# Patient Record
Sex: FEMALE | Race: WHITE | Employment: FULL TIME | URBAN - METROPOLITAN AREA
[De-identification: names, ages, dates, MRNs, and addresses within clinical notes are randomized per-mention and may not be internally consistent; named-entity substitution may affect disease eponyms.]

---

## 2020-01-01 ENCOUNTER — RESEARCH ENCOUNTER (OUTPATIENT)
Dept: ONCOLOGY | Age: 55
End: 2020-01-01

## 2020-01-01 ENCOUNTER — HOSPITAL ENCOUNTER (OUTPATIENT)
Dept: INFUSION THERAPY | Age: 55
Discharge: HOME | End: 2020-12-22
Payer: COMMERCIAL

## 2020-01-01 ENCOUNTER — OFFICE VISIT (OUTPATIENT)
Dept: ONCOLOGY | Age: 55
End: 2020-01-01
Payer: COMMERCIAL

## 2020-01-01 VITALS
HEART RATE: 78 BPM | TEMPERATURE: 98.7 F | HEIGHT: 63 IN | BODY MASS INDEX: 35.08 KG/M2 | SYSTOLIC BLOOD PRESSURE: 125 MMHG | DIASTOLIC BLOOD PRESSURE: 84 MMHG | WEIGHT: 198 LBS | OXYGEN SATURATION: 99 % | RESPIRATION RATE: 16 BRPM

## 2020-01-01 VITALS
DIASTOLIC BLOOD PRESSURE: 90 MMHG | SYSTOLIC BLOOD PRESSURE: 166 MMHG | HEART RATE: 68 BPM | BODY MASS INDEX: 35.19 KG/M2 | TEMPERATURE: 98.7 F | HEIGHT: 63 IN | OXYGEN SATURATION: 99 % | RESPIRATION RATE: 16 BRPM | WEIGHT: 198.6 LBS

## 2020-01-01 DIAGNOSIS — M19.90 ARTHRITIS: ICD-10-CM

## 2020-01-01 DIAGNOSIS — Z51.11 CHEMOTHERAPY MANAGEMENT, ENCOUNTER FOR: ICD-10-CM

## 2020-01-01 DIAGNOSIS — G62.9 NEUROPATHY: ICD-10-CM

## 2020-01-01 DIAGNOSIS — Z17.1 MALIGNANT NEOPLASM OF UPPER-OUTER QUADRANT OF LEFT BREAST IN FEMALE, ESTROGEN RECEPTOR NEGATIVE (HCC): Primary | ICD-10-CM

## 2020-01-01 DIAGNOSIS — C50.412 MALIGNANT NEOPLASM OF UPPER-OUTER QUADRANT OF LEFT BREAST IN FEMALE, ESTROGEN RECEPTOR NEGATIVE (HCC): Primary | ICD-10-CM

## 2020-01-01 LAB
ALBUMIN SERPL-MCNC: 3.4 G/DL (ref 3.5–5)
ALBUMIN/GLOB SERPL: 0.9 {RATIO} (ref 1.1–2.2)
ALP SERPL-CCNC: 101 U/L (ref 45–117)
ALT SERPL-CCNC: 42 U/L (ref 12–78)
ANION GAP SERPL CALC-SCNC: 2 MMOL/L (ref 5–15)
AST SERPL-CCNC: 22 U/L (ref 15–37)
BASOPHILS # BLD: 0 K/UL (ref 0–0.1)
BASOPHILS NFR BLD: 1 % (ref 0–1)
BILIRUB SERPL-MCNC: 0.3 MG/DL (ref 0.2–1)
BUN SERPL-MCNC: 12 MG/DL (ref 6–20)
BUN/CREAT SERPL: 15 (ref 12–20)
CALCIUM SERPL-MCNC: 9.1 MG/DL (ref 8.5–10.1)
CHLORIDE SERPL-SCNC: 108 MMOL/L (ref 97–108)
CO2 SERPL-SCNC: 31 MMOL/L (ref 21–32)
CREAT SERPL-MCNC: 0.8 MG/DL (ref 0.55–1.02)
DIFFERENTIAL METHOD BLD: ABNORMAL
EOSINOPHIL # BLD: 0.1 K/UL (ref 0–0.4)
EOSINOPHIL NFR BLD: 2 % (ref 0–7)
ERYTHROCYTE [DISTWIDTH] IN BLOOD BY AUTOMATED COUNT: 13.5 % (ref 11.5–14.5)
GLOBULIN SER CALC-MCNC: 3.6 G/DL (ref 2–4)
GLUCOSE SERPL-MCNC: 84 MG/DL (ref 65–100)
HCT VFR BLD AUTO: 37.8 % (ref 35–47)
HGB BLD-MCNC: 11.9 G/DL (ref 11.5–16)
IMM GRANULOCYTES # BLD AUTO: 0 K/UL (ref 0–0.04)
IMM GRANULOCYTES NFR BLD AUTO: 0 % (ref 0–0.5)
LYMPHOCYTES # BLD: 0.7 K/UL (ref 0.8–3.5)
LYMPHOCYTES NFR BLD: 15 % (ref 12–49)
MCH RBC QN AUTO: 28.9 PG (ref 26–34)
MCHC RBC AUTO-ENTMCNC: 31.5 G/DL (ref 30–36.5)
MCV RBC AUTO: 91.7 FL (ref 80–99)
MONOCYTES # BLD: 0.5 K/UL (ref 0–1)
MONOCYTES NFR BLD: 10 % (ref 5–13)
NEUTS SEG # BLD: 3.3 K/UL (ref 1.8–8)
NEUTS SEG NFR BLD: 72 % (ref 32–75)
NRBC # BLD: 0 K/UL (ref 0–0.01)
NRBC BLD-RTO: 0 PER 100 WBC
PLATELET # BLD AUTO: 176 K/UL (ref 150–400)
PLATELET COMMENTS,PCOM: ABNORMAL
PMV BLD AUTO: 10 FL (ref 8.9–12.9)
POTASSIUM SERPL-SCNC: 3.5 MMOL/L (ref 3.5–5.1)
PROT SERPL-MCNC: 7 G/DL (ref 6.4–8.2)
RBC # BLD AUTO: 4.12 M/UL (ref 3.8–5.2)
RBC MORPH BLD: ABNORMAL
SODIUM SERPL-SCNC: 141 MMOL/L (ref 136–145)
WBC # BLD AUTO: 4.6 K/UL (ref 3.6–11)

## 2020-01-01 PROCEDURE — 36415 COLL VENOUS BLD VENIPUNCTURE: CPT

## 2020-01-01 PROCEDURE — 99215 OFFICE O/P EST HI 40 MIN: CPT | Performed by: INTERNAL MEDICINE

## 2020-01-01 PROCEDURE — 96413 CHEMO IV INFUSION 1 HR: CPT

## 2020-01-01 PROCEDURE — 77030012965 HC NDL HUBR BBMI -A

## 2020-01-01 PROCEDURE — 85025 COMPLETE CBC W/AUTO DIFF WBC: CPT

## 2020-01-01 PROCEDURE — 80053 COMPREHEN METABOLIC PANEL: CPT

## 2020-01-01 PROCEDURE — 74011000250 HC RX REV CODE- 250: Performed by: INTERNAL MEDICINE

## 2020-01-01 PROCEDURE — 74011250636 HC RX REV CODE- 250/636: Performed by: INTERNAL MEDICINE

## 2020-01-01 RX ADMIN — Medication 1200 MG: at 12:50

## 2020-04-09 ENCOUNTER — DOCUMENTATION ONLY (OUTPATIENT)
Dept: ONCOLOGY | Age: 55
End: 2020-04-09

## 2020-04-09 ENCOUNTER — RESEARCH ENCOUNTER (OUTPATIENT)
Dept: ONCOLOGY | Age: 55
End: 2020-04-09

## 2020-04-09 ENCOUNTER — OFFICE VISIT (OUTPATIENT)
Dept: ONCOLOGY | Age: 55
End: 2020-04-09

## 2020-04-09 VITALS
WEIGHT: 205.6 LBS | RESPIRATION RATE: 18 BRPM | HEART RATE: 75 BPM | BODY MASS INDEX: 36.43 KG/M2 | HEIGHT: 63 IN | OXYGEN SATURATION: 96 % | TEMPERATURE: 98.5 F | SYSTOLIC BLOOD PRESSURE: 155 MMHG | DIASTOLIC BLOOD PRESSURE: 92 MMHG

## 2020-04-09 DIAGNOSIS — Z17.1 MALIGNANT NEOPLASM OF UPPER-OUTER QUADRANT OF LEFT BREAST IN FEMALE, ESTROGEN RECEPTOR NEGATIVE (HCC): Primary | ICD-10-CM

## 2020-04-09 DIAGNOSIS — C50.412 MALIGNANT NEOPLASM OF UPPER-OUTER QUADRANT OF LEFT BREAST IN FEMALE, ESTROGEN RECEPTOR NEGATIVE (HCC): Primary | ICD-10-CM

## 2020-04-09 RX ORDER — PROCHLORPERAZINE MALEATE 10 MG
10 TABLET ORAL
Qty: 50 TAB | Refills: 5 | Status: SHIPPED | OUTPATIENT
Start: 2020-04-09 | End: 2020-09-14 | Stop reason: ALTCHOICE

## 2020-04-09 RX ORDER — ONDANSETRON HYDROCHLORIDE 8 MG/1
8 TABLET, FILM COATED ORAL
Qty: 60 TAB | Refills: 3 | Status: SHIPPED | OUTPATIENT
Start: 2020-04-09 | End: 2020-09-14 | Stop reason: ALTCHOICE

## 2020-04-09 RX ORDER — LIDOCAINE AND PRILOCAINE 25; 25 MG/G; MG/G
CREAM TOPICAL AS NEEDED
Qty: 30 G | Refills: 0 | Status: SHIPPED | OUTPATIENT
Start: 2020-04-09 | End: 2021-01-01

## 2020-04-09 RX ORDER — LANOLIN ALCOHOL/MO/W.PET/CERES
50 CREAM (GRAM) TOPICAL DAILY
COMMUNITY
Start: 2020-03-19 | End: 2021-01-01

## 2020-04-09 RX ORDER — LISDEXAMFETAMINE DIMESYLATE 30 MG/1
30 CAPSULE ORAL DAILY
COMMUNITY
Start: 2020-02-26 | End: 2020-04-28

## 2020-04-09 NOTE — H&P (VIEW-ONLY)
[facility] [place] [facility] [place] [address] [facility] [address] [facility] W: [phone]  F: [phone] Reason for Visit:  
Opal Craig is a 48 y.o. female who is seen in consultation at the request of Dr. Jacklyn Gardner for evaluation of therapy for breast cancer Treatment History: · 4/6/20 left breast 3:00, 13cmfn, core bx:  IMC, gr 2-3, 1.3 cm, ER negative, SD negative, HER 2 negative at IHC 0; ki67 68%; LN + by core, 6 mm, gr 2-3 History of Present Illness:  
12/28/19 mammogram was negative, she felt the L breast mass herself, earlier in March 2020. FH:  Mother with breast cancer at age 79; maternal aunt with breast cancer in her 45s; maternal aunt with breast cancer at age 61; no ovarian, prostate, or pancreas cancer No past medical history on file. No past surgical history on file. Social History Tobacco Use  Smoking status: Light Tobacco Smoker  Smokeless tobacco: Never Used  Tobacco comment: Occasional cigar per patient Substance Use Topics  Alcohol use: Yes Alcohol/week: 6.0 standard drinks Types: 6 Glasses of wine per week Family History Problem Relation Age of Onset  Cancer Mother Breast  
 Cancer Maternal Aunt Breast  
 Cancer Paternal Uncle  Cancer Maternal Aunt Breast  
 Cancer Paternal Uncle Current Outpatient Medications Medication Sig  
 vitamin E acetate (VITAMIN E PO) Take  by mouth.  cholecalciferol, vitamin D3, (VITAMIN D3 PO) Take  by mouth.  prochlorperazine (COMPAZINE) 10 mg tablet Take 1 Tab by mouth every six (6) hours as needed for Nausea.  lidocaine-prilocaine (EMLA) topical cream Apply  to affected area as needed for Pain.  ondansetron hcl (ZOFRAN) 8 mg tablet Take 1 Tab by mouth every eight (8) hours as needed for Nausea.  pyridoxine, vitamin B6, (VITAMIN B-6) 50 mg tablet Take 50 mg by mouth daily.  Vyvanse 30 mg capsule Take 30 mg by mouth daily. No current facility-administered medications for this visit. No Known Allergies Review of Systems: A complete review of systems was obtained, negative except as described above. Physical Exam:  
 
Visit Vitals BP (!) 155/92 (BP 1 Location: Right arm, BP Patient Position: Sitting) Pulse 75 Temp 98.5 °F (36.9 °C) (Temporal) Resp 18 Ht 5' 3\" (1.6 m) Wt 205 lb 9.6 oz (93.3 kg) LMP  (Approximate) Comment: LMP July 2019 per patient SpO2 96% BMI 36.42 kg/m² ECOG PS: 0 General: No distress Eyes: Anicteric sclerae HENT: Atraumatic Neck: Supple Respiratory: Normal respiratory effort CV: No peripheral edema Skin: No rashes, ecchymoses, or petechiae Psych: Alert, oriented, appropriate affect, normal judgment/insight Breasts:  L breast 3:00, 7-8 cmfn, 2.5 cm mass, left ax, 2 - 2cm ax LN, and 1 cm LN Results: No results found for: WBC, HGB, HCT, PLT, MCV, ANEU, HGBPOC, HCTPOC, HGBEXT, HCTEXT, PLTEXT, HGBEXT, HCTEXT, PLTEXT No results found for: NA, K, CL, CO2, GLU, BUN, CREA, GFRAA, GFRNA, CA, NAPOC, KPOCT, CLPOC, GLUCPOC, IBUN, CREAPOC, ICAI No results found for: TBILI, ALT, SGOT, AP, TP, ALB, GLOB 
 
3/31/20 
3D mammogram:  1.6 cm mass in L breast 
US L breast 
3:00 left breast mass 1.6 cm, multiple LN in L ax tail Records reviewed and summarized above. Pathology report(s) reviewed above. Radiology report(s) reviewed above. Assessment/plan: 1. Left IMC, gr 2-3, triple negative, LN + by core:  cT1c cN1a cM0, stage IIA both anatomic and prognostic With multiple LN felt on exam, ordered CT c/a/p and bone scan for staging We explained to the patient that the goal of systemic adjuvant therapy is to improve the chances for cure and decrease the risk of relapse.  We explained why a patient can have microscopic cancer spread now even though physical examination, laboratory studies and imaging studies are negative for cancer. We explained that the same treatments used now as adjuvant or preventive treatments rarely if ever are curative in women who develop metastases. We discussed that there is no difference in overall survival between neoadjuvant and adjuvant chemotherapy. We discussed the rationale for neoadjuvant chemotherapy, if chemotherapy is warranted, as it is in this case: to avoid any potential delays in giving chemotherapy following surgery, to be able to see the response of the tumor to chemotherapy, and to potentially downstage the tumor prior to surgery. I discussed the potential risks of dose-dense chemotherapy with the patient. (DD AC-T, adriamycin 60 mg/m2; cyclophosphamide 600 mg/m2 q 2 weeks x 4; paclitaxel 80 mg/m2 qweekly x 12). Major toxicities include nausea and vomiting, stomatitis, fatigue, and a small risk of heart damage. Anemia frequently results and occasionally requires growth factors and rarely transfusions. Neutropenic fever is uncommon, but can be a life-threatening problem. Also, there is a small but increased risk of myelodysplasia and acute leukemia. We provided the patient with detailed information concerning toxicity including frequent toxicities that only last a few days, such as nausea, vomiting, mouth sores, arthralgia, myalgia, and potentially allergic reactions to paclitaxel, as well as toxicities which can be longer lasting including total alopecia, fatigue, anemia and neuropathy. We provided the patient with detailed information concerning the toxicities of their regimen in addition to our verbal discussion. We discussed the potential addition of carboplatin auc 6 q 3 weeks during weekly paclitaxel as evaluated in CALGB 04773, showing a significant improvement in pCR for patients with stage II-III triple negative breast cancer. Additional side effects of added myelosuppression, fatigue, renal damage with dehydration, and nausea and vomiting were discussed.  Would use AUC 5 as that is the dose that is being used in all current investigations Also discussed the clinical trial NSABP B-59, which is carbo/taxol then DD [facility] +/- atezolizumab. Discussed the positive findings of MERCK 522 with pembrolizumab We discussed the risks and benefits of atezolizumab therapy today. Potential side effects include, but are not limited to fatigue, rash, auto-immune issues, infertility, allergic reactions, and rarely, death. Discussed cold caps (not working with [facility]). Discussed oral and peripheral cryotherapy Discussed COVID19 risks and LETI guidelines stating neoadj chemotherapy for [place] breast cancer is preferred vs upfront surgery during this pandemic. After this discussion, she is agreeable to chemotherapy and port placement. She will let us know by early next week if she would like to consent to B-59. Would aim to start chemo likely the week of the 20th The patient was given the following prescriptions with written and verbal instructions on how to use each:  Compazine, zofran, olanzapine (held until [facility]), a wig, and emla cream.  IV Graciela Sidle will be used with AC. Neulasta the following day with AC (bone pain side effect discussed). Dr. Shabnam May to place port, discussed with her and will let her know when pt decides on chemotherapy regimen (trial or not) TTE ordered Breast MRI ordered 2. Emotional well being:  She has excellent support and is coping well with her disease 3. Genetic testing:   Discussed potential ramifications of a positive test including the potential need for bilateral mastectomies and bilateral oophorectomies and the risk then for her family members to also have a mutation. VUS discussed also. Will do testing when draw blood next 
 
> 80 min were spent with this patient with > 50% of that time spent in face to face counseling. I appreciate the opportunity to participate in Ms. Amanda Kurtz's care.  
 
Signed By: Coy Floyd MD   
 
 No orders of the defined types were placed in this encounter.

## 2020-04-09 NOTE — PROGRESS NOTES
Met with patient to discuss NSABP B-59. We discussed the study in detail and I answered any questions she had at this time. I gave her a read-only consent to take home and review. She said that she would read the consent tonight and call us back tomorrow or we will call her back on Monday if we do not hear from her tomorrow.

## 2020-04-09 NOTE — PATIENT INSTRUCTIONS
Common Side Effects of Chemotherapy  Decreased Blood Counts Your blood counts can decrease temporarily due to chemotherapy, they will recover over time. This is an expected side effect that your Doctor will be monitoring.  - If you experience fevers (temperature >100.4°F), bleeding or unexplained bruising, please call the office right away   Risk of Infection Your white blood cells can decrease temporarily due to chemotherapy and can put you at higher risk of infection. Washing hands frequently with soap and avoiding sick contacts can reduce your risk of infection.  - If you experience fevers (temperature >100.4°F), shaking chills, or any signs of infection, please call the office immediately   Anemia Chemotherapy can cause your red blood cells to temporarily decrease; this is an expected side effect that your Doctor will be monitoring.  - You may experience fatigue if this occurs, please notify the office if you experience bleeding, shortness of breath with minimal exertion or at rest, rapid heartbeat, or feeling as though you may lose consciousness. Hair Loss Chemotherapy can affect your hair follicles and cause you to lose hair. This can occur on your scalp hair but also all over your body including eyebrows and eye lashes   Nausea  You have been prescribed nausea medication to take if needed. Please follow the directions given to you by your Doctor. - Please call the office if the medications you have been given are not relieving nausea. Vomiting Make sure you are taking anti-nausea medication as prescribed. Eating small amounts of bland foods frequently can help. - Please call the office right away if you are vomiting more than 4 times per day or are unable to keep down food or fluids   Diarrhea Eating small amounts of bland foods frequently can help, increase your fluid intake. It is usually ok to take Imodium for diarrhea.   - Please call the office right away if you experience more than 4 episodes of watery diarrhea or if you are feeling dehydrated. Female patients of childbearing age need to avoid pregnancy during chemotherapy. You can reach Medical Oncology at [address] [place] [address] with further questions or concerns at: [phone].  - Calls during normal business hours will reach our office.  - Calls after hours or on the weekend will reach an answering service and the on-call Oncologist will return your call. Prognosis: Good     This is our best current assessment. Cancers respond differently to treatment. Overall prognosis depends on many factors including other conditions, cancer stage, side effects, and other unforeseen events. Goal of therapy: Curative     Expected response to treatment:  Very good: Anticipate remission (no sign of cancer) and possible cancer cure    Treatment benefits and harms:  We discussed potential short term side effects to include:see handouts    Long term side effects of treatment:  see handouts    Quality of life: Quality of life concerns have been addressed. Treatment as outlined is expected to have minimal impact on patients quality of life.       prescriptions with written and verbal instructions on how to use each:  Compazine, zofran, a wig, and emla cream.    Port for chemo    Echo for heart

## 2020-04-09 NOTE — PROGRESS NOTES
Bjorn Owen is a 48 y.o. female here as a new patient for the evaluation of therapy for breast cancer.

## 2020-04-09 NOTE — PROGRESS NOTES
[facility] [place] [facility] [place] [address] [facility] [address] [facility] [facility] [phone]  F: [phone]      Reason for Visit:   Maryuri Webber is a 48 y.o. female who is seen in consultation at the request of Dr. Janis King for evaluation of therapy for breast cancer    Treatment History:   · 4/6/20 left breast 3:00, 13cmfn, core bx:  IMC, gr 2-3, 1.3 cm, ER negative, OR negative, HER 2 negative at IHC 0; ki67 68%; LN + by core, 6 mm, gr 2-3    History of Present Illness:   12/28/19 mammogram was negative, she felt the L breast mass herself, earlier in March 2020. FH:  Mother with breast cancer at age 79; maternal aunt with breast cancer in her 45s; maternal aunt with breast cancer at age 61; no ovarian, prostate, or pancreas cancer    No past medical history on file. No past surgical history on file. Social History     Tobacco Use    Smoking status: Light Tobacco Smoker    Smokeless tobacco: Never Used    Tobacco comment: Occasional cigar per patient   Substance Use Topics    Alcohol use: Yes     Alcohol/week: 6.0 standard drinks     Types: 6 Glasses of wine per week      Family History   Problem Relation Age of Onset    Cancer Mother         Breast    Cancer Maternal Aunt         Breast    Cancer Paternal Uncle     Cancer Maternal Aunt         Breast    Cancer Paternal Uncle      Current Outpatient Medications   Medication Sig    vitamin E acetate (VITAMIN E PO) Take  by mouth.  cholecalciferol, vitamin D3, (VITAMIN D3 PO) Take  by mouth.  prochlorperazine (COMPAZINE) 10 mg tablet Take 1 Tab by mouth every six (6) hours as needed for Nausea.  lidocaine-prilocaine (EMLA) topical cream Apply  to affected area as needed for Pain.  ondansetron hcl (ZOFRAN) 8 mg tablet Take 1 Tab by mouth every eight (8) hours as needed for Nausea.  pyridoxine, vitamin B6, (VITAMIN B-6) 50 mg tablet Take 50 mg by mouth daily.     Vyvanse 30 mg capsule Take 30 mg by mouth daily. No current facility-administered medications for this visit. No Known Allergies     Review of Systems: A complete review of systems was obtained, negative except as described above. Physical Exam:     Visit Vitals  BP (!) 155/92 (BP 1 Location: Right arm, BP Patient Position: Sitting)   Pulse 75   Temp 98.5 °F (36.9 °C) (Temporal)   Resp 18   Ht 5' 3\" (1.6 m)   Wt 205 lb 9.6 oz (93.3 kg)   LMP  (Approximate) Comment: LMP July 2019 per patient   SpO2 96%   BMI 36.42 kg/m²     ECOG PS: 0  General: No distress  Eyes: Anicteric sclerae  HENT: Atraumatic  Neck: Supple  Respiratory: Normal respiratory effort  CV: No peripheral edema  Skin: No rashes, ecchymoses, or petechiae  Psych: Alert, oriented, appropriate affect, normal judgment/insight  Breasts:  L breast 3:00, 7-8 cmfn, 2.5 cm mass, left ax, 2 - 2cm ax LN, and 1 cm LN    Results:   No results found for: WBC, HGB, HCT, PLT, MCV, ANEU, HGBPOC, HCTPOC, HGBEXT, HCTEXT, PLTEXT, HGBEXT, HCTEXT, PLTEXT  No results found for: NA, K, CL, CO2, GLU, BUN, CREA, GFRAA, GFRNA, CA, NAPOC, KPOCT, CLPOC, GLUCPOC, IBUN, CREAPOC, ICAI  No results found for: TBILI, ALT, SGOT, AP, TP, ALB, GLOB    3/31/20  3D mammogram:  1.6 cm mass in L breast  US L breast  3:00 left breast mass 1.6 cm, multiple LN in L ax tail    Records reviewed and summarized above. Pathology report(s) reviewed above. Radiology report(s) reviewed above. Assessment/plan:   1. Left IMC, gr 2-3, triple negative, LN + by core:  cT1c cN1a cM0, stage IIA both anatomic and prognostic  postmenopausal    With multiple LN felt on exam, ordered CT c/a/p and bone scan for staging    We explained to the patient that the goal of systemic adjuvant therapy is to improve the chances for cure and decrease the risk of relapse. We explained why a patient can have microscopic cancer spread now even though physical examination, laboratory studies and imaging studies are negative for cancer.  We explained that the same treatments used now as adjuvant or preventive treatments rarely if ever are curative in women who develop metastases. We discussed that there is no difference in overall survival between neoadjuvant and adjuvant chemotherapy. We discussed the rationale for neoadjuvant chemotherapy, if chemotherapy is warranted, as it is in this case: to avoid any potential delays in giving chemotherapy following surgery, to be able to see the response of the tumor to chemotherapy, and to potentially downstage the tumor prior to surgery. I discussed the potential risks of dose-dense chemotherapy with the patient. (DD AC-T, adriamycin 60 mg/m2; cyclophosphamide 600 mg/m2 q 2 weeks x 4; paclitaxel 80 mg/m2 qweekly x 12). Major toxicities include nausea and vomiting, stomatitis, fatigue, and a small risk of heart damage. Anemia frequently results and occasionally requires growth factors and rarely transfusions. Neutropenic fever is uncommon, but can be a life-threatening problem. Also, there is a small but increased risk of myelodysplasia and acute leukemia. We provided the patient with detailed information concerning toxicity including frequent toxicities that only last a few days, such as nausea, vomiting, mouth sores, arthralgia, myalgia, and potentially allergic reactions to paclitaxel, as well as toxicities which can be longer lasting including total alopecia, fatigue, anemia and neuropathy. We provided the patient with detailed information concerning the toxicities of their regimen in addition to our verbal discussion. We discussed the potential addition of carboplatin auc 6 q 3 weeks during weekly paclitaxel as evaluated in CALGB 56506, showing a significant improvement in pCR for patients with stage II-III triple negative breast cancer. Additional side effects of added myelosuppression, fatigue, renal damage with dehydration, and nausea and vomiting were discussed.  Would use AUC 5 as that is the dose that is being used in all current investigations    Also discussed the clinical trial NSABP B-59, which is carbo/taxol then DD [facility] +/- atezolizumab. Discussed the positive findings of FDOJO 159 with pembrolizumab    We discussed the risks and benefits of atezolizumab therapy today. Potential side effects include, but are not limited to fatigue, rash, auto-immune issues, infertility, allergic reactions, and rarely, death. Discussed cold caps (not working with [facility]). Discussed oral and peripheral cryotherapy    Discussed COVID19 risks and LETI guidelines stating neoadj chemotherapy for [place] breast cancer is preferred vs upfront surgery during this pandemic. After this discussion, she is agreeable to chemotherapy and port placement. She will let us know by early next week if she would like to consent to B-59. Would aim to start chemo likely the week of the 20th    The patient was given the following prescriptions with written and verbal instructions on how to use each:  Compazine, zofran, olanzapine (held until [facility]), a wig, and emla cream.  IV [place] will be used with AC. Neulasta the following day with AC (bone pain side effect discussed). Dr. Radha Torres to place port, discussed with her and will let her know when pt decides on chemotherapy regimen (trial or not)    TTE ordered    Breast MRI ordered      2. Emotional well being:  She has excellent support and is coping well with her disease    3. Genetic testing:   Discussed potential ramifications of a positive test including the potential need for bilateral mastectomies and bilateral oophorectomies and the risk then for her family members to also have a mutation. VUS discussed also. Will do testing when draw blood next    > 80 min were spent with this patient with > 50% of that time spent in face to face counseling. I appreciate the opportunity to participate in Ms. [place] Kurtz's care.     Signed By: Johan Tate MD      No orders of the defined types were placed in this encounter.

## 2020-04-09 NOTE — PROGRESS NOTES
[address] at [address], [address] [facility] [address] [facility] W: [phone]  F: [phone] Reason for Visit:  
Angelia Boas is a 48 y.o. female who is seen in consultation at the request of Dr. Annamarie Simmons for evaluation of therapy for breast cancer Treatment History: · 4/6/20 left breast 3:00, 13cmfn, core bx:  IMC, gr 2-3, 1.3 cm, ER negative, CO negative, HER 2 negative at IHC 0; ki67 68%; LN + by core, 6 mm, gr 2-3 History of Present Illness:  
*** 
***Include 4 elements, or status of 3 chronic or inactive problems*** FH:  Mother with breast cancer at age 79; maternal aunt with breast cancer at age 48; maternal aunt with breast cancer at age 61 No past medical history on file. No past surgical history on file. Social History Tobacco Use  Smoking status: Not on file Substance Use Topics  Alcohol use: Not on file No family history on file. No current outpatient medications on file. No current facility-administered medications for this visit. Allergies not on file Review of Systems: A complete review of systems was obtained, negative except as described above. Physical Exam:  
There were no vitals taken for this visit. ECOG PS: *** General: No distress Eyes: PERRLA, anicteric sclerae HENT: Atraumatic, OP clear Neck: Supple Lymphatic: No cervical, supraclavicular adenopathy Respiratory: CTAB, normal respiratory effort CV: Normal rate, regular rhythm, no murmurs, no peripheral edema GI: Soft, nontender, nondistended, no masses, no hepatomegaly, no splenomegaly; + BS 
MS:  Digits without clubbing or cyanosis. Skin: No rashes, ecchymoses, or petechiae. Normal temperature, turgor, and texture. Psych: Alert, oriented, appropriate affect, normal judgment/insight Results: No results found for: WBC, HGB, HCT, PLT, MCV, ANEU, HGBPOC, HCTPOC, HGBEXT, HCTEXT, PLTEXT 
 No results found for: NA, K, CL, CO2, GLU, BUN, CREA, GFRAA, GFRNA, CA, NAPOC, KPOCT, CLPOC, GLUCPOC, IBUN, CREAPOC, ICAI No results found for: TBILI, ALT, SGOT, AP, TP, ALB, GLOB 
 
3/31/20 
3D mammogram:  1.6 cm mass in L breast 
US L breast 
3:00 left breast mass 1.6 cm, multiple LN in L ax tail Records reviewed and summarized above. Pathology report(s) reviewed above. Radiology report(s) reviewed above. Assessment/plan: 1. Left IMC, gr 2-3, triple negative, LN + by core:  cT1c cN1a cM0 We explained to the patient that the goal of systemic adjuvant therapy is to improve the chances for cure and decrease the risk of relapse. We explained why a patient can have microscopic cancer spread now even though physical examination, laboratory studies and imaging studies are negative for cancer. We explained that the same treatments used now as adjuvant or preventive treatments rarely if ever are curative in women who develop metastases. We discussed that there is no difference in overall survival between neoadjuvant and adjuvant chemotherapy. We discussed the rationale for neoadjuvant chemotherapy, if chemotherapy is warranted, as it is in this case: to avoid any potential delays in giving chemotherapy following surgery, to be able to see the response of the tumor to chemotherapy, and to potentially downstage the tumor prior to surgery. I discussed the potential risks of dose-dense chemotherapy with the patient. (DD AC-T, adriamycin 60 mg/m2; cyclophosphamide 600 mg/m2 q 2 weeks x 4; paclitaxel 80 mg/m2 qweekly x 12). Major toxicities include nausea and vomiting, stomatitis, fatigue, and a small risk of heart damage. Anemia frequently results and occasionally requires growth factors and rarely transfusions. Neutropenic fever is uncommon, but can be a life-threatening problem. Also, there is a small but increased risk of myelodysplasia and acute leukemia.  We provided the patient with detailed information concerning toxicity including frequent toxicities that only last a few days, such as nausea, vomiting, mouth sores, arthralgia, myalgia, and potentially allergic reactions to paclitaxel, as well as toxicities which can be longer lasting including total alopecia, fatigue, anemia and neuropathy. We provided the patient with detailed information concerning the toxicities of their regimen in addition to our verbal discussion. We discussed the potential addition of carboplatin auc 6 q 3 weeks during weekly paclitaxel as evaluated in CALGB 97433, showing a significant improvement in pCR for patients with stage II-III triple negative breast cancer. Additional side effects of added myelosuppression, fatigue, renal damage with dehydration, and nausea and vomiting were discussed. Would use AUC 5 as that is the dose that is being used in all current investigations Also discussed the clinical trial NSABP B-59, which is carbo/taxol then DD [facility] +/- atezolizumab. Discussed the positive findings of MERCK 522 with pembrolizumab We discussed the risks and benefits of atezolizumab therapy today. Potential side effects include, but are not limited to fatigue, rash, auto-immune issues, infertility, allergic reactions, and rarely, death. Discussed cold caps (not working with [facility]). Discussed oral and peripheral cryotherapy Discussed COVID19 risks and LETI guidelines stating neoadj chemotherapy for [place] breast cancer is preferred vs upfront surgery during this pandemic. After this discussion, she is agreeable to The patient was given the following prescriptions with written and verbal instructions on how to use each:  Compazine, zofran, olanzapine (held until [facility]), a wig, and emla cream.  IV Hui Found will be used with AC. Neulasta the following day with AC (bone pain side effect discussed). Dr. Severa Marking to place port TTE ordered 2.  Emotional well being:  She has excellent support and is coping well with her disease I appreciate the opportunity to participate in MsNoemí Jhony Quintanilla Jerardo's care. Signed By: Aquilino Bertrand MD   
 
No orders of the defined types were placed in this encounter.

## 2020-04-10 ENCOUNTER — OFFICE VISIT (OUTPATIENT)
Dept: SURGERY | Age: 55
End: 2020-04-10

## 2020-04-10 DIAGNOSIS — Z17.1 MALIGNANT NEOPLASM OF UPPER-OUTER QUADRANT OF LEFT BREAST IN FEMALE, ESTROGEN RECEPTOR NEGATIVE (HCC): Primary | ICD-10-CM

## 2020-04-10 DIAGNOSIS — C50.912 MALIGNANT NEOPLASM OF LEFT FEMALE BREAST, UNSPECIFIED ESTROGEN RECEPTOR STATUS, UNSPECIFIED SITE OF BREAST (HCC): Primary | ICD-10-CM

## 2020-04-10 DIAGNOSIS — C50.912 BREAST CANCER METASTASIZED TO AXILLARY LYMPH NODE, LEFT (HCC): Primary | ICD-10-CM

## 2020-04-10 DIAGNOSIS — C77.3 BREAST CANCER METASTASIZED TO AXILLARY LYMPH NODE, LEFT (HCC): Primary | ICD-10-CM

## 2020-04-10 DIAGNOSIS — C50.412 MALIGNANT NEOPLASM OF UPPER-OUTER QUADRANT OF LEFT BREAST IN FEMALE, ESTROGEN RECEPTOR NEGATIVE (HCC): Primary | ICD-10-CM

## 2020-04-10 NOTE — PROGRESS NOTES
Oncology Navigator Psychosocial Assessment Reason for Assessment:   
[]Depression  []Anxiety  []Caregiver Kirby  []Maladaptive Coping with Serious Illness   [x] Social Work Referral [x] Initial Assessment  [] Other Sources of Information:   
[x]Patient  []Family  [x]Staff  [x]Medical Record Advance Care Planning: 
Advance Care Planning 4/10/2020 Confirm Advance Directive None Patient Would Like to Complete Advance Directive No  
 
 
Mental Status:   
[x]Alert  []Lethargic  []Unresponsive   [] Unable to assess Oriented to:  [x]Person  [x]Place  [x]Time  [x]Situation Barriers to Learning:   
[]Language  []Developmental  []Cognitive  []Altered Mental Status  []Visual/Hearing Impairment  []Unable to Read/Write  []Motivational   [x]No Barriers Identified  []Other: 
 
Relationship Status: 
[]Single  []  []Significant Other/Life Partner  [x]  []  [] Living Circumstances: 
[]Lives Alone  [x]Family/Significant Other in Household  []Roommates  []Children in the Home  []Paid Caregivers  []Assisted Living Facility/Group [address]  []Homeless  []Incarcerated  []Environmental/Care Concerns  []other: 
 
Employment Status: 
[x]Employed Full-time []Employed Part-time []Homemaker [] Disabled  [] Retired []Other: 
 
Support System:   
[x]Strong  []Fair  []Limited Financial/Legal Concerns:   
[]Uninsured  []Limited Income/Resources  []Non-Citizen  [x]No Concerns Identified  []Financial POA:   
[]Other: 
 
Protestant/Spiritual/Existential: 
[]Strong Sense of Spirituality  []Involved in [employer] []Request  Visit  []Expressing Spiritual/Existential Angst  [x]No Concerns Identified Coping with Illness:       
 Patient: Family/Caregiver:  
Understanding and Acceptance of Illness/Prognosis  [x] [] Strong Sense of Resilience [x] [] Self Reflection [x] [] Engaged Support System [x] [] Does not Readily Discuss Illness [] [] Denial of Terminal Status [] [] Anger [] [] Depression [] [] Anxiety/Fear [] []  
Bargaining [] [] Recent Diagnosis/Prognosis [x] [] Difficulties with Body Image [] [] Loss of Identity [] [] Excessive Substance Use [] [] Mental Health History [] []  
Enmeshed Relationships [] [] History of Loss [] [] Anticipatory Grief [] [] Concern for Complicated Grief [] [] Suicidal Ideation or Plan [] [] Unable to assess [] []  
        
Narrative: Patient here for initial consultation with Dr. Vikash Martinez. Met with patient to introduce social work role and supports. Patient is divorces and has two adult children. Her daughter lives with her in her private residence and her son is in college. Her family live about 2 hours away. Her coworkers provide most of her local support. She works full time  for [employer]. Provided supportive listening as she ventilates feelings regarding diagnosis and treatment. Patient denies depression or anxiety and tells me she is actively coping with diagnosis at this time. She feels at 'some point she will cry but is too busy planning right now'. She often assumes the caregiver role and tells me it will be hard to adjust to being the  of help but recognizes the importance of support during this time. Provided reassurance that we are here to support her during this process. Reviewed barriers to care and none identified a this time. Patient plans to work during treatment. She is able to work for home and has a flexible schedule. Her daughter is able to help with care needs at home and coworkers have offered to provide transportation. encouraged 
 her to contact me for psychosocial support. Assessment/Action: 1. Provided supportive listening and patient is actively coping with diagnosis. She demonstrates insight and offers self-reflection. She well supported with practical and emotional needs. 2. Wig information provided. 3. Ongoing psychosocial support at desired by patient. Plan/Referral:   
Insurance/Entitlements referral 
DME/WIG referral 
 
Thank you, Jon Gardner LCSW This note will not be viewable in [address]

## 2020-04-10 NOTE — PERIOP NOTES
PAT TELEPHONE INTERVIEW COMPLETED. PRE-OP INSTRUCTIONS REVIEWED WITH PATIENT WHICH INCLUDED INSTRUCTIONS ON MEDICATIONS AND NPO AFTER MIDNIGHT. INSTRUCTIONS GIVEN ON THE USE OF CHLORHEXIDINE SOLUTION THE EVENING BEFORE AND THE MORNING OF SURGERY PATIENT VOICED UNDERSTANDING OF SAME.

## 2020-04-13 ENCOUNTER — RESEARCH ENCOUNTER (OUTPATIENT)
Dept: ONCOLOGY | Age: 55
End: 2020-04-13

## 2020-04-13 NOTE — PROGRESS NOTES
Informed consent was reviewed by RN with patient prior to signing. Possible side effects were discussed in detail, with patient being advised to inform RN or Dr. Kodak Tapia immediately in the event of any side effects once drug is started. All questions were answered adequately by RN or Dr. Kodak Tapia. Patient signed consent today for study [address] [place] [address]. / A Randomized, Double-Blind, Phase III Clinical Trial of Neoadjuvant Chemotherapy with Atezolizumab or Placebo in Patients with Triple-Negative Breast Cancer Followed by Adjuvant Continuation of Atezolizumab or Placebo. A copy of ICF given to patient. No additional questions at this time.

## 2020-04-13 NOTE — PROGRESS NOTES
Peyman Acosta is a 49 yo female  who was phone evaluated on 4/10/2020. I spent 30 minutes on the phone. Consent:  
He and/or his healthcare decision maker is aware that this patient-initiated Telehealth encounter is a billable service, with coverage as determined by his insurance carrier. He is aware that he may receive a bill and has provided verbal consent to proceed: Yes I was at home while conducting this encounter. HPI:  
49 yo female with Left breast carcinoma · 20 left breast  3:00, 13cmfn, core bx:  IMC, gr 2-3, 1.3 cm, ER negative, NJ negative, HER 2 negative at IHC 0; ki67 68%; LN + by core, 6 mm, gr 2-3 
  
History of Present Illness:  
19 mammogram was negative, she felt the L breast mass herself, earlier in 2020. 
  
FH: Mother with breast cancer at age 79; maternal aunt with breast cancer in her 45s; maternal aunt with breast cancer at age 61; no ovarian, prostate, or pancreas cancer 
  
ROS: negative except for left breast mass Past Medical History:  
Diagnosis Date  ADHD  Cancer ([facility] [address] LEFT BREAST CA  
 Sleep apnea MILD -NO CPAP Past Surgical History:  
Procedure Laterality Date  HX  SECTION  98, 99  
 HX GI    
 COLONOSCOPY  
 HX ORTHOPAEDIC    
 RIGHT ELBOW-FATTY MASS Social History Socioeconomic History  Marital status: UNKNOWN Spouse name: Not on file  Number of children: Not on file  Years of education: Not on file  Highest education level: Not on file Occupational History  Not on file Social Needs  Financial resource strain: Not on file  Food insecurity Worry: Not on file Inability: Not on file  Transportation needs Medical: Not on file Non-medical: Not on file Tobacco Use  Smoking status: Light Tobacco Smoker  Smokeless tobacco: Never Used  Tobacco comment: RARE cigar per patient Substance and Sexual Activity  Alcohol use:  Yes  
 Alcohol/week: 6.0 standard drinks Types: 6 Glasses of wine per week  Drug use: Never  Sexual activity: Not on file Lifestyle  Physical activity Days per week: Not on file Minutes per session: Not on file  Stress: Not on file Relationships  Social connections Talks on phone: Not on file Gets together: Not on file Attends Mosque service: Not on file Active member of club or organization: Not on file Attends meetings of clubs or organizations: Not on file Relationship status: Not on file  Intimate partner violence Fear of current or ex partner: Not on file Emotionally abused: Not on file Physically abused: Not on file Forced sexual activity: Not on file Other Topics Concern  Not on file Social History Narrative  Not on file OB History No obstetric history on file. Current Outpatient Medications:  
  pyridoxine, vitamin B6, (VITAMIN B-6) 50 mg tablet, Take 50 mg by mouth daily. , Disp: , Rfl:  
  Vyvanse 30 mg capsule, Take 30 mg by mouth daily. , Disp: , Rfl:  
  vitamin E acetate (VITAMIN E PO), Take  by mouth daily. , Disp: , Rfl:  
  cholecalciferol, vitamin D3, (VITAMIN D3 PO), Take 5,000 Units by mouth daily. , Disp: , Rfl:  
  prochlorperazine (COMPAZINE) 10 mg tablet, Take 1 Tab by mouth every six (6) hours as needed for Nausea., Disp: 50 Tab, Rfl: 5 
  lidocaine-prilocaine (EMLA) topical cream, Apply  to affected area as needed for Pain., Disp: 30 g, Rfl: 0 
  ondansetron hcl (ZOFRAN) 8 mg tablet, Take 1 Tab by mouth every eight (8) hours as needed for Nausea., Disp: 60 Tab, Rfl: 3 No Known Allergies Physical: this is a phone note. Patient was examined by Dr. Shanti Kumar medical oncology on 4/9/2020. A/P  
1. Staging scans and breast mri per Dr. Shanti Kumar. Plan is staging, port placement by myself, then neoadjuvant chemo. I spoke with the patient and discussed port placement.  The procedure and risks were discussed. Risks include bleeding, bruising, scar, infection, need for more surgery and pneumothorax. She understood and wished to proceed. She was happy with the treatment plan. We will discuss future breast surgery in office at later time. Pursuant to the emergency declaration under the [facility], [facility] waiver authority and the [employer] Act, this Virtual Visit was conducted, with patient's consent, to reduce the patient's risk of exposure to COVID-19 and provide continuity of care for an established patient. Services were provided through a phone discussion to substitute for in-person clinic visit. Dr. Misha West

## 2020-04-14 ENCOUNTER — HOSPITAL ENCOUNTER (OUTPATIENT)
Dept: CT IMAGING | Age: 55
Discharge: HOME | End: 2020-04-14
Attending: INTERNAL MEDICINE
Payer: COMMERCIAL

## 2020-04-14 ENCOUNTER — HOSPITAL ENCOUNTER (OUTPATIENT)
Dept: NUCLEAR MEDICINE | Age: 55
Discharge: HOME | End: 2020-04-14
Attending: INTERNAL MEDICINE
Payer: COMMERCIAL

## 2020-04-14 ENCOUNTER — ANESTHESIA EVENT (OUTPATIENT)
Dept: SURGERY | Age: 55
End: 2020-04-14
Payer: COMMERCIAL

## 2020-04-14 DIAGNOSIS — Z17.1 MALIGNANT NEOPLASM OF UPPER-OUTER QUADRANT OF LEFT BREAST IN FEMALE, ESTROGEN RECEPTOR NEGATIVE (HCC): ICD-10-CM

## 2020-04-14 DIAGNOSIS — C50.412 MALIGNANT NEOPLASM OF UPPER-OUTER QUADRANT OF LEFT BREAST IN FEMALE, ESTROGEN RECEPTOR NEGATIVE (HCC): ICD-10-CM

## 2020-04-14 DIAGNOSIS — C50.912 MALIGNANT NEOPLASM OF LEFT FEMALE BREAST, UNSPECIFIED ESTROGEN RECEPTOR STATUS, UNSPECIFIED SITE OF BREAST (HCC): Primary | ICD-10-CM

## 2020-04-14 PROCEDURE — 74177 CT ABD & PELVIS W/CONTRAST: CPT

## 2020-04-14 PROCEDURE — 74011636320 HC RX REV CODE- 636/320: Performed by: RADIOLOGY

## 2020-04-14 PROCEDURE — 78306 BONE IMAGING WHOLE BODY: CPT

## 2020-04-14 RX ADMIN — IOPAMIDOL 100 ML: 755 INJECTION, SOLUTION INTRAVENOUS at 12:04

## 2020-04-15 ENCOUNTER — APPOINTMENT (OUTPATIENT)
Dept: GENERAL RADIOLOGY | Age: 55
End: 2020-04-15
Attending: SURGERY
Payer: COMMERCIAL

## 2020-04-15 ENCOUNTER — HOSPITAL ENCOUNTER (OUTPATIENT)
Age: 55
Setting detail: OUTPATIENT SURGERY
Discharge: HOME | End: 2020-04-15
Attending: SURGERY | Admitting: SURGERY
Payer: COMMERCIAL

## 2020-04-15 ENCOUNTER — ANESTHESIA (OUTPATIENT)
Dept: SURGERY | Age: 55
End: 2020-04-15
Payer: COMMERCIAL

## 2020-04-15 VITALS
BODY MASS INDEX: 36.14 KG/M2 | OXYGEN SATURATION: 96 % | SYSTOLIC BLOOD PRESSURE: 130 MMHG | TEMPERATURE: 97.9 F | RESPIRATION RATE: 12 BRPM | HEART RATE: 63 BPM | HEIGHT: 63 IN | DIASTOLIC BLOOD PRESSURE: 77 MMHG | WEIGHT: 204 LBS

## 2020-04-15 DIAGNOSIS — C50.912 MALIGNANT NEOPLASM OF LEFT FEMALE BREAST, UNSPECIFIED ESTROGEN RECEPTOR STATUS, UNSPECIFIED SITE OF BREAST (HCC): ICD-10-CM

## 2020-04-15 LAB — HCG UR QL: NEGATIVE

## 2020-04-15 PROCEDURE — 74011250636 HC RX REV CODE- 250/636: Performed by: SURGERY

## 2020-04-15 PROCEDURE — 77030040922 HC BLNKT HYPOTHRM STRY -A

## 2020-04-15 PROCEDURE — 74011250636 HC RX REV CODE- 250/636: Performed by: NURSE ANESTHETIST, CERTIFIED REGISTERED

## 2020-04-15 PROCEDURE — 71045 X-RAY EXAM CHEST 1 VIEW: CPT

## 2020-04-15 PROCEDURE — 81025 URINE PREGNANCY TEST: CPT

## 2020-04-15 PROCEDURE — 77030018836 HC SOL IRR NACL ICUM -A: Performed by: SURGERY

## 2020-04-15 PROCEDURE — 74011000250 HC RX REV CODE- 250: Performed by: SURGERY

## 2020-04-15 PROCEDURE — 74011250636 HC RX REV CODE- 250/636: Performed by: ANESTHESIOLOGY

## 2020-04-15 PROCEDURE — 74011250637 HC RX REV CODE- 250/637: Performed by: ANESTHESIOLOGY

## 2020-04-15 PROCEDURE — 77030040361 HC SLV COMPR DVT MDII -B: Performed by: SURGERY

## 2020-04-15 PROCEDURE — 77030008684 HC TU ET CUF COVD -B: Performed by: ANESTHESIOLOGY

## 2020-04-15 PROCEDURE — 76060000033 HC ANESTHESIA 1 TO 1.5 HR: Performed by: SURGERY

## 2020-04-15 PROCEDURE — 77030002996 HC SUT SLK J&J -A: Performed by: SURGERY

## 2020-04-15 PROCEDURE — 76010000149 HC OR TIME 1 TO 1.5 HR: Performed by: SURGERY

## 2020-04-15 PROCEDURE — C1788 PORT, INDWELLING, IMP: HCPCS | Performed by: SURGERY

## 2020-04-15 PROCEDURE — 77030002933 HC SUT MCRYL J&J -A: Performed by: SURGERY

## 2020-04-15 PROCEDURE — 76210000006 HC OR PH I REC 0.5 TO 1 HR: Performed by: SURGERY

## 2020-04-15 PROCEDURE — 77030011640 HC PAD GRND REM COVD -A: Performed by: SURGERY

## 2020-04-15 PROCEDURE — 77030026438 HC STYL ET INTUB CARD -A: Performed by: ANESTHESIOLOGY

## 2020-04-15 PROCEDURE — 77030031139 HC SUT VCRL2 J&J -A: Performed by: SURGERY

## 2020-04-15 PROCEDURE — 74011000250 HC RX REV CODE- 250: Performed by: NURSE ANESTHETIST, CERTIFIED REGISTERED

## 2020-04-15 PROCEDURE — 77030020256 HC SOL INJ NACL 0.9%  500ML: Performed by: SURGERY

## 2020-04-15 PROCEDURE — 77030010507 HC ADH SKN DERMBND J&J -B: Performed by: SURGERY

## 2020-04-15 PROCEDURE — 77030011267 HC ELECTRD BLD COVD -A: Performed by: SURGERY

## 2020-04-15 DEVICE — POWERPORT IMPLANTABLE PORT WITH ATTACHABLE 8F CHRONOFLEX OPEN-ENDED SINGLE-LUMEN VENOUS CATHETER INTERMEDIATE KIT (WITH SUTURE PLUGS)
Type: IMPLANTABLE DEVICE | Site: SUBCLAVIAN | Status: FUNCTIONAL
Brand: POWERPORT, CHRONOFLEX

## 2020-04-15 RX ORDER — SUCCINYLCHOLINE CHLORIDE 20 MG/ML
INJECTION INTRAMUSCULAR; INTRAVENOUS AS NEEDED
Status: DISCONTINUED | OUTPATIENT
Start: 2020-04-15 | End: 2020-04-15 | Stop reason: HOSPADM

## 2020-04-15 RX ORDER — ACETAMINOPHEN 325 MG/1
650 TABLET ORAL ONCE
Status: COMPLETED | OUTPATIENT
Start: 2020-04-15 | End: 2020-04-15

## 2020-04-15 RX ORDER — SODIUM CHLORIDE 0.9 % (FLUSH) 0.9 %
5-40 SYRINGE (ML) INJECTION EVERY 8 HOURS
Status: DISCONTINUED | OUTPATIENT
Start: 2020-04-15 | End: 2020-04-15 | Stop reason: HOSPADM

## 2020-04-15 RX ORDER — SODIUM CHLORIDE, SODIUM LACTATE, POTASSIUM CHLORIDE, CALCIUM CHLORIDE 600; 310; 30; 20 MG/100ML; MG/100ML; MG/100ML; MG/100ML
100 INJECTION, SOLUTION INTRAVENOUS CONTINUOUS
Status: DISCONTINUED | OUTPATIENT
Start: 2020-04-15 | End: 2020-04-15 | Stop reason: HOSPADM

## 2020-04-15 RX ORDER — MORPHINE SULFATE 10 MG/ML
2 INJECTION, SOLUTION INTRAMUSCULAR; INTRAVENOUS
Status: DISCONTINUED | OUTPATIENT
Start: 2020-04-15 | End: 2020-04-15 | Stop reason: HOSPADM

## 2020-04-15 RX ORDER — MIDAZOLAM HYDROCHLORIDE 1 MG/ML
1 INJECTION, SOLUTION INTRAMUSCULAR; INTRAVENOUS AS NEEDED
Status: DISCONTINUED | OUTPATIENT
Start: 2020-04-15 | End: 2020-04-15 | Stop reason: HOSPADM

## 2020-04-15 RX ORDER — LIDOCAINE HYDROCHLORIDE 10 MG/ML
0.1 INJECTION, SOLUTION EPIDURAL; INFILTRATION; INTRACAUDAL; PERINEURAL AS NEEDED
Status: DISCONTINUED | OUTPATIENT
Start: 2020-04-15 | End: 2020-04-15 | Stop reason: HOSPADM

## 2020-04-15 RX ORDER — DEXAMETHASONE SODIUM PHOSPHATE 4 MG/ML
INJECTION, SOLUTION INTRA-ARTICULAR; INTRALESIONAL; INTRAMUSCULAR; INTRAVENOUS; SOFT TISSUE AS NEEDED
Status: DISCONTINUED | OUTPATIENT
Start: 2020-04-15 | End: 2020-04-15 | Stop reason: HOSPADM

## 2020-04-15 RX ORDER — ONDANSETRON 2 MG/ML
INJECTION INTRAMUSCULAR; INTRAVENOUS AS NEEDED
Status: DISCONTINUED | OUTPATIENT
Start: 2020-04-15 | End: 2020-04-15 | Stop reason: HOSPADM

## 2020-04-15 RX ORDER — MIDAZOLAM HYDROCHLORIDE 1 MG/ML
INJECTION, SOLUTION INTRAMUSCULAR; INTRAVENOUS AS NEEDED
Status: DISCONTINUED | OUTPATIENT
Start: 2020-04-15 | End: 2020-04-15 | Stop reason: HOSPADM

## 2020-04-15 RX ORDER — SODIUM CHLORIDE 0.9 % (FLUSH) 0.9 %
5-40 SYRINGE (ML) INJECTION AS NEEDED
Status: DISCONTINUED | OUTPATIENT
Start: 2020-04-15 | End: 2020-04-15 | Stop reason: HOSPADM

## 2020-04-15 RX ORDER — FENTANYL CITRATE 50 UG/ML
50 INJECTION, SOLUTION INTRAMUSCULAR; INTRAVENOUS AS NEEDED
Status: DISCONTINUED | OUTPATIENT
Start: 2020-04-15 | End: 2020-04-15 | Stop reason: HOSPADM

## 2020-04-15 RX ORDER — FENTANYL CITRATE 50 UG/ML
INJECTION, SOLUTION INTRAMUSCULAR; INTRAVENOUS AS NEEDED
Status: DISCONTINUED | OUTPATIENT
Start: 2020-04-15 | End: 2020-04-15 | Stop reason: HOSPADM

## 2020-04-15 RX ORDER — FENTANYL CITRATE 50 UG/ML
25 INJECTION, SOLUTION INTRAMUSCULAR; INTRAVENOUS
Status: DISCONTINUED | OUTPATIENT
Start: 2020-04-15 | End: 2020-04-15 | Stop reason: HOSPADM

## 2020-04-15 RX ORDER — ROPIVACAINE HYDROCHLORIDE 5 MG/ML
30 INJECTION, SOLUTION EPIDURAL; INFILTRATION; PERINEURAL AS NEEDED
Status: DISCONTINUED | OUTPATIENT
Start: 2020-04-15 | End: 2020-04-15 | Stop reason: HOSPADM

## 2020-04-15 RX ORDER — CEFAZOLIN SODIUM/WATER 2 G/20 ML
2 SYRINGE (ML) INTRAVENOUS ONCE
Status: COMPLETED | OUTPATIENT
Start: 2020-04-15 | End: 2020-04-15

## 2020-04-15 RX ORDER — SODIUM CHLORIDE 9 MG/ML
25 INJECTION, SOLUTION INTRAVENOUS CONTINUOUS
Status: DISCONTINUED | OUTPATIENT
Start: 2020-04-15 | End: 2020-04-15 | Stop reason: HOSPADM

## 2020-04-15 RX ORDER — HYDROMORPHONE HYDROCHLORIDE 1 MG/ML
0.5 INJECTION, SOLUTION INTRAMUSCULAR; INTRAVENOUS; SUBCUTANEOUS
Status: DISCONTINUED | OUTPATIENT
Start: 2020-04-15 | End: 2020-04-15 | Stop reason: HOSPADM

## 2020-04-15 RX ORDER — PHENYLEPHRINE HCL IN 0.9% NACL 0.4MG/10ML
SYRINGE (ML) INTRAVENOUS AS NEEDED
Status: DISCONTINUED | OUTPATIENT
Start: 2020-04-15 | End: 2020-04-15 | Stop reason: HOSPADM

## 2020-04-15 RX ORDER — HYDROCODONE BITARTRATE AND ACETAMINOPHEN 5; 325 MG/1; MG/1
1 TABLET ORAL
Qty: 20 TAB | Refills: 0 | Status: SHIPPED | OUTPATIENT
Start: 2020-04-15 | End: 2020-04-20

## 2020-04-15 RX ORDER — HYDROMORPHONE HYDROCHLORIDE 2 MG/ML
INJECTION, SOLUTION INTRAMUSCULAR; INTRAVENOUS; SUBCUTANEOUS AS NEEDED
Status: DISCONTINUED | OUTPATIENT
Start: 2020-04-15 | End: 2020-04-15 | Stop reason: HOSPADM

## 2020-04-15 RX ORDER — MIDAZOLAM HYDROCHLORIDE 1 MG/ML
0.5 INJECTION, SOLUTION INTRAMUSCULAR; INTRAVENOUS
Status: DISCONTINUED | OUTPATIENT
Start: 2020-04-15 | End: 2020-04-15 | Stop reason: HOSPADM

## 2020-04-15 RX ORDER — PROPOFOL 10 MG/ML
INJECTION, EMULSION INTRAVENOUS AS NEEDED
Status: DISCONTINUED | OUTPATIENT
Start: 2020-04-15 | End: 2020-04-15 | Stop reason: HOSPADM

## 2020-04-15 RX ORDER — ROCURONIUM BROMIDE 10 MG/ML
INJECTION, SOLUTION INTRAVENOUS AS NEEDED
Status: DISCONTINUED | OUTPATIENT
Start: 2020-04-15 | End: 2020-04-15 | Stop reason: HOSPADM

## 2020-04-15 RX ORDER — DIPHENHYDRAMINE HYDROCHLORIDE 50 MG/ML
12.5 INJECTION, SOLUTION INTRAMUSCULAR; INTRAVENOUS AS NEEDED
Status: DISCONTINUED | OUTPATIENT
Start: 2020-04-15 | End: 2020-04-15 | Stop reason: HOSPADM

## 2020-04-15 RX ORDER — HEPARIN 100 UNIT/ML
300 SYRINGE INTRAVENOUS ONCE
Status: COMPLETED | OUTPATIENT
Start: 2020-04-15 | End: 2020-04-15

## 2020-04-15 RX ORDER — SODIUM CHLORIDE, SODIUM LACTATE, POTASSIUM CHLORIDE, CALCIUM CHLORIDE 600; 310; 30; 20 MG/100ML; MG/100ML; MG/100ML; MG/100ML
25 INJECTION, SOLUTION INTRAVENOUS CONTINUOUS
Status: DISCONTINUED | OUTPATIENT
Start: 2020-04-15 | End: 2020-04-15 | Stop reason: HOSPADM

## 2020-04-15 RX ORDER — LIDOCAINE HYDROCHLORIDE 20 MG/ML
INJECTION, SOLUTION EPIDURAL; INFILTRATION; INTRACAUDAL; PERINEURAL AS NEEDED
Status: DISCONTINUED | OUTPATIENT
Start: 2020-04-15 | End: 2020-04-15 | Stop reason: HOSPADM

## 2020-04-15 RX ORDER — ONDANSETRON 2 MG/ML
4 INJECTION INTRAMUSCULAR; INTRAVENOUS AS NEEDED
Status: DISCONTINUED | OUTPATIENT
Start: 2020-04-15 | End: 2020-04-15 | Stop reason: HOSPADM

## 2020-04-15 RX ADMIN — ACETAMINOPHEN 650 MG: 325 TABLET ORAL at 07:23

## 2020-04-15 RX ADMIN — HYDROMORPHONE HYDROCHLORIDE 0.4 MG: 2 INJECTION, SOLUTION INTRAMUSCULAR; INTRAVENOUS; SUBCUTANEOUS at 08:32

## 2020-04-15 RX ADMIN — FENTANYL CITRATE 50 MCG: 50 INJECTION, SOLUTION INTRAMUSCULAR; INTRAVENOUS at 07:39

## 2020-04-15 RX ADMIN — PROPOFOL 200 MG: 10 INJECTION, EMULSION INTRAVENOUS at 07:39

## 2020-04-15 RX ADMIN — LIDOCAINE HYDROCHLORIDE 60 MG: 20 INJECTION, SOLUTION EPIDURAL; INFILTRATION; INTRACAUDAL; PERINEURAL at 07:39

## 2020-04-15 RX ADMIN — Medication 80 MCG: at 08:14

## 2020-04-15 RX ADMIN — ROCURONIUM BROMIDE 5 MG: 10 SOLUTION INTRAVENOUS at 07:39

## 2020-04-15 RX ADMIN — FENTANYL CITRATE 50 MCG: 50 INJECTION, SOLUTION INTRAMUSCULAR; INTRAVENOUS at 07:55

## 2020-04-15 RX ADMIN — DEXAMETHASONE SODIUM PHOSPHATE 4 MG: 4 INJECTION, SOLUTION INTRAMUSCULAR; INTRAVENOUS at 07:52

## 2020-04-15 RX ADMIN — SUCCINYLCHOLINE CHLORIDE 160 MG: 20 INJECTION, SOLUTION INTRAMUSCULAR; INTRAVENOUS at 07:39

## 2020-04-15 RX ADMIN — ONDANSETRON HYDROCHLORIDE 4 MG: 2 INJECTION, SOLUTION INTRAMUSCULAR; INTRAVENOUS at 08:15

## 2020-04-15 RX ADMIN — MIDAZOLAM 2 MG: 1 INJECTION INTRAMUSCULAR; INTRAVENOUS at 07:25

## 2020-04-15 RX ADMIN — HYDROMORPHONE HYDROCHLORIDE 0.4 MG: 2 INJECTION, SOLUTION INTRAMUSCULAR; INTRAVENOUS; SUBCUTANEOUS at 08:37

## 2020-04-15 RX ADMIN — Medication 2 G: at 07:47

## 2020-04-15 RX ADMIN — SODIUM CHLORIDE, SODIUM LACTATE, POTASSIUM CHLORIDE, AND CALCIUM CHLORIDE 25 ML/HR: 600; 310; 30; 20 INJECTION, SOLUTION INTRAVENOUS at 07:05

## 2020-04-15 NOTE — PERIOP NOTES
PATIENT INTERVIEWED IN PREOP. NAME BAND VISIBLE AND CORRECT PER PATIENT. PATIENT HAS UNDERSTANDING OF PROCEDURE AND SURGICAL SITE. EDUCATIONAL NEEDS MET. PATIENT STATES LEFT BREAST PAIN AT 7-8.

## 2020-04-15 NOTE — ANESTHESIA PREPROCEDURE EVALUATION
Relevant Problems No relevant active problems Anesthetic History No history of anesthetic complications Review of Systems / Medical History Patient summary reviewed, nursing notes reviewed and pertinent labs reviewed Pulmonary Within defined limits Sleep apnea Neuro/Psych Within defined limits Cardiovascular Within defined limits Exercise tolerance: >4 METS 
  
GI/Hepatic/Renal 
Within defined limits Endo/Other Within defined limits Cancer Other Findings Physical Exam 
 
Airway Mallampati: II 
TM Distance: > 6 cm Neck ROM: normal range of motion Mouth opening: Normal 
 
 Cardiovascular Regular rate and rhythm,  S1 and S2 normal,  no murmur, click, rub, or gallop Dental 
No notable dental hx Pulmonary Breath sounds clear to auscultation Abdominal 
GI exam deferred Other Findings Anesthetic Plan ASA: 2 Anesthesia type: general 
 
 
 
 
Induction: Intravenous Anesthetic plan and risks discussed with: Patient

## 2020-04-15 NOTE — ROUTINE PROCESS
Patient: Opal Craig MRN: [id number]  SSN: [id number] YOB: 1965  Age: 48 y.o. Sex: female Patient is status post Procedure(s): PORTACATH INSERTION, LEFT BREAST EXCISONAL BIOPSY WITH ULTRASOUND (ESSENTIAL) BREAST BIOPSY. Surgeon(s) and Role: Baudilio Javier MD - Primary Local/Dose/Irrigation:  See [facility] Peripheral IV 04/15/20 Right Hand (Active) Site Assessment Clean, dry, & intact 4/15/2020  8:46 AM  
Phlebitis Assessment 0 4/15/2020  8:46 AM  
Infiltration Assessment 0 4/15/2020  8:46 AM  
Dressing Status Clean, dry, & intact 4/15/2020  8:46 AM  
Dressing Type Tape;Transparent 4/15/2020  8:46 AM  
Hub Color/Line Status Pink; Infusing 4/15/2020  8:46 AM  
                 
 
 
 
Dressing/Packing:  Wound Breast Left-Dressing Type: Topical skin adhesive/glue (04/15/20 0836) Wound Chest Right-Dressing Type: Topical skin adhesive/glue (04/15/20 0840) Splint/Cast:  ] Other:

## 2020-04-15 NOTE — OP NOTES
[address]  
OPERATIVE REPORT Name:  Tiffanie Hardy 
MR#:  [id number] :  1965 ACCOUNT #:  [de-identified] DATE OF SERVICE:  04/15/2020 PREOPERATIVE DIAGNOSIS:  Left breast cancer, need IV access for chemotherapy. POSTOPERATIVE DIAGNOSIS:  Left breast cancer, need IV access for chemotherapy. PROCEDURE PERFORMED: 
1. Right subclavian port insertion. 2.  Left ultrasound-guided core needle breast biopsy. SURGEON:  Annelise Wheeler MD 
 
ASSISTANT:  None. ANESTHESIA:  general 
 
COMPLICATIONS:  None. SPECIMENS REMOVED:  None. IMPLANTS:  An 8-[place] PowerPort. ESTIMATED BLOOD LOSS:  Minimal. 
 
DRAINS:  None. FINDINGS:  Tip of port at junction of SVC and the right atrium on fluoroscopy. INDICATIONS FOR PROCEDURE:  This is a 72-year-old female with triple negative stage III left breast cancer. She agreed to be in the B59 study which required 2 additional core biopsies of the left breast cancer. She also needed a port placement for central IV access for neoadjuvant systemic therapy. PROCEDURE IN DETAIL:  The patient was seen in the preop holding area where surgical site was marked by surgeon. Informed consent was obtained. She was taken to the operating room and laid in supine position where general endotracheal anesthesia was induced. Bilateral breasts were prepped and draped in the usual fashion. Shoulder roll was placed underneath the patient. Time-out was performed. Attention was turned to the left breast at the 3 to 4 o'clock where the mass and clip was identified. Ten mL of local anesthetic was injected in the breast under direct ultrasound guidance. For core needle biopsy, 2 cores were taken, and these were sent for B-59 study. Pressure was held on the site and a Dermabond was applied to the incision. The patient was placed in Trendelenburg position and an 18-gauge introducer needle was used to access the right subclavian vein. This went very easily. The syringe was removed. The wire was threaded through the needle, and the needle was removed and the wire was going toward the SVC and right atrium on fluoro. Next, an incision was made at the wire with a 15 blade. Bovie cautery was used to create a port pocket. The dilator sheath was threaded over the wire. The inner cannula and wire were removed. The catheter was clamped and threaded through the dilator sheath and this was torn away at 15 cm at the chest wall. Fluro confirmed tip of the port catheter at the junction of the SVC and right atrium. Next,  the catheter was clamped, cut, and threaded onto the port. The port was secured to the chest wall with 3-0 Prolene on either side. The port aspirated and flushed well with injectable saline and final heparin solution. The patient did not have ectopy and had good placement on fluoroscopy. Next, an additional 20 mL of local anesthetic was injected in the port site surrounding skin and tissue. The incision was closed with interrupted 3-0 Vicryl and 4-0 subcuticular Monocryl. Skin glue was placed on the incision. All sponge, needle, and instrument counts were correct. A chest x-ray was ordered for recovery. The patient was stable at this time. MD TIMMY Duvall/[id number]_I/[email or web address] 
D:  04/15/2020 8:51 T:  04/15/2020 10:23 
JOB #:  [id number]

## 2020-04-15 NOTE — DISCHARGE INSTRUCTIONS
Discharge Instructions from Dr. Givens Saliva    · I will call you with the pathology results, typically within 1 week from today. · you may shower, but no hot tubs, swimming pools, or baths until your incision is healed. · No heavy lifting with the affected extremity (nothing greater than 5 pounds), and limit its use for the next 4-5 days. · You may use an ice pack for comfort for the next couple of days, but do not place ice directly on the skin. Rather, use a towel or clothing to serve as a barrier between skin and ice to prevent injury. · If I placed a drain, follow the drain instructions provided, especially as you keep a record of the drain output. · Follow medication instructions carefully. ·   · You will have bruising and swelling  · Watch for signs of infection as listed below. · Redness  · Swelling  · Drainage from the incision or from your nipple that appears infected  · Fever over 101.5 degrees for consecutive readings, or over 99.5 if you are currently undergoing chemotherapy. · Call our office (number is below) for a follow-up appointment. · If you have any problems, our phone number is [phone]  ______________________________________________________________________    Anesthesia Discharge Instructions    After general anesthesia or intervenous sedation, for 24 hours or while taking prescription Narcotics:  · Limit your activities  · Do not drive or operate hazardous machinery  · If you have not urinated within 8 hours after discharge, please contact your surgeon on call.   · Do not make important personal or business decisions  · Do not drink alcoholic beverages    Report the following to your surgeon:  · Excessive pain, swelling, redness or odor of or around the surgical area  · Temperature over 100.5 degrees  · Nausea and vomiting lasting longer than 4 hours or if unable to take medication  · Any signs of decreased circulation or nerve impairment to extremity:  Change in color, persistent numbness, tingling, coldness or increased pain.   · Any questions

## 2020-04-15 NOTE — INTERVAL H&P NOTE
Update History & Physical 
 
The Patient's History and Physical of April 4/9/2020,  
H and P  was reviewed with the patient and I examined the patient. There was no change. The surgical site was confirmed by the patient and me. Plan is port placement and us guided left breast core needle biopsy. Plan:  The risk, benefits, expected outcome, and alternative to the recommended procedure have been discussed with the patient. Patient understands and wants to proceed with the procedure.  
 
Electronically signed by Marysol Ball MD on 4/15/2020 at 6:56 AM

## 2020-04-15 NOTE — BRIEF OP NOTE
Brief Postoperative Note Patient: Akshat Rivas YOB: 1965 MRN: [id number] Date of Procedure: 4/15/2020 Pre-Op Diagnosis: LEFT BREAST CANCER, need iv access for chemo Post-Op Diagnosis: Same as preoperative diagnosis. Procedure(s): PORTACATH INSERTION, LEFT BREAST EXCISONAL BIOPSY WITH ULTRASOUND (ESSENTIAL) BREAST BIOPSY Surgeon(s): Eve Cabello MD 
 
Surgical Assistant: None Anesthesia: MAC Estimated Blood Loss (mL): Minimal 
 
Complications: None Specimens: * No specimens in log * Implants:  
Implant Name Type Inv. Item Serial No.  Lot No. LRB No. Used Action PORT ATTACH CATH POWERPORT 8FR --  - SN/A  PORT ATTACH CATH POWERPORT 8FR --  N/A [employer] [id number] 1 Implanted Drains: * No LDAs found * Findings: tip of port junction svc and right atrium Electronically Signed by Annelise Wheeler MD on 4/15/2020 at 8:43 AM 
 
Dictated stat

## 2020-04-16 NOTE — ANESTHESIA POSTPROCEDURE EVALUATION
Procedure(s): PORTACATH INSERTION, LEFT BREAST EXCISONAL BIOPSY WITH ULTRASOUND (ESSENTIAL) BREAST BIOPSY. general 
 
Anesthesia Post Evaluation Patient location during evaluation: PACU Note status: Adequate. Level of consciousness: responsive to verbal stimuli and sleepy but conscious Pain management: satisfactory to patient Airway patency: patent Anesthetic complications: no 
Cardiovascular status: acceptable Respiratory status: acceptable Hydration status: acceptable Comments: +Post-Anesthesia Evaluation and Assessment Patient: Naveen Grimaldo MRN: [id number]  SSN: [id number] YOB: 1965  Age: 48 y.o. Sex: female Cardiovascular Function/Vital Signs /77   Pulse 63   Temp 36.6 °C (97.9 °F)   Resp 12   Ht 5' 3\" (1.6 m)   Wt 92.5 kg (204 lb)   SpO2 96%   BMI 36.14 kg/m² Patient is status post Procedure(s): PORTACATH INSERTION, LEFT BREAST EXCISONAL BIOPSY WITH ULTRASOUND (ESSENTIAL) BREAST BIOPSY. Nausea/Vomiting: Controlled. Postoperative hydration reviewed and adequate. Pain: 
Pain Scale 1: Numeric (0 - 10) (04/15/20 0910) Pain Intensity 1: 0 (04/15/20 0910) Managed. Neurological Status:  
Neuro (WDL): Within Defined Limits (04/15/20 0910) At baseline. Mental Status and Level of Consciousness: Arousable. Pulmonary Status:  
O2 Device: Room air (04/15/20 0910) Adequate oxygenation and airway patent. Complications related to anesthesia: None Post-anesthesia assessment completed. No concerns. I have evaluated the patient and the patient is stable and ready to be discharged from PACU . Signed By: Farshad Copeland MD  
 4/16/2020 Vitals Value Taken Time /77 4/15/2020  9:00 AM  
Temp 36.6 °C (97.9 °F) 4/15/2020  9:10 AM  
Pulse 66 4/15/2020  9:14 AM  
Resp 15 4/15/2020  9:14 AM  
SpO2 95 % 4/15/2020  9:14 AM  
Vitals shown include unvalidated device data.

## 2020-04-17 DIAGNOSIS — Z17.1 MALIGNANT NEOPLASM OF UPPER-OUTER QUADRANT OF LEFT BREAST IN FEMALE, ESTROGEN RECEPTOR NEGATIVE (HCC): Primary | ICD-10-CM

## 2020-04-17 DIAGNOSIS — C50.412 MALIGNANT NEOPLASM OF UPPER-OUTER QUADRANT OF LEFT BREAST IN FEMALE, ESTROGEN RECEPTOR NEGATIVE (HCC): Primary | ICD-10-CM

## 2020-04-21 ENCOUNTER — TELEPHONE (OUTPATIENT)
Dept: SURGERY | Age: 55
End: 2020-04-21

## 2020-04-21 NOTE — TELEPHONE ENCOUNTER
Called and spoke with patient as she sent a [place] message regarding port site concerns. Port site mildly sore - has improved since last night. No erythema, swelling or drainage. Musculoskeletal soreness has developed. Likely due to positioning at desk and some guarding of the area. Will treat symptoms with heat, stretching and OTC pain medication. Has follow-up with Dr. Wicho Peck next week as she had not met her for a breast talk. Also has follow-up with the infusion center.

## 2020-04-23 ENCOUNTER — HOSPITAL ENCOUNTER (OUTPATIENT)
Dept: NON INVASIVE DIAGNOSTICS | Age: 55
Discharge: HOME | End: 2020-04-23
Payer: COMMERCIAL

## 2020-04-23 ENCOUNTER — HOSPITAL ENCOUNTER (OUTPATIENT)
Dept: LAB | Age: 55
Discharge: HOME | End: 2020-04-23

## 2020-04-23 DIAGNOSIS — Z17.1 MALIGNANT NEOPLASM OF UPPER-OUTER QUADRANT OF LEFT BREAST IN FEMALE, ESTROGEN RECEPTOR NEGATIVE (HCC): ICD-10-CM

## 2020-04-23 DIAGNOSIS — C50.412 MALIGNANT NEOPLASM OF UPPER-OUTER QUADRANT OF LEFT BREAST IN FEMALE, ESTROGEN RECEPTOR NEGATIVE (HCC): ICD-10-CM

## 2020-04-23 LAB
ATRIAL RATE: 63 BPM
CALCULATED P AXIS, ECG09: 61 DEGREES
CALCULATED R AXIS, ECG10: 3 DEGREES
CALCULATED T AXIS, ECG11: 10 DEGREES
DIAGNOSIS, 93000: NORMAL
P-R INTERVAL, ECG05: 146 MS
Q-T INTERVAL, ECG07: 426 MS
QRS DURATION, ECG06: 88 MS
QTC CALCULATION (BEZET), ECG08: 435 MS
VENTRICULAR RATE, ECG03: 63 BPM

## 2020-04-23 PROCEDURE — 93005 ELECTROCARDIOGRAM TRACING: CPT

## 2020-04-23 RX ORDER — DIPHENHYDRAMINE HCL 25 MG
50 CAPSULE ORAL ONCE
Status: COMPLETED | OUTPATIENT
Start: 2020-04-28 | End: 2020-04-28

## 2020-04-23 RX ORDER — PALONOSETRON 0.05 MG/ML
0.25 INJECTION, SOLUTION INTRAVENOUS ONCE
Status: COMPLETED | OUTPATIENT
Start: 2020-04-28 | End: 2020-04-28

## 2020-04-23 RX ORDER — SODIUM CHLORIDE 9 MG/ML
25 INJECTION, SOLUTION INTRAVENOUS CONTINUOUS
Status: DISCONTINUED | OUTPATIENT
Start: 2020-04-28 | End: 2020-04-29 | Stop reason: HOSPADM

## 2020-04-24 LAB
ALBUMIN SERPL-MCNC: 4.4 G/DL (ref 3.8–4.9)
ALBUMIN/GLOB SERPL: 1.8 {RATIO} (ref 1.2–2.2)
ALP SERPL-CCNC: 108 IU/L (ref 39–117)
ALT SERPL-CCNC: 113 IU/L (ref 0–32)
AST SERPL-CCNC: 65 IU/L (ref 0–40)
BASOPHILS # BLD AUTO: 0 X10E3/UL (ref 0–0.2)
BASOPHILS NFR BLD AUTO: 0 %
BILIRUB SERPL-MCNC: 0.3 MG/DL (ref 0–1.2)
BUN SERPL-MCNC: 9 MG/DL (ref 6–24)
BUN/CREAT SERPL: 10 (ref 9–23)
CALCIUM SERPL-MCNC: 9.5 MG/DL (ref 8.7–10.2)
CHLORIDE SERPL-SCNC: 100 MMOL/L (ref 96–106)
CO2 SERPL-SCNC: 26 MMOL/L (ref 20–29)
CORTIS SERPL-MCNC: 13 UG/DL
CREAT SERPL-MCNC: 0.88 MG/DL (ref 0.57–1)
EOSINOPHIL # BLD AUTO: 0.1 X10E3/UL (ref 0–0.4)
EOSINOPHIL NFR BLD AUTO: 2 %
ERYTHROCYTE [DISTWIDTH] IN BLOOD BY AUTOMATED COUNT: 13 % (ref 11.7–15.4)
GLOBULIN SER CALC-MCNC: 2.4 G/DL (ref 1.5–4.5)
GLUCOSE SERPL-MCNC: 88 MG/DL (ref 65–99)
HBV CORE AB SERPL QL IA: NEGATIVE
HBV SURFACE AB SER QL: NON REACTIVE
HBV SURFACE AG SERPL QL IA: NEGATIVE
HCT VFR BLD AUTO: 39.2 % (ref 34–46.6)
HCV AB S/CO SERPL IA: <0.1 S/CO RATIO (ref 0–0.9)
HGB BLD-MCNC: 13.2 G/DL (ref 11.1–15.9)
INR PPP: 1 (ref 0.8–1.2)
LYMPHOCYTES # BLD AUTO: 1.4 X10E3/UL (ref 0.7–3.1)
LYMPHOCYTES NFR BLD AUTO: 24 %
MCH RBC QN AUTO: 29.4 PG (ref 26.6–33)
MCHC RBC AUTO-ENTMCNC: 33.7 G/DL (ref 31.5–35.7)
MCV RBC AUTO: 87 FL (ref 79–97)
MONOCYTES # BLD AUTO: 0.6 X10E3/UL (ref 0.1–0.9)
MONOCYTES NFR BLD AUTO: 11 %
NEUTROPHILS # BLD AUTO: 3.7 X10E3/UL (ref 1.4–7)
NEUTROPHILS NFR BLD AUTO: 63 %
PLATELET # BLD AUTO: 240 X10E3/UL (ref 150–450)
POTASSIUM SERPL-SCNC: 3.8 MMOL/L (ref 3.5–5.2)
PROT SERPL-MCNC: 6.8 G/DL (ref 6–8.5)
PROTHROMBIN TIME: 9.6 SEC (ref 9.1–12)
RBC # BLD AUTO: 4.49 X10E6/UL (ref 3.77–5.28)
SODIUM SERPL-SCNC: 141 MMOL/L (ref 134–144)
TSH SERPL DL<=0.005 MIU/L-ACNC: 2.51 UIU/ML (ref 0.45–4.5)
WBC # BLD AUTO: 5.8 X10E3/UL (ref 3.4–10.8)

## 2020-04-24 NOTE — PROGRESS NOTES
Discussed with pt, LFTs high. Pt has been taking a significant amount of tylenol due to breast pain.   Will have her stop tylenol and repeat LFTs on Monday

## 2020-04-27 ENCOUNTER — HOSPITAL ENCOUNTER (OUTPATIENT)
Dept: INFUSION THERAPY | Age: 55
Discharge: HOME | End: 2020-04-27
Payer: COMMERCIAL

## 2020-04-27 VITALS
HEART RATE: 65 BPM | TEMPERATURE: 97.5 F | RESPIRATION RATE: 18 BRPM | SYSTOLIC BLOOD PRESSURE: 149 MMHG | OXYGEN SATURATION: 97 % | DIASTOLIC BLOOD PRESSURE: 77 MMHG

## 2020-04-27 LAB
ALBUMIN SERPL-MCNC: 3.6 G/DL (ref 3.5–5)
ALBUMIN/GLOB SERPL: 1 {RATIO} (ref 1.1–2.2)
ALP SERPL-CCNC: 110 U/L (ref 45–117)
ALT SERPL-CCNC: 91 U/L (ref 12–78)
AST SERPL-CCNC: 35 U/L (ref 15–37)
BILIRUB DIRECT SERPL-MCNC: 0.1 MG/DL (ref 0–0.2)
BILIRUB SERPL-MCNC: 0.4 MG/DL (ref 0.2–1)
GLOBULIN SER CALC-MCNC: 3.5 G/DL (ref 2–4)
PROT SERPL-MCNC: 7.1 G/DL (ref 6.4–8.2)

## 2020-04-27 PROCEDURE — 77030016057 HC NDL HUBR APOL -B

## 2020-04-27 PROCEDURE — 74011250636 HC RX REV CODE- 250/636: Performed by: INTERNAL MEDICINE

## 2020-04-27 PROCEDURE — 36591 DRAW BLOOD OFF VENOUS DEVICE: CPT

## 2020-04-27 PROCEDURE — 80076 HEPATIC FUNCTION PANEL: CPT

## 2020-04-27 RX ORDER — HEPARIN 100 UNIT/ML
500 SYRINGE INTRAVENOUS AS NEEDED
Status: DISCONTINUED | OUTPATIENT
Start: 2020-04-27 | End: 2020-04-28 | Stop reason: HOSPADM

## 2020-04-27 RX ORDER — SODIUM CHLORIDE 0.9 % (FLUSH) 0.9 %
5-10 SYRINGE (ML) INJECTION AS NEEDED
Status: DISCONTINUED | OUTPATIENT
Start: 2020-04-27 | End: 2020-04-28 | Stop reason: HOSPADM

## 2020-04-27 RX ADMIN — HEPARIN 500 UNITS: 100 SYRINGE at 08:51

## 2020-04-27 RX ADMIN — Medication 10 ML: at 08:52

## 2020-04-27 NOTE — PROGRESS NOTES
Outpatient Infusion Center Short Visit Progress Note Patient admitted to [facility] for lab draw from port ambulatory in stable condition. Verified labs to be drawn by research RN Linwood zacarias. Vital Signs: 
Visit Vitals /77 Pulse 65 Temp 97.5 °F (36.4 °C) Resp 18 SpO2 97% R chest port with positive blood return secured for use tomorrow per patiens request.  
 
Lab Results: 
Pending in CC Patient tolerated treatment well. Patient discharged from [place] [address] ambulatory in no distress. Patient aware of next appointment. Farrukh Bailon RN Future Appointments Date Time Provider Dorota Ethel 4/28/2020  9:00 AM SS INF2 CH3 <4H [facility]  
4/28/2020  9:30 AM Rani Toure MD [address] [place] [address],  [place] [address]  
4/29/2020  3:30 PM Gorge Farah MD [facility] SING[facility] SCHED  
5/4/2020 10:00 AM [facility] MRI 1 [facility] [place]  
5/5/2020  9:00 AM SS INF3 [facility]H [facility]  
5/11/2020  9:30 AM Gorge Farah MD [facility] SINGH SCHED  
5/12/2020  9:00 AM SS INF2 CH3 <4H [facility]  
5/19/2020  9:00 AM SS INF2 CH3 <4H [facility]  
5/19/2020  9:30 AM Rani Toure MD [facility] SING[facility] SCHED  
6/16/2020  9:00 AM SS INF4 CH3 <4H [facility]  
6/23/2020  9:00 AM SS INF2 CH3 <4H [facility]  
6/30/2020  9:00 AM SS INF2 CH3 <4H [facility]  
6/30/2020  9:15 AM MD LORE Lobo SINGH SCHED  
7/7/2020  9:00 AM SS INF1 CH3 <4H [facility]  
7/14/2020  9:00 AM SS INF1 CH3 <4H [facility] [address] [place] [address]

## 2020-04-28 ENCOUNTER — HOSPITAL ENCOUNTER (OUTPATIENT)
Dept: INFUSION THERAPY | Age: 55
Discharge: HOME | End: 2020-04-28
Payer: COMMERCIAL

## 2020-04-28 ENCOUNTER — OFFICE VISIT (OUTPATIENT)
Dept: ONCOLOGY | Age: 55
End: 2020-04-28

## 2020-04-28 ENCOUNTER — RESEARCH ENCOUNTER (OUTPATIENT)
Dept: ONCOLOGY | Age: 55
End: 2020-04-28

## 2020-04-28 VITALS
HEIGHT: 63 IN | DIASTOLIC BLOOD PRESSURE: 92 MMHG | HEART RATE: 73 BPM | TEMPERATURE: 98.7 F | WEIGHT: 206 LBS | SYSTOLIC BLOOD PRESSURE: 163 MMHG | BODY MASS INDEX: 36.5 KG/M2 | RESPIRATION RATE: 18 BRPM | OXYGEN SATURATION: 98 %

## 2020-04-28 VITALS
WEIGHT: 206.1 LBS | DIASTOLIC BLOOD PRESSURE: 81 MMHG | HEIGHT: 63 IN | RESPIRATION RATE: 18 BRPM | HEART RATE: 75 BPM | BODY MASS INDEX: 36.52 KG/M2 | OXYGEN SATURATION: 96 % | SYSTOLIC BLOOD PRESSURE: 154 MMHG | TEMPERATURE: 97.9 F

## 2020-04-28 DIAGNOSIS — E66.01 SEVERE OBESITY (HCC): ICD-10-CM

## 2020-04-28 DIAGNOSIS — C50.412 MALIGNANT NEOPLASM OF UPPER-OUTER QUADRANT OF LEFT BREAST IN FEMALE, ESTROGEN RECEPTOR NEGATIVE (HCC): Primary | ICD-10-CM

## 2020-04-28 DIAGNOSIS — Z51.11 CHEMOTHERAPY MANAGEMENT, ENCOUNTER FOR: ICD-10-CM

## 2020-04-28 DIAGNOSIS — Z17.1 MALIGNANT NEOPLASM OF UPPER-OUTER QUADRANT OF LEFT BREAST IN FEMALE, ESTROGEN RECEPTOR NEGATIVE (HCC): Primary | ICD-10-CM

## 2020-04-28 LAB
ALBUMIN SERPL-MCNC: 3.6 G/DL (ref 3.5–5)
ALBUMIN/GLOB SERPL: 1 {RATIO} (ref 1.1–2.2)
ALP SERPL-CCNC: 110 U/L (ref 45–117)
ALT SERPL-CCNC: 85 U/L (ref 12–78)
ANION GAP SERPL CALC-SCNC: 6 MMOL/L (ref 5–15)
AST SERPL-CCNC: 35 U/L (ref 15–37)
BASOPHILS # BLD: 0 K/UL (ref 0–0.1)
BASOPHILS NFR BLD: 0 % (ref 0–1)
BILIRUB SERPL-MCNC: 0.4 MG/DL (ref 0.2–1)
BUN SERPL-MCNC: 17 MG/DL (ref 6–20)
BUN/CREAT SERPL: 20 (ref 12–20)
CALCIUM SERPL-MCNC: 8.8 MG/DL (ref 8.5–10.1)
CHLORIDE SERPL-SCNC: 107 MMOL/L (ref 97–108)
CO2 SERPL-SCNC: 27 MMOL/L (ref 21–32)
CREAT SERPL-MCNC: 0.84 MG/DL (ref 0.55–1.02)
DIFFERENTIAL METHOD BLD: NORMAL
EOSINOPHIL # BLD: 0.1 K/UL (ref 0–0.4)
EOSINOPHIL NFR BLD: 1 % (ref 0–7)
ERYTHROCYTE [DISTWIDTH] IN BLOOD BY AUTOMATED COUNT: 13 % (ref 11.5–14.5)
GLOBULIN SER CALC-MCNC: 3.5 G/DL (ref 2–4)
GLUCOSE SERPL-MCNC: 99 MG/DL (ref 65–100)
HCG SERPL QL: NEGATIVE
HCT VFR BLD AUTO: 39.6 % (ref 35–47)
HGB BLD-MCNC: 12.3 G/DL (ref 11.5–16)
IMM GRANULOCYTES # BLD AUTO: 0 K/UL (ref 0–0.04)
IMM GRANULOCYTES NFR BLD AUTO: 0 % (ref 0–0.5)
LYMPHOCYTES # BLD: 1.5 K/UL (ref 0.8–3.5)
LYMPHOCYTES NFR BLD: 21 % (ref 12–49)
MCH RBC QN AUTO: 28.9 PG (ref 26–34)
MCHC RBC AUTO-ENTMCNC: 31.1 G/DL (ref 30–36.5)
MCV RBC AUTO: 93 FL (ref 80–99)
MONOCYTES # BLD: 0.6 K/UL (ref 0–1)
MONOCYTES NFR BLD: 9 % (ref 5–13)
NEUTS SEG # BLD: 4.9 K/UL (ref 1.8–8)
NEUTS SEG NFR BLD: 69 % (ref 32–75)
NRBC # BLD: 0 K/UL (ref 0–0.01)
NRBC BLD-RTO: 0 PER 100 WBC
PLATELET # BLD AUTO: 237 K/UL (ref 150–400)
PMV BLD AUTO: 10.6 FL (ref 8.9–12.9)
POTASSIUM SERPL-SCNC: 3.5 MMOL/L (ref 3.5–5.1)
PROT SERPL-MCNC: 7.1 G/DL (ref 6.4–8.2)
RBC # BLD AUTO: 4.26 M/UL (ref 3.8–5.2)
SODIUM SERPL-SCNC: 140 MMOL/L (ref 136–145)
WBC # BLD AUTO: 7.1 K/UL (ref 3.6–11)

## 2020-04-28 PROCEDURE — 74011250636 HC RX REV CODE- 250/636: Performed by: INTERNAL MEDICINE

## 2020-04-28 PROCEDURE — 74011000250 HC RX REV CODE- 250: Performed by: INTERNAL MEDICINE

## 2020-04-28 PROCEDURE — 96417 CHEMO IV INFUS EACH ADDL SEQ: CPT

## 2020-04-28 PROCEDURE — 84703 CHORIONIC GONADOTROPIN ASSAY: CPT

## 2020-04-28 PROCEDURE — 85025 COMPLETE CBC W/AUTO DIFF WBC: CPT

## 2020-04-28 PROCEDURE — 74011000258 HC RX REV CODE- 258: Performed by: INTERNAL MEDICINE

## 2020-04-28 PROCEDURE — 96413 CHEMO IV INFUSION 1 HR: CPT

## 2020-04-28 PROCEDURE — 74011250637 HC RX REV CODE- 250/637: Performed by: INTERNAL MEDICINE

## 2020-04-28 PROCEDURE — 96367 TX/PROPH/DG ADDL SEQ IV INF: CPT

## 2020-04-28 PROCEDURE — 96375 TX/PRO/DX INJ NEW DRUG ADDON: CPT

## 2020-04-28 PROCEDURE — 80053 COMPREHEN METABOLIC PANEL: CPT

## 2020-04-28 PROCEDURE — 36415 COLL VENOUS BLD VENIPUNCTURE: CPT

## 2020-04-28 RX ORDER — SODIUM CHLORIDE 9 MG/ML
25 INJECTION, SOLUTION INTRAVENOUS CONTINUOUS
Status: DISCONTINUED | OUTPATIENT
Start: 2020-05-05 | End: 2020-05-06 | Stop reason: HOSPADM

## 2020-04-28 RX ORDER — DIPHENHYDRAMINE HCL 25 MG
50 CAPSULE ORAL ONCE
Status: COMPLETED | OUTPATIENT
Start: 2020-05-05 | End: 2020-05-05

## 2020-04-28 RX ADMIN — SODIUM CHLORIDE 25 ML/HR: 900 INJECTION, SOLUTION INTRAVENOUS at 11:09

## 2020-04-28 RX ADMIN — Medication 1200 MG: at 15:04

## 2020-04-28 RX ADMIN — FAMOTIDINE 20 MG: 10 INJECTION INTRAVENOUS at 11:09

## 2020-04-28 RX ADMIN — PALONOSETRON HYDROCHLORIDE 0.25 MG: 0.25 INJECTION INTRAVENOUS at 13:27

## 2020-04-28 RX ADMIN — DEXAMETHASONE SODIUM PHOSPHATE 12 MG: 4 INJECTION INTRA-ARTICULAR; INTRALESIONAL; INTRAMUSCULAR; INTRAVENOUS; SOFT TISSUE at 11:14

## 2020-04-28 RX ADMIN — DIPHENHYDRAMINE HYDROCHLORIDE 50 MG: 25 CAPSULE ORAL at 11:08

## 2020-04-28 RX ADMIN — CARBOPLATIN 750 MG: 10 INJECTION INTRAVENOUS at 14:24

## 2020-04-28 RX ADMIN — FOSAPREPITANT 150 MG: 150 INJECTION, POWDER, LYOPHILIZED, FOR SOLUTION INTRAVENOUS at 13:28

## 2020-04-28 RX ADMIN — PACLITAXEL 162 MG: 6 INJECTION, SOLUTION INTRAVENOUS at 12:14

## 2020-04-28 NOTE — PROGRESS NOTES
[facility] Progress Note Date: 2020 Name: Dameon Mcnulty MRN: [id number] : 1965 Ms. Alvino Ruiz Arrived ambulatory and in no distress for C1D1 of  Clinical Trial NSABP B-59 Regimen. Assessment was completed, no acute issues at this time, no new complaints voiced. R chest wall port accessed without difficulty, labs drawn & sent for processing. Chemotherapy Flowsheet 2020 Cycle C1D1 Date 2020 Drug / Regimen Clinical Trial NSABP B-59 Pre Meds given Notes given Patient proceed to appointment with Dr. Aakash Bear. Ms. Shira Myles vitals were reviewed. Visit Vitals /81 Pulse 75 Temp 97.9 °F (36.6 °C) Resp 18 Ht 5' 3\" (1.6 m) Wt 93.5 kg (206 lb 1.6 oz) SpO2 96% Breastfeeding No  
BMI 36.51 kg/m² Lab results were obtained and reviewed. Recent Results (from the past 12 hour(s)) CBC WITH AUTOMATED DIFF Collection Time: 20  8:48 AM  
Result Value Ref Range WBC 7.1 3.6 - 11.0 K/uL  
 RBC 4.26 3.80 - 5.20 M/uL  
 HGB 12.3 11.5 - 16.0 g/dL HCT 39.6 35.0 - 47.0 % MCV 93.0 80.0 - 99.0 FL  
 MCH 28.9 26.0 - 34.0 PG  
 MCHC 31.1 30.0 - 36.5 g/dL  
 RDW 13.0 11.5 - 14.5 % PLATELET 390 577 - 290 K/uL MPV 10.6 8.9 - 12.9 FL  
 NRBC 0.0 0  WBC ABSOLUTE NRBC 0.00 0.00 - 0.01 K/uL NEUTROPHILS 69 32 - 75 % LYMPHOCYTES 21 12 - 49 % MONOCYTES 9 5 - 13 % EOSINOPHILS 1 0 - 7 % BASOPHILS 0 0 - 1 % IMMATURE GRANULOCYTES 0 0.0 - 0.5 % ABS. NEUTROPHILS 4.9 1.8 - 8.0 K/UL  
 ABS. LYMPHOCYTES 1.5 0.8 - 3.5 K/UL  
 ABS. MONOCYTES 0.6 0.0 - 1.0 K/UL  
 ABS. EOSINOPHILS 0.1 0.0 - 0.4 K/UL  
 ABS. BASOPHILS 0.0 0.0 - 0.1 K/UL  
 ABS. IMM. GRANS. 0.0 0.00 - 0.04 K/UL  
 DF AUTOMATED METABOLIC PANEL, COMPREHENSIVE Collection Time: 20  8:48 AM  
Result Value Ref Range Sodium 140 136 - 145 mmol/L Potassium 3.5 3.5 - 5.1 mmol/L  Chloride 107 97 - 108 mmol/L  
 CO2 27 21 - 32 mmol/L  
 Anion gap 6 5 - 15 mmol/L Glucose 99 65 - 100 mg/dL BUN 17 6 - 20 MG/DL Creatinine 0.84 0.55 - 1.02 MG/DL  
 BUN/Creatinine ratio 20 12 - 20 GFR est AA >60 >60 ml/min/1.73m2 GFR est non-AA >60 >60 ml/min/1.73m2 Calcium 8.8 8.5 - 10.1 MG/DL Bilirubin, total 0.4 0.2 - 1.0 MG/DL  
 ALT (SGPT) 85 (H) 12 - 78 U/L  
 AST (SGOT) 35 15 - 37 U/L Alk. phosphatase 110 45 - 117 U/L Protein, total 7.1 6.4 - 8.2 g/dL Albumin 3.6 3.5 - 5.0 g/dL Globulin 3.5 2.0 - 4.0 g/dL A-G Ratio 1.0 (L) 1.1 - 2.2 HCG QL SERUM Collection Time: 04/28/20  8:48 AM  
Result Value Ref Range HCG, Ql. Negative NEG Medications: 
Medications Administered 0.9% sodium chloride infusion Admin Date 
04/28/2020 Action New Bag Dose 25 mL/hr Rate 25 mL/hr Route IntraVENous Administered By 
Saul Garcia RN  
  
  
 CARBOplatin (PARAPLATIN) 750 mg in 0.9% sodium chloride 250 mL, overfill volume 25 mL chemo infusion Admin Date 
04/28/2020 Action New Bag Dose 
750 mg Rate 
700 mL/hr Route IntraVENous Administered By 
Saul Garcia RN  
  
  
 dexamethasone (DECADRON) 12 mg in 0.9% sodium chloride 50 mL IVPB Admin Date 
04/28/2020 Action Given Dose 
12 mg Route IntraVENous Administered By 
Saul Garcia RN  
  
  
 diphenhydrAMINE (BENADRYL) capsule 50 mg   
 Admin Date 
04/28/2020 Action Given Dose 50 mg Route Oral Administered By 
Saul Garcia RN  
  
  
 famotidine (PF) (PEPCID) 20 mg in 0.9% sodium chloride 10 mL injection Admin Date 
04/28/2020 Action Given Dose 
20 mg Route IntraVENous Administered By 
Saul Garcia RN  
  
  
 fosaprepitant (EMEND) 150 mg in 0.9% sodium chloride 150 mL IVPB Admin Date 
04/28/2020 Action Given Dose 
150 mg Rate 450 mL/hr Route IntraVENous Administered By 
Saul Garcia RN  
  
  
 INV atezolizumab / placebo 1,200 mg in 0.9% sodium chloride 250 mL, overfill volume 25 mL INVESTIGATIONAL IVPB Admin Date 
04/28/2020 Action Given Dose 
1200 mg Rate 
295 mL/hr Route IntraVENous Administered By 
Adonay Kumar RN  
  
  
 PACLitaxeL (TAXOL) 162 mg in 0.9% sodium chloride 250 mL, overfill volume 25 mL chemo infusion Admin Date 
04/28/2020 Action New Bag Dose 162 mg Rate 302 mL/hr Route IntraVENous Administered By 
Adonay Kumar RN  
  
  
 palonosetron HCl (ALOXI) injection 0.25 mg   
 Admin Date 
04/28/2020 Action Given Dose 0.25 mg Route IntraVENous Administered By 
Adonay Kumar RN  
  
  
  
 
 
 
Ms. Savanah Adams tolerated treatment well and was discharged from [place] [address] in stable condition. Port de-accessed, flushed & heparinized per protocol. She is to return on May 5 at 0900 for her next appointment. Huber Goldman RN April 28, 2020

## 2020-04-28 NOTE — PROGRESS NOTES
[facility] at [address], [address] [facility]  [place]: [phone]  F: [phone]      Reason for Visit:   Brook Leach is a 48 y.o. female who is seen in consultation at the request of Dr. Rory Haynes for evaluation of therapy for breast cancer    Treatment History:   · 20 left breast 3:00, 13cmfn, core bx:  IMC, gr 2-3, 1.3 cm, ER negative, VA negative, HER 2 negative at IHC 0; ki67 68%; LN + by core, 6 mm, gr 2-3  · B-59 Carbo/Taxol +/- atezolizumab 2020-    History of Present Illness:   19 mammogram was negative, she felt the L breast mass herself, earlier in 2020. Interval history: In today for follow up and treatment. Complains of gr 1 hot flashes, gr 2 insomnia. FH:  Mother with breast cancer at age 79; maternal aunt with breast cancer in her 45s; maternal aunt with breast cancer at age 61; no ovarian, prostate, or pancreas cancer    Past Medical History:   Diagnosis Date    ADHD     Cancer ([address]     LEFT BREAST CA    Sleep apnea     MILD -NO CPAP      Past Surgical History:   Procedure Laterality Date    HX BREAST BIOPSY Left 4/15/2020    BREAST BIOPSY performed by Shari Jesus MD at [address] HX  SECTION  98, 80    HX GI      COLONOSCOPY    HX ORTHOPAEDIC      RIGHT ELBOW-FATTY MASS      Social History     Tobacco Use    Smoking status: Light Tobacco Smoker    Smokeless tobacco: Never Used    Tobacco comment: RARE cigar per patient   Substance Use Topics    Alcohol use: Yes     Alcohol/week: 6.0 standard drinks     Types: 6 Glasses of wine per week      Family History   Problem Relation Age of Onset    Cancer Mother         Breast    Cancer Maternal Aunt         Breast    Cancer Paternal Uncle     Cancer Maternal Aunt         Breast    Cancer Paternal Uncle      Current Outpatient Medications   Medication Sig    pyridoxine, vitamin B6, (VITAMIN B-6) 50 mg tablet Take 50 mg by mouth daily.  cholecalciferol, vitamin D3, (VITAMIN D3 PO) Take 5,000 Units by mouth daily.  prochlorperazine (COMPAZINE) 10 mg tablet Take 1 Tab by mouth every six (6) hours as needed for Nausea.  lidocaine-prilocaine (EMLA) topical cream Apply  to affected area as needed for Pain.  ondansetron hcl (ZOFRAN) 8 mg tablet Take 1 Tab by mouth every eight (8) hours as needed for Nausea. No current facility-administered medications for this visit. Facility-Administered Medications Ordered in Other Visits   Medication Dose Route Frequency    PACLitaxeL (TAXOL) 162 mg in 0.9% sodium chloride 250 mL, overfill volume 25 mL chemo infusion  162 mg IntraVENous ONCE    CARBOplatin (PARAPLATIN) 750 mg in 0.9% sodium chloride 250 mL, overfill volume 25 mL chemo infusion  750 mg IntraVENous ONCE    INV atezolizumab / placebo 1,200 mg in 0.9% sodium chloride 250 mL, overfill volume 25 mL INVESTIGATIONAL IVPB  1,200 mg IntraVENous ONCE    fosaprepitant (EMEND) 150 mg in 0.9% sodium chloride 150 mL IVPB  150 mg IntraVENous ONCE    palonosetron HCl (ALOXI) injection 0.25 mg  0.25 mg IntraVENous ONCE    diphenhydrAMINE (BENADRYL) capsule 50 mg  50 mg Oral ONCE    famotidine (PF) (PEPCID) 20 mg in 0.9% sodium chloride 10 mL injection  20 mg IntraVENous ONCE    dexamethasone (DECADRON) 12 mg in 0.9% sodium chloride 50 mL IVPB  12 mg IntraVENous ONCE    0.9% sodium chloride infusion  25 mL/hr IntraVENous CONTINUOUS      No Known Allergies     Review of Systems: A complete review of systems was obtained, negative except as described above. Physical Exam:     Visit Vitals  BP (!) 163/92 (BP 1 Location: Left arm, BP Patient Position: Sitting) Comment: I rechecked the BP and it was[de-identified] 163/ 98 Pulse:70:Aysymptom.    Pulse 73   Temp 98.7 °F (37.1 °C) (Temporal)   Resp 18   Ht 5' 3\" (1.6 m)   Wt 206 lb (93.4 kg)   SpO2 98%   BMI 36.49 kg/m²     ECOG PS: 0  General: No distress  Eyes: Anicteric sclerae  HENT: Atraumatic  Neck: Supple  Respiratory: Normal respiratory effort  CV: No peripheral edema  GI: nondistended  Skin: No rashes, ecchymoses, or petechiae  Psych: Alert, oriented, appropriate affect, normal judgment/insight    Breasts:  L breast 3:00, 7-8 cmfn, 2.5 cm mass, left ax, 2 - 2cm ax LN, and 1 cm LN    Results:     Lab Results   Component Value Date/Time    WBC 5.8 04/23/2020 10:21 AM    HGB 13.2 04/23/2020 10:21 AM    HCT 39.2 04/23/2020 10:21 AM    PLATELET 234 81/45/8787 10:21 AM    MCV 87 04/23/2020 10:21 AM    ABS. NEUTROPHILS 3.7 04/23/2020 10:21 AM     Lab Results   Component Value Date/Time    Sodium 141 04/23/2020 10:21 AM    Potassium 3.8 04/23/2020 10:21 AM    Chloride 100 04/23/2020 10:21 AM    CO2 26 04/23/2020 10:21 AM    Glucose 88 04/23/2020 10:21 AM    BUN 9 04/23/2020 10:21 AM    Creatinine 0.88 04/23/2020 10:21 AM    GFR est AA 86 04/23/2020 10:21 AM    GFR est non-AA 75 04/23/2020 10:21 AM    Calcium 9.5 04/23/2020 10:21 AM     Lab Results   Component Value Date/Time    Bilirubin, total 0.4 04/27/2020 08:09 AM    ALT (SGPT) 91 (H) 04/27/2020 08:09 AM    AST (SGOT) 35 04/27/2020 08:09 AM    Alk. phosphatase 110 04/27/2020 08:09 AM    Protein, total 7.1 04/27/2020 08:09 AM    Albumin 3.6 04/27/2020 08:09 AM    Globulin 3.5 04/27/2020 08:09 AM       3/31/20  3D mammogram:  1.6 cm mass in L breast  US L breast  3:00 left breast mass 1.6 cm, multiple LN in L ax tail    4/14/2020 CT c/a/p:  IMPRESSION: Left breast mass. No evidence for metastatic disease in the chest  abdomen or pelvis. There are no target lesions for RECIST classification. 4/14/2020 Bone Scan:  IMPRESSION: No evidence of bony metastatic disease. Records reviewed and summarized above. Pathology report(s) reviewed above. Radiology report(s) reviewed above. Assessment/plan:   1.  Left IMC, gr 2-3, triple negative, LN + by core:  cT1c cN1a cM0, unifocal, stage IIA both anatomic and prognostic  postmenopausal    With multiple LN felt on exam, ordered CT c/a/p and bone scan for staging. CT and Bone scan on 4/14/2020 negative for mets. We explained to the patient that the goal of systemic adjuvant therapy is to improve the chances for cure and decrease the risk of relapse. We explained why a patient can have microscopic cancer spread now even though physical examination, laboratory studies and imaging studies are negative for cancer. We explained that the same treatments used now as adjuvant or preventive treatments rarely if ever are curative in women who develop metastases. We discussed that there is no difference in overall survival between neoadjuvant and adjuvant chemotherapy. We discussed the rationale for neoadjuvant chemotherapy, if chemotherapy is warranted, as it is in this case: to avoid any potential delays in giving chemotherapy following surgery, to be able to see the response of the tumor to chemotherapy, and to potentially downstage the tumor prior to surgery. I discussed the potential risks of dose-dense chemotherapy with the patient. (DD AC-T, adriamycin 60 mg/m2; cyclophosphamide 600 mg/m2 q 2 weeks x 4; paclitaxel 80 mg/m2 qweekly x 12). Major toxicities include nausea and vomiting, stomatitis, fatigue, and a small risk of heart damage. Anemia frequently results and occasionally requires growth factors and rarely transfusions. Neutropenic fever is uncommon, but can be a life-threatening problem. Also, there is a small but increased risk of myelodysplasia and acute leukemia. We provided the patient with detailed information concerning toxicity including frequent toxicities that only last a few days, such as nausea, vomiting, mouth sores, arthralgia, myalgia, and potentially allergic reactions to paclitaxel, as well as toxicities which can be longer lasting including total alopecia, fatigue, anemia and neuropathy.  We provided the patient with detailed information concerning the toxicities of their regimen in addition to our verbal discussion. We discussed the potential addition of carboplatin auc 6 q 3 weeks during weekly paclitaxel as evaluated in CAL[facility] 41189, showing a significant improvement in pCR for patients with stage II-III triple negative breast cancer. Additional side effects of added myelosuppression, fatigue, renal damage with dehydration, and nausea and vomiting were discussed. Would use AUC 5 as that is the dose that is being used in all current investigations    Also discussed the clinical trial NSABP B-59, which is carbo/taxol then DD [facility] +/- atezolizumab. Discussed the positive findings of YOYOE 202 with pembrolizumab    We discussed the risks and benefits of atezolizumab therapy today. Potential side effects include, but are not limited to fatigue, rash, auto-immune issues, infertility, allergic reactions, and rarely, death. Discussed cold caps (not working with [facility]). Discussed oral and peripheral cryotherapy    Discussed COVID19 risks and LETI guidelines stating neoadj chemotherapy for [place] breast cancer is preferred vs upfront surgery during this pandemic. After this discussion, she is agreeable to chemotherapy and port placement. She has signed informed consent for B-59. The patient was given the following prescriptions with written and verbal instructions on how to use each:  Compazine, zofran, olanzapine (held until [facility]), a wig, and emla cream.  IV Lemmie Gentle will be used with AC. Neulasta the following day with AC (bone pain side effect discussed). Port placed by Dr. Bernice Sever. TTE on 4/13/2020, EF 61%. Breast MRI scheduled for 5/4/2020. B-59 Carbo/Taxol C1D1 today. 2. Emotional well being:  She has excellent support and is coping well with her disease    3. Genetic testing:   Discussed potential ramifications of a positive test including the potential need for bilateral mastectomies and bilateral oophorectomies and the risk then for her family members to also have a mutation.  VUS discussed also. Testing performed today by blood. 4. Increased LFTs:  Due to tylenol and alcohol, resolved with stopping        I appreciate the opportunity to participate in Ms. Vikram Wilson Jerardo's care. Signed By: Thaddeus Gil MD      No orders of the defined types were placed in this encounter.

## 2020-04-28 NOTE — PROGRESS NOTES
Akshat Rivas is a 48 y.o. female Follow up for the Evaluation of Breast Cancer. 1. Have you been to the ER, urgent care clinic since your last visit? Hospitalized since your last visit? No    2. Have you seen or consulted any other health care providers outside of the [address] [place] [address] since your last visit? Include any pap smears or colon screening.  No

## 2020-04-29 ENCOUNTER — OFFICE VISIT (OUTPATIENT)
Dept: SURGERY | Age: 55
End: 2020-04-29

## 2020-04-29 ENCOUNTER — DOCUMENTATION ONLY (OUTPATIENT)
Dept: SURGERY | Age: 55
End: 2020-04-29

## 2020-04-29 VITALS — HEIGHT: 63 IN | BODY MASS INDEX: 36.5 KG/M2 | WEIGHT: 206 LBS | TEMPERATURE: 98.4 F

## 2020-04-29 DIAGNOSIS — C77.3 BREAST CANCER METASTASIZED TO AXILLARY LYMPH NODE, LEFT (HCC): Primary | ICD-10-CM

## 2020-04-29 DIAGNOSIS — C50.912 BREAST CANCER METASTASIZED TO AXILLARY LYMPH NODE, LEFT (HCC): Primary | ICD-10-CM

## 2020-04-29 NOTE — PATIENT INSTRUCTIONS

## 2020-04-29 NOTE — PROGRESS NOTES
Type of Film: [x] CD [] FILMS  Type of Test: [] MRI [x] MAMMO  From: [facility]  Given to: Will take to [facility] MRI 5/4/2020  To be Downloaded into PACS:  YES      Patient is having a breast MRI next week.

## 2020-04-29 NOTE — PROGRESS NOTES
HISTORY OF PRESENT ILLNESS  Gisela Rendon is a 48 y.o. female. HPI ESTABLISHED patient here to discuss newly diagnosed LEFT breast cancer. She started neoadjuvant chemotherapy yesterday. She is feeling pretty well so far. Her LEFT breast feels a little swollen. Port site has healed well. Breast cancer-  2020 - left breast 3:00, 13cmfn, core bx:  IMC, gr 2-3, 1.3 cm, ER negative, CT negative, HER 2 negative at [facility] 0; ki67 68%; LN + by core, 6 mm, gr 2-3  4/15/2020 - port insertion   2020 - started neoadjuvant chemotherapy (B-59 Carbo/Taxol +/- atezolizumab) - Dr. Brenden Lopes testing performed yesterday 2020 at Dr. Bang Mabry office. Breast imaging-  Breast MRI is next week 2020  3/2020 - mammogram at [facility]  Staging scans negative  Past Medical History:   Diagnosis Date    ADHD     Cancer ([address]     LEFT BREAST CA    Sleep apnea     MILD -NO CPAP     Past Surgical History:   Procedure Laterality Date    HX BREAST BIOPSY Left 4/15/2020    BREAST BIOPSY performed by Miryam Whittington [facility] at [facility] MAIN OR    HX  SECTION  98, 80    HX GI      COLONOSCOPY    HX ORTHOPAEDIC      RIGHT ELBOW-FATTY MASS     Social History     Socioeconomic History    Marital status:      Spouse name: Not on file    Number of children: Not on file    Years of education: Not on file    Highest education level: Not on file   Occupational History    Not on file   Social Needs    Financial resource strain: Not on file    Food insecurity     Worry: Not on file     Inability: Not on file    Transportation needs     Medical: Not on file     Non-medical: Not on file   Tobacco Use    Smoking status: Light Tobacco Smoker    Smokeless tobacco: Never Used    Tobacco comment: RARE cigar per patient   Substance and Sexual Activity    Alcohol use:  Yes     Alcohol/week: 6.0 standard drinks     Types: 6 Glasses of wine per week    Drug use: Never    Sexual activity: Not on file   Lifestyle    Physical activity     Days per week: Not on file     Minutes per session: Not on file    Stress: Not on file   Relationships    Social connections     Talks on phone: Not on file     Gets together: Not on file     Attends [employer] service: Not on file     Active member of club or organization: Not on file     Attends meetings of clubs or organizations: Not on file     Relationship status: Not on file    Intimate partner violence     Fear of current or ex partner: Not on file     Emotionally abused: Not on file     Physically abused: Not on file     Forced sexual activity: Not on file   Other Topics Concern    Not on file   Social History Narrative    Not on file     OB History    No obstetric history on file. Obstetric Comments   Menarche 16, LMP 7/2019, # of children 2, age of 1st delivery 29, Hysterectomy/oophorectomy No/No, Breast bx Yes, history of breast feeding Yes, BCP No, Hormone therapy No               Current Outpatient Medications:     lisdexamfetamine dimesylate (VYVANSE PO), Take  by mouth., Disp: , Rfl:     CLONAZEPAM PO, Take  by mouth., Disp: , Rfl:     prochlorperazine (COMPAZINE) 10 mg tablet, Take 1 Tab by mouth every six (6) hours as needed for Nausea., Disp: 50 Tab, Rfl: 5    lidocaine-prilocaine (EMLA) topical cream, Apply  to affected area as needed for Pain., Disp: 30 g, Rfl: 0    ondansetron hcl (ZOFRAN) 8 mg tablet, Take 1 Tab by mouth every eight (8) hours as needed for Nausea., Disp: 60 Tab, Rfl: 3    pyridoxine, vitamin B6, (VITAMIN B-6) 50 mg tablet, Take 50 mg by mouth daily. , Disp: , Rfl:     cholecalciferol, vitamin D3, (VITAMIN D3 PO), Take 5,000 Units by mouth daily. , Disp: , Rfl:   No current facility-administered medications for this visit.      Facility-Administered Medications Ordered in Other Visits:     [START ON 5/5/2020] diphenhydrAMINE (BENADRYL) capsule 50 mg, 50 mg, Oral, ONCE, Karin Coelho MD    [START ON 5/5/2020] famotidine (PF) (PEPCID) 20 mg in 0.9% sodium chloride 10 mL injection, 20 mg, IntraVENous, ONCE, Summer Nuno MD  [facility] ON 5/5/2020] dexamethasone (DECADRON) 12 mg in 0.9% sodium chloride 50 mL IVPB, 12 mg, IntraVENous, ONCE, Summer Nuno MD  [facility] ON 5/5/2020] PACLitaxeL (TAXOL) 162 mg in 0.9% sodium chloride 250 mL, overfill volume 25 mL chemo infusion, 162 mg, IntraVENous, ONCE, Summer Nuno MD    [START ON 5/5/2020] 0.9% sodium chloride infusion, 25 mL/hr, IntraVENous, CONTINUOUS, Summer Nuno MD  No Known Allergies    Review of Systems   Constitutional: Negative. HENT: Negative. Eyes: Negative. Respiratory: Negative. Cardiovascular: Negative. Gastrointestinal: Negative. Genitourinary: Negative. Musculoskeletal: Negative. Skin: Negative. Neurological: Negative. Endo/Heme/Allergies: Negative. Psychiatric/Behavioral: Negative. All other systems reviewed and are negative. Physical Exam  Vitals signs and nursing note reviewed. Constitutional:       Appearance: She is well-developed. HENT:      Head: Normocephalic. Neck:      Musculoskeletal: Neck supple. Thyroid: No thyromegaly. Pulmonary:      Effort: Pulmonary effort is normal.   Chest:      Breasts:         Right: No swelling, bleeding, inverted nipple, mass, nipple discharge, skin change or tenderness. Left: Mass (2.5 cm mass 4:00 ttp ) present. No swelling, bleeding, inverted nipple, nipple discharge, skin change or tenderness. Abdominal:      Palpations: Abdomen is soft. Musculoskeletal: Normal range of motion. Lymphadenopathy:      Cervical: No cervical adenopathy. Upper Body:      Left upper body: Axillary adenopathy present. No supraclavicular or pectoral adenopathy. Skin:     General: Skin is warm and dry. Neurological:      Mental Status: She is alert and oriented to person, place, and time. ASSESSMENT and PLAN    ICD-10-CM ICD-9-CM    1. Breast cancer metastasized to axillary lymph node, left (HCC) C50.912 174.9     C77.3 196.3    Breast cancer-  4/6/2020 - left breast 3:00, 13cmfn, core bx:  IMC, gr 2-3, 1.3 cm, ER negative, [place] negative, HER 2 negative at [facility] 0; ki67 68%; LN + by core, 6 mm, gr 2-3  4/15/2020 - port insertion   4/28/2020 - started neoadjuvant chemotherapy (B-59 Carbo/Taxol +/- atezolizumab) - Dr. Ivy Salgado site healing well  Will call her after mri  Genetic testing pending. Surgical plan will depend on mri and genetic test.   F/u in 6 weeks for us.

## 2020-04-29 NOTE — PROGRESS NOTES
Pt has been screened for all eligibility/ineligibility criteria and has been determined eligible for this protocol. We discussed contraception methods and patient stated she will remain abstinent. Pt in for labs and follow up visit today per protocol with Dr. Carlyon Mohs and RN. This is C1D1 of treatment. Patient reports the following adverse events at this time: gr 1 hot flashes, gr 2 insomnia. Vital signs are stable. ctDNA labs drawn per protocol. Patient to go to infusion center after appointment to receive carbo/taxol/atezo/placebo. Next appt is scheduled for 5/5/20.

## 2020-04-30 ENCOUNTER — DOCUMENTATION ONLY (OUTPATIENT)
Dept: ONCOLOGY | Age: 55
End: 2020-04-30

## 2020-04-30 NOTE — PROGRESS NOTES
[facility] Genetic Testing Kit Prepared and placed up front for [email or web address] to  via [employer]. Tracking Number[de-identified] [id number].

## 2020-05-04 ENCOUNTER — HOSPITAL ENCOUNTER (OUTPATIENT)
Dept: MRI IMAGING | Age: 55
Discharge: HOME | End: 2020-05-04
Attending: INTERNAL MEDICINE
Payer: COMMERCIAL

## 2020-05-04 ENCOUNTER — DOCUMENTATION ONLY (OUTPATIENT)
Dept: SURGERY | Age: 55
End: 2020-05-04

## 2020-05-04 DIAGNOSIS — C50.412 MALIGNANT NEOPLASM OF UPPER-OUTER QUADRANT OF LEFT BREAST IN FEMALE, ESTROGEN RECEPTOR NEGATIVE (HCC): ICD-10-CM

## 2020-05-04 DIAGNOSIS — Z17.1 MALIGNANT NEOPLASM OF UPPER-OUTER QUADRANT OF LEFT BREAST IN FEMALE, ESTROGEN RECEPTOR NEGATIVE (HCC): ICD-10-CM

## 2020-05-04 PROCEDURE — 77049 MRI BREAST C-+ W/CAD BI: CPT

## 2020-05-04 PROCEDURE — 74011250636 HC RX REV CODE- 250/636: Performed by: INTERNAL MEDICINE

## 2020-05-04 PROCEDURE — A9585 GADOBUTROL INJECTION: HCPCS | Performed by: INTERNAL MEDICINE

## 2020-05-04 RX ADMIN — GADOBUTROL 10 ML: 604.72 INJECTION INTRAVENOUS at 11:00

## 2020-05-04 NOTE — PROGRESS NOTES
Type of Film: [x] CD [] FILMS  Type of Test: [] MRI [x] MAMMO  From: [facility]   Given to: [facility] MRI department    To be Downloaded into PACS:  YES    Patient is having an  MRI today.

## 2020-05-05 ENCOUNTER — OFFICE VISIT (OUTPATIENT)
Dept: ONCOLOGY | Age: 55
End: 2020-05-05

## 2020-05-05 ENCOUNTER — RESEARCH ENCOUNTER (OUTPATIENT)
Dept: ONCOLOGY | Age: 55
End: 2020-05-05

## 2020-05-05 ENCOUNTER — HOSPITAL ENCOUNTER (OUTPATIENT)
Dept: INFUSION THERAPY | Age: 55
Discharge: HOME | End: 2020-05-05
Payer: COMMERCIAL

## 2020-05-05 VITALS
RESPIRATION RATE: 16 BRPM | WEIGHT: 205.4 LBS | SYSTOLIC BLOOD PRESSURE: 153 MMHG | HEIGHT: 63 IN | TEMPERATURE: 98.5 F | OXYGEN SATURATION: 95 % | HEART RATE: 64 BPM | BODY MASS INDEX: 36.39 KG/M2 | DIASTOLIC BLOOD PRESSURE: 84 MMHG

## 2020-05-05 VITALS
SYSTOLIC BLOOD PRESSURE: 133 MMHG | RESPIRATION RATE: 18 BRPM | HEART RATE: 74 BPM | DIASTOLIC BLOOD PRESSURE: 69 MMHG | HEIGHT: 63 IN | BODY MASS INDEX: 36.32 KG/M2 | TEMPERATURE: 98.9 F | WEIGHT: 205 LBS | OXYGEN SATURATION: 95 %

## 2020-05-05 DIAGNOSIS — Z17.1 MALIGNANT NEOPLASM OF UPPER-OUTER QUADRANT OF LEFT BREAST IN FEMALE, ESTROGEN RECEPTOR NEGATIVE (HCC): Primary | ICD-10-CM

## 2020-05-05 DIAGNOSIS — Z51.11 CHEMOTHERAPY MANAGEMENT, ENCOUNTER FOR: ICD-10-CM

## 2020-05-05 DIAGNOSIS — C50.412 MALIGNANT NEOPLASM OF UPPER-OUTER QUADRANT OF LEFT BREAST IN FEMALE, ESTROGEN RECEPTOR NEGATIVE (HCC): Primary | ICD-10-CM

## 2020-05-05 LAB
BASOPHILS # BLD: 0 K/UL (ref 0–0.1)
BASOPHILS NFR BLD: 1 % (ref 0–1)
DIFFERENTIAL METHOD BLD: ABNORMAL
EOSINOPHIL # BLD: 0.1 K/UL (ref 0–0.4)
EOSINOPHIL NFR BLD: 2 % (ref 0–7)
ERYTHROCYTE [DISTWIDTH] IN BLOOD BY AUTOMATED COUNT: 12.8 % (ref 11.5–14.5)
HCT VFR BLD AUTO: 37.7 % (ref 35–47)
HGB BLD-MCNC: 12.1 G/DL (ref 11.5–16)
IMM GRANULOCYTES # BLD AUTO: 0 K/UL (ref 0–0.04)
IMM GRANULOCYTES NFR BLD AUTO: 1 % (ref 0–0.5)
LYMPHOCYTES # BLD: 1.3 K/UL (ref 0.8–3.5)
LYMPHOCYTES NFR BLD: 23 % (ref 12–49)
MCH RBC QN AUTO: 29.4 PG (ref 26–34)
MCHC RBC AUTO-ENTMCNC: 32.1 G/DL (ref 30–36.5)
MCV RBC AUTO: 91.7 FL (ref 80–99)
MONOCYTES # BLD: 0.4 K/UL (ref 0–1)
MONOCYTES NFR BLD: 7 % (ref 5–13)
NEUTS SEG # BLD: 3.7 K/UL (ref 1.8–8)
NEUTS SEG NFR BLD: 66 % (ref 32–75)
NRBC # BLD: 0 K/UL (ref 0–0.01)
NRBC BLD-RTO: 0 PER 100 WBC
PLATELET # BLD AUTO: 243 K/UL (ref 150–400)
PMV BLD AUTO: 10.8 FL (ref 8.9–12.9)
RBC # BLD AUTO: 4.11 M/UL (ref 3.8–5.2)
WBC # BLD AUTO: 5.6 K/UL (ref 3.6–11)

## 2020-05-05 PROCEDURE — 74011250637 HC RX REV CODE- 250/637: Performed by: INTERNAL MEDICINE

## 2020-05-05 PROCEDURE — 77030016057 HC NDL HUBR APOL -B

## 2020-05-05 PROCEDURE — 74011000258 HC RX REV CODE- 258: Performed by: INTERNAL MEDICINE

## 2020-05-05 PROCEDURE — 36415 COLL VENOUS BLD VENIPUNCTURE: CPT

## 2020-05-05 PROCEDURE — 85025 COMPLETE CBC W/AUTO DIFF WBC: CPT

## 2020-05-05 PROCEDURE — 74011250636 HC RX REV CODE- 250/636: Performed by: INTERNAL MEDICINE

## 2020-05-05 PROCEDURE — 96413 CHEMO IV INFUSION 1 HR: CPT

## 2020-05-05 PROCEDURE — 74011000250 HC RX REV CODE- 250: Performed by: INTERNAL MEDICINE

## 2020-05-05 PROCEDURE — 96375 TX/PRO/DX INJ NEW DRUG ADDON: CPT

## 2020-05-05 RX ORDER — DIPHENHYDRAMINE HCL 25 MG
50 CAPSULE ORAL ONCE
Status: COMPLETED | OUTPATIENT
Start: 2020-05-12 | End: 2020-05-12

## 2020-05-05 RX ORDER — SODIUM CHLORIDE 9 MG/ML
25 INJECTION, SOLUTION INTRAVENOUS CONTINUOUS
Status: DISCONTINUED | OUTPATIENT
Start: 2020-05-12 | End: 2020-05-13 | Stop reason: HOSPADM

## 2020-05-05 RX ADMIN — DEXAMETHASONE SODIUM PHOSPHATE 12 MG: 4 INJECTION INTRA-ARTICULAR; INTRALESIONAL; INTRAMUSCULAR; INTRAVENOUS; SOFT TISSUE at 10:30

## 2020-05-05 RX ADMIN — PACLITAXEL 162 MG: 6 INJECTION, SOLUTION INTRAVENOUS at 11:22

## 2020-05-05 RX ADMIN — DIPHENHYDRAMINE HYDROCHLORIDE 50 MG: 25 CAPSULE ORAL at 10:28

## 2020-05-05 RX ADMIN — SODIUM CHLORIDE 25 ML/HR: 900 INJECTION, SOLUTION INTRAVENOUS at 10:28

## 2020-05-05 RX ADMIN — FAMOTIDINE 20 MG: 10 INJECTION INTRAVENOUS at 10:29

## 2020-05-05 NOTE — PROGRESS NOTES
Pt in for labs and follow up visit today per protocol with Dr. Lozada and RN. Héctor Croft is C1D8 of treatment. Benigno Jose reports the following adverse events at this time:gr 1 hot flashes, gr 2 sob. Vital signs are stable. Patient to go to infusion center after appointment to receive taxol.  Next appt is scheduled for 5/12/20.

## 2020-05-05 NOTE — PROGRESS NOTES
[facility] [place] at [facility]  [address] [facility], [address]  Lucia Mix: [phone]  F: [phone]      Reason for Visit:   Opal Craig is a 48 y.o. female who is seen in consultation at the request of Dr. Jacklyn Gardner for evaluation of therapy for breast cancer    Treatment History:   · 20 left breast 3:00, 13cmfn, core bx:  IMC, gr 2-3, 1.3 cm, ER negative, [place] negative, HER 2 negative at [facility] 0; ki67 68%; LN + by core, 6 mm, gr 2-3  · B-59 Carbo/Taxol +/- atezolizumab 2020-    History of Present Illness:   19 mammogram was negative, she felt the L breast mass herself, earlier in 2020. Interval history: In today for follow up and treatment. Complains of gr 1 hot flashes, gr 2 sob. FH:  Mother with breast cancer at age 79; maternal aunt with breast cancer in her 45s; maternal aunt with breast cancer at age 61; no ovarian, prostate, or pancreas cancer    Past Medical History:   Diagnosis Date    ADHD     Cancer ([address]     LEFT BREAST CA    Sleep apnea     MILD -NO CPAP      Past Surgical History:   Procedure Laterality Date    HX BREAST BIOPSY Left 4/15/2020    BREAST BIOPSY performed by Sendy Garcia MD at [facility]  HX  SECTION  98, 80    HX GI      COLONOSCOPY    HX ORTHOPAEDIC      RIGHT ELBOW-FATTY MASS      Social History     Tobacco Use    Smoking status: Light Tobacco Smoker    Smokeless tobacco: Never Used    Tobacco comment: RARE cigar per patient   Substance Use Topics    Alcohol use: Yes     Alcohol/week: 6.0 standard drinks     Types: 6 Glasses of wine per week      Family History   Problem Relation Age of Onset    Cancer Mother         Breast    Cancer Maternal Aunt         Breast    Cancer Paternal Uncle     Cancer Maternal Aunt         Breast    Cancer Paternal Uncle      Current Outpatient Medications   Medication Sig    lisdexamfetamine dimesylate (VYVANSE PO) Take  by mouth.     CLONAZEPAM PO Take by mouth.  pyridoxine, vitamin B6, (VITAMIN B-6) 50 mg tablet Take 50 mg by mouth daily.  cholecalciferol, vitamin D3, (VITAMIN D3 PO) Take 5,000 Units by mouth daily.  prochlorperazine (COMPAZINE) 10 mg tablet Take 1 Tab by mouth every six (6) hours as needed for Nausea.  lidocaine-prilocaine (EMLA) topical cream Apply  to affected area as needed for Pain.  ondansetron hcl (ZOFRAN) 8 mg tablet Take 1 Tab by mouth every eight (8) hours as needed for Nausea. No current facility-administered medications for this visit. Facility-Administered Medications Ordered in Other Visits   Medication Dose Route Frequency    diphenhydrAMINE (BENADRYL) capsule 50 mg  50 mg Oral ONCE    famotidine (PF) (PEPCID) 20 mg in 0.9% sodium chloride 10 mL injection  20 mg IntraVENous ONCE    dexamethasone (DECADRON) 12 mg in 0.9% sodium chloride 50 mL IVPB  12 mg IntraVENous ONCE    PACLitaxeL (TAXOL) 162 mg in 0.9% sodium chloride 250 mL, overfill volume 25 mL chemo infusion  162 mg IntraVENous ONCE    0.9% sodium chloride infusion  25 mL/hr IntraVENous CONTINUOUS      No Known Allergies     Review of Systems: A complete review of systems was obtained, negative except as described above.     Physical Exam:     Visit Vitals  /69 (BP 1 Location: Left arm, BP Patient Position: Sitting)   Pulse 74   Temp 98.9 °F (37.2 °C) (Temporal)   Resp 18   Ht 5' 3\" (1.6 m)   Wt 205 lb (93 kg)   SpO2 95%   BMI 36.31 kg/m²     ECOG PS: 0  General: No distress  Eyes: Anicteric sclerae  HENT: Atraumatic  Neck: Supple  Respiratory: Normal respiratory effort  CV: No peripheral edema  GI: nondistended  Skin: No rashes, ecchymoses, or petechiae  Psych: Alert, oriented, appropriate affect, normal judgment/insight    Breasts:  L breast 3:00, 7-8 cmfn, 2.5 cm mass, left ax, 2 - 2cm ax LN, and 1 cm LN (last exam, deferred today)    Results:     Lab Results   Component Value Date/Time    WBC 7.1 04/28/2020 08:48 AM    HGB 12.3 04/28/2020 08:48 AM    HCT 39.6 04/28/2020 08:48 AM    PLATELET 362 72/81/6160 08:48 AM    MCV 93.0 04/28/2020 08:48 AM    ABS. NEUTROPHILS 4.9 04/28/2020 08:48 AM     Lab Results   Component Value Date/Time    Sodium 140 04/28/2020 08:48 AM    Potassium 3.5 04/28/2020 08:48 AM    Chloride 107 04/28/2020 08:48 AM    CO2 27 04/28/2020 08:48 AM    Glucose 99 04/28/2020 08:48 AM    BUN 17 04/28/2020 08:48 AM    Creatinine 0.84 04/28/2020 08:48 AM    GFR est AA >60 04/28/2020 08:48 AM    GFR est non-AA >60 04/28/2020 08:48 AM    Calcium 8.8 04/28/2020 08:48 AM     Lab Results   Component Value Date/Time    Bilirubin, total 0.4 04/28/2020 08:48 AM    ALT (SGPT) 85 (H) 04/28/2020 08:48 AM    AST (SGOT) 35 04/28/2020 08:48 AM    Alk. phosphatase 110 04/28/2020 08:48 AM    Protein, total 7.1 04/28/2020 08:48 AM    Albumin 3.6 04/28/2020 08:48 AM    Globulin 3.5 04/28/2020 08:48 AM       3/31/20  3D mammogram:  1.6 cm mass in L breast  US L breast  3:00 left breast mass 1.6 cm, multiple LN in L ax tail    4/14/2020 CT c/a/p:  IMPRESSION: Left breast mass. No evidence for metastatic disease in the chest  abdomen or pelvis. There are no target lesions for RECIST classification. 4/14/2020 Bone Scan:  IMPRESSION: No evidence of bony metastatic disease. 5/4/2020 MRI breast:  IMPRESSION:     Right Breast:  1. BI-RADS Assessment Category 1: Negative. No evidence of breast carcinoma  within the right breast.     Left Breast:  1. BI-RADS Assessment Category 6: Known biopsy proven malignancy- Appropriate  action should be taken. Large area of malignant enhancement, occupying most of  the middle and posterior third of the left breast upper outer and lower-outer  quadrants, from 2:00 to 4:00. Malignancy is much larger than it had appeared  mammographically. Greatest measurement is 7 x 4.2 x 3 cm.  Contained within this  large area of enhancement is the dominant 2.5 cm mass, which represents the  mammographic and sonographic correlate. 2. BI-RADS Assessment Category 6: Known biopsy proven malignancy- Appropriate  action should be taken. Pathologically proven level 1 left axillary  lymphadenopathy, with numerous enlarged, malignant lymph nodes. There is also  level 2 left axillary lymphadenopathy. 3. Retraction of the skin of the lateral left breast, which is thickened. However, no evidence of malignant skin invasion/involvement. 4. The malignant mass closely approximates the pectoral muscle, but there is no  evidence of direct muscle invasion/involvement.     RECOMMENDATIONS:  Appropriate action is recommended. The patient is undergoing neoadjuvant  chemotherapy. There is no evidence of contralateral disease. Records reviewed and summarized above. Pathology report(s) reviewed above. Radiology report(s) reviewed above. Assessment/plan:   1. Left IMC, gr 2-3, triple negative, LN + by core:  cT1c cN1a cM0, unifocal, stage IIA both anatomic and prognostic  postmenopausal    With multiple LN felt on exam, ordered CT c/a/p and bone scan for staging. CT and Bone scan on 4/14/2020 negative for mets. We explained to the patient that the goal of systemic adjuvant therapy is to improve the chances for cure and decrease the risk of relapse. We explained why a patient can have microscopic cancer spread now even though physical examination, laboratory studies and imaging studies are negative for cancer. We explained that the same treatments used now as adjuvant or preventive treatments rarely if ever are curative in women who develop metastases. We discussed that there is no difference in overall survival between neoadjuvant and adjuvant chemotherapy.   We discussed the rationale for neoadjuvant chemotherapy, if chemotherapy is warranted, as it is in this case: to avoid any potential delays in giving chemotherapy following surgery, to be able to see the response of the tumor to chemotherapy, and to potentially downstage the tumor prior to surgery. I discussed the potential risks of dose-dense chemotherapy with the patient. (DD AC-T, adriamycin 60 mg/m2; cyclophosphamide 600 mg/m2 q 2 weeks x 4; paclitaxel 80 mg/m2 qweekly x 12). Major toxicities include nausea and vomiting, stomatitis, fatigue, and a small risk of heart damage. Anemia frequently results and occasionally requires growth factors and rarely transfusions. Neutropenic fever is uncommon, but can be a life-threatening problem. Also, there is a small but increased risk of myelodysplasia and acute leukemia. We provided the patient with detailed information concerning toxicity including frequent toxicities that only last a few days, such as nausea, vomiting, mouth sores, arthralgia, myalgia, and potentially allergic reactions to paclitaxel, as well as toxicities which can be longer lasting including total alopecia, fatigue, anemia and neuropathy. We provided the patient with detailed information concerning the toxicities of their regimen in addition to our verbal discussion. We discussed the potential addition of carboplatin auc 6 q 3 weeks during weekly paclitaxel as evaluated in CALGB 73366, showing a significant improvement in pCR for patients with stage II-III triple negative breast cancer. Additional side effects of added myelosuppression, fatigue, renal damage with dehydration, and nausea and vomiting were discussed. Would use AUC 5 as that is the dose that is being used in all current investigations    Also discussed the clinical trial NSABP B-59, which is carbo/taxol then [facility] +/- atezolizumab. Discussed the positive findings of IBCXA 260 with pembrolizumab    We discussed the risks and benefits of atezolizumab therapy today. Potential side effects include, but are not limited to fatigue, rash, auto-immune issues, infertility, allergic reactions, and rarely, death. Discussed cold caps (not working with [facility]).   Discussed oral and peripheral cryotherapy    Discussed COVID19 risks and LETI guidelines stating neoadj chemotherapy for [place] breast cancer is preferred vs upfront surgery during this pandemic. After this discussion, she is agreeable to chemotherapy and port placement. She has signed informed consent for B-59. The patient was given the following prescriptions with written and verbal instructions on how to use each:  Compazine, zofran, olanzapine (held until [facility]), a wig, and emla cream.  IV German Winter will be used with AC. Neulasta the following day with AC (bone pain side effect discussed). Port placed by Dr. Hernesto Dobbs. TTE on 4/13/2020, EF 61%. Breast MRI on 5/4/2020. B-59 Carbo/Taxol C1D8 today. 2. Emotional well being:  She has excellent support and is coping well with her disease    3. Genetic testing:   Discussed potential ramifications of a positive test including the potential need for bilateral mastectomies and bilateral oophorectomies and the risk then for her family members to also have a mutation. VUS discussed also. Testing performed 4/28/20 by blood    4. Increased LFTs:  Due to tylenol and alcohol, resolved with stopping    5. Headaches:  Dull for few days after treatment. Recommend ibuprofen or tylenol PRN. Encouraged increase PO fluids. I appreciate the opportunity to participate in Ms. Noy Kurtz's care. Signed By: Chucky Guallpa MD      No orders of the defined types were placed in this encounter.

## 2020-05-05 NOTE — PROGRESS NOTES
[facility] Progress Note Date: May 5, 2020 Name: Isabella De Leon MRN: [id number] : 1965 Ms. Brook Bautista Arrived ambulatory and in no distress for C1D8 of Clinical Trial NSABP B-59  Regimen. Assessment was completed, no acute issues at this time, no new complaints voiced. Right chest wall port accessed without difficulty, labs drawn & sent for processing. Patient proceed to appointment with Dr. Allison Charles. Ms. Garcia Talamantes vitals were reviewed. Visit Vitals /69 Pulse 74 Temp 98.9 °F (37.2 °C) Resp 18 Ht 5' 3\" (1.6 m) Wt 93.2 kg (205 lb 6.4 oz) SpO2 95% Breastfeeding No  
BMI 36.38 kg/m² Lab results were obtained and reviewed. Recent Results (from the past 12 hour(s)) CBC WITH AUTOMATED DIFF Collection Time: 20  8:55 AM  
Result Value Ref Range WBC 5.6 3.6 - 11.0 K/uL  
 RBC 4.11 3.80 - 5.20 M/uL  
 HGB 12.1 11.5 - 16.0 g/dL HCT 37.7 35.0 - 47.0 % MCV 91.7 80.0 - 99.0 FL  
 MCH 29.4 26.0 - 34.0 PG  
 MCHC 32.1 30.0 - 36.5 g/dL  
 RDW 12.8 11.5 - 14.5 % PLATELET 454 171 - 131 K/uL MPV 10.8 8.9 - 12.9 FL  
 NRBC 0.0 0  WBC ABSOLUTE NRBC 0.00 0.00 - 0.01 K/uL NEUTROPHILS 66 32 - 75 % LYMPHOCYTES 23 12 - 49 % MONOCYTES 7 5 - 13 % EOSINOPHILS 2 0 - 7 % BASOPHILS 1 0 - 1 % IMMATURE GRANULOCYTES 1 (H) 0.0 - 0.5 % ABS. NEUTROPHILS 3.7 1.8 - 8.0 K/UL  
 ABS. LYMPHOCYTES 1.3 0.8 - 3.5 K/UL  
 ABS. MONOCYTES 0.4 0.0 - 1.0 K/UL  
 ABS. EOSINOPHILS 0.1 0.0 - 0.4 K/UL  
 ABS. BASOPHILS 0.0 0.0 - 0.1 K/UL  
 ABS. IMM. GRANS. 0.0 0.00 - 0.04 K/UL  
 DF AUTOMATED OK to treat from research. Medications: 
Pepcid IVP Benadryl PO Decadron IVPB Taxol IV 
 
 
Ms. Brook Bautista tolerated treatment well and was discharged from [place] [address] in stable condition   Port de-accessed, flushed & heparinized per protocol. She is to return on 20 at 9:00 for her next appointment. Purnima Levi RN May 5, 2020

## 2020-05-05 NOTE — PROGRESS NOTES
Gene Camarillo is a 48 y.o. female Follow up for the Evaluation of Breast Cancer. 1. Have you been to the ER, urgent care clinic since your last visit? Hospitalized since your last visit? No    2. Have you seen or consulted any other health care providers outside of the [address] [place] [address] since your last visit? Include any pap smears or colon screening.  No

## 2020-05-06 ENCOUNTER — TELEPHONE (OUTPATIENT)
Dept: ONCOLOGY | Age: 55
End: 2020-05-06

## 2020-05-06 NOTE — TELEPHONE ENCOUNTER
[facility]  Social Work Navigator Encounter     Patient Name:  Ariadne Owen    Medical History: breast cancer    Advance Directives: none on file; conversation deferred    Narrative: Social work services requested by Sterling Sears Np. Called patient and left voicemail message requesting a return call.       Thank you,  Evangelista Ward LCSW

## 2020-05-12 ENCOUNTER — RESEARCH ENCOUNTER (OUTPATIENT)
Dept: ONCOLOGY | Age: 55
End: 2020-05-12

## 2020-05-12 ENCOUNTER — HOSPITAL ENCOUNTER (OUTPATIENT)
Dept: INFUSION THERAPY | Age: 55
Discharge: HOME | End: 2020-05-12
Payer: COMMERCIAL

## 2020-05-12 VITALS
DIASTOLIC BLOOD PRESSURE: 79 MMHG | BODY MASS INDEX: 36.61 KG/M2 | WEIGHT: 206.6 LBS | HEIGHT: 63 IN | TEMPERATURE: 99.1 F | RESPIRATION RATE: 18 BRPM | OXYGEN SATURATION: 97 % | HEART RATE: 71 BPM | SYSTOLIC BLOOD PRESSURE: 153 MMHG

## 2020-05-12 DIAGNOSIS — Z17.1 MALIGNANT NEOPLASM OF UPPER-OUTER QUADRANT OF LEFT BREAST IN FEMALE, ESTROGEN RECEPTOR NEGATIVE (HCC): Primary | ICD-10-CM

## 2020-05-12 DIAGNOSIS — C50.412 MALIGNANT NEOPLASM OF UPPER-OUTER QUADRANT OF LEFT BREAST IN FEMALE, ESTROGEN RECEPTOR NEGATIVE (HCC): Primary | ICD-10-CM

## 2020-05-12 LAB
BASOPHILS # BLD: 0 K/UL (ref 0–0.1)
BASOPHILS NFR BLD: 0 % (ref 0–1)
DIFFERENTIAL METHOD BLD: ABNORMAL
EOSINOPHIL # BLD: 0.1 K/UL (ref 0–0.4)
EOSINOPHIL NFR BLD: 1 % (ref 0–7)
ERYTHROCYTE [DISTWIDTH] IN BLOOD BY AUTOMATED COUNT: 13.2 % (ref 11.5–14.5)
HCT VFR BLD AUTO: 36.6 % (ref 35–47)
HGB BLD-MCNC: 11.7 G/DL (ref 11.5–16)
IMM GRANULOCYTES # BLD AUTO: 0 K/UL (ref 0–0.04)
IMM GRANULOCYTES NFR BLD AUTO: 1 % (ref 0–0.5)
LYMPHOCYTES # BLD: 1.2 K/UL (ref 0.8–3.5)
LYMPHOCYTES NFR BLD: 23 % (ref 12–49)
MCH RBC QN AUTO: 29.5 PG (ref 26–34)
MCHC RBC AUTO-ENTMCNC: 32 G/DL (ref 30–36.5)
MCV RBC AUTO: 92.4 FL (ref 80–99)
MONOCYTES # BLD: 0.5 K/UL (ref 0–1)
MONOCYTES NFR BLD: 10 % (ref 5–13)
NEUTS SEG # BLD: 3.4 K/UL (ref 1.8–8)
NEUTS SEG NFR BLD: 65 % (ref 32–75)
NRBC # BLD: 0 K/UL (ref 0–0.01)
NRBC BLD-RTO: 0 PER 100 WBC
PLATELET # BLD AUTO: 238 K/UL (ref 150–400)
PMV BLD AUTO: 10.3 FL (ref 8.9–12.9)
RBC # BLD AUTO: 3.96 M/UL (ref 3.8–5.2)
WBC # BLD AUTO: 5.2 K/UL (ref 3.6–11)

## 2020-05-12 PROCEDURE — 74011000258 HC RX REV CODE- 258: Performed by: INTERNAL MEDICINE

## 2020-05-12 PROCEDURE — 77030016057 HC NDL HUBR APOL -B

## 2020-05-12 PROCEDURE — 96413 CHEMO IV INFUSION 1 HR: CPT

## 2020-05-12 PROCEDURE — 85025 COMPLETE CBC W/AUTO DIFF WBC: CPT

## 2020-05-12 PROCEDURE — 74011250637 HC RX REV CODE- 250/637: Performed by: INTERNAL MEDICINE

## 2020-05-12 PROCEDURE — 74011250636 HC RX REV CODE- 250/636: Performed by: INTERNAL MEDICINE

## 2020-05-12 PROCEDURE — 74011000250 HC RX REV CODE- 250: Performed by: INTERNAL MEDICINE

## 2020-05-12 PROCEDURE — 36415 COLL VENOUS BLD VENIPUNCTURE: CPT

## 2020-05-12 PROCEDURE — 96375 TX/PRO/DX INJ NEW DRUG ADDON: CPT

## 2020-05-12 RX ORDER — FLUCONAZOLE 150 MG/1
150 TABLET ORAL DAILY
Qty: 1 TAB | Refills: 1 | Status: SHIPPED | OUTPATIENT
Start: 2020-05-12 | End: 2020-05-13

## 2020-05-12 RX ADMIN — SODIUM CHLORIDE 25 ML/HR: 900 INJECTION, SOLUTION INTRAVENOUS at 10:13

## 2020-05-12 RX ADMIN — DIPHENHYDRAMINE HYDROCHLORIDE 50 MG: 25 CAPSULE ORAL at 10:13

## 2020-05-12 RX ADMIN — DEXAMETHASONE SODIUM PHOSPHATE 12 MG: 4 INJECTION INTRA-ARTICULAR; INTRALESIONAL; INTRAMUSCULAR; INTRAVENOUS; SOFT TISSUE at 10:21

## 2020-05-12 RX ADMIN — PACLITAXEL 162 MG: 6 INJECTION, SOLUTION INTRAVENOUS at 11:23

## 2020-05-12 RX ADMIN — FAMOTIDINE 20 MG: 10 INJECTION INTRAVENOUS at 10:17

## 2020-05-12 NOTE — PROGRESS NOTES
[facility] Progress Note Date: May 12, 2020 Name: Betzaida Woodson MRN: [id number] : 1965 Ms. Guerrero Johnson Arrived ambulatory and in no distress for C1D15 of Taxol Regimen. Assessment was completed, no acute issues at this time, no new complaints voiced. Right chest wall port accessed with 1\" needle without difficulty, labs drawn & sent for processing. Chemotherapy Flowsheet 2020 Cycle C1D15 Date 2020 Drug / Regimen Taxol Pre Meds -  
Notes -  
 
  
 
Ms. Kurtz's vitals were reviewed. Visit Vitals /79 Pulse 74 Temp 98.8 °F (37.1 °C) Resp 18 Ht 5' 3\" (1.6 m) Wt 93.7 kg (206 lb 9.6 oz) SpO2 97% BMI 36.60 kg/m² Lab results were obtained and reviewed. Recent Results (from the past 12 hour(s)) CBC WITH AUTOMATED DIFF Collection Time: 20  8:53 AM  
Result Value Ref Range WBC 5.2 3.6 - 11.0 K/uL  
 RBC 3.96 3.80 - 5.20 M/uL  
 HGB 11.7 11.5 - 16.0 g/dL HCT 36.6 35.0 - 47.0 % MCV 92.4 80.0 - 99.0 FL  
 MCH 29.5 26.0 - 34.0 PG  
 MCHC 32.0 30.0 - 36.5 g/dL  
 RDW 13.2 11.5 - 14.5 % PLATELET 207 310 - 735 K/uL MPV 10.3 8.9 - 12.9 FL  
 NRBC 0.0 0  WBC ABSOLUTE NRBC 0.00 0.00 - 0.01 K/uL NEUTROPHILS 65 32 - 75 % LYMPHOCYTES 23 12 - 49 % MONOCYTES 10 5 - 13 % EOSINOPHILS 1 0 - 7 % BASOPHILS 0 0 - 1 % IMMATURE GRANULOCYTES 1 (H) 0.0 - 0.5 % ABS. NEUTROPHILS 3.4 1.8 - 8.0 K/UL  
 ABS. LYMPHOCYTES 1.2 0.8 - 3.5 K/UL  
 ABS. MONOCYTES 0.5 0.0 - 1.0 K/UL  
 ABS. EOSINOPHILS 0.1 0.0 - 0.4 K/UL  
 ABS. BASOPHILS 0.0 0.0 - 0.1 K/UL  
 ABS. IMM. GRANS. 0.0 0.00 - 0.04 K/UL  
 DF AUTOMATED    
0111- Called to let MD office know that patient has been having an issue with a rash in genital area. Dr. Anam Hernadez to prescribe Diflucan. Informed patient that script was sent to her pharmacy. Medications: 
Medications Administered 0.9% sodium chloride infusion Admin Date 
2020 Action New Bag Dose 25 mL/hr Rate 25 mL/hr Route IntraVENous Administered By 
Abram Gaffney RN  
  
  
 dexamethasone (DECADRON) 12 mg in 0.9% sodium chloride 50 mL IVPB Admin Date 
05/12/2020 Action Given Dose 
12 mg Route IntraVENous Administered By 
Abram Gaffney RN  
  
  
 diphenhydrAMINE (BENADRYL) capsule 50 mg   
 Admin Date 
05/12/2020 Action Given Dose 50 mg Route Oral Administered By 
Abram Gaffney RN  
  
  
 famotidine (PF) (PEPCID) 20 mg in 0.9% sodium chloride 10 mL injection Admin Date 
05/12/2020 Action Given Dose 
20 mg Route IntraVENous Administered By 
Abram Gaffney RN  
  
  
 PACLitaxeL (TAXOL) 162 mg in 0.9% sodium chloride 250 mL, overfill volume 25 mL chemo infusion Admin Date 
05/12/2020 Action New Bag Dose 162 mg Rate 302 mL/hr Route IntraVENous Administered By 
Abram Gaffney RN  
  
  
  
 
 
 
Ms. Malathi Flores tolerated treatment well and was discharged from [place] [address] in stable condition. Port de-accessed, flushed & heparinized per protocol. She is to return on 5/19/20 for her next appointment. Yasmine Lombardo RN May 12, 2020

## 2020-05-13 RX ORDER — SODIUM CHLORIDE 9 MG/ML
25 INJECTION, SOLUTION INTRAVENOUS CONTINUOUS
Status: DISCONTINUED | OUTPATIENT
Start: 2020-05-19 | End: 2020-05-20 | Stop reason: HOSPADM

## 2020-05-13 RX ORDER — PALONOSETRON 0.05 MG/ML
0.25 INJECTION, SOLUTION INTRAVENOUS ONCE
Status: COMPLETED | OUTPATIENT
Start: 2020-05-19 | End: 2020-05-19

## 2020-05-13 RX ORDER — DIPHENHYDRAMINE HCL 25 MG
50 CAPSULE ORAL ONCE
Status: COMPLETED | OUTPATIENT
Start: 2020-05-19 | End: 2020-05-19

## 2020-05-18 RX ORDER — DIPHENHYDRAMINE HCL 25 MG
50 CAPSULE ORAL ONCE
Status: COMPLETED | OUTPATIENT
Start: 2020-05-26 | End: 2020-05-26

## 2020-05-18 RX ORDER — SODIUM CHLORIDE 9 MG/ML
25 INJECTION, SOLUTION INTRAVENOUS CONTINUOUS
Status: DISCONTINUED | OUTPATIENT
Start: 2020-05-26 | End: 2020-05-27 | Stop reason: HOSPADM

## 2020-05-19 ENCOUNTER — RESEARCH ENCOUNTER (OUTPATIENT)
Dept: ONCOLOGY | Age: 55
End: 2020-05-19

## 2020-05-19 ENCOUNTER — OFFICE VISIT (OUTPATIENT)
Dept: ONCOLOGY | Age: 55
End: 2020-05-19

## 2020-05-19 ENCOUNTER — HOSPITAL ENCOUNTER (OUTPATIENT)
Dept: INFUSION THERAPY | Age: 55
Discharge: HOME | End: 2020-05-19
Payer: COMMERCIAL

## 2020-05-19 VITALS
WEIGHT: 207.9 LBS | HEART RATE: 72 BPM | SYSTOLIC BLOOD PRESSURE: 164 MMHG | RESPIRATION RATE: 18 BRPM | BODY MASS INDEX: 36.84 KG/M2 | HEIGHT: 63 IN | OXYGEN SATURATION: 99 % | DIASTOLIC BLOOD PRESSURE: 84 MMHG

## 2020-05-19 VITALS
TEMPERATURE: 97.8 F | WEIGHT: 207 LBS | BODY MASS INDEX: 36.68 KG/M2 | HEART RATE: 74 BPM | HEIGHT: 63 IN | OXYGEN SATURATION: 97 % | RESPIRATION RATE: 18 BRPM | DIASTOLIC BLOOD PRESSURE: 77 MMHG | SYSTOLIC BLOOD PRESSURE: 125 MMHG

## 2020-05-19 DIAGNOSIS — Z51.11 CHEMOTHERAPY MANAGEMENT, ENCOUNTER FOR: ICD-10-CM

## 2020-05-19 DIAGNOSIS — Z17.1 MALIGNANT NEOPLASM OF UPPER-OUTER QUADRANT OF LEFT BREAST IN FEMALE, ESTROGEN RECEPTOR NEGATIVE (HCC): Primary | ICD-10-CM

## 2020-05-19 DIAGNOSIS — C50.412 MALIGNANT NEOPLASM OF UPPER-OUTER QUADRANT OF LEFT BREAST IN FEMALE, ESTROGEN RECEPTOR NEGATIVE (HCC): Primary | ICD-10-CM

## 2020-05-19 LAB
ALBUMIN SERPL-MCNC: 3.7 G/DL (ref 3.5–5)
ALBUMIN/GLOB SERPL: 1 {RATIO} (ref 1.1–2.2)
ALP SERPL-CCNC: 112 U/L (ref 45–117)
ALT SERPL-CCNC: 94 U/L (ref 12–78)
ANION GAP SERPL CALC-SCNC: 4 MMOL/L (ref 5–15)
AST SERPL-CCNC: 34 U/L (ref 15–37)
BASOPHILS # BLD: 0 K/UL (ref 0–0.1)
BASOPHILS NFR BLD: 0 % (ref 0–1)
BILIRUB SERPL-MCNC: 0.2 MG/DL (ref 0.2–1)
BUN SERPL-MCNC: 15 MG/DL (ref 6–20)
BUN/CREAT SERPL: 18 (ref 12–20)
CALCIUM SERPL-MCNC: 9.1 MG/DL (ref 8.5–10.1)
CHLORIDE SERPL-SCNC: 108 MMOL/L (ref 97–108)
CO2 SERPL-SCNC: 27 MMOL/L (ref 21–32)
CREAT SERPL-MCNC: 0.84 MG/DL (ref 0.55–1.02)
DIFFERENTIAL METHOD BLD: ABNORMAL
EOSINOPHIL # BLD: 0 K/UL (ref 0–0.4)
EOSINOPHIL NFR BLD: 1 % (ref 0–7)
ERYTHROCYTE [DISTWIDTH] IN BLOOD BY AUTOMATED COUNT: 13.4 % (ref 11.5–14.5)
GLOBULIN SER CALC-MCNC: 3.7 G/DL (ref 2–4)
GLUCOSE SERPL-MCNC: 103 MG/DL (ref 65–100)
HCT VFR BLD AUTO: 36.8 % (ref 35–47)
HGB BLD-MCNC: 11.8 G/DL (ref 11.5–16)
IMM GRANULOCYTES # BLD AUTO: 0 K/UL (ref 0–0.04)
IMM GRANULOCYTES NFR BLD AUTO: 1 % (ref 0–0.5)
LYMPHOCYTES # BLD: 1.1 K/UL (ref 0.8–3.5)
LYMPHOCYTES NFR BLD: 24 % (ref 12–49)
MCH RBC QN AUTO: 29.7 PG (ref 26–34)
MCHC RBC AUTO-ENTMCNC: 32.1 G/DL (ref 30–36.5)
MCV RBC AUTO: 92.7 FL (ref 80–99)
MONOCYTES # BLD: 0.4 K/UL (ref 0–1)
MONOCYTES NFR BLD: 8 % (ref 5–13)
NEUTS SEG # BLD: 3.2 K/UL (ref 1.8–8)
NEUTS SEG NFR BLD: 66 % (ref 32–75)
NRBC # BLD: 0 K/UL (ref 0–0.01)
NRBC BLD-RTO: 0 PER 100 WBC
PLATELET # BLD AUTO: 180 K/UL (ref 150–400)
PMV BLD AUTO: 10.6 FL (ref 8.9–12.9)
POTASSIUM SERPL-SCNC: 3.8 MMOL/L (ref 3.5–5.1)
PROT SERPL-MCNC: 7.4 G/DL (ref 6.4–8.2)
RBC # BLD AUTO: 3.97 M/UL (ref 3.8–5.2)
SODIUM SERPL-SCNC: 139 MMOL/L (ref 136–145)
WBC # BLD AUTO: 4.8 K/UL (ref 3.6–11)

## 2020-05-19 PROCEDURE — 74011000250 HC RX REV CODE- 250: Performed by: INTERNAL MEDICINE

## 2020-05-19 PROCEDURE — 96417 CHEMO IV INFUS EACH ADDL SEQ: CPT

## 2020-05-19 PROCEDURE — 96375 TX/PRO/DX INJ NEW DRUG ADDON: CPT

## 2020-05-19 PROCEDURE — 80053 COMPREHEN METABOLIC PANEL: CPT

## 2020-05-19 PROCEDURE — 85025 COMPLETE CBC W/AUTO DIFF WBC: CPT

## 2020-05-19 PROCEDURE — 36415 COLL VENOUS BLD VENIPUNCTURE: CPT

## 2020-05-19 PROCEDURE — 74011000258 HC RX REV CODE- 258: Performed by: INTERNAL MEDICINE

## 2020-05-19 PROCEDURE — 96413 CHEMO IV INFUSION 1 HR: CPT

## 2020-05-19 PROCEDURE — 74011250636 HC RX REV CODE- 250/636: Performed by: INTERNAL MEDICINE

## 2020-05-19 PROCEDURE — 74011250637 HC RX REV CODE- 250/637: Performed by: INTERNAL MEDICINE

## 2020-05-19 PROCEDURE — 77030016057 HC NDL HUBR APOL -B

## 2020-05-19 PROCEDURE — 96367 TX/PROPH/DG ADDL SEQ IV INF: CPT

## 2020-05-19 RX ADMIN — PALONOSETRON HYDROCHLORIDE 0.25 MG: 0.25 INJECTION INTRAVENOUS at 10:35

## 2020-05-19 RX ADMIN — Medication 1200 MG: at 13:23

## 2020-05-19 RX ADMIN — DIPHENHYDRAMINE HYDROCHLORIDE 50 MG: 25 CAPSULE ORAL at 10:34

## 2020-05-19 RX ADMIN — PACLITAXEL 162 MG: 6 INJECTION, SOLUTION INTRAVENOUS at 11:36

## 2020-05-19 RX ADMIN — DEXAMETHASONE SODIUM PHOSPHATE 12 MG: 4 INJECTION INTRA-ARTICULAR; INTRALESIONAL; INTRAMUSCULAR; INTRAVENOUS; SOFT TISSUE at 10:42

## 2020-05-19 RX ADMIN — CARBOPLATIN 750 MG: 10 INJECTION, SOLUTION INTRAVENOUS at 12:40

## 2020-05-19 RX ADMIN — FAMOTIDINE 20 MG: 10 INJECTION INTRAVENOUS at 10:38

## 2020-05-19 RX ADMIN — FOSAPREPITANT 150 MG: 150 INJECTION, POWDER, LYOPHILIZED, FOR SOLUTION INTRAVENOUS at 10:41

## 2020-05-19 RX ADMIN — SODIUM CHLORIDE 25 ML/HR: 900 INJECTION, SOLUTION INTRAVENOUS at 11:00

## 2020-05-19 NOTE — PROGRESS NOTES
OK TO TREAT    Pt in for labs and follow up visit today per protocol with Dr. Lozada and RN. Geovanna Lima is C2D1 of treatment.  Patient reports the following adverse events at this time: gr 2 hot flashes, gr 1 insomnia. Vital signs are stable.  Patient to go to infusion center after appointment to receive carbo/taxol/atezo/placebo.  Next appt is scheduled for 5/26/20.

## 2020-05-19 NOTE — PROGRESS NOTES
[facility] at [address], [address] [place] [address]  Navid Marva: [phone]  F: [phone]      Reason for Visit:   Jaynie Spurling is a 48 y.o. female who is seen in consultation at the request of Dr. Bjorn Tyson for evaluation of therapy for breast cancer    Treatment History:   · 20 left breast 3:00, 13cmfn, core bx:  IMC, gr 2-3, 1.3 cm, ER negative, OH negative, HER 2 negative at IHC 0; ki67 68%; LN + by core, 6 mm, gr 2-3  · B-59 Carbo/Taxol +/- atezolizumab 2020-  · [facility] 2020: negative    History of Present Illness:   19 mammogram was negative, she felt the L breast mass herself, earlier in 2020. Interval history: In today for follow up and treatment. Complains of gr 2 hot flashes, gr 1 insomnia. FH:  Mother with breast cancer at age 79; maternal aunt with breast cancer in her 45s; maternal aunt with breast cancer at age 61; no ovarian, prostate, or pancreas cancer    Past Medical History:   Diagnosis Date    ADHD     Cancer ([address]     LEFT BREAST CA    Sleep apnea     MILD -NO CPAP      Past Surgical History:   Procedure Laterality Date    HX BREAST BIOPSY Left 4/15/2020    BREAST BIOPSY performed by Ludwin Cerrato MD at [facility]  HX  SECTION  98, 80    HX GI      COLONOSCOPY    HX ORTHOPAEDIC      RIGHT ELBOW-FATTY MASS      Social History     Tobacco Use    Smoking status: Light Tobacco Smoker    Smokeless tobacco: Never Used    Tobacco comment: RARE cigar per patient   Substance Use Topics    Alcohol use:  Yes     Alcohol/week: 6.0 standard drinks     Types: 6 Glasses of wine per week      Family History   Problem Relation Age of Onset    Cancer Mother         Breast    Cancer Maternal Aunt         Breast    Cancer Paternal Uncle     Cancer Maternal Aunt         Breast    Cancer Paternal Uncle      Current Outpatient Medications   Medication Sig    lisdexamfetamine dimesylate (VYVANSE PO) Take by mouth.  CLONAZEPAM PO Take  by mouth.  pyridoxine, vitamin B6, (VITAMIN B-6) 50 mg tablet Take 50 mg by mouth daily.  cholecalciferol, vitamin D3, (VITAMIN D3 PO) Take 5,000 Units by mouth daily.  prochlorperazine (COMPAZINE) 10 mg tablet Take 1 Tab by mouth every six (6) hours as needed for Nausea.  lidocaine-prilocaine (EMLA) topical cream Apply  to affected area as needed for Pain.  ondansetron hcl (ZOFRAN) 8 mg tablet Take 1 Tab by mouth every eight (8) hours as needed for Nausea. No current facility-administered medications for this visit.       Facility-Administered Medications Ordered in Other Visits   Medication Dose Route Frequency    [START ON 5/26/2020] PACLitaxeL (TAXOL) 162 mg in 0.9% sodium chloride 250 mL, overfill volume 25 mL chemo infusion  162 mg IntraVENous ONCE    [START ON 5/26/2020] famotidine (PF) (PEPCID) 20 mg in 0.9% sodium chloride 10 mL injection  20 mg IntraVENous ONCE    [START ON 5/26/2020] diphenhydrAMINE (BENADRYL) capsule 50 mg  50 mg Oral ONCE    [START ON 5/26/2020] dexamethasone (DECADRON) 12 mg in 0.9% sodium chloride 50 mL IVPB  12 mg IntraVENous ONCE    [START ON 5/26/2020] 0.9% sodium chloride infusion  25 mL/hr IntraVENous CONTINUOUS    PACLitaxeL (TAXOL) 162 mg in 0.9% sodium chloride 250 mL, overfill volume 25 mL chemo infusion  162 mg IntraVENous ONCE    CARBOplatin (PARAPLATIN) 750 mg in 0.9% sodium chloride 250 mL, overfill volume 25 mL chemo infusion  750 mg IntraVENous ONCE    INV atezolizumab / placebo 1,200 mg in 0.9% sodium chloride 250 mL, overfill volume 25 mL INVESTIGATIONAL IVPB  1,200 mg IntraVENous ONCE    fosaprepitant (EMEND) 150 mg in 0.9% sodium chloride 150 mL IVPB  150 mg IntraVENous ONCE    palonosetron HCl (ALOXI) injection 0.25 mg  0.25 mg IntraVENous ONCE    diphenhydrAMINE (BENADRYL) capsule 50 mg  50 mg Oral ONCE    famotidine (PF) (PEPCID) 20 mg in 0.9% sodium chloride 10 mL injection  20 mg IntraVENous ONCE  dexamethasone (DECADRON) 12 mg in 0.9% sodium chloride 50 mL IVPB  12 mg IntraVENous ONCE    0.9% sodium chloride infusion  25 mL/hr IntraVENous CONTINUOUS      No Known Allergies     Review of Systems: A complete review of systems was obtained, negative except as described above. Physical Exam:     Visit Vitals  /77 (BP 1 Location: Left arm, BP Patient Position: Sitting)   Pulse 74   Temp 97.8 °F (36.6 °C) (Temporal)   Resp 18   Ht 5' 3\" (1.6 m)   Wt 207 lb (93.9 kg)   SpO2 97%   BMI 36.67 kg/m²     ECOG PS: 0  General: No distress  Eyes: Anicteric sclerae  HENT: Atraumatic  Neck: Supple  Respiratory: Normal respiratory effort  CV: No peripheral edema  GI: nondistended  Skin: No rashes, ecchymoses, or petechiae  Psych: Alert, oriented, appropriate affect, normal judgment/insight    Breasts:  L breast 3:00, 7-8 cmfn, 2 cm mass, left ax, 2 - 2cm ax LN, and 1 cm LN     Results:     Lab Results   Component Value Date/Time    WBC 5.2 05/12/2020 08:53 AM    HGB 11.7 05/12/2020 08:53 AM    HCT 36.6 05/12/2020 08:53 AM    PLATELET 890 44/76/5768 08:53 AM    MCV 92.4 05/12/2020 08:53 AM    ABS. NEUTROPHILS 3.4 05/12/2020 08:53 AM     Lab Results   Component Value Date/Time    Sodium 140 04/28/2020 08:48 AM    Potassium 3.5 04/28/2020 08:48 AM    Chloride 107 04/28/2020 08:48 AM    CO2 27 04/28/2020 08:48 AM    Glucose 99 04/28/2020 08:48 AM    BUN 17 04/28/2020 08:48 AM    Creatinine 0.84 04/28/2020 08:48 AM    GFR est AA >60 04/28/2020 08:48 AM    GFR est non-AA >60 04/28/2020 08:48 AM    Calcium 8.8 04/28/2020 08:48 AM     Lab Results   Component Value Date/Time    Bilirubin, total 0.4 04/28/2020 08:48 AM    ALT (SGPT) 85 (H) 04/28/2020 08:48 AM    AST (SGOT) 35 04/28/2020 08:48 AM    Alk.  phosphatase 110 04/28/2020 08:48 AM    Protein, total 7.1 04/28/2020 08:48 AM    Albumin 3.6 04/28/2020 08:48 AM    Globulin 3.5 04/28/2020 08:48 AM       3/31/20  3D mammogram:  1.6 cm mass in L breast  US L breast  3:00 left breast mass 1.6 cm, multiple LN in L ax tail    4/14/2020 CT c/a/p:  IMPRESSION: Left breast mass. No evidence for metastatic disease in the chest  abdomen or pelvis. There are no target lesions for RECIST classification. 4/14/2020 Bone Scan:  IMPRESSION: No evidence of bony metastatic disease. 5/4/2020 MRI breast:  IMPRESSION:     Right Breast:  1. BI-RADS Assessment Category 1: Negative. No evidence of breast carcinoma  within the right breast.     Left Breast:  1. BI-RADS Assessment Category 6: Known biopsy proven malignancy- Appropriate  action should be taken. Large area of malignant enhancement, occupying most of  the middle and posterior third of the left breast upper outer and lower-outer  quadrants, from 2:00 to 4:00. Malignancy is much larger than it had appeared  mammographically. Greatest measurement is 7 x 4.2 x 3 cm. Contained within this  large area of enhancement is the dominant 2.5 cm mass, which represents the  mammographic and sonographic correlate. 2. BI-RADS Assessment Category 6: Known biopsy proven malignancy- Appropriate  action should be taken. Pathologically proven level 1 left axillary  lymphadenopathy, with numerous enlarged, malignant lymph nodes. There is also  level 2 left axillary lymphadenopathy. 3. Retraction of the skin of the lateral left breast, which is thickened. However, no evidence of malignant skin invasion/involvement. 4. The malignant mass closely approximates the pectoral muscle, but there is no  evidence of direct muscle invasion/involvement.     RECOMMENDATIONS:  Appropriate action is recommended. The patient is undergoing neoadjuvant  chemotherapy. There is no evidence of contralateral disease. Records reviewed and summarized above. Pathology report(s) reviewed above. Radiology report(s) reviewed above. Assessment/plan:   1.  Left IMC, gr 2-3, triple negative, LN + by core:  cT1c cN1a cM0, unifocal, stage IIA both anatomic and prognostic  postmenopausal    With multiple LN felt on exam, ordered CT c/a/p and bone scan for staging. CT and Bone scan on 4/14/2020 negative for mets. We explained to the patient that the goal of systemic adjuvant therapy is to improve the chances for cure and decrease the risk of relapse. We explained why a patient can have microscopic cancer spread now even though physical examination, laboratory studies and imaging studies are negative for cancer. We explained that the same treatments used now as adjuvant or preventive treatments rarely if ever are curative in women who develop metastases. We discussed that there is no difference in overall survival between neoadjuvant and adjuvant chemotherapy. We discussed the rationale for neoadjuvant chemotherapy, if chemotherapy is warranted, as it is in this case: to avoid any potential delays in giving chemotherapy following surgery, to be able to see the response of the tumor to chemotherapy, and to potentially downstage the tumor prior to surgery. I discussed the potential risks of dose-dense chemotherapy with the patient. (DD AC-T, adriamycin 60 mg/m2; cyclophosphamide 600 mg/m2 q 2 weeks x 4; paclitaxel 80 mg/m2 qweekly x 12). Major toxicities include nausea and vomiting, stomatitis, fatigue, and a small risk of heart damage. Anemia frequently results and occasionally requires growth factors and rarely transfusions. Neutropenic fever is uncommon, but can be a life-threatening problem. Also, there is a small but increased risk of myelodysplasia and acute leukemia. We provided the patient with detailed information concerning toxicity including frequent toxicities that only last a few days, such as nausea, vomiting, mouth sores, arthralgia, myalgia, and potentially allergic reactions to paclitaxel, as well as toxicities which can be longer lasting including total alopecia, fatigue, anemia and neuropathy.  We provided the patient with detailed information concerning the toxicities of their regimen in addition to our verbal discussion. We discussed the potential addition of carboplatin auc 6 q 3 weeks during weekly paclitaxel as evaluated in CALGB 85205, showing a significant improvement in pCR for patients with stage II-III triple negative breast cancer. Additional side effects of added myelosuppression, fatigue, renal damage with dehydration, and nausea and vomiting were discussed. Would use AUC 5 as that is the dose that is being used in all current investigations    Also discussed the clinical trial NSABP B-59, which is carbo/taxol then DD [facility] +/- atezolizumab. Discussed the positive findings of PKMLQ 420 with pembrolizumab    We discussed the risks and benefits of atezolizumab therapy today. Potential side effects include, but are not limited to fatigue, rash, auto-immune issues, infertility, allergic reactions, and rarely, death. Discussed cold caps (not working with [facility]). Discussed oral and peripheral cryotherapy    Discussed COVID19 risks and LETI guidelines stating neoadj chemotherapy for [place] breast cancer is preferred vs upfront surgery during this pandemic. After this discussion, she is agreeable to chemotherapy and port placement. She has signed informed consent for B-59. The patient was given the following prescriptions with written and verbal instructions on how to use each:  Compazine, zofran, olanzapine (held until [facility]), a wig, and emla cream.  IV Brissa Roulette will be used with AC. Neulasta the following day with AC (bone pain side effect discussed). Port placed by Dr. Herminio Polanco. TTE on 4/13/2020, EF 61%. Breast MRI on 5/4/2020. Discussed that with her extent of disease, a mastectomy is warranted. B-59 Carbo/Taxol C2D1 today. 2. Emotional well being:  She has excellent support and is coping well with her disease    3.  Genetic testing:   Discussed potential ramifications of a positive test including the potential need for bilateral mastectomies and bilateral oophorectomies and the risk then for her family members to also have a mutation. VUS discussed also. Testing performed 4/28/20 by blood, negative. 4. Increased LFTs:  Due to tylenol and alcohol, resolved with stopping    5. Headaches:  Dull for few days after treatment. Recommend ibuprofen or tylenol PRN. Encouraged increase PO fluids. Reports improvement. I appreciate the opportunity to participate in Ms. Vikki Kurtz's care. Signed By: Devi Cross MD      No orders of the defined types were placed in this encounter.

## 2020-05-19 NOTE — PROGRESS NOTES
[facility] Progress Note Date: May 19, 2020 Name: Stalin Garcia MRN: [id number] : 1965 Ms. Maribell Dwyer Arrived ambulatory and in no distress for C2D1 of Taxol/Carboplatin/Tecentriq vs. Placebo Regimen. Assessment was completed, patient complaining of mild intermittent nausea and fatigue. Right chest wall port accessed without difficulty, labs drawn & sent for processing. Chemotherapy Flowsheet 2020 Cycle C1D15 Date 2020 Drug / Regimen Taxol Pre Meds given Notes given Patient proceed to appointment with Dr. Alf Salinas. Ms. Sharon Alcala vitals were reviewed. Visit Vitals /77 Pulse 74 Resp 18 Ht 5' 3\" (1.6 m) Wt 94.3 kg (207 lb 14.4 oz) SpO2 97% BMI 36.83 kg/m² Lab results were obtained and reviewed. Recent Results (from the past 8 hour(s)) CBC WITH AUTOMATED DIFF Collection Time: 20  8:46 AM  
Result Value Ref Range WBC 4.8 3.6 - 11.0 K/uL  
 RBC 3.97 3.80 - 5.20 M/uL  
 HGB 11.8 11.5 - 16.0 g/dL HCT 36.8 35.0 - 47.0 % MCV 92.7 80.0 - 99.0 FL  
 MCH 29.7 26.0 - 34.0 PG  
 MCHC 32.1 30.0 - 36.5 g/dL  
 RDW 13.4 11.5 - 14.5 % PLATELET 876 943 - 856 K/uL MPV 10.6 8.9 - 12.9 FL  
 NRBC 0.0 0  WBC ABSOLUTE NRBC 0.00 0.00 - 0.01 K/uL NEUTROPHILS 66 32 - 75 % LYMPHOCYTES 24 12 - 49 % MONOCYTES 8 5 - 13 % EOSINOPHILS 1 0 - 7 % BASOPHILS 0 0 - 1 % IMMATURE GRANULOCYTES 1 (H) 0.0 - 0.5 % ABS. NEUTROPHILS 3.2 1.8 - 8.0 K/UL  
 ABS. LYMPHOCYTES 1.1 0.8 - 3.5 K/UL  
 ABS. MONOCYTES 0.4 0.0 - 1.0 K/UL  
 ABS. EOSINOPHILS 0.0 0.0 - 0.4 K/UL  
 ABS. BASOPHILS 0.0 0.0 - 0.1 K/UL  
 ABS. IMM. GRANS. 0.0 0.00 - 0.04 K/UL  
 DF AUTOMATED METABOLIC PANEL, COMPREHENSIVE Collection Time: 20  8:46 AM  
Result Value Ref Range Sodium 139 136 - 145 mmol/L Potassium 3.8 3.5 - 5.1 mmol/L Chloride 108 97 - 108 mmol/L  
 CO2 27 21 - 32 mmol/L  Anion gap 4 (L) 5 - 15 mmol/L  
 Glucose 103 (H) 65 - 100 mg/dL BUN 15 6 - 20 MG/DL Creatinine 0.84 0.55 - 1.02 MG/DL  
 BUN/Creatinine ratio 18 12 - 20 GFR est AA >60 >60 ml/min/1.73m2 GFR est non-AA >60 >60 ml/min/1.73m2 Calcium 9.1 8.5 - 10.1 MG/DL Bilirubin, total 0.2 0.2 - 1.0 MG/DL  
 ALT (SGPT) 94 (H) 12 - 78 U/L  
 AST (SGOT) 34 15 - 37 U/L Alk. phosphatase 112 45 - 117 U/L Protein, total 7.4 6.4 - 8.2 g/dL Albumin 3.7 3.5 - 5.0 g/dL Globulin 3.7 2.0 - 4.0 g/dL A-G Ratio 1.0 (L) 1.1 - 2.2 Ok to treat received from research nurse. Medications: 
Medications Administered 0.9% sodium chloride infusion Admin Date 
05/19/2020 Action New Bag Dose 25 mL/hr Rate 25 mL/hr Route IntraVENous Administered By Nini Merlos RN  
  
  
 CARBOplatin (PARAPLATIN) 750 mg in 0.9% sodium chloride 250 mL, overfill volume 25 mL chemo infusion Admin Date 
05/19/2020 Action New Bag Dose 
750 mg Rate 
700 mL/hr Route IntraVENous Administered By Nini Merlos RN  
  
  
 dexamethasone (DECADRON) 12 mg in 0.9% sodium chloride 50 mL IVPB Admin Date 
05/19/2020 Action Given Dose 
12 mg Route IntraVENous Administered By Nini Leone RN  
  
  
 diphenhydrAMINE (BENADRYL) capsule 50 mg   
 Admin Date 
05/19/2020 Action Given Dose 50 mg Route Oral Administered By Nini Merlos RN  
  
  
 famotidine (PF) (PEPCID) 20 mg in 0.9% sodium chloride 10 mL injection Admin Date 
05/19/2020 Action Given Dose 
20 mg Route IntraVENous Administered By Nini Leone RN  
  
  
 fosaprepitant (EMEND) 150 mg in 0.9% sodium chloride 150 mL IVPB Admin Date 
05/19/2020 Action Given Dose 
150 mg Rate 450 mL/hr Route IntraVENous Administered By Yanni Valente RN  
  
  
 INV atezolizumab / placebo 1,200 mg in 0.9% sodium chloride 250 mL, overfill volume 25 mL INVESTIGATIONAL IVPB Admin Date 
05/19/2020 Action Given Dose 
1200 mg Rate 
590 mL/hr Route IntraVENous Administered By Walker Mercedes RN  
  
  
 PACLitaxeL (TAXOL) 162 mg in 0.9% sodium chloride 250 mL, overfill volume 25 mL chemo infusion Admin Date 
05/19/2020 Action New Bag Dose 162 mg Rate 302 mL/hr Route IntraVENous Administered By Monserrat JOHNSON RN  
  
  
 palonosetron HCl (ALOXI) injection 0.25 mg   
 Admin Date 
05/19/2020 Action Given Dose 0.25 mg Route IntraVENous Administered By Yelena Finley RN  
  
  
  
 
 
Ms. Brook Bautista tolerated treatment well and was discharged from [place] [address] in stable condition. Port de-accessed, flushed & heparinized per protocol. She is to return on May 26 for her next appointment. Ophelia Linton RN May 19, 2020

## 2020-05-19 NOTE — PROGRESS NOTES
Cindy Courser is a 48 y.o. female Follow up for the Evaluation of Breast Cancer. 1. Have you been to the ER, urgent care clinic since your last visit? Hospitalized since your last visit? No    2. Have you seen or consulted any other health care providers outside of the [address] [place] [address] since your last visit? Include any pap smears or colon screening.  No no

## 2020-05-26 ENCOUNTER — RESEARCH ENCOUNTER (OUTPATIENT)
Dept: ONCOLOGY | Age: 55
End: 2020-05-26

## 2020-05-26 ENCOUNTER — HOSPITAL ENCOUNTER (OUTPATIENT)
Dept: INFUSION THERAPY | Age: 55
Discharge: HOME | End: 2020-05-26
Payer: COMMERCIAL

## 2020-05-26 VITALS
HEART RATE: 79 BPM | RESPIRATION RATE: 18 BRPM | TEMPERATURE: 98.7 F | SYSTOLIC BLOOD PRESSURE: 153 MMHG | HEIGHT: 63 IN | WEIGHT: 206 LBS | BODY MASS INDEX: 36.5 KG/M2 | DIASTOLIC BLOOD PRESSURE: 91 MMHG | OXYGEN SATURATION: 99 %

## 2020-05-26 LAB
BASOPHILS # BLD: 0 K/UL (ref 0–0.1)
BASOPHILS NFR BLD: 1 % (ref 0–1)
DIFFERENTIAL METHOD BLD: ABNORMAL
EOSINOPHIL # BLD: 0.1 K/UL (ref 0–0.4)
EOSINOPHIL NFR BLD: 1 % (ref 0–7)
ERYTHROCYTE [DISTWIDTH] IN BLOOD BY AUTOMATED COUNT: 13.3 % (ref 11.5–14.5)
HCT VFR BLD AUTO: 35.5 % (ref 35–47)
HGB BLD-MCNC: 11.2 G/DL (ref 11.5–16)
IMM GRANULOCYTES # BLD AUTO: 0 K/UL (ref 0–0.04)
IMM GRANULOCYTES NFR BLD AUTO: 1 % (ref 0–0.5)
LYMPHOCYTES # BLD: 1.1 K/UL (ref 0.8–3.5)
LYMPHOCYTES NFR BLD: 26 % (ref 12–49)
MCH RBC QN AUTO: 29.7 PG (ref 26–34)
MCHC RBC AUTO-ENTMCNC: 31.5 G/DL (ref 30–36.5)
MCV RBC AUTO: 94.2 FL (ref 80–99)
MONOCYTES # BLD: 0.3 K/UL (ref 0–1)
MONOCYTES NFR BLD: 6 % (ref 5–13)
NEUTS SEG # BLD: 2.7 K/UL (ref 1.8–8)
NEUTS SEG NFR BLD: 65 % (ref 32–75)
NRBC # BLD: 0 K/UL (ref 0–0.01)
NRBC BLD-RTO: 0 PER 100 WBC
PLATELET # BLD AUTO: 155 K/UL (ref 150–400)
PMV BLD AUTO: 10.3 FL (ref 8.9–12.9)
RBC # BLD AUTO: 3.77 M/UL (ref 3.8–5.2)
WBC # BLD AUTO: 4.1 K/UL (ref 3.6–11)

## 2020-05-26 PROCEDURE — 74011250636 HC RX REV CODE- 250/636: Performed by: INTERNAL MEDICINE

## 2020-05-26 PROCEDURE — 74011250637 HC RX REV CODE- 250/637: Performed by: INTERNAL MEDICINE

## 2020-05-26 PROCEDURE — 96375 TX/PRO/DX INJ NEW DRUG ADDON: CPT

## 2020-05-26 PROCEDURE — 77030012965 HC NDL HUBR BBMI -A

## 2020-05-26 PROCEDURE — 74011000258 HC RX REV CODE- 258: Performed by: INTERNAL MEDICINE

## 2020-05-26 PROCEDURE — 96413 CHEMO IV INFUSION 1 HR: CPT

## 2020-05-26 PROCEDURE — 36415 COLL VENOUS BLD VENIPUNCTURE: CPT

## 2020-05-26 PROCEDURE — 85025 COMPLETE CBC W/AUTO DIFF WBC: CPT

## 2020-05-26 RX ORDER — DIPHENHYDRAMINE HCL 25 MG
50 CAPSULE ORAL ONCE
Status: COMPLETED | OUTPATIENT
Start: 2020-06-02 | End: 2020-06-02

## 2020-05-26 RX ORDER — SODIUM CHLORIDE 9 MG/ML
25 INJECTION, SOLUTION INTRAVENOUS CONTINUOUS
Status: DISCONTINUED | OUTPATIENT
Start: 2020-06-02 | End: 2020-06-03 | Stop reason: HOSPADM

## 2020-05-26 RX ADMIN — FAMOTIDINE 20 MG: 10 INJECTION INTRAVENOUS at 10:31

## 2020-05-26 RX ADMIN — PACLITAXEL 162 MG: 6 INJECTION, SOLUTION INTRAVENOUS at 11:19

## 2020-05-26 RX ADMIN — SODIUM CHLORIDE 25 ML/HR: 900 INJECTION, SOLUTION INTRAVENOUS at 10:17

## 2020-05-26 RX ADMIN — DEXAMETHASONE SODIUM PHOSPHATE 12 MG: 4 INJECTION INTRA-ARTICULAR; INTRALESIONAL; INTRAMUSCULAR; INTRAVENOUS; SOFT TISSUE at 10:33

## 2020-05-26 RX ADMIN — DIPHENHYDRAMINE HYDROCHLORIDE 50 MG: 25 CAPSULE ORAL at 10:29

## 2020-06-02 ENCOUNTER — RESEARCH ENCOUNTER (OUTPATIENT)
Dept: ONCOLOGY | Age: 55
End: 2020-06-02

## 2020-06-02 ENCOUNTER — HOSPITAL ENCOUNTER (OUTPATIENT)
Dept: INFUSION THERAPY | Age: 55
Discharge: HOME | End: 2020-06-02
Payer: COMMERCIAL

## 2020-06-02 VITALS
OXYGEN SATURATION: 96 % | WEIGHT: 206.1 LBS | BODY MASS INDEX: 36.52 KG/M2 | RESPIRATION RATE: 16 BRPM | TEMPERATURE: 98.3 F | HEART RATE: 70 BPM | SYSTOLIC BLOOD PRESSURE: 156 MMHG | DIASTOLIC BLOOD PRESSURE: 88 MMHG | HEIGHT: 63 IN

## 2020-06-02 LAB
BASOPHILS # BLD: 0 K/UL (ref 0–0.1)
BASOPHILS NFR BLD: 0 % (ref 0–1)
DIFFERENTIAL METHOD BLD: ABNORMAL
EOSINOPHIL # BLD: 0 K/UL (ref 0–0.4)
EOSINOPHIL NFR BLD: 1 % (ref 0–7)
ERYTHROCYTE [DISTWIDTH] IN BLOOD BY AUTOMATED COUNT: 13.9 % (ref 11.5–14.5)
HCT VFR BLD AUTO: 34.1 % (ref 35–47)
HGB BLD-MCNC: 11 G/DL (ref 11.5–16)
IMM GRANULOCYTES # BLD AUTO: 0 K/UL (ref 0–0.04)
IMM GRANULOCYTES NFR BLD AUTO: 1 % (ref 0–0.5)
LYMPHOCYTES # BLD: 1.1 K/UL (ref 0.8–3.5)
LYMPHOCYTES NFR BLD: 26 % (ref 12–49)
MCH RBC QN AUTO: 30 PG (ref 26–34)
MCHC RBC AUTO-ENTMCNC: 32.3 G/DL (ref 30–36.5)
MCV RBC AUTO: 92.9 FL (ref 80–99)
MONOCYTES # BLD: 0.5 K/UL (ref 0–1)
MONOCYTES NFR BLD: 12 % (ref 5–13)
NEUTS SEG # BLD: 2.5 K/UL (ref 1.8–8)
NEUTS SEG NFR BLD: 60 % (ref 32–75)
NRBC # BLD: 0 K/UL (ref 0–0.01)
NRBC BLD-RTO: 0 PER 100 WBC
PLATELET # BLD AUTO: 218 K/UL (ref 150–400)
PMV BLD AUTO: 9.8 FL (ref 8.9–12.9)
RBC # BLD AUTO: 3.67 M/UL (ref 3.8–5.2)
WBC # BLD AUTO: 4.1 K/UL (ref 3.6–11)

## 2020-06-02 PROCEDURE — 77030016057 HC NDL HUBR APOL -B

## 2020-06-02 PROCEDURE — 74011250637 HC RX REV CODE- 250/637: Performed by: INTERNAL MEDICINE

## 2020-06-02 PROCEDURE — 96413 CHEMO IV INFUSION 1 HR: CPT

## 2020-06-02 PROCEDURE — 36415 COLL VENOUS BLD VENIPUNCTURE: CPT

## 2020-06-02 PROCEDURE — 85025 COMPLETE CBC W/AUTO DIFF WBC: CPT

## 2020-06-02 PROCEDURE — 74011250636 HC RX REV CODE- 250/636: Performed by: INTERNAL MEDICINE

## 2020-06-02 PROCEDURE — 74011000258 HC RX REV CODE- 258: Performed by: INTERNAL MEDICINE

## 2020-06-02 PROCEDURE — 74011000250 HC RX REV CODE- 250: Performed by: INTERNAL MEDICINE

## 2020-06-02 PROCEDURE — 96375 TX/PRO/DX INJ NEW DRUG ADDON: CPT

## 2020-06-02 RX ORDER — SODIUM CHLORIDE 0.9 % (FLUSH) 0.9 %
5-10 SYRINGE (ML) INJECTION AS NEEDED
Status: DISCONTINUED | OUTPATIENT
Start: 2020-06-02 | End: 2020-06-03 | Stop reason: HOSPADM

## 2020-06-02 RX ORDER — HEPARIN 100 UNIT/ML
500 SYRINGE INTRAVENOUS AS NEEDED
Status: DISCONTINUED | OUTPATIENT
Start: 2020-06-02 | End: 2020-06-03 | Stop reason: HOSPADM

## 2020-06-02 RX ORDER — DIPHENHYDRAMINE HCL 25 MG
50 CAPSULE ORAL ONCE
Status: COMPLETED | OUTPATIENT
Start: 2020-06-09 | End: 2020-06-09

## 2020-06-02 RX ORDER — SODIUM CHLORIDE 9 MG/ML
25 INJECTION, SOLUTION INTRAVENOUS CONTINUOUS
Status: DISPENSED | OUTPATIENT
Start: 2020-06-09 | End: 2020-06-09

## 2020-06-02 RX ORDER — PALONOSETRON 0.05 MG/ML
0.25 INJECTION, SOLUTION INTRAVENOUS ONCE
Status: COMPLETED | OUTPATIENT
Start: 2020-06-09 | End: 2020-06-09

## 2020-06-02 RX ADMIN — SODIUM CHLORIDE 25 ML/HR: 900 INJECTION, SOLUTION INTRAVENOUS at 09:30

## 2020-06-02 RX ADMIN — DEXAMETHASONE SODIUM PHOSPHATE 12 MG: 4 INJECTION INTRA-ARTICULAR; INTRALESIONAL; INTRAMUSCULAR; INTRAVENOUS; SOFT TISSUE at 09:36

## 2020-06-02 RX ADMIN — HEPARIN 500 UNITS: 100 SYRINGE at 11:35

## 2020-06-02 RX ADMIN — FAMOTIDINE 20 MG: 10 INJECTION INTRAVENOUS at 09:31

## 2020-06-02 RX ADMIN — DIPHENHYDRAMINE HYDROCHLORIDE 50 MG: 25 CAPSULE ORAL at 09:30

## 2020-06-02 RX ADMIN — PACLITAXEL 162 MG: 6 INJECTION, SOLUTION INTRAVENOUS at 10:30

## 2020-06-02 RX ADMIN — Medication 10 ML: at 11:35

## 2020-06-02 NOTE — PROGRESS NOTES
[facility] Progress Note Date: 2020 Name: Stalin Garcia MRN: [id number] : 1965 Ms. Maribell Dwyer Arrived ambulatory and in no distress for cycle 2 day 15 of Taxol/Clinical Trial regimen. Assessment was completed, no acute issues at this time, no new complaints voiced. Right chest port accessed without difficulty, labs drawn and in process. 1 inch mancera needle. Chemotherapy Flowsheet 2020 Cycle C2D15 Date 2020 Drug / Regimen Taxol/Clinical Trial  
Pre Meds -  
Notes -  
 
 
 
Ms. Kurtz's vitals were reviewed. Visit Vitals /60 (BP 1 Location: Right arm, BP Patient Position: Sitting) Pulse 77 Temp 98 °F (36.7 °C) Resp 18 Ht 5' 2.99\" (1.6 m) Wt 93.5 kg (206 lb 1.6 oz) SpO2 96% BMI 36.52 kg/m² Lab results were obtained and reviewed. Recent Results (from the past 12 hour(s)) CBC WITH AUTOMATED DIFF Collection Time: 20  8:40 AM  
Result Value Ref Range WBC 4.1 3.6 - 11.0 K/uL  
 RBC 3.67 (L) 3.80 - 5.20 M/uL  
 HGB 11.0 (L) 11.5 - 16.0 g/dL HCT 34.1 (L) 35.0 - 47.0 % MCV 92.9 80.0 - 99.0 FL  
 MCH 30.0 26.0 - 34.0 PG  
 MCHC 32.3 30.0 - 36.5 g/dL  
 RDW 13.9 11.5 - 14.5 % PLATELET 428 680 - 514 K/uL MPV 9.8 8.9 - 12.9 FL  
 NRBC 0.0 0  WBC ABSOLUTE NRBC 0.00 0.00 - 0.01 K/uL NEUTROPHILS 60 32 - 75 % LYMPHOCYTES 26 12 - 49 % MONOCYTES 12 5 - 13 % EOSINOPHILS 1 0 - 7 % BASOPHILS 0 0 - 1 % IMMATURE GRANULOCYTES 1 (H) 0.0 - 0.5 % ABS. NEUTROPHILS 2.5 1.8 - 8.0 K/UL  
 ABS. LYMPHOCYTES 1.1 0.8 - 3.5 K/UL  
 ABS. MONOCYTES 0.5 0.0 - 1.0 K/UL  
 ABS. EOSINOPHILS 0.0 0.0 - 0.4 K/UL  
 ABS. BASOPHILS 0.0 0.0 - 0.1 K/UL  
 ABS. IMM. GRANS. 0.0 0.00 - 0.04 K/UL  
 DF AUTOMATED Pre-medications  were administered as ordered and chemotherapy was initiated. Medications Administered 0.9% sodium chloride infusion Admin Date 
2020 Action New Bag Dose 25 mL/hr Rate 25 mL/hr Route IntraVENous Administered By Lor Landaverde  
  
  
 dexamethasone (DECADRON) 12 mg in 0.9% sodium chloride 50 mL IVPB Admin Date 
06/02/2020 Action Given Dose 
12 mg Route IntraVENous Administered By Kassandra Arellano diphenhydrAMINE (BENADRYL) capsule 50 mg   
 Admin Date 
06/02/2020 Action Given Dose 50 mg Route Oral Administered By Lor Landaverde  
  
  
 famotidine (PF) (PEPCID) 20 mg in 0.9% sodium chloride 10 mL injection Admin Date 
06/02/2020 Action Given Dose 
20 mg Route IntraVENous Administered By Lor Landaverde  
  
  
 heparin (porcine) pf 500 Units Admin Date 
06/02/2020 Action Given Dose 
500 Units Route IntraVENous Administered By Kassandra Arellano PACLitaxeL (TAXOL) 162 mg in 0.9% sodium chloride 250 mL, overfill volume 25 mL chemo infusion Admin Date 
06/02/2020 Action New Bag Dose 162 mg Rate 302 mL/hr Route IntraVENous Administered By Lor Landaverde  
  
  
 sodium chloride (NS) flush 5-10 mL Admin Date 
06/02/2020 Action Given Dose 
10 mL Route IntraVENous Administered By Kassandra Arellano Ms. Elmer Morrison tolerated treatment well and was discharged from [place] [address] in stable condition. She is to return on 6/9/20 for her next appointment. [facility] June 2, 2020

## 2020-06-08 ENCOUNTER — DOCUMENTATION ONLY (OUTPATIENT)
Dept: SURGERY | Age: 55
End: 2020-06-08

## 2020-06-08 ENCOUNTER — OFFICE VISIT (OUTPATIENT)
Dept: SURGERY | Age: 55
End: 2020-06-08

## 2020-06-08 VITALS
SYSTOLIC BLOOD PRESSURE: 130 MMHG | DIASTOLIC BLOOD PRESSURE: 86 MMHG | HEIGHT: 63 IN | BODY MASS INDEX: 36.5 KG/M2 | WEIGHT: 206 LBS | HEART RATE: 85 BPM | TEMPERATURE: 98.6 F

## 2020-06-08 DIAGNOSIS — C77.3 SECONDARY MALIGNANT NEOPLASM OF BRACHIAL LYMPH NODE (HCC): ICD-10-CM

## 2020-06-08 DIAGNOSIS — C50.912 MALIGNANT NEOPLASM OF LEFT FEMALE BREAST, UNSPECIFIED ESTROGEN RECEPTOR STATUS, UNSPECIFIED SITE OF BREAST (HCC): Primary | ICD-10-CM

## 2020-06-08 DIAGNOSIS — C50.912 BREAST CANCER METASTASIZED TO AXILLARY LYMPH NODE, LEFT (HCC): Primary | ICD-10-CM

## 2020-06-08 DIAGNOSIS — C77.3 BREAST CANCER METASTASIZED TO AXILLARY LYMPH NODE, LEFT (HCC): Primary | ICD-10-CM

## 2020-06-08 NOTE — PROGRESS NOTES
HISTORY OF PRESENT ILLNESS Kezia Cox is a 48 y.o. female. HPI ESTABLISHED patient here today for follow up LEFT breast cancer. She is doing well. Receiving neoadjuvant chemotherapy. The patient reports the tumor to have gotten smaller from when she found it. She is on B59 study. Breast cancer- 
4/6/2020 - left breast 3:00, 13cmfn, core bx: [place], gr 2-3, 1.3 cm, ER negative, MI negative, HER 2 negative at IHC 0; ki67 68%; LN + by core, 6 mm, gr 2-3 4/15/2020 - port insertion 4/28/2020 - started neoadjuvant chemotherapy (B-59 Carbo/Taxol +/- atezolizumab) - Dr. Sky Matamoros 5/4/2020- breast mri 7 cm enhancement left breast, multiple suspicious nodes 4/14/2020- bone scan, ct scan no mets Review of Systems All other systems reviewed and are negative. Physical Exam 
Vitals signs and nursing note reviewed. Chest:  
   Breasts: Breasts are symmetrical.  
      Right: No inverted nipple, mass, nipple discharge, skin change or tenderness. Left: No inverted nipple, mass, nipple discharge, skin change or tenderness. Comments: Today no palpable mass or adenopathy on left. Skin is smooth. Overall breast much softer. Lymphadenopathy:  
   Cervical: No cervical adenopathy. BREAST ULTRASOUND, Preop planning Indication:preop planning  left Breast 3:00 and left axilla Technique: The area was scanned using a high-frequency linear-array near-field transducer Findings: 1 cm  irregular mass with dropout Impression: Biopsy site visible with ultrasound, nodes much smaller mass smaller (1.0 cm from 1.6 cm on us) Disposition:  Responding to chemo ASSESSMENT and PLAN 
  ICD-10-CM ICD-9-CM 1. Breast cancer metastasized to axillary lymph node, left ([facility]) C50.912 174.9   
 C77.3 196.3   
 
47 yo female with left breast 3:00, 13cmfn, core bx:  IMC, gr 2-3, 1.3 cm, ER negative, MI negative, HER 2 negative at IHC 0; ki67 68%; LN + by core, 6 mm, gr 2-3 On mri 7 cm with multiple nodes Stage 3. She is responding well to treatment. Will see her back in 6 weeks. Will plan for post treatment mri I will go ahead and refer to rad onc, plastics. She was happy with plan.

## 2020-06-09 ENCOUNTER — RESEARCH ENCOUNTER (OUTPATIENT)
Dept: ONCOLOGY | Age: 55
End: 2020-06-09

## 2020-06-09 ENCOUNTER — OFFICE VISIT (OUTPATIENT)
Dept: ONCOLOGY | Age: 55
End: 2020-06-09

## 2020-06-09 ENCOUNTER — HOSPITAL ENCOUNTER (OUTPATIENT)
Dept: INFUSION THERAPY | Age: 55
Discharge: HOME | End: 2020-06-09
Payer: COMMERCIAL

## 2020-06-09 VITALS
DIASTOLIC BLOOD PRESSURE: 80 MMHG | OXYGEN SATURATION: 97 % | HEART RATE: 78 BPM | BODY MASS INDEX: 36.5 KG/M2 | RESPIRATION RATE: 18 BRPM | WEIGHT: 206 LBS | SYSTOLIC BLOOD PRESSURE: 133 MMHG | TEMPERATURE: 97.7 F | HEIGHT: 63 IN

## 2020-06-09 VITALS
TEMPERATURE: 98.7 F | SYSTOLIC BLOOD PRESSURE: 142 MMHG | OXYGEN SATURATION: 97 % | WEIGHT: 206 LBS | RESPIRATION RATE: 18 BRPM | DIASTOLIC BLOOD PRESSURE: 76 MMHG | HEART RATE: 76 BPM | BODY MASS INDEX: 36.5 KG/M2 | HEIGHT: 63 IN

## 2020-06-09 DIAGNOSIS — C50.412 MALIGNANT NEOPLASM OF UPPER-OUTER QUADRANT OF LEFT BREAST IN FEMALE, ESTROGEN RECEPTOR NEGATIVE (HCC): Primary | ICD-10-CM

## 2020-06-09 DIAGNOSIS — Z17.1 MALIGNANT NEOPLASM OF UPPER-OUTER QUADRANT OF LEFT BREAST IN FEMALE, ESTROGEN RECEPTOR NEGATIVE (HCC): Primary | ICD-10-CM

## 2020-06-09 DIAGNOSIS — Z51.11 CHEMOTHERAPY MANAGEMENT, ENCOUNTER FOR: ICD-10-CM

## 2020-06-09 LAB
ALBUMIN SERPL-MCNC: 3.6 G/DL (ref 3.5–5)
ALBUMIN/GLOB SERPL: 1.1 {RATIO} (ref 1.1–2.2)
ALP SERPL-CCNC: 96 U/L (ref 45–117)
ALT SERPL-CCNC: 93 U/L (ref 12–78)
ANION GAP SERPL CALC-SCNC: 2 MMOL/L (ref 5–15)
AST SERPL-CCNC: 32 U/L (ref 15–37)
BASOPHILS # BLD: 0 K/UL (ref 0–0.1)
BASOPHILS NFR BLD: 0 % (ref 0–1)
BILIRUB SERPL-MCNC: 0.2 MG/DL (ref 0.2–1)
BUN SERPL-MCNC: 12 MG/DL (ref 6–20)
BUN/CREAT SERPL: 13 (ref 12–20)
CALCIUM SERPL-MCNC: 8.8 MG/DL (ref 8.5–10.1)
CHLORIDE SERPL-SCNC: 107 MMOL/L (ref 97–108)
CO2 SERPL-SCNC: 30 MMOL/L (ref 21–32)
CORTIS AM PEAK SERPL-MCNC: 9.3 UG/DL (ref 4.3–22.45)
CREAT SERPL-MCNC: 0.9 MG/DL (ref 0.55–1.02)
DIFFERENTIAL METHOD BLD: ABNORMAL
EOSINOPHIL # BLD: 0 K/UL (ref 0–0.4)
EOSINOPHIL NFR BLD: 1 % (ref 0–7)
ERYTHROCYTE [DISTWIDTH] IN BLOOD BY AUTOMATED COUNT: 14.5 % (ref 11.5–14.5)
GLOBULIN SER CALC-MCNC: 3.3 G/DL (ref 2–4)
GLUCOSE SERPL-MCNC: 93 MG/DL (ref 65–100)
HCT VFR BLD AUTO: 32.4 % (ref 35–47)
HGB BLD-MCNC: 10.6 G/DL (ref 11.5–16)
IMM GRANULOCYTES # BLD AUTO: 0 K/UL (ref 0–0.04)
IMM GRANULOCYTES NFR BLD AUTO: 1 % (ref 0–0.5)
LYMPHOCYTES # BLD: 1.2 K/UL (ref 0.8–3.5)
LYMPHOCYTES NFR BLD: 29 % (ref 12–49)
MCH RBC QN AUTO: 30.4 PG (ref 26–34)
MCHC RBC AUTO-ENTMCNC: 32.7 G/DL (ref 30–36.5)
MCV RBC AUTO: 92.8 FL (ref 80–99)
MONOCYTES # BLD: 0.4 K/UL (ref 0–1)
MONOCYTES NFR BLD: 10 % (ref 5–13)
NEUTS SEG # BLD: 2.3 K/UL (ref 1.8–8)
NEUTS SEG NFR BLD: 59 % (ref 32–75)
NRBC # BLD: 0 K/UL (ref 0–0.01)
NRBC BLD-RTO: 0 PER 100 WBC
PLATELET # BLD AUTO: 161 K/UL (ref 150–400)
PMV BLD AUTO: 10.3 FL (ref 8.9–12.9)
POTASSIUM SERPL-SCNC: 3.5 MMOL/L (ref 3.5–5.1)
PROT SERPL-MCNC: 6.9 G/DL (ref 6.4–8.2)
RBC # BLD AUTO: 3.49 M/UL (ref 3.8–5.2)
SODIUM SERPL-SCNC: 139 MMOL/L (ref 136–145)
TSH SERPL DL<=0.05 MIU/L-ACNC: 1.64 UIU/ML (ref 0.36–3.74)
WBC # BLD AUTO: 3.9 K/UL (ref 3.6–11)

## 2020-06-09 PROCEDURE — 74011000258 HC RX REV CODE- 258: Performed by: INTERNAL MEDICINE

## 2020-06-09 PROCEDURE — 74011000250 HC RX REV CODE- 250: Performed by: INTERNAL MEDICINE

## 2020-06-09 PROCEDURE — 84443 ASSAY THYROID STIM HORMONE: CPT

## 2020-06-09 PROCEDURE — 80053 COMPREHEN METABOLIC PANEL: CPT

## 2020-06-09 PROCEDURE — 96375 TX/PRO/DX INJ NEW DRUG ADDON: CPT

## 2020-06-09 PROCEDURE — 85025 COMPLETE CBC W/AUTO DIFF WBC: CPT

## 2020-06-09 PROCEDURE — 96413 CHEMO IV INFUSION 1 HR: CPT

## 2020-06-09 PROCEDURE — 82533 TOTAL CORTISOL: CPT

## 2020-06-09 PROCEDURE — 74011250636 HC RX REV CODE- 250/636: Performed by: INTERNAL MEDICINE

## 2020-06-09 PROCEDURE — 74011250637 HC RX REV CODE- 250/637: Performed by: INTERNAL MEDICINE

## 2020-06-09 PROCEDURE — 96417 CHEMO IV INFUS EACH ADDL SEQ: CPT

## 2020-06-09 PROCEDURE — 77030016057 HC NDL HUBR APOL -B

## 2020-06-09 PROCEDURE — 96366 THER/PROPH/DIAG IV INF ADDON: CPT

## 2020-06-09 PROCEDURE — 36415 COLL VENOUS BLD VENIPUNCTURE: CPT

## 2020-06-09 RX ORDER — SODIUM CHLORIDE 9 MG/ML
25 INJECTION, SOLUTION INTRAVENOUS CONTINUOUS
Status: DISCONTINUED | OUTPATIENT
Start: 2020-06-16 | End: 2020-06-17 | Stop reason: HOSPADM

## 2020-06-09 RX ORDER — DIPHENHYDRAMINE HCL 25 MG
50 CAPSULE ORAL ONCE
Status: COMPLETED | OUTPATIENT
Start: 2020-06-16 | End: 2020-06-16

## 2020-06-09 RX ORDER — CLINDAMYCIN PHOSPHATE 10 MG/G
GEL TOPICAL 2 TIMES DAILY
Qty: 1 ML | Refills: 0 | Status: SHIPPED | OUTPATIENT
Start: 2020-06-09 | End: 2020-07-06

## 2020-06-09 RX ADMIN — PACLITAXEL 163 MG: 6 INJECTION, SOLUTION INTRAVENOUS at 13:15

## 2020-06-09 RX ADMIN — DEXAMETHASONE SODIUM PHOSPHATE 12 MG: 4 INJECTION INTRA-ARTICULAR; INTRALESIONAL; INTRAMUSCULAR; INTRAVENOUS; SOFT TISSUE at 12:24

## 2020-06-09 RX ADMIN — Medication 1200 MG: at 15:10

## 2020-06-09 RX ADMIN — FAMOTIDINE 20 MG: 10 INJECTION INTRAVENOUS at 12:18

## 2020-06-09 RX ADMIN — FOSAPREPITANT 150 MG: 150 INJECTION, POWDER, LYOPHILIZED, FOR SOLUTION INTRAVENOUS at 12:21

## 2020-06-09 RX ADMIN — DIPHENHYDRAMINE HYDROCHLORIDE 50 MG: 25 CAPSULE ORAL at 12:12

## 2020-06-09 RX ADMIN — CARBOPLATIN 650 MG: 10 INJECTION, SOLUTION INTRAVENOUS at 14:31

## 2020-06-09 RX ADMIN — PALONOSETRON HYDROCHLORIDE 0.25 MG: 0.25 INJECTION INTRAVENOUS at 12:16

## 2020-06-09 RX ADMIN — SODIUM CHLORIDE 25 ML/HR: 900 INJECTION, SOLUTION INTRAVENOUS at 12:13

## 2020-06-09 NOTE — PROGRESS NOTES
[facility] [place] at [address], [address] [facility]  [place]: [phone]  F: [phone]      Reason for Visit:   Koko Moss is a 48 y.o. female who is seen in consultation at the request of Dr. Junior Manzano for evaluation of therapy for breast cancer    Treatment History:   · 20 left breast 3:00, 13cmfn, core bx:  IMC, gr 2-3, 1.3 cm, ER negative, IN negative, HER 2 negative at IHC 0; ki67 68%; LN + by core, 6 mm, gr 2-3  · B-59 Carbo/Taxol +/- atezolizumab 2020-  · [employer] 2020: negative    History of Present Illness:   19 mammogram was negative, she felt the L breast mass herself, earlier in 2020. Interval history: In today for follow up and treatment. Complains of gr 2 hot flashes, gr 1 insomnia, gr 1 skin rash. FH:  Mother with breast cancer at age 79; maternal aunt with breast cancer in her 45s; maternal aunt with breast cancer at age 61; no ovarian, prostate, or pancreas cancer    Past Medical History:   Diagnosis Date    ADHD     Cancer ([address]     LEFT BREAST CA    Sleep apnea     MILD -NO CPAP      Past Surgical History:   Procedure Laterality Date    HX BREAST BIOPSY Left 4/15/2020    BREAST BIOPSY performed by Lieutenant Javan MD at [address] [facility] [address] HX  SECTION  98, 80    HX GI      COLONOSCOPY    HX ORTHOPAEDIC      RIGHT ELBOW-FATTY MASS      Social History     Tobacco Use    Smoking status: Light Tobacco Smoker    Smokeless tobacco: Never Used    Tobacco comment: RARE cigar per patient   Substance Use Topics    Alcohol use:  Yes     Alcohol/week: 6.0 standard drinks     Types: 6 Glasses of wine per week      Family History   Problem Relation Age of Onset    Cancer Mother         Breast    Cancer Maternal Aunt         Breast    Cancer Paternal Uncle     Cancer Maternal Aunt         Breast    Cancer Paternal Uncle      Current Outpatient Medications   Medication Sig    clindamycin (CLINDAGEL) 1 % topical gel Apply  to affected area two (2) times a day. use thin film on affected area    lisdexamfetamine dimesylate (VYVANSE PO) Take  by mouth.  CLONAZEPAM PO Take  by mouth.  pyridoxine, vitamin B6, (VITAMIN B-6) 50 mg tablet Take 50 mg by mouth daily.  cholecalciferol, vitamin D3, (VITAMIN D3 PO) Take 5,000 Units by mouth daily.  prochlorperazine (COMPAZINE) 10 mg tablet Take 1 Tab by mouth every six (6) hours as needed for Nausea.  lidocaine-prilocaine (EMLA) topical cream Apply  to affected area as needed for Pain.  ondansetron hcl (ZOFRAN) 8 mg tablet Take 1 Tab by mouth every eight (8) hours as needed for Nausea. No current facility-administered medications for this visit. Facility-Administered Medications Ordered in Other Visits   Medication Dose Route Frequency    INV atezolizumab / placebo 1,200 mg in 0.9% sodium chloride 250 mL, overfill volume 25 mL INVESTIGATIONAL IVPB  1,200 mg IntraVENous ONCE    CARBOplatin (PARAPLATIN) 750 mg in 0.9% sodium chloride 250 mL, overfill volume 25 mL chemo infusion  750 mg IntraVENous ONCE    PACLitaxeL (TAXOL) 162 mg in 0.9% sodium chloride 250 mL, overfill volume 25 mL chemo infusion  162 mg IntraVENous ONCE    fosaprepitant (EMEND) 150 mg in 0.9% sodium chloride 150 mL IVPB  150 mg IntraVENous ONCE    palonosetron HCl (ALOXI) injection 0.25 mg  0.25 mg IntraVENous ONCE    diphenhydrAMINE (BENADRYL) capsule 50 mg  50 mg Oral ONCE    famotidine (PF) (PEPCID) 20 mg in 0.9% sodium chloride 10 mL injection  20 mg IntraVENous ONCE    dexamethasone (DECADRON) 12 mg in 0.9% sodium chloride 50 mL IVPB  12 mg IntraVENous ONCE    0.9% sodium chloride infusion  25 mL/hr IntraVENous CONTINUOUS      No Known Allergies     Review of Systems: A complete review of systems was obtained, negative except as described above.     Physical Exam:     Visit Vitals  /76 (BP 1 Location: Left arm, BP Patient Position: Sitting)   Pulse 76   Temp 98.7 °F (37.1 °C) (Temporal)   Resp 18   Ht 5' 3\" (1.6 m)   Wt 206 lb (93.4 kg)   SpO2 97%   BMI 36.49 kg/m²     ECOG PS: 0  General: No distress  Eyes: Anicteric sclerae  HENT: Atraumatic  Neck: Supple  Respiratory: Normal respiratory effort  CV: No peripheral edema  GI: nondistended  Skin: No rashes, ecchymoses, or petechiae  Psych: Alert, oriented, appropriate affect, normal judgment/insight    Breasts:  L breast 3:00, 7-8 cmfn, 1.8 cm mass, left ax, 2 - 2cm ax LN, and 1 cm LN     Results:     Lab Results   Component Value Date/Time    WBC 3.9 06/09/2020 08:51 AM    HGB 10.6 (L) 06/09/2020 08:51 AM    HCT 32.4 (L) 06/09/2020 08:51 AM    PLATELET 480 47/64/8168 08:51 AM    MCV 92.8 06/09/2020 08:51 AM    ABS. NEUTROPHILS 2.3 06/09/2020 08:51 AM     Lab Results   Component Value Date/Time    Sodium 139 06/09/2020 08:51 AM    Potassium 3.5 06/09/2020 08:51 AM    Chloride 107 06/09/2020 08:51 AM    CO2 30 06/09/2020 08:51 AM    Glucose 93 06/09/2020 08:51 AM    BUN 12 06/09/2020 08:51 AM    Creatinine 0.90 06/09/2020 08:51 AM    GFR est AA >60 06/09/2020 08:51 AM    GFR est non-AA >60 06/09/2020 08:51 AM    Calcium 8.8 06/09/2020 08:51 AM     Lab Results   Component Value Date/Time    Bilirubin, total 0.2 06/09/2020 08:51 AM    ALT (SGPT) 93 (H) 06/09/2020 08:51 AM    Alk. phosphatase 96 06/09/2020 08:51 AM    Protein, total 6.9 06/09/2020 08:51 AM    Albumin 3.6 06/09/2020 08:51 AM    Globulin 3.3 06/09/2020 08:51 AM       3/31/20  3D mammogram:  1.6 cm mass in L breast  US L breast  3:00 left breast mass 1.6 cm, multiple LN in L ax tail    4/14/2020 CT c/a/p:  IMPRESSION: Left breast mass. No evidence for metastatic disease in the chest  abdomen or pelvis. There are no target lesions for RECIST classification. 4/14/2020 Bone Scan:  IMPRESSION: No evidence of bony metastatic disease. 5/4/2020 MRI breast:  IMPRESSION:     Right Breast:  1. BI-RADS Assessment Category 1: Negative.  No evidence of breast carcinoma  within the right breast.     Left Breast:  1. BI-RADS Assessment Category 6: Known biopsy proven malignancy- Appropriate  action should be taken. Large area of malignant enhancement, occupying most of  the middle and posterior third of the left breast upper outer and lower-outer  quadrants, from 2:00 to 4:00. Malignancy is much larger than it had appeared  mammographically. Greatest measurement is 7 x 4.2 x 3 cm. Contained within this  large area of enhancement is the dominant 2.5 cm mass, which represents the  mammographic and sonographic correlate. 2. BI-RADS Assessment Category 6: Known biopsy proven malignancy- Appropriate  action should be taken. Pathologically proven level 1 left axillary  lymphadenopathy, with numerous enlarged, malignant lymph nodes. There is also  level 2 left axillary lymphadenopathy. 3. Retraction of the skin of the lateral left breast, which is thickened. However, no evidence of malignant skin invasion/involvement. 4. The malignant mass closely approximates the pectoral muscle, but there is no  evidence of direct muscle invasion/involvement.     RECOMMENDATIONS:  Appropriate action is recommended. The patient is undergoing neoadjuvant  chemotherapy. There is no evidence of contralateral disease. Records reviewed and summarized above. Pathology report(s) reviewed above. Radiology report(s) reviewed above. Assessment/plan:   1. Left IMC, gr 2-3, triple negative, LN + by core:  cT1c cN1a cM0, unifocal, stage IIA both anatomic and prognostic  postmenopausal    With multiple LN felt on exam, ordered CT c/a/p and bone scan for staging. CT and Bone scan on 4/14/2020 negative for mets. We explained to the patient that the goal of systemic adjuvant therapy is to improve the chances for cure and decrease the risk of relapse.  We explained why a patient can have microscopic cancer spread now even though physical examination, laboratory studies and imaging studies are negative for cancer. We explained that the same treatments used now as adjuvant or preventive treatments rarely if ever are curative in women who develop metastases. We discussed that there is no difference in overall survival between neoadjuvant and adjuvant chemotherapy. We discussed the rationale for neoadjuvant chemotherapy, if chemotherapy is warranted, as it is in this case: to avoid any potential delays in giving chemotherapy following surgery, to be able to see the response of the tumor to chemotherapy, and to potentially downstage the tumor prior to surgery. I discussed the potential risks of dose-dense chemotherapy with the patient. (DD AC-T, adriamycin 60 mg/m2; cyclophosphamide 600 mg/m2 q 2 weeks x 4; paclitaxel 80 mg/m2 qweekly x 12). Major toxicities include nausea and vomiting, stomatitis, fatigue, and a small risk of heart damage. Anemia frequently results and occasionally requires growth factors and rarely transfusions. Neutropenic fever is uncommon, but can be a life-threatening problem. Also, there is a small but increased risk of myelodysplasia and acute leukemia. We provided the patient with detailed information concerning toxicity including frequent toxicities that only last a few days, such as nausea, vomiting, mouth sores, arthralgia, myalgia, and potentially allergic reactions to paclitaxel, as well as toxicities which can be longer lasting including total alopecia, fatigue, anemia and neuropathy. We provided the patient with detailed information concerning the toxicities of their regimen in addition to our verbal discussion. We discussed the potential addition of carboplatin auc 6 q 3 weeks during weekly paclitaxel as evaluated in CALGB 04157, showing a significant improvement in pCR for patients with stage II-III triple negative breast cancer. Additional side effects of added myelosuppression, fatigue, renal damage with dehydration, and nausea and vomiting were discussed. Would use AUC 5 as that is the dose that is being used in all current investigations    Also discussed the clinical trial NSABP B-59, which is carbo/taxol then DD [facility] +/- atezolizumab. Discussed the positive findings of SPLNV 544 with pembrolizumab    We discussed the risks and benefits of atezolizumab therapy today. Potential side effects include, but are not limited to fatigue, rash, auto-immune issues, infertility, allergic reactions, and rarely, death. Discussed cold caps (not working with [facility]). Discussed oral and peripheral cryotherapy    Discussed COVID19 risks and LETI guidelines stating neoadj chemotherapy for [place] breast cancer is preferred vs upfront surgery during this pandemic. After this discussion, she is agreeable to chemotherapy and port placement. She has signed informed consent for B-59. The patient was given the following prescriptions with written and verbal instructions on how to use each:  Compazine, zofran, olanzapine (held until [facility]), a wig, and emla cream.  IV Robert Chong will be used with AC. Neulasta the following day with AC (bone pain side effect discussed). Port placed by Dr. Jacque Thompson. TTE on 4/13/2020, EF 61%. Breast MRI on 5/4/2020. Discussed that with her extent of disease, a mastectomy is warranted. B-59 Carbo/Taxol C3D1 today. 2. Emotional well being:  She has excellent support and is coping well with her disease    3. Genetic testing:   Discussed potential ramifications of a positive test including the potential need for bilateral mastectomies and bilateral oophorectomies and the risk then for her family members to also have a mutation. VUS discussed also. Testing performed 4/28/20 by blood, negative. 4. Alt increased:  Mild, due to taxol    5. Headaches:  Dull for few days after treatment. Recommend ibuprofen or tylenol PRN. Encouraged increase PO fluids. Significantly improved. 6. Heartburn:  Recommend PRN Pepcid.      7. Folliculitis: Clindamycin gel BID PRN, rx in.      8. Anemia:  Due to chemo, mild        I appreciate the opportunity to participate in Ms. Shari Kurtz's care. Signed By: Carson Lesches, MD      No orders of the defined types were placed in this encounter.

## 2020-06-09 NOTE — PROGRESS NOTES
Jaynie Spurling is a 48 y.o. female Follow up for the Evaluation of Breast Cancer. 1. Have you been to the ER, urgent care clinic since your last visit? Hospitalized since your last visit? No    2. Have you seen or consulted any other health care providers outside of the [address] [place] [address] since your last visit? Include any pap smears or colon screening.  No

## 2020-06-09 NOTE — PROGRESS NOTES
[facility] Progress Note Date: 2020 Name: Migue Cornell MRN: [id number] : 1965 Ms. Michelle Jerome Arrived ambulatory and in no distress for C3D1 of Clinical Trial: NSABP B-59 Atezolizumab/Placebo + Carboplatin + Paclitaxel Regimen. Assessment was completed, no acute issues at this time, no new complaints voiced. Right chest wall port accessed with 1 inch needle without difficulty, labs drawn & sent for processing. Chemotherapy Flowsheet 2020 Cycle C3D1 Date 2020 Drug / Regimen Taxol/Carbo/Tecentriq Pre Meds -  
Notes -  
 
 
0940 Patient proceed to appointment with Dr. Jeanie Daily. Ms. Tr Casas vitals were reviewed. Visit Vitals /76 Resp 18 Ht 5' 3\" (1.6 m) Wt 93.4 kg (206 lb) SpO2 97% BMI 36.49 kg/m² Lab results were obtained and reviewed. Recent Results (from the past 12 hour(s)) CBC WITH AUTOMATED DIFF Collection Time: 20  8:51 AM  
Result Value Ref Range WBC 3.9 3.6 - 11.0 K/uL  
 RBC 3.49 (L) 3.80 - 5.20 M/uL  
 HGB 10.6 (L) 11.5 - 16.0 g/dL HCT 32.4 (L) 35.0 - 47.0 % MCV 92.8 80.0 - 99.0 FL  
 MCH 30.4 26.0 - 34.0 PG  
 MCHC 32.7 30.0 - 36.5 g/dL  
 RDW 14.5 11.5 - 14.5 % PLATELET 279 599 - 257 K/uL MPV 10.3 8.9 - 12.9 FL  
 NRBC 0.0 0  WBC ABSOLUTE NRBC 0.00 0.00 - 0.01 K/uL NEUTROPHILS 59 32 - 75 % LYMPHOCYTES 29 12 - 49 % MONOCYTES 10 5 - 13 % EOSINOPHILS 1 0 - 7 % BASOPHILS 0 0 - 1 % IMMATURE GRANULOCYTES 1 (H) 0.0 - 0.5 % ABS. NEUTROPHILS 2.3 1.8 - 8.0 K/UL  
 ABS. LYMPHOCYTES 1.2 0.8 - 3.5 K/UL  
 ABS. MONOCYTES 0.4 0.0 - 1.0 K/UL  
 ABS. EOSINOPHILS 0.0 0.0 - 0.4 K/UL  
 ABS. BASOPHILS 0.0 0.0 - 0.1 K/UL  
 ABS. IMM. GRANS. 0.0 0.00 - 0.04 K/UL  
 DF AUTOMATED Medications: 
Medications Administered 0.9% sodium chloride infusion Admin Date 
2020 Action New Bag Dose 25 mL/hr Rate 25 mL/hr Route IntraVENous Administered By 
Carmella Alvarez RN  
  
  
 CARBOplatin (PARAPLATIN) 650 mg in 0.9% sodium chloride 250 mL, overfill volume 25 mL chemo infusion Admin Date 
06/09/2020 Action New Bag Dose 650 mg Rate 680 mL/hr Route IntraVENous Administered By 
Carmella Alvarez RN  
  
  
 dexamethasone (DECADRON) 12 mg in 0.9% sodium chloride 50 mL IVPB Admin Date 
06/09/2020 Action Given Dose 
12 mg Route IntraVENous Administered By 
Carmella Alvarez RN  
  
  
 diphenhydrAMINE (BENADRYL) capsule 50 mg   
 Admin Date 
06/09/2020 Action Given Dose 50 mg Route Oral Administered By 
Carmella Alvarez RN  
  
  
 famotidine (PF) (PEPCID) 20 mg in 0.9% sodium chloride 10 mL injection Admin Date 
06/09/2020 Action Given Dose 
20 mg Route IntraVENous Administered By 
Carmella Alvarez RN  
  
  
 fosaprepitant (EMEND) 150 mg in 0.9% sodium chloride 150 mL IVPB Admin Date 
06/09/2020 Action Given Dose 
150 mg Rate 450 mL/hr Route IntraVENous Administered By 
Carmella Alvarez RN  
  
  
 INV atezolizumab / placebo 1,200 mg in 0.9% sodium chloride 250 mL, overfill volume 25 mL INVESTIGATIONAL IVPB Admin Date 
06/09/2020 Action Given Dose 
1200 mg Rate 
590 mL/hr Route IntraVENous Administered By 
Carmella Alvarez RN  
  
  
 PACLitaxeL (TAXOL) 163 mg in 0.9% sodium chloride 250 mL, overfill volume 25 mL chemo infusion Admin Date 
06/09/2020 Action New Bag Dose 163 mg Rate 
302.2 mL/hr Route IntraVENous Administered By 
Carmella Alvarez RN  
  
  
 palonosetron HCl (ALOXI) injection 0.25 mg   
 Admin Date 
06/09/2020 Action Given Dose 0.25 mg Route IntraVENous Administered By 
Carmella Alvarez RN  
  
  
  
 
 
 
Ms. Michelle Jerome tolerated treatment well and was discharged from [place] [address] in stable condition. Port de-accessed, flushed & heparinized per protocol. She is to return on 6/16/20 for her next appointment. Fransico Horton RN 
June 9, 2020

## 2020-06-16 ENCOUNTER — RESEARCH ENCOUNTER (OUTPATIENT)
Dept: ONCOLOGY | Age: 55
End: 2020-06-16

## 2020-06-16 ENCOUNTER — HOSPITAL ENCOUNTER (OUTPATIENT)
Dept: INFUSION THERAPY | Age: 55
Discharge: HOME | End: 2020-06-16
Payer: COMMERCIAL

## 2020-06-16 VITALS
OXYGEN SATURATION: 100 % | DIASTOLIC BLOOD PRESSURE: 73 MMHG | BODY MASS INDEX: 35.58 KG/M2 | WEIGHT: 200.8 LBS | TEMPERATURE: 98.5 F | RESPIRATION RATE: 18 BRPM | HEART RATE: 80 BPM | SYSTOLIC BLOOD PRESSURE: 139 MMHG | HEIGHT: 63 IN

## 2020-06-16 LAB
BASOPHILS # BLD: 0 K/UL (ref 0–0.1)
BASOPHILS NFR BLD: 0 % (ref 0–1)
DIFFERENTIAL METHOD BLD: ABNORMAL
EOSINOPHIL # BLD: 0 K/UL (ref 0–0.4)
EOSINOPHIL NFR BLD: 1 % (ref 0–7)
ERYTHROCYTE [DISTWIDTH] IN BLOOD BY AUTOMATED COUNT: 14.6 % (ref 11.5–14.5)
HCT VFR BLD AUTO: 31.8 % (ref 35–47)
HGB BLD-MCNC: 10.4 G/DL (ref 11.5–16)
IMM GRANULOCYTES # BLD AUTO: 0 K/UL (ref 0–0.04)
IMM GRANULOCYTES NFR BLD AUTO: 1 % (ref 0–0.5)
LYMPHOCYTES # BLD: 1.1 K/UL (ref 0.8–3.5)
LYMPHOCYTES NFR BLD: 24 % (ref 12–49)
MCH RBC QN AUTO: 30.4 PG (ref 26–34)
MCHC RBC AUTO-ENTMCNC: 32.7 G/DL (ref 30–36.5)
MCV RBC AUTO: 93 FL (ref 80–99)
MONOCYTES # BLD: 0.4 K/UL (ref 0–1)
MONOCYTES NFR BLD: 9 % (ref 5–13)
NEUTS SEG # BLD: 3.1 K/UL (ref 1.8–8)
NEUTS SEG NFR BLD: 65 % (ref 32–75)
NRBC # BLD: 0 K/UL (ref 0–0.01)
NRBC BLD-RTO: 0 PER 100 WBC
PLATELET # BLD AUTO: 197 K/UL (ref 150–400)
PMV BLD AUTO: 10.1 FL (ref 8.9–12.9)
RBC # BLD AUTO: 3.42 M/UL (ref 3.8–5.2)
WBC # BLD AUTO: 4.8 K/UL (ref 3.6–11)

## 2020-06-16 PROCEDURE — 85025 COMPLETE CBC W/AUTO DIFF WBC: CPT

## 2020-06-16 PROCEDURE — 74011250636 HC RX REV CODE- 250/636: Performed by: INTERNAL MEDICINE

## 2020-06-16 PROCEDURE — 74011000258 HC RX REV CODE- 258: Performed by: INTERNAL MEDICINE

## 2020-06-16 PROCEDURE — 77030012965 HC NDL HUBR BBMI -A

## 2020-06-16 PROCEDURE — 96413 CHEMO IV INFUSION 1 HR: CPT

## 2020-06-16 PROCEDURE — 36415 COLL VENOUS BLD VENIPUNCTURE: CPT

## 2020-06-16 PROCEDURE — 96375 TX/PRO/DX INJ NEW DRUG ADDON: CPT

## 2020-06-16 PROCEDURE — 74011250637 HC RX REV CODE- 250/637: Performed by: INTERNAL MEDICINE

## 2020-06-16 RX ORDER — DIPHENHYDRAMINE HCL 25 MG
50 CAPSULE ORAL ONCE
Status: DISCONTINUED | OUTPATIENT
Start: 2020-06-23 | End: 2020-06-23

## 2020-06-16 RX ORDER — SODIUM CHLORIDE 9 MG/ML
25 INJECTION, SOLUTION INTRAVENOUS CONTINUOUS
Status: DISCONTINUED | OUTPATIENT
Start: 2020-06-23 | End: 2020-06-24

## 2020-06-16 RX ADMIN — FAMOTIDINE 20 MG: 10 INJECTION, SOLUTION INTRAVENOUS at 10:15

## 2020-06-16 RX ADMIN — PACLITAXEL 163 MG: 6 INJECTION, SOLUTION INTRAVENOUS at 11:19

## 2020-06-16 RX ADMIN — DIPHENHYDRAMINE HYDROCHLORIDE 50 MG: 25 CAPSULE ORAL at 10:10

## 2020-06-16 RX ADMIN — SODIUM CHLORIDE 25 ML/HR: 900 INJECTION, SOLUTION INTRAVENOUS at 10:09

## 2020-06-16 RX ADMIN — DEXAMETHASONE SODIUM PHOSPHATE 12 MG: 4 INJECTION INTRA-ARTICULAR; INTRALESIONAL; INTRAMUSCULAR; INTRAVENOUS; SOFT TISSUE at 10:13

## 2020-06-16 NOTE — PROGRESS NOTES
[facility] VISIT NOTE Date: 2020 Name: Caesar Faulkner MRN: [id number] : 1965 
 
9180 Ms. Clarence Sahu Arrived ambulatory and in no distress for C3D of CLINICAL TRAIL: NSABP B-59 Treatment 1: Atezlizumab/Placebo + CARBOplatin + PACItaxel Regimen. Assessment was completed, no acute issues at this time, no new complaints voiced. Right chest wall port accessed with 1 inch mancera needle without difficulty, labs drawn & sent for processing. Chemotherapy Flowsheet 2020 Cycle C3D8 Date 2020 Drug / Regimen CLINCAL TRAIL: NSABP B-59 Treatment 1: Atezlizumab/Placebo + CARBOplatin + PACLItaxel Pre Meds given Notes Paclitaxel only Vitals: 
Patient Vitals for the past 12 hrs: 
 Temp Pulse Resp BP SpO2  
20 1226 98.5 °F (36.9 °C) 80  139/73   
20 0853 98.3 °F (36.8 °C) 79 18 135/78 100 % Lab results were obtained and reviewed. Recent Results (from the past 12 hour(s)) CBC WITH AUTOMATED DIFF Collection Time: 20  9:13 AM  
Result Value Ref Range WBC 4.8 3.6 - 11.0 K/uL  
 RBC 3.42 (L) 3.80 - 5.20 M/uL  
 HGB 10.4 (L) 11.5 - 16.0 g/dL HCT 31.8 (L) 35.0 - 47.0 % MCV 93.0 80.0 - 99.0 FL  
 MCH 30.4 26.0 - 34.0 PG  
 MCHC 32.7 30.0 - 36.5 g/dL  
 RDW 14.6 (H) 11.5 - 14.5 % PLATELET 436 749 - 999 K/uL MPV 10.1 8.9 - 12.9 FL  
 NRBC 0.0 0  WBC ABSOLUTE NRBC 0.00 0.00 - 0.01 K/uL NEUTROPHILS 65 32 - 75 % LYMPHOCYTES 24 12 - 49 % MONOCYTES 9 5 - 13 % EOSINOPHILS 1 0 - 7 % BASOPHILS 0 0 - 1 % IMMATURE GRANULOCYTES 1 (H) 0.0 - 0.5 % ABS. NEUTROPHILS 3.1 1.8 - 8.0 K/UL  
 ABS. LYMPHOCYTES 1.1 0.8 - 3.5 K/UL  
 ABS. MONOCYTES 0.4 0.0 - 1.0 K/UL  
 ABS. EOSINOPHILS 0.0 0.0 - 0.4 K/UL  
 ABS. BASOPHILS 0.0 0.0 - 0.1 K/UL  
 ABS. IMM. GRANS. 0.0 0.00 - 0.04 K/UL  
 DF AUTOMATED Medications received: 
Medications Administered 0.9% sodium chloride infusion Admin Date 
2020 Action New Bag Dose 25 mL/hr Rate 25 mL/hr Route IntraVENous Administered By Pamela Reis RN  
  
  
 dexamethasone (DECADRON) 12 mg in 0.9% sodium chloride 50 mL IVPB Admin Date 
06/16/2020 Action Given Dose 
12 mg Route IntraVENous Administered By Pamela Reis RN  
  
  
 diphenhydrAMINE (BENADRYL) capsule 50 mg   
 Admin Date 
06/16/2020 Action Given Dose 50 mg Route Oral Administered By Pamela Reis RN  
  
  
 famotidine (PF) (PEPCID) 20 mg in 0.9% sodium chloride 10 mL injection Admin Date 
06/16/2020 Action Given Dose 
20 mg Route IntraVENous Administered By Pamela Reis RN  
  
  
 PACLitaxeL (TAXOL) 163 mg in 0.9% sodium chloride 250 mL, overfill volume 25 mL chemo infusion Admin Date 
06/16/2020 Action New Bag Dose 163 mg Rate 
302.2 mL/hr Route IntraVENous Administered By Pamela Reis RN  
  
  
  
 
 
Ms. Tea Littlejohn tolerated treatment well and was discharged from [place] [address] in stable condition at 1230. Port de-accessed, flushed & heparinized per protocol. She is to return on 6/23/2020 at 0900 for her next appointment. Tramaine Yen RN 
June 16, 2020 Future Appointments: 
Future Appointments Date Time Provider Dorota Davenport 6/22/2020  2:30 PM RAD ONC THERAPY [facility] [address]  
6/23/2020  9:00 AM [facility]  
6/30/2020  9:00 AM SS [facility]  
6/30/2020  9:15 AM Marnie Robb MD [address] [place] [address], [facility]  
7/7/2020  9:00 AM [facility]  
7/14/2020  9:00 AM SS INF1 CH3 <4H [facility]  
7/20/2020  9:00 AM Ivan Ramirez MD [facility] SCHED  
7/27/2020  2:30 PM RAD ONC THERAPY [facility] [address]

## 2020-06-17 ENCOUNTER — DOCUMENTATION ONLY (OUTPATIENT)
Dept: SURGERY | Age: 55
End: 2020-06-17

## 2020-06-18 ENCOUNTER — TELEPHONE (OUTPATIENT)
Dept: ONCOLOGY | Age: 55
End: 2020-06-18

## 2020-06-18 NOTE — TELEPHONE ENCOUNTER
I attempted to call the Patient to get some more information, to see if it was red, swollen, hot to the touch anything like that and how long it has been going on for, but she did not answer the phone. I left her a message to call me back. ----- Message from Baltazar Tian LPN sent at 0/30/0239 12:55 PM EDT -----  Regarding: FW: Non-Urgent Medical Question  Contact: [phone]    ----- Message -----  From: Selene Gottron  Sent: 6/18/2020  12:38 PM EDT  To: [place] [address] [facility] [address]  Subject: Non-Urgent Medical Question                      I have been experiencing pain in my right  knee (inner part/left of my knee cap) for several days. I have not been treating it with OTC however, it is becoming increasingly more painful especially when I am not moving for awhile. Should I contact my PCP or could chemo be causing inflammation? What if any could be recommended as Tx?

## 2020-06-18 NOTE — TELEPHONE ENCOUNTER
I called and spoke to the Patient and let her know all the Recommendation's and she verbalized understanding and had no further question's at that time.

## 2020-06-22 ENCOUNTER — HOSPITAL ENCOUNTER (OUTPATIENT)
Dept: RADIATION THERAPY | Age: 55
Discharge: HOME | End: 2020-06-22

## 2020-06-23 RX ORDER — SODIUM CHLORIDE 9 MG/ML
25 INJECTION, SOLUTION INTRAVENOUS CONTINUOUS
Status: DISCONTINUED | OUTPATIENT
Start: 2020-06-30 | End: 2020-07-01 | Stop reason: HOSPADM

## 2020-06-23 RX ORDER — DIPHENHYDRAMINE HCL 25 MG
50 CAPSULE ORAL ONCE
Status: COMPLETED | OUTPATIENT
Start: 2020-06-30 | End: 2020-06-30

## 2020-06-23 RX ORDER — PALONOSETRON 0.05 MG/ML
0.25 INJECTION, SOLUTION INTRAVENOUS ONCE
Status: COMPLETED | OUTPATIENT
Start: 2020-06-30 | End: 2020-06-30

## 2020-06-24 ENCOUNTER — HOSPITAL ENCOUNTER (OUTPATIENT)
Dept: INFUSION THERAPY | Age: 55
Discharge: HOME | End: 2020-06-24
Payer: COMMERCIAL

## 2020-06-24 ENCOUNTER — RESEARCH ENCOUNTER (OUTPATIENT)
Dept: ONCOLOGY | Age: 55
End: 2020-06-24

## 2020-06-24 VITALS
WEIGHT: 200.2 LBS | BODY MASS INDEX: 35.47 KG/M2 | HEART RATE: 76 BPM | SYSTOLIC BLOOD PRESSURE: 141 MMHG | TEMPERATURE: 98.3 F | DIASTOLIC BLOOD PRESSURE: 78 MMHG | RESPIRATION RATE: 18 BRPM | HEIGHT: 63 IN

## 2020-06-24 LAB
BASOPHILS # BLD: 0 K/UL (ref 0–0.1)
BASOPHILS NFR BLD: 0 % (ref 0–1)
DIFFERENTIAL METHOD BLD: ABNORMAL
EOSINOPHIL # BLD: 0 K/UL (ref 0–0.4)
EOSINOPHIL NFR BLD: 0 % (ref 0–7)
ERYTHROCYTE [DISTWIDTH] IN BLOOD BY AUTOMATED COUNT: 15.6 % (ref 11.5–14.5)
HCT VFR BLD AUTO: 32.4 % (ref 35–47)
HGB BLD-MCNC: 10.4 G/DL (ref 11.5–16)
IMM GRANULOCYTES # BLD AUTO: 0 K/UL (ref 0–0.04)
IMM GRANULOCYTES NFR BLD AUTO: 1 % (ref 0–0.5)
LYMPHOCYTES # BLD: 1.2 K/UL (ref 0.8–3.5)
LYMPHOCYTES NFR BLD: 25 % (ref 12–49)
MCH RBC QN AUTO: 30.5 PG (ref 26–34)
MCHC RBC AUTO-ENTMCNC: 32.1 G/DL (ref 30–36.5)
MCV RBC AUTO: 95 FL (ref 80–99)
MONOCYTES # BLD: 0.6 K/UL (ref 0–1)
MONOCYTES NFR BLD: 13 % (ref 5–13)
NEUTS SEG # BLD: 2.8 K/UL (ref 1.8–8)
NEUTS SEG NFR BLD: 61 % (ref 32–75)
NRBC # BLD: 0 K/UL (ref 0–0.01)
NRBC BLD-RTO: 0 PER 100 WBC
PLATELET # BLD AUTO: 265 K/UL (ref 150–400)
PMV BLD AUTO: 9.5 FL (ref 8.9–12.9)
RBC # BLD AUTO: 3.41 M/UL (ref 3.8–5.2)
WBC # BLD AUTO: 4.6 K/UL (ref 3.6–11)

## 2020-06-24 PROCEDURE — 74011250636 HC RX REV CODE- 250/636: Performed by: INTERNAL MEDICINE

## 2020-06-24 PROCEDURE — 96375 TX/PRO/DX INJ NEW DRUG ADDON: CPT

## 2020-06-24 PROCEDURE — 96413 CHEMO IV INFUSION 1 HR: CPT

## 2020-06-24 PROCEDURE — 36415 COLL VENOUS BLD VENIPUNCTURE: CPT

## 2020-06-24 PROCEDURE — 85025 COMPLETE CBC W/AUTO DIFF WBC: CPT

## 2020-06-24 PROCEDURE — 74011000258 HC RX REV CODE- 258: Performed by: INTERNAL MEDICINE

## 2020-06-24 PROCEDURE — 77030012965 HC NDL HUBR BBMI -A

## 2020-06-24 PROCEDURE — 74011250637 HC RX REV CODE- 250/637: Performed by: INTERNAL MEDICINE

## 2020-06-24 RX ORDER — DIPHENHYDRAMINE HCL 25 MG
50 CAPSULE ORAL ONCE
Status: COMPLETED | OUTPATIENT
Start: 2020-06-24 | End: 2020-06-24

## 2020-06-24 RX ORDER — SODIUM CHLORIDE 9 MG/ML
25 INJECTION, SOLUTION INTRAVENOUS CONTINUOUS
Status: DISCONTINUED | OUTPATIENT
Start: 2020-06-24 | End: 2020-06-25 | Stop reason: HOSPADM

## 2020-06-24 RX ADMIN — SODIUM CHLORIDE 25 ML/HR: 900 INJECTION, SOLUTION INTRAVENOUS at 14:18

## 2020-06-24 RX ADMIN — PACLITAXEL 163 MG: 300 INJECTION INTRAVENOUS at 15:19

## 2020-06-24 RX ADMIN — DEXAMETHASONE SODIUM PHOSPHATE 12 MG: 10 INJECTION, SOLUTION INTRAMUSCULAR; INTRAVENOUS at 14:20

## 2020-06-24 RX ADMIN — FAMOTIDINE 20 MG: 10 INJECTION INTRAVENOUS at 14:23

## 2020-06-24 RX ADMIN — DIPHENHYDRAMINE HYDROCHLORIDE 50 MG: 25 CAPSULE ORAL at 14:27

## 2020-06-24 NOTE — PROGRESS NOTES
[facility] VISIT NOTE Date: 2020 Name: Joann Pollack MRN: [id number] : 1965 
 
1300 Ms. Gianluca Oliver Arrived ambulatory and in no distress for C3D15 of CLINICAL TRIAL: NSABP B-59 Treatment 1: Atezoliumab/Placebo + CARBOplatain + PACLItaxel Regimen. Assessment was completed, no acute issues at this time, no new complaints voiced. Right chest wall port accessed without difficulty, labs drawn & sent for processing. Chemotherapy Flowsheet 2020 Cycle C3D15 Date 2020 Drug / Regimen CLINICAL TRIAL: NSABP B-59 Treatment 1: Atezolizumab/Placebo + CARBOplatain + PACLItaxel Pre Meds given Notes Paclitaxel only Vitals: 
Patient Vitals for the past 12 hrs: 
 Temp Pulse Resp BP  
20 1624  76  141/78  
20 1300 98.3 °F (36.8 °C) 73 18 130/74 Lab results were obtained and reviewed. Recent Results (from the past 12 hour(s)) CBC WITH AUTOMATED DIFF Collection Time: 20  1:14 PM  
Result Value Ref Range WBC 4.6 3.6 - 11.0 K/uL  
 RBC 3.41 (L) 3.80 - 5.20 M/uL  
 HGB 10.4 (L) 11.5 - 16.0 g/dL HCT 32.4 (L) 35.0 - 47.0 % MCV 95.0 80.0 - 99.0 FL  
 MCH 30.5 26.0 - 34.0 PG  
 MCHC 32.1 30.0 - 36.5 g/dL  
 RDW 15.6 (H) 11.5 - 14.5 % PLATELET 815 567 - 734 K/uL MPV 9.5 8.9 - 12.9 FL  
 NRBC 0.0 0  WBC ABSOLUTE NRBC 0.00 0.00 - 0.01 K/uL NEUTROPHILS 61 32 - 75 % LYMPHOCYTES 25 12 - 49 % MONOCYTES 13 5 - 13 % EOSINOPHILS 0 0 - 7 % BASOPHILS 0 0 - 1 % IMMATURE GRANULOCYTES 1 (H) 0.0 - 0.5 % ABS. NEUTROPHILS 2.8 1.8 - 8.0 K/UL  
 ABS. LYMPHOCYTES 1.2 0.8 - 3.5 K/UL  
 ABS. MONOCYTES 0.6 0.0 - 1.0 K/UL  
 ABS. EOSINOPHILS 0.0 0.0 - 0.4 K/UL  
 ABS. BASOPHILS 0.0 0.0 - 0.1 K/UL  
 ABS. IMM. GRANS. 0.0 0.00 - 0.04 K/UL  
 DF AUTOMATED Medications received: 
Medications Administered 0.9% sodium chloride infusion Admin Date 
2020 Action New Bag Dose 25 mL/hr Rate 25 mL/hr Route IntraVENous Administered By Jemima Manley RN  
  
  
 dexamethasone (DECADRON) 12 mg in 0.9% sodium chloride 50 mL IVPB Admin Date 
06/24/2020 Action Given Dose 
12 mg Route IntraVENous Administered By Jemima Manley RN  
  
  
 diphenhydrAMINE (BENADRYL) capsule 50 mg   
 Admin Date 
06/24/2020 Action Given Dose 50 mg Route Oral Administered By Jemima Manley RN  
  
  
 famotidine (PF) (PEPCID) 20 mg in 0.9% sodium chloride 10 mL injection Admin Date 
06/24/2020 Action Given Dose 
20 mg Route IntraVENous Administered By Jemima Manley RN  
  
  
 PACLitaxeL (TAXOL) 163 mg in 0.9% sodium chloride 250 mL, overfill volume 25 mL chemo infusion Admin Date 
06/24/2020 Action New Bag Dose 163 mg Rate 
302.2 mL/hr Route IntraVENous Administered By Jemima Manley RN  
  
  
  
 
 
Ms. Mirian Baltazar tolerated treatment well and was discharged from [place] [address] in stable condition at 1625. Port de-accessed, flushed & heparinized per protocol. She is to return on 6/30/2020 at 0900 for her next appointment. Martina Iglesias RN 
June 24, 2020 Future Appointments: 
Future Appointments Date Time Provider Dorota Davenport 6/30/2020  9:00 AM [facility]  
6/30/2020  9:15 AM Wayne Pérez MD [address] [place] [address], [facility] 1019  
7/7/2020  9:00 AM SS INF1 CH3 <4H [facility]  
7/14/2020  9:00 AM SS INF1 CH3 <4H [facility]  
7/20/2020  9:00 AM Mary Garcia MD [facility] SINGH SCHED  
7/27/2020  2:30 PM RAD ONC THERAPY [facility] Melissa SHEFFIELD

## 2020-06-30 ENCOUNTER — APPOINTMENT (OUTPATIENT)
Dept: INFUSION THERAPY | Age: 55
End: 2020-06-30
Payer: COMMERCIAL

## 2020-06-30 ENCOUNTER — OFFICE VISIT (OUTPATIENT)
Dept: ONCOLOGY | Age: 55
End: 2020-06-30

## 2020-06-30 ENCOUNTER — HOSPITAL ENCOUNTER (OUTPATIENT)
Dept: INFUSION THERAPY | Age: 55
Discharge: HOME | End: 2020-06-30
Payer: COMMERCIAL

## 2020-06-30 ENCOUNTER — RESEARCH ENCOUNTER (OUTPATIENT)
Dept: ONCOLOGY | Age: 55
End: 2020-06-30

## 2020-06-30 VITALS
HEIGHT: 63 IN | TEMPERATURE: 97 F | HEART RATE: 81 BPM | WEIGHT: 198 LBS | OXYGEN SATURATION: 98 % | BODY MASS INDEX: 35.08 KG/M2 | RESPIRATION RATE: 18 BRPM | DIASTOLIC BLOOD PRESSURE: 81 MMHG | SYSTOLIC BLOOD PRESSURE: 145 MMHG

## 2020-06-30 VITALS
RESPIRATION RATE: 18 BRPM | WEIGHT: 198.8 LBS | BODY MASS INDEX: 35.22 KG/M2 | DIASTOLIC BLOOD PRESSURE: 73 MMHG | HEART RATE: 76 BPM | SYSTOLIC BLOOD PRESSURE: 147 MMHG | HEIGHT: 63 IN | TEMPERATURE: 97.8 F

## 2020-06-30 DIAGNOSIS — C50.412 MALIGNANT NEOPLASM OF UPPER-OUTER QUADRANT OF LEFT BREAST IN FEMALE, ESTROGEN RECEPTOR NEGATIVE (HCC): Primary | ICD-10-CM

## 2020-06-30 DIAGNOSIS — Z51.11 CHEMOTHERAPY MANAGEMENT, ENCOUNTER FOR: ICD-10-CM

## 2020-06-30 DIAGNOSIS — Z17.1 MALIGNANT NEOPLASM OF UPPER-OUTER QUADRANT OF LEFT BREAST IN FEMALE, ESTROGEN RECEPTOR NEGATIVE (HCC): Primary | ICD-10-CM

## 2020-06-30 LAB
ALBUMIN SERPL-MCNC: 3.6 G/DL (ref 3.5–5)
ALBUMIN/GLOB SERPL: 1 {RATIO} (ref 1.1–2.2)
ALP SERPL-CCNC: 95 U/L (ref 45–117)
ALT SERPL-CCNC: 79 U/L (ref 12–78)
ANION GAP SERPL CALC-SCNC: 5 MMOL/L (ref 5–15)
AST SERPL-CCNC: 34 U/L (ref 15–37)
BASOPHILS # BLD: 0 K/UL (ref 0–0.1)
BASOPHILS NFR BLD: 1 % (ref 0–1)
BILIRUB SERPL-MCNC: 0.5 MG/DL (ref 0.2–1)
BUN SERPL-MCNC: 10 MG/DL (ref 6–20)
BUN/CREAT SERPL: 12 (ref 12–20)
CALCIUM SERPL-MCNC: 8.8 MG/DL (ref 8.5–10.1)
CHLORIDE SERPL-SCNC: 107 MMOL/L (ref 97–108)
CO2 SERPL-SCNC: 29 MMOL/L (ref 21–32)
CREAT SERPL-MCNC: 0.85 MG/DL (ref 0.55–1.02)
DIFFERENTIAL METHOD BLD: ABNORMAL
EOSINOPHIL # BLD: 0 K/UL (ref 0–0.4)
EOSINOPHIL NFR BLD: 0 % (ref 0–7)
ERYTHROCYTE [DISTWIDTH] IN BLOOD BY AUTOMATED COUNT: 15.7 % (ref 11.5–14.5)
GLOBULIN SER CALC-MCNC: 3.5 G/DL (ref 2–4)
GLUCOSE SERPL-MCNC: 95 MG/DL (ref 65–100)
HCT VFR BLD AUTO: 31.9 % (ref 35–47)
HGB BLD-MCNC: 10.5 G/DL (ref 11.5–16)
IMM GRANULOCYTES # BLD AUTO: 0 K/UL (ref 0–0.04)
IMM GRANULOCYTES NFR BLD AUTO: 1 % (ref 0–0.5)
LYMPHOCYTES # BLD: 1.2 K/UL (ref 0.8–3.5)
LYMPHOCYTES NFR BLD: 32 % (ref 12–49)
MCH RBC QN AUTO: 31 PG (ref 26–34)
MCHC RBC AUTO-ENTMCNC: 32.9 G/DL (ref 30–36.5)
MCV RBC AUTO: 94.1 FL (ref 80–99)
MONOCYTES # BLD: 0.4 K/UL (ref 0–1)
MONOCYTES NFR BLD: 11 % (ref 5–13)
NEUTS SEG # BLD: 2.1 K/UL (ref 1.8–8)
NEUTS SEG NFR BLD: 55 % (ref 32–75)
NRBC # BLD: 0 K/UL (ref 0–0.01)
NRBC BLD-RTO: 0 PER 100 WBC
PLATELET # BLD AUTO: 167 K/UL (ref 150–400)
PMV BLD AUTO: 10 FL (ref 8.9–12.9)
POTASSIUM SERPL-SCNC: 3.3 MMOL/L (ref 3.5–5.1)
PROT SERPL-MCNC: 7.1 G/DL (ref 6.4–8.2)
RBC # BLD AUTO: 3.39 M/UL (ref 3.8–5.2)
SODIUM SERPL-SCNC: 141 MMOL/L (ref 136–145)
WBC # BLD AUTO: 3.8 K/UL (ref 3.6–11)

## 2020-06-30 PROCEDURE — 80053 COMPREHEN METABOLIC PANEL: CPT

## 2020-06-30 PROCEDURE — 74011000250 HC RX REV CODE- 250: Performed by: INTERNAL MEDICINE

## 2020-06-30 PROCEDURE — 74011250637 HC RX REV CODE- 250/637: Performed by: NURSE PRACTITIONER

## 2020-06-30 PROCEDURE — 74011000258 HC RX REV CODE- 258: Performed by: INTERNAL MEDICINE

## 2020-06-30 PROCEDURE — 36415 COLL VENOUS BLD VENIPUNCTURE: CPT

## 2020-06-30 PROCEDURE — 74011250636 HC RX REV CODE- 250/636: Performed by: INTERNAL MEDICINE

## 2020-06-30 PROCEDURE — 74011250637 HC RX REV CODE- 250/637: Performed by: INTERNAL MEDICINE

## 2020-06-30 PROCEDURE — 85025 COMPLETE CBC W/AUTO DIFF WBC: CPT

## 2020-06-30 RX ORDER — POTASSIUM CHLORIDE 750 MG/1
40 TABLET, FILM COATED, EXTENDED RELEASE ORAL
Status: COMPLETED | OUTPATIENT
Start: 2020-06-30 | End: 2020-06-30

## 2020-06-30 RX ORDER — SODIUM CHLORIDE 9 MG/ML
25 INJECTION, SOLUTION INTRAVENOUS CONTINUOUS
Status: DISCONTINUED | OUTPATIENT
Start: 2020-07-07 | End: 2020-07-08 | Stop reason: HOSPADM

## 2020-06-30 RX ORDER — DIPHENHYDRAMINE HCL 25 MG
50 CAPSULE ORAL ONCE
Status: COMPLETED | OUTPATIENT
Start: 2020-07-07 | End: 2020-07-07

## 2020-06-30 RX ADMIN — PACLITAXEL 163 MG: 300 INJECTION INTRAVENOUS at 12:56

## 2020-06-30 RX ADMIN — PALONOSETRON HYDROCHLORIDE 0.25 MG: 0.25 INJECTION INTRAVENOUS at 11:46

## 2020-06-30 RX ADMIN — Medication 1200 MG: at 15:24

## 2020-06-30 RX ADMIN — DEXAMETHASONE SODIUM PHOSPHATE 12 MG: 10 INJECTION, SOLUTION INTRAMUSCULAR; INTRAVENOUS at 11:54

## 2020-06-30 RX ADMIN — DIPHENHYDRAMINE HYDROCHLORIDE 50 MG: 25 CAPSULE ORAL at 11:41

## 2020-06-30 RX ADMIN — CARBOPLATIN 650 MG: 10 INJECTION INTRAVENOUS at 14:20

## 2020-06-30 RX ADMIN — POTASSIUM CHLORIDE 40 MEQ: 750 TABLET, FILM COATED, EXTENDED RELEASE ORAL at 11:41

## 2020-06-30 RX ADMIN — FOSAPREPITANT 150 MG: 150 INJECTION, POWDER, LYOPHILIZED, FOR SOLUTION INTRAVENOUS at 11:50

## 2020-06-30 RX ADMIN — SODIUM CHLORIDE 25 ML/HR: 900 INJECTION, SOLUTION INTRAVENOUS at 11:43

## 2020-06-30 RX ADMIN — FAMOTIDINE 20 MG: 10 INJECTION, SOLUTION INTRAVENOUS at 11:48

## 2020-06-30 NOTE — PROGRESS NOTES
Pt in for labs and follow up visit today per protocol with Dr. Lozada and RN. Daniel Purcell is C4D1 of treatment.  Patient reports the following adverse events at this time: gr 1 constipation, gr 1 hot flashes, gr 1 insomnia, gr 1 pain 6/10 to right knee, gr 1 mucositis, gr 1 sob.  Vital signs are stable.  Patient to go to infusion center after appointment to receive carbo/taxol/atezo/placebo.  Next appt is scheduled for 7/7/20.

## 2020-06-30 NOTE — PROGRESS NOTES
Errololivierkatherine Cantu is a 48 y.o. female Follow up for the Evaluation of Breast Cancer. 1. Have you been to the ER, urgent care clinic since your last visit? Hospitalized since your last visit? No    2. Have you seen or consulted any other health care providers outside of the [address] [place] [address] since your last visit? Include any pap smears or colon screening.  No

## 2020-06-30 NOTE — PROGRESS NOTES
[facility] [place] at [address] [place] [address], [address] [facility]: [phone]  F: [phone]      Reason for Visit:   Cm Adams is a 48 y.o. female who is seen in consultation at the request of Dr. Ankita Strong for evaluation of therapy for breast cancer    Rad onc:  Dr. Martinez Spine    Treatment History:   · 20 left breast 3:00, 13cmfn, core bx:  IMC, gr 2-3, 1.3 cm, ER negative, AZ negative, HER 2 negative at IHC 0; ki67 68%; LN + by core, 6 mm, gr 2-3  · B-59 Carbo/Taxol +/- atezolizumab 2020-  · [facility] 2020: negative    History of Present Illness:   19 mammogram was negative, she felt the L breast mass herself, earlier in 2020. Interval history: In today for follow up and treatment. Complains of gr 1 constipation, gr 1 hot flashes, gr 1 insomnia, gr 1 pain 6/10 to right knee, gr 1 mucositis, gr 1 sob. FH:  Mother with breast cancer at age 79; maternal aunt with breast cancer in her 45s; maternal aunt with breast cancer at age 61; no ovarian, prostate, or pancreas cancer    Past Medical History:   Diagnosis Date    ADHD     Cancer ([address]     LEFT BREAST CA    Sleep apnea     MILD -NO CPAP      Past Surgical History:   Procedure Laterality Date    HX BREAST BIOPSY Left 4/15/2020    BREAST BIOPSY performed by Toño Nur MD at [address] [facility]  HX  SECTION  98, 80    HX GI      COLONOSCOPY    HX ORTHOPAEDIC      RIGHT ELBOW-FATTY MASS      Social History     Tobacco Use    Smoking status: Light Tobacco Smoker    Smokeless tobacco: Never Used    Tobacco comment: RARE cigar per patient   Substance Use Topics    Alcohol use:  Yes     Alcohol/week: 6.0 standard drinks     Types: 6 Glasses of wine per week      Family History   Problem Relation Age of Onset    Cancer Mother         Breast    Cancer Maternal Aunt         Breast    Cancer Paternal Uncle     Cancer Maternal Aunt         Breast    Cancer Paternal Uncle Current Outpatient Medications   Medication Sig    clindamycin (CLINDAGEL) 1 % topical gel Apply  to affected area two (2) times a day. use thin film on affected area    lisdexamfetamine dimesylate (VYVANSE PO) Take  by mouth.  CLONAZEPAM PO Take  by mouth.  pyridoxine, vitamin B6, (VITAMIN B-6) 50 mg tablet Take 50 mg by mouth daily.  cholecalciferol, vitamin D3, (VITAMIN D3 PO) Take 5,000 Units by mouth daily.  prochlorperazine (COMPAZINE) 10 mg tablet Take 1 Tab by mouth every six (6) hours as needed for Nausea.  lidocaine-prilocaine (EMLA) topical cream Apply  to affected area as needed for Pain.  ondansetron hcl (ZOFRAN) 8 mg tablet Take 1 Tab by mouth every eight (8) hours as needed for Nausea. No current facility-administered medications for this visit.       Facility-Administered Medications Ordered in Other Visits   Medication Dose Route Frequency    potassium chloride SR (KLOR-CON 10) tablet 40 mEq  40 mEq Oral NOW    INV atezolizumab / placebo 1,200 mg in 0.9% sodium chloride 250 mL, overfill volume 25 mL INVESTIGATIONAL IVPB  1,200 mg IntraVENous ONCE    CARBOplatin (PARAPLATIN) 650 mg in 0.9% sodium chloride 250 mL, overfill volume 25 mL chemo infusion  650 mg IntraVENous ONCE    fosaprepitant (EMEND) 150 mg in 0.9% sodium chloride 150 mL IVPB  150 mg IntraVENous ONCE    palonosetron HCl (ALOXI) injection 0.25 mg  0.25 mg IntraVENous ONCE    PACLitaxeL (TAXOL) 163 mg in 0.9% sodium chloride 250 mL, overfill volume 25 mL chemo infusion  163 mg IntraVENous ONCE    dexamethasone (DECADRON) 12 mg in 0.9% sodium chloride 50 mL IVPB  12 mg IntraVENous ONCE    famotidine (PF) (PEPCID) 20 mg in 0.9% sodium chloride 10 mL injection  20 mg IntraVENous ONCE    0.9% sodium chloride infusion  25 mL/hr IntraVENous CONTINUOUS    diphenhydrAMINE (BENADRYL) capsule 50 mg  50 mg Oral ONCE      No Known Allergies     Review of Systems: A complete review of systems was obtained, negative except as described above. Physical Exam:     Visit Vitals  /81 (BP 1 Location: Left arm, BP Patient Position: Sitting)   Pulse 81   Temp 97 °F (36.1 °C) (Temporal)   Resp 18   Ht 5' 3\" (1.6 m)   Wt 198 lb (89.8 kg)   SpO2 98%   BMI 35.07 kg/m²     ECOG PS: 0  General: No distress  Eyes: Anicteric sclerae  HENT: Atraumatic  Neck: Supple  Respiratory: Normal respiratory effort  CV: No peripheral edema  GI: nondistended  Skin: No rashes, ecchymoses, or petechiae  Psych: Alert, oriented, appropriate affect, normal judgment/insight    Breasts:  L breast 3:00, 7-8 cmfn, 1.5 cm mass, left ax, 2cm ax LN, and 1 cm LN     Results:     Lab Results   Component Value Date/Time    WBC 3.8 06/30/2020 09:23 AM    HGB 10.5 (L) 06/30/2020 09:23 AM    HCT 31.9 (L) 06/30/2020 09:23 AM    PLATELET 143 89/18/6348 09:23 AM    MCV 94.1 06/30/2020 09:23 AM    ABS. NEUTROPHILS 2.1 06/30/2020 09:23 AM     Lab Results   Component Value Date/Time    Sodium 141 06/30/2020 09:23 AM    Potassium 3.3 (L) 06/30/2020 09:23 AM    Chloride 107 06/30/2020 09:23 AM    CO2 29 06/30/2020 09:23 AM    Glucose 95 06/30/2020 09:23 AM    BUN 10 06/30/2020 09:23 AM    Creatinine 0.85 06/30/2020 09:23 AM    GFR est AA >60 06/30/2020 09:23 AM    GFR est non-AA >60 06/30/2020 09:23 AM    Calcium 8.8 06/30/2020 09:23 AM     Lab Results   Component Value Date/Time    Bilirubin, total 0.5 06/30/2020 09:23 AM    ALT (SGPT) 79 (H) 06/30/2020 09:23 AM    Alk. phosphatase 95 06/30/2020 09:23 AM    Protein, total 7.1 06/30/2020 09:23 AM    Albumin 3.6 06/30/2020 09:23 AM    Globulin 3.5 06/30/2020 09:23 AM       3/31/20  3D mammogram:  1.6 cm mass in L breast  US L breast  3:00 left breast mass 1.6 cm, multiple LN in L ax tail    4/14/2020 CT c/a/p:  IMPRESSION: Left breast mass. No evidence for metastatic disease in the chest  abdomen or pelvis. There are no target lesions for RECIST classification.     4/14/2020 Bone Scan:  IMPRESSION: No evidence of bony metastatic disease. 5/4/2020 MRI breast:  IMPRESSION:     Right Breast:  1. BI-RADS Assessment Category 1: Negative. No evidence of breast carcinoma  within the right breast.     Left Breast:  1. BI-RADS Assessment Category 6: Known biopsy proven malignancy- Appropriate  action should be taken. Large area of malignant enhancement, occupying most of  the middle and posterior third of the left breast upper outer and lower-outer  quadrants, from 2:00 to 4:00. Malignancy is much larger than it had appeared  mammographically. Greatest measurement is 7 x 4.2 x 3 cm. Contained within this  large area of enhancement is the dominant 2.5 cm mass, which represents the  mammographic and sonographic correlate. 2. BI-RADS Assessment Category 6: Known biopsy proven malignancy- Appropriate  action should be taken. Pathologically proven level 1 left axillary  lymphadenopathy, with numerous enlarged, malignant lymph nodes. There is also  level 2 left axillary lymphadenopathy. 3. Retraction of the skin of the lateral left breast, which is thickened. However, no evidence of malignant skin invasion/involvement. 4. The malignant mass closely approximates the pectoral muscle, but there is no  evidence of direct muscle invasion/involvement.     RECOMMENDATIONS:  Appropriate action is recommended. The patient is undergoing neoadjuvant  chemotherapy. There is no evidence of contralateral disease. Records reviewed and summarized above. Pathology report(s) reviewed above. Radiology report(s) reviewed above. Assessment/plan:   1. Left IMC, gr 2-3, triple negative, LN + by core:  cT1c cN1a cM0, unifocal, stage IIA both anatomic and prognostic  postmenopausal    With multiple LN felt on exam, ordered CT c/a/p and bone scan for staging. CT and Bone scan on 4/14/2020 negative for mets.      We explained to the patient that the goal of systemic adjuvant therapy is to improve the chances for cure and decrease the risk of relapse. We explained why a patient can have microscopic cancer spread now even though physical examination, laboratory studies and imaging studies are negative for cancer. We explained that the same treatments used now as adjuvant or preventive treatments rarely if ever are curative in women who develop metastases. We discussed that there is no difference in overall survival between neoadjuvant and adjuvant chemotherapy. We discussed the rationale for neoadjuvant chemotherapy, if chemotherapy is warranted, as it is in this case: to avoid any potential delays in giving chemotherapy following surgery, to be able to see the response of the tumor to chemotherapy, and to potentially downstage the tumor prior to surgery. I discussed the potential risks of dose-dense chemotherapy with the patient. (DD AC-T, adriamycin 60 mg/m2; cyclophosphamide 600 mg/m2 q 2 weeks x 4; paclitaxel 80 mg/m2 qweekly x 12). Major toxicities include nausea and vomiting, stomatitis, fatigue, and a small risk of heart damage. Anemia frequently results and occasionally requires growth factors and rarely transfusions. Neutropenic fever is uncommon, but can be a life-threatening problem. Also, there is a small but increased risk of myelodysplasia and acute leukemia. We provided the patient with detailed information concerning toxicity including frequent toxicities that only last a few days, such as nausea, vomiting, mouth sores, arthralgia, myalgia, and potentially allergic reactions to paclitaxel, as well as toxicities which can be longer lasting including total alopecia, fatigue, anemia and neuropathy. We provided the patient with detailed information concerning the toxicities of their regimen in addition to our verbal discussion.     We discussed the potential addition of carboplatin auc 6 q 3 weeks during weekly paclitaxel as evaluated in CALGB 78842, showing a significant improvement in pCR for patients with stage II-III triple negative breast cancer. Additional side effects of added myelosuppression, fatigue, renal damage with dehydration, and nausea and vomiting were discussed. Would use AUC 5 as that is the dose that is being used in all current investigations    Also discussed the clinical trial NSABP B-59, which is carbo/taxol then DD [facility] +/- atezolizumab. Discussed the positive findings of PSOCL 215 with pembrolizumab    We discussed the risks and benefits of atezolizumab therapy today. Potential side effects include, but are not limited to fatigue, rash, auto-immune issues, infertility, allergic reactions, and rarely, death. Discussed cold caps (not working with [facility]). Discussed oral and peripheral cryotherapy    Discussed COVID19 risks and LETI guidelines stating neoadj chemotherapy for [place] breast cancer is preferred vs upfront surgery during this pandemic. After this discussion, she is agreeable to chemotherapy and port placement. She has signed informed consent for B-59. The patient was given the following prescriptions with written and verbal instructions on how to use each:  Compazine, zofran, olanzapine (held until [facility]), a wig, and emla cream.  IV Casimer Level will be used with AC. Neulasta the following day with AC (bone pain side effect discussed). Port placed by Dr. Guerline Samayoa. TTE on 4/13/2020, EF 61%. Breast MRI on 5/4/2020. Discussed that with her extent of disease, a mastectomy is warranted. B-59 Carbo/Taxol C4D1 today. 2. Emotional well being:  She has excellent support and is coping well with her disease    3. Genetic testing:   Discussed potential ramifications of a positive test including the potential need for bilateral mastectomies and bilateral oophorectomies and the risk then for her family members to also have a mutation. VUS discussed also. Testing performed 4/28/20 by blood, negative. 4. Alt increased:  Mild, due to taxol    5. Headaches:  Dull for few days after treatment.   Recommend ibuprofen or tylenol PRN. Encouraged increase PO fluids. Significantly improved. 6. Heartburn:  Recommend PRN Pepcid. 7. Folliculitis: Clindamycin gel BID PRN, rx in. Getting better. 8. Anemia:  Due to chemo, mild    9. Knee pain:  To right, intermittent, better with ambulation. Taking PRN Tylenol and Ibuprofen. Heat pack does help. I appreciate the opportunity to participate in Ms. [facility] SHARON Kurtz's care. Signed By: Thaddeus Gil MD      No orders of the defined types were placed in this encounter.

## 2020-06-30 NOTE — PROGRESS NOTES
[facility] Progress Note Date: 2020 Name: Coley Sever MRN: [id number] : 1965 Ms. Tania Galindo Arrived ambulatory and in no distress for C4D1 of Cinical Trial: NSABP B-59 Treatment 1: Atezolizumab/Placebo + Carboplatin + Paclitaxel  Regimen. Assessment was completed, no acute issues at this time, no new complaints voiced. Right chest wall port accessed without difficulty, labs drawn & sent for processing. Chemotherapy Flowsheet 2020 Cycle C4D1 Date 2020 Drug / Regimen Clinical Trial: NSABP B-59 Treatment1 : Atezolizumab/Placebo+Carboplatin+Paclitaxel Pre Meds -  
Notes -  
 
 
0945 Patient proceed to appointment with Dr. Diana Kramer. Ms. Leslie Repress vitals were reviewed. Visit Vitals /81 Pulse 81 Temp 97 °F (36.1 °C) Resp 18 Ht 5' 3\" (1.6 m) Wt 90.2 kg (198 lb 12.8 oz) BMI 35.22 kg/m² Lab results were obtained and reviewed. Recent Results (from the past 12 hour(s)) CBC WITH AUTOMATED DIFF Collection Time: 20  9:23 AM  
Result Value Ref Range WBC 3.8 3.6 - 11.0 K/uL  
 RBC 3.39 (L) 3.80 - 5.20 M/uL  
 HGB 10.5 (L) 11.5 - 16.0 g/dL HCT 31.9 (L) 35.0 - 47.0 % MCV 94.1 80.0 - 99.0 FL  
 MCH 31.0 26.0 - 34.0 PG  
 MCHC 32.9 30.0 - 36.5 g/dL  
 RDW 15.7 (H) 11.5 - 14.5 % PLATELET 400 239 - 260 K/uL MPV 10.0 8.9 - 12.9 FL  
 NRBC 0.0 0  WBC ABSOLUTE NRBC 0.00 0.00 - 0.01 K/uL NEUTROPHILS 55 32 - 75 % LYMPHOCYTES 32 12 - 49 % MONOCYTES 11 5 - 13 % EOSINOPHILS 0 0 - 7 % BASOPHILS 1 0 - 1 % IMMATURE GRANULOCYTES 1 (H) 0.0 - 0.5 % ABS. NEUTROPHILS 2.1 1.8 - 8.0 K/UL  
 ABS. LYMPHOCYTES 1.2 0.8 - 3.5 K/UL  
 ABS. MONOCYTES 0.4 0.0 - 1.0 K/UL  
 ABS. EOSINOPHILS 0.0 0.0 - 0.4 K/UL  
 ABS. BASOPHILS 0.0 0.0 - 0.1 K/UL  
 ABS. IMM. GRANS. 0.0 0.00 - 0.04 K/UL  
 DF AUTOMATED METABOLIC PANEL, COMPREHENSIVE Collection Time: 20  9:23 AM  
Result Value Ref Range Sodium 141 136 - 145 mmol/L Potassium 3.3 (L) 3.5 - 5.1 mmol/L Chloride 107 97 - 108 mmol/L  
 CO2 29 21 - 32 mmol/L Anion gap 5 5 - 15 mmol/L Glucose 95 65 - 100 mg/dL BUN 10 6 - 20 MG/DL Creatinine 0.85 0.55 - 1.02 MG/DL  
 BUN/Creatinine ratio 12 12 - 20 GFR est AA >60 >60 ml/min/1.73m2 GFR est non-AA >60 >60 ml/min/1.73m2 Calcium 8.8 8.5 - 10.1 MG/DL Bilirubin, total 0.5 0.2 - 1.0 MG/DL  
 ALT (SGPT) 79 (H) 12 - 78 U/L  
 AST (SGOT) 34 15 - 37 U/L Alk. phosphatase 95 45 - 117 U/L Protein, total 7.1 6.4 - 8.2 g/dL Albumin 3.6 3.5 - 5.0 g/dL Globulin 3.5 2.0 - 4.0 g/dL A-G Ratio 1.0 (L) 1.1 - 2.2 Medications: 
Medications Administered 0.9% sodium chloride infusion Admin Date 
06/30/2020 Action New Bag Dose 25 mL/hr Rate 25 mL/hr Route IntraVENous Administered By 
Alex Kurtz RN  
  
  
 CARBOplatin (PARAPLATIN) 650 mg in 0.9% sodium chloride 250 mL, overfill volume 25 mL chemo infusion Admin Date 
06/30/2020 Action New Bag Dose 650 mg Rate 680 mL/hr Route IntraVENous Administered By 
Alex Kurtz RN  
  
  
 dexamethasone (DECADRON) 12 mg in 0.9% sodium chloride 50 mL IVPB Admin Date 
06/30/2020 Action Given Dose 
12 mg Route IntraVENous Administered By 
Alex Kurtz RN  
  
  
 diphenhydrAMINE (BENADRYL) capsule 50 mg   
 Admin Date 
06/30/2020 Action Given Dose 50 mg Route Oral Administered By 
Alex Kurtz RN  
  
  
 famotidine (PF) (PEPCID) 20 mg in 0.9% sodium chloride 10 mL injection Admin Date 
06/30/2020 Action Given Dose 
20 mg Route IntraVENous Administered By 
Alex Kurtz RN  
  
  
 fosaprepitant (EMEND) 150 mg in 0.9% sodium chloride 150 mL IVPB Admin Date 
06/30/2020 Action Given Dose 
150 mg Rate 450 mL/hr Route IntraVENous Administered By 
Alex Kurtz RN  
  
  
 INV atezolizumab / placebo 1,200 mg in 0.9% sodium chloride 250 mL, overfill volume 25 mL INVESTIGATIONAL IVPB Admin Date 
06/30/2020 Action Given Dose 
1200 mg Rate 
590 mL/hr Route IntraVENous Administered By 
Rosi Davenport RN  
  
  
 PACLitaxeL (TAXOL) 163 mg in 0.9% sodium chloride 250 mL, overfill volume 25 mL chemo infusion Admin Date 
06/30/2020 Action New Bag Dose 163 mg Rate 
302.2 mL/hr Route IntraVENous Administered By 
Rosi Davenport RN  
  
  
 palonosetron HCl (ALOXI) injection 0.25 mg   
 Admin Date 
06/30/2020 Action Given Dose 0.25 mg Route IntraVENous Administered By 
Rosi Davenport RN  
  
  
 potassium chloride SR (KLOR-CON 10) tablet 40 mEq Admin Date 
06/30/2020 Action Given Dose 40 mEq Route Oral Administered By 
Rosi Davenport RN  
  
  
  
 
 
Ms. Elyssa Schroeder tolerated treatment well and was discharged from [place] [address] in stable condition. Port de-accessed, flushed & heparinized per protocol. She is to return on 7/7/20 for her next appointment. Ct Botello RN 
June 30, 2020

## 2020-07-03 DIAGNOSIS — Z51.11 CHEMOTHERAPY MANAGEMENT, ENCOUNTER FOR: ICD-10-CM

## 2020-07-03 DIAGNOSIS — C50.412 MALIGNANT NEOPLASM OF UPPER-OUTER QUADRANT OF LEFT BREAST IN FEMALE, ESTROGEN RECEPTOR NEGATIVE (HCC): ICD-10-CM

## 2020-07-03 DIAGNOSIS — Z17.1 MALIGNANT NEOPLASM OF UPPER-OUTER QUADRANT OF LEFT BREAST IN FEMALE, ESTROGEN RECEPTOR NEGATIVE (HCC): ICD-10-CM

## 2020-07-06 RX ORDER — CLINDAMYCIN PHOSPHATE 10 MG/G
GEL TOPICAL
Qty: 30 G | Refills: 0 | Status: SHIPPED | OUTPATIENT
Start: 2020-07-06 | End: 2020-09-14 | Stop reason: ALTCHOICE

## 2020-07-07 ENCOUNTER — RESEARCH ENCOUNTER (OUTPATIENT)
Dept: ONCOLOGY | Age: 55
End: 2020-07-07

## 2020-07-07 ENCOUNTER — HOSPITAL ENCOUNTER (OUTPATIENT)
Dept: INFUSION THERAPY | Age: 55
Discharge: HOME | End: 2020-07-07
Payer: COMMERCIAL

## 2020-07-07 ENCOUNTER — DOCUMENTATION ONLY (OUTPATIENT)
Dept: ONCOLOGY | Age: 55
End: 2020-07-07

## 2020-07-07 VITALS
OXYGEN SATURATION: 100 % | TEMPERATURE: 98.2 F | RESPIRATION RATE: 18 BRPM | WEIGHT: 205.1 LBS | DIASTOLIC BLOOD PRESSURE: 84 MMHG | HEIGHT: 63 IN | HEART RATE: 79 BPM | BODY MASS INDEX: 36.34 KG/M2 | SYSTOLIC BLOOD PRESSURE: 140 MMHG

## 2020-07-07 DIAGNOSIS — Z17.1 MALIGNANT NEOPLASM OF UPPER-OUTER QUADRANT OF LEFT BREAST IN FEMALE, ESTROGEN RECEPTOR NEGATIVE (HCC): Primary | ICD-10-CM

## 2020-07-07 DIAGNOSIS — C50.412 MALIGNANT NEOPLASM OF UPPER-OUTER QUADRANT OF LEFT BREAST IN FEMALE, ESTROGEN RECEPTOR NEGATIVE (HCC): Primary | ICD-10-CM

## 2020-07-07 LAB
BASOPHILS # BLD: 0 K/UL (ref 0–0.1)
BASOPHILS NFR BLD: 0 % (ref 0–1)
DIFFERENTIAL METHOD BLD: ABNORMAL
EOSINOPHIL # BLD: 0 K/UL (ref 0–0.4)
EOSINOPHIL NFR BLD: 1 % (ref 0–7)
ERYTHROCYTE [DISTWIDTH] IN BLOOD BY AUTOMATED COUNT: 15.6 % (ref 11.5–14.5)
HCT VFR BLD AUTO: 30.9 % (ref 35–47)
HGB BLD-MCNC: 10.1 G/DL (ref 11.5–16)
IMM GRANULOCYTES # BLD AUTO: 0 K/UL (ref 0–0.04)
IMM GRANULOCYTES NFR BLD AUTO: 1 % (ref 0–0.5)
LYMPHOCYTES # BLD: 1.1 K/UL (ref 0.8–3.5)
LYMPHOCYTES NFR BLD: 26 % (ref 12–49)
MCH RBC QN AUTO: 31.1 PG (ref 26–34)
MCHC RBC AUTO-ENTMCNC: 32.7 G/DL (ref 30–36.5)
MCV RBC AUTO: 95.1 FL (ref 80–99)
MONOCYTES # BLD: 0.4 K/UL (ref 0–1)
MONOCYTES NFR BLD: 8 % (ref 5–13)
NEUTS SEG # BLD: 2.8 K/UL (ref 1.8–8)
NEUTS SEG NFR BLD: 64 % (ref 32–75)
NRBC # BLD: 0 K/UL (ref 0–0.01)
NRBC BLD-RTO: 0 PER 100 WBC
PLATELET # BLD AUTO: 160 K/UL (ref 150–400)
PMV BLD AUTO: 9.7 FL (ref 8.9–12.9)
RBC # BLD AUTO: 3.25 M/UL (ref 3.8–5.2)
WBC # BLD AUTO: 4.4 K/UL (ref 3.6–11)

## 2020-07-07 PROCEDURE — 74011250636 HC RX REV CODE- 250/636: Performed by: INTERNAL MEDICINE

## 2020-07-07 PROCEDURE — 85025 COMPLETE CBC W/AUTO DIFF WBC: CPT

## 2020-07-07 PROCEDURE — 96375 TX/PRO/DX INJ NEW DRUG ADDON: CPT

## 2020-07-07 PROCEDURE — 77030012965 HC NDL HUBR BBMI -A

## 2020-07-07 PROCEDURE — 36415 COLL VENOUS BLD VENIPUNCTURE: CPT

## 2020-07-07 PROCEDURE — 74011000258 HC RX REV CODE- 258: Performed by: INTERNAL MEDICINE

## 2020-07-07 PROCEDURE — 96413 CHEMO IV INFUSION 1 HR: CPT

## 2020-07-07 PROCEDURE — 74011250637 HC RX REV CODE- 250/637: Performed by: INTERNAL MEDICINE

## 2020-07-07 RX ADMIN — FAMOTIDINE 20 MG: 10 INJECTION, SOLUTION INTRAVENOUS at 10:05

## 2020-07-07 RX ADMIN — PACLITAXEL 163 MG: 6 INJECTION, SOLUTION INTRAVENOUS at 11:22

## 2020-07-07 RX ADMIN — DEXAMETHASONE SODIUM PHOSPHATE 12 MG: 10 INJECTION, SOLUTION INTRAMUSCULAR; INTRAVENOUS at 10:03

## 2020-07-07 RX ADMIN — SODIUM CHLORIDE 25 ML/HR: 900 INJECTION, SOLUTION INTRAVENOUS at 10:01

## 2020-07-07 RX ADMIN — DIPHENHYDRAMINE HYDROCHLORIDE 50 MG: 25 CAPSULE ORAL at 09:59

## 2020-07-07 NOTE — PROGRESS NOTES
This note will not be viewable in [address] [place] Social Work Navigator Encounter Patient Name:  Eugune Leyden Medical History: breast cancer Advance Directives: none on file; conversation deferred Narrative: Social work services requested by patient. Patient is here for treatment. Met with patient and provided supportive counseling. Patient presents as pleasant and engaged yet appropriately tearful when reflecting on the emotional impact cancer/treatment has had on her. Patient reports feelings of anxiety and sadness related to diagnosis and treatment length. She is using distraction as a coping strategy by staying busy with work. She reports concern feeling of sadness and anxiety will increase as she is able to do less due to treatment side effects. Explored journaling and medication as coping strategies. Referred patient to [facility] for counseling and [facility] for guided meditation. Also noted patient has ADHD. Patient was able to reflect on strengths including willingness to engage in self-reflection, supportive work environment and supportive family. Encouraged patient to contact me for ongoing psychosocial support. Barriers to Care:  
 
Assessment/Action: 1. Referred patient to [facility] for psychotherapy. [facility] 3. Recommended patient journal as a form of stress relief. 4. Ongoing psychosocial support as desired by patient. Plan/Referral:  
Behavioral health referral 
Complementary therapies referral 
 
Thank you, Devang Dorado LCSW

## 2020-07-07 NOTE — PROGRESS NOTES
[facility] VISIT NOTE Date: 2020 Name: Rodriguez Graf MRN: [id number] : 1965 
 
0840 Ms. Ralph Jones Arrived ambulatory and in no distress for C4D8 of CLINICAL TRIAL: NSABP B-59 Treatment 1: Atezolizumab/Placebo + CARBOplatin + PACLItaxel Regimen. Assessment was completed, no acute issues at this time. Right chest wall port accessed without difficulty, labs drawn & sent for processing. Chemotherapy Flowsheet 2020 Cycle C4D8 Date 2020 Drug / Regimen CLINICAL TRIAL: NSABP B-59 Treatment 1: Atezolizumab/Placebo + CARBOplatin + PACLItaxel Pre Meds given Notes Paclitaxel only Vitals: 
Patient Vitals for the past 12 hrs: 
 Temp Pulse Resp BP SpO2  
20 1234  79  140/84   
20 0841 98.2 °F (36.8 °C) 79 18 129/76 100 % Lab results were obtained and reviewed. Recent Results (from the past 12 hour(s)) CBC WITH AUTOMATED DIFF Collection Time: 20  8:59 AM  
Result Value Ref Range WBC 4.4 3.6 - 11.0 K/uL  
 RBC 3.25 (L) 3.80 - 5.20 M/uL  
 HGB 10.1 (L) 11.5 - 16.0 g/dL HCT 30.9 (L) 35.0 - 47.0 % MCV 95.1 80.0 - 99.0 FL  
 MCH 31.1 26.0 - 34.0 PG  
 MCHC 32.7 30.0 - 36.5 g/dL  
 RDW 15.6 (H) 11.5 - 14.5 % PLATELET 945 229 - 585 K/uL MPV 9.7 8.9 - 12.9 FL  
 NRBC 0.0 0  WBC ABSOLUTE NRBC 0.00 0.00 - 0.01 K/uL NEUTROPHILS 64 32 - 75 % LYMPHOCYTES 26 12 - 49 % MONOCYTES 8 5 - 13 % EOSINOPHILS 1 0 - 7 % BASOPHILS 0 0 - 1 % IMMATURE GRANULOCYTES 1 (H) 0.0 - 0.5 % ABS. NEUTROPHILS 2.8 1.8 - 8.0 K/UL  
 ABS. LYMPHOCYTES 1.1 0.8 - 3.5 K/UL  
 ABS. MONOCYTES 0.4 0.0 - 1.0 K/UL  
 ABS. EOSINOPHILS 0.0 0.0 - 0.4 K/UL  
 ABS. BASOPHILS 0.0 0.0 - 0.1 K/UL  
 ABS. IMM. GRANS. 0.0 0.00 - 0.04 K/UL  
 DF AUTOMATED Medications received: 
Medications Administered 0.9% sodium chloride infusion Admin Date 
2020 Action New Bag Dose 25 mL/hr Rate 25 mL/hr Route IntraVENous Administered By Horace Braga RN  
  
  
 dexamethasone (DECADRON) 12 mg in 0.9% sodium chloride 50 mL IVPB Admin Date 
07/07/2020 Action Given Dose 
12 mg Route IntraVENous Administered By Horace Braga RN  
  
  
 diphenhydrAMINE (BENADRYL) capsule 50 mg   
 Admin Date 
07/07/2020 Action Given Dose 50 mg Route Oral Administered By Horace Braga RN  
  
  
 famotidine (PF) (PEPCID) 20 mg in 0.9% sodium chloride 10 mL injection Admin Date 
07/07/2020 Action Given Dose 
20 mg Route IntraVENous Administered By Horace Braga RN  
  
  
 PACLitaxeL (TAXOL) 163 mg in 0.9% sodium chloride 250 mL, overfill volume 25 mL chemo infusion Admin Date 
07/07/2020 Action New Bag Dose 163 mg Rate 
302.2 mL/hr Route IntraVENous Administered By Horace Braga RN  
  
  
  
 
 
Ms. Guanaco Arriaga tolerated treatment well and was discharged from [place] [address] in stable condition at 1235. Port de-accessed, flushed & heparinized per protocol. She is to return on 7/14/2020 at 0900 for her next appointment. Ranulfo Lassiter RN 
July 7, 2020 Future Appointments: 
Future Appointments Date Time Provider Dorota Davenport 7/14/2020  9:00 AM [facility]  
7/14/2020  9:15 AM Low Kincaid MD [address] [place] [address],  [address] [place] [address]  
7/20/2020  2:30 PM Brissa Jones MD [facility]  
7/27/2020  2:30 PM RAD ONC THERAPY RCR Ubaldo SHEFFIELD

## 2020-07-08 RX ORDER — SODIUM CHLORIDE 9 MG/ML
25 INJECTION, SOLUTION INTRAVENOUS CONTINUOUS
Status: DISCONTINUED | OUTPATIENT
Start: 2020-07-14 | End: 2020-07-15 | Stop reason: HOSPADM

## 2020-07-08 RX ORDER — DIPHENHYDRAMINE HCL 25 MG
50 CAPSULE ORAL ONCE
Status: COMPLETED | OUTPATIENT
Start: 2020-07-14 | End: 2020-07-14

## 2020-07-13 ENCOUNTER — TELEPHONE (OUTPATIENT)
Dept: ONCOLOGY | Age: 55
End: 2020-07-13

## 2020-07-13 NOTE — TELEPHONE ENCOUNTER
7/13/2020 2:18 PM: Called patient's employer ([address]) and confirmed that her [facility] paperwork should be faxed to [employer]interos at [phone]. RN faxed completed paperwork and fax confirmation was received.

## 2020-07-14 ENCOUNTER — OFFICE VISIT (OUTPATIENT)
Dept: ONCOLOGY | Age: 55
End: 2020-07-14

## 2020-07-14 ENCOUNTER — RESEARCH ENCOUNTER (OUTPATIENT)
Dept: ONCOLOGY | Age: 55
End: 2020-07-14

## 2020-07-14 ENCOUNTER — HOSPITAL ENCOUNTER (OUTPATIENT)
Dept: INFUSION THERAPY | Age: 55
Discharge: HOME | End: 2020-07-14
Payer: COMMERCIAL

## 2020-07-14 VITALS
BODY MASS INDEX: 36.27 KG/M2 | RESPIRATION RATE: 18 BRPM | HEIGHT: 63 IN | OXYGEN SATURATION: 100 % | WEIGHT: 204.7 LBS | DIASTOLIC BLOOD PRESSURE: 85 MMHG | TEMPERATURE: 97 F | SYSTOLIC BLOOD PRESSURE: 159 MMHG | HEART RATE: 77 BPM

## 2020-07-14 VITALS
RESPIRATION RATE: 18 BRPM | OXYGEN SATURATION: 100 % | TEMPERATURE: 97 F | HEIGHT: 62 IN | BODY MASS INDEX: 37.54 KG/M2 | WEIGHT: 204 LBS | HEART RATE: 80 BPM | DIASTOLIC BLOOD PRESSURE: 73 MMHG | SYSTOLIC BLOOD PRESSURE: 124 MMHG

## 2020-07-14 DIAGNOSIS — Z17.1 MALIGNANT NEOPLASM OF UPPER-OUTER QUADRANT OF LEFT BREAST IN FEMALE, ESTROGEN RECEPTOR NEGATIVE (HCC): Primary | ICD-10-CM

## 2020-07-14 DIAGNOSIS — Z51.11 CHEMOTHERAPY MANAGEMENT, ENCOUNTER FOR: ICD-10-CM

## 2020-07-14 DIAGNOSIS — C50.412 MALIGNANT NEOPLASM OF UPPER-OUTER QUADRANT OF LEFT BREAST IN FEMALE, ESTROGEN RECEPTOR NEGATIVE (HCC): Primary | ICD-10-CM

## 2020-07-14 LAB
BASOPHILS # BLD: 0 K/UL (ref 0–0.1)
BASOPHILS NFR BLD: 0 % (ref 0–1)
DIFFERENTIAL METHOD BLD: ABNORMAL
EOSINOPHIL # BLD: 0 K/UL (ref 0–0.4)
EOSINOPHIL NFR BLD: 1 % (ref 0–7)
ERYTHROCYTE [DISTWIDTH] IN BLOOD BY AUTOMATED COUNT: 16.6 % (ref 11.5–14.5)
HCT VFR BLD AUTO: 31.2 % (ref 35–47)
HGB BLD-MCNC: 10.1 G/DL (ref 11.5–16)
IMM GRANULOCYTES # BLD AUTO: 0 K/UL (ref 0–0.04)
IMM GRANULOCYTES NFR BLD AUTO: 0 % (ref 0–0.5)
LYMPHOCYTES # BLD: 1.1 K/UL (ref 0.8–3.5)
LYMPHOCYTES NFR BLD: 28 % (ref 12–49)
MCH RBC QN AUTO: 31.8 PG (ref 26–34)
MCHC RBC AUTO-ENTMCNC: 32.4 G/DL (ref 30–36.5)
MCV RBC AUTO: 98.1 FL (ref 80–99)
MONOCYTES # BLD: 0.4 K/UL (ref 0–1)
MONOCYTES NFR BLD: 11 % (ref 5–13)
NEUTS SEG # BLD: 2.4 K/UL (ref 1.8–8)
NEUTS SEG NFR BLD: 60 % (ref 32–75)
NRBC # BLD: 0 K/UL (ref 0–0.01)
NRBC BLD-RTO: 0 PER 100 WBC
PLATELET # BLD AUTO: 217 K/UL (ref 150–400)
PMV BLD AUTO: 9.5 FL (ref 8.9–12.9)
RBC # BLD AUTO: 3.18 M/UL (ref 3.8–5.2)
WBC # BLD AUTO: 4.1 K/UL (ref 3.6–11)

## 2020-07-14 PROCEDURE — 74011250636 HC RX REV CODE- 250/636: Performed by: INTERNAL MEDICINE

## 2020-07-14 PROCEDURE — 74011000258 HC RX REV CODE- 258: Performed by: INTERNAL MEDICINE

## 2020-07-14 PROCEDURE — 74011000250 HC RX REV CODE- 250: Performed by: INTERNAL MEDICINE

## 2020-07-14 PROCEDURE — 74011250637 HC RX REV CODE- 250/637: Performed by: INTERNAL MEDICINE

## 2020-07-14 PROCEDURE — 77030012965 HC NDL HUBR BBMI -A

## 2020-07-14 PROCEDURE — 96375 TX/PRO/DX INJ NEW DRUG ADDON: CPT

## 2020-07-14 PROCEDURE — 85025 COMPLETE CBC W/AUTO DIFF WBC: CPT

## 2020-07-14 PROCEDURE — 96413 CHEMO IV INFUSION 1 HR: CPT

## 2020-07-14 PROCEDURE — 36591 DRAW BLOOD OFF VENOUS DEVICE: CPT

## 2020-07-14 PROCEDURE — 36415 COLL VENOUS BLD VENIPUNCTURE: CPT

## 2020-07-14 RX ORDER — DOXORUBICIN HYDROCHLORIDE 2 MG/ML
60 INJECTION, SOLUTION INTRAVENOUS ONCE
Status: COMPLETED | OUTPATIENT
Start: 2020-07-21 | End: 2020-07-21

## 2020-07-14 RX ORDER — PALONOSETRON 0.05 MG/ML
0.25 INJECTION, SOLUTION INTRAVENOUS ONCE
Status: COMPLETED | OUTPATIENT
Start: 2020-07-21 | End: 2020-07-21

## 2020-07-14 RX ORDER — SODIUM CHLORIDE 9 MG/ML
25 INJECTION, SOLUTION INTRAVENOUS CONTINUOUS
Status: DISCONTINUED | OUTPATIENT
Start: 2020-07-21 | End: 2020-07-22 | Stop reason: HOSPADM

## 2020-07-14 RX ADMIN — FAMOTIDINE 20 MG: 10 INJECTION, SOLUTION INTRAVENOUS at 11:17

## 2020-07-14 RX ADMIN — DEXAMETHASONE SODIUM PHOSPHATE 12 MG: 10 INJECTION, SOLUTION INTRAMUSCULAR; INTRAVENOUS at 11:20

## 2020-07-14 RX ADMIN — DIPHENHYDRAMINE HYDROCHLORIDE 50 MG: 25 CAPSULE ORAL at 11:13

## 2020-07-14 RX ADMIN — PACLITAXEL 163 MG: 300 INJECTION INTRAVENOUS at 12:32

## 2020-07-14 RX ADMIN — SODIUM CHLORIDE 25 ML/HR: 900 INJECTION, SOLUTION INTRAVENOUS at 11:05

## 2020-07-14 NOTE — PROGRESS NOTES
[facility] VISIT NOTE    0845. Pt arrived at [facility] ambulatory and in no distress for Clinical Trial: NSABP B-59 C4D15 Taxol. Assessment completed, pt c/o continued dry eyes and fatigue. RCW chest port accessed with 1 in [facility] with no difficulty. Positive blood return noted and labs drawn. Pt proceeded to MD harmon. Labs resulted. Received ok to treat from Research RN. Medications received:  NS KVO  Benadryl PO  Pepcid IV  Dexamethasone IV  Taxol IV    Patient Vitals for the past 12 hrs:   Temp Pulse Resp BP SpO2   07/14/20 1331  77 18 159/85    07/14/20 0845 97 °F (36.1 °C) 80 18 124/73 100 %     Recent Results (from the past 12 hour(s))   CBC WITH AUTOMATED DIFF    Collection Time: 07/14/20  8:55 AM   Result Value Ref Range    WBC 4.1 3.6 - 11.0 K/uL    RBC 3.18 (L) 3.80 - 5.20 M/uL    HGB 10.1 (L) 11.5 - 16.0 g/dL    HCT 31.2 (L) 35.0 - 47.0 %    MCV 98.1 80.0 - 99.0 FL    MCH 31.8 26.0 - 34.0 PG    MCHC 32.4 30.0 - 36.5 g/dL    RDW 16.6 (H) 11.5 - 14.5 %    PLATELET 444 191 - 558 K/uL    MPV 9.5 8.9 - 12.9 FL    NRBC 0.0 0  WBC    ABSOLUTE NRBC 0.00 0.00 - 0.01 K/uL    NEUTROPHILS 60 32 - 75 %    LYMPHOCYTES 28 12 - 49 %    MONOCYTES 11 5 - 13 %    EOSINOPHILS 1 0 - 7 %    BASOPHILS 0 0 - 1 %    IMMATURE GRANULOCYTES 0 0.0 - 0.5 %    ABS. NEUTROPHILS 2.4 1.8 - 8.0 K/UL    ABS. LYMPHOCYTES 1.1 0.8 - 3.5 K/UL    ABS. MONOCYTES 0.4 0.0 - 1.0 K/UL    ABS. EOSINOPHILS 0.0 0.0 - 0.4 K/UL    ABS. BASOPHILS 0.0 0.0 - 0.1 K/UL    ABS. IMM. GRANS. 0.0 0.00 - 0.04 K/UL    DF AUTOMATED       Tolerated treatment well, no adverse reaction noted. Port de-accessed and flushed per protocol. Positive blood return noted. 1335. D/C'd from [facility] ambulatory and in no distress.

## 2020-07-14 NOTE — PROGRESS NOTES
Dylan Wolf is a 48 y.o. female Follow up for the Evaluation of Breast Cancer. 1. Have you been to the ER, urgent care clinic since your last visit? Hospitalized since your last visit? No    2. Have you seen or consulted any other health care providers outside of the [address] [place] [address] since your last visit? Include any pap smears or colon screening.  No

## 2020-07-14 NOTE — PROGRESS NOTES
[facility] [place] at [address], [address] [place] [address]   Randall Durant: [phone] F: [phone]   Reason for Visit:   Abdirahman Worley is a 48 y.o. female who is seen in consultation at the request of Dr. Juancarlos Ramesh for evaluation of therapy for breast cancer   Rad onc: Dr. Dat Christopher   Treatment History:   20 left breast 3:00, 13cmfn, core bx: IMC, gr 2-3, 1.3 cm, ER negative, CO negative, HER 2 negative at IHC 0; ki67 68%; LN + by core, 6 mm, gr 2-3   B-59 Carbo/Taxol +/- atezolizumab 2020-2020   [employer] 2020: negative  History of Present Illness:   19 mammogram was negative, she felt the L breast mass herself, earlier in 2020. Interval history: In today for follow up and treatment. Complains of gr 1 hot flashes, gr 1 insomnia. FH: Mother with breast cancer at age 79; maternal aunt with breast cancer in her 45s; maternal aunt with breast cancer at age 61; no ovarian, prostate, or pancreas cancer        Past Medical History:   Diagnosis Date    ADHD     Cancer ([address]     LEFT BREAST CA    Sleep apnea     MILD -NO CPAP           Past Surgical History:   Procedure Laterality Date    HX BREAST BIOPSY Left 4/15/2020    BREAST BIOPSY performed by Keyonna Galindo MD at [address] HX  SECTION  98, 80    HX GI      COLONOSCOPY    HX ORTHOPAEDIC      RIGHT ELBOW-FATTY MASS     Social History           Tobacco Use    Smoking status: Light Tobacco Smoker    Smokeless tobacco: Never Used    Tobacco comment: RARE cigar per patient   Substance Use Topics    Alcohol use:  Yes     Alcohol/week: 6.0 standard drinks     Types: 6 Glasses of wine per week           Family History   Problem Relation Age of Onset    Cancer Mother     Breast    Cancer Maternal Aunt     Breast    Cancer Paternal Uncle     Cancer Maternal Aunt     Breast    Cancer Paternal Uncle           Current Outpatient Medications   Medication Sig    clindamycin (CLINDAGEL) 1 % topical gel APPLY TO AFFECTED AREA TWICE A DAY (USE THIN FILM)    lisdexamfetamine dimesylate (VYVANSE PO) Take by mouth.  CLONAZEPAM PO Take by mouth.  pyridoxine, vitamin B6, (VITAMIN B-6) 50 mg tablet Take 50 mg by mouth daily.  cholecalciferol, vitamin D3, (VITAMIN D3 PO) Take 5,000 Units by mouth daily.  prochlorperazine (COMPAZINE) 10 mg tablet Take 1 Tab by mouth every six (6) hours as needed for Nausea.  lidocaine-prilocaine (EMLA) topical cream Apply to affected area as needed for Pain.  ondansetron hcl (ZOFRAN) 8 mg tablet Take 1 Tab by mouth every eight (8) hours as needed for Nausea. No current facility-administered medications for this visit. Facility-Administered Medications Ordered in Other Visits   Medication Dose Route Frequency    PACLitaxeL (TAXOL) 163 mg in 0.9% sodium chloride 250 mL, overfill volume 25 mL chemo infusion 163 mg IntraVENous ONCE    famotidine (PF) (PEPCID) 20 mg in 0.9% sodium chloride 10 mL injection 20 mg IntraVENous ONCE    diphenhydrAMINE (BENADRYL) capsule 50 mg 50 mg Oral ONCE    dexamethasone (DECADRON) 12 mg in 0.9% sodium chloride 50 mL IVPB 12 mg IntraVENous ONCE    0.9% sodium chloride infusion 25 mL/hr IntraVENous CONTINUOUS   No Known Allergies   Review of Systems: A complete review of systems was obtained, negative except as described above.    Physical Exam:   Visit Vitals   /73 (BP 1 Location: Left arm, BP Patient Position: Sitting)   Pulse 80   Temp 97 °F (36.1 °C) (Temporal)   Resp 18   Ht 5' 2\" (1.575 m)   Wt 204 lb (92.5 kg)   SpO2 100%   BMI 37.31 kg/m²   ECOG PS: 0   General: No distress   Eyes: Anicteric sclerae   HENT: Atraumatic   Neck: Supple   Respiratory: Normal respiratory effort   CV: No peripheral edema   GI: nondistended   Skin: No rashes, ecchymoses, or petechiae   Psych: Alert, oriented, appropriate affect, normal judgment/insight   Breasts: L breast 3:00, 7-8 cmfn, 1.3 cm mass, left ax, 2cm ax LN, and 1 cm LN   Results:           Lab Results   Component Value Date/Time    WBC 4.1 07/14/2020 08:55 AM    HGB 10.1 (L) 07/14/2020 08:55 AM    HCT 31.2 (L) 07/14/2020 08:55 AM    PLATELET 113 96/70/4669 08:55 AM    MCV 98.1 07/14/2020 08:55 AM    ABS. NEUTROPHILS 2.4 07/14/2020 08:55 AM           Lab Results   Component Value Date/Time    Sodium 141 06/30/2020 09:23 AM    Potassium 3.3 (L) 06/30/2020 09:23 AM    Chloride 107 06/30/2020 09:23 AM    CO2 29 06/30/2020 09:23 AM    Glucose 95 06/30/2020 09:23 AM    BUN 10 06/30/2020 09:23 AM    Creatinine 0.85 06/30/2020 09:23 AM    GFR est AA >60 06/30/2020 09:23 AM    GFR est non-AA >60 06/30/2020 09:23 AM    Calcium 8.8 06/30/2020 09:23 AM           Lab Results   Component Value Date/Time    Bilirubin, total 0.5 06/30/2020 09:23 AM    ALT (SGPT) 79 (H) 06/30/2020 09:23 AM    Alk. phosphatase 95 06/30/2020 09:23 AM    Protein, total 7.1 06/30/2020 09:23 AM    Albumin 3.6 06/30/2020 09:23 AM    Globulin 3.5 06/30/2020 09:23 AM   3/31/20   3D mammogram: 1.6 cm mass in L breast   US L breast   3:00 left breast mass 1.6 cm, multiple LN in L ax tail   4/14/2020 CT c/a/p:   IMPRESSION: Left breast mass. No evidence for metastatic disease in the chest   abdomen or pelvis. There are no target lesions for RECIST classification. 4/14/2020 Bone Scan:   IMPRESSION: No evidence of bony metastatic disease. 5/4/2020 MRI breast:   IMPRESSION:   Right Breast:   1. BI-RADS Assessment Category 1: Negative. No evidence of breast carcinoma   within the right breast.   Left Breast:   1. BI-RADS Assessment Category 6: Known biopsy proven malignancy- Appropriate   action should be taken. Large area of malignant enhancement, occupying most of   the middle and posterior third of the left breast upper outer and lower-outer   quadrants, from 2:00 to 4:00. Malignancy is much larger than it had appeared   mammographically. Greatest measurement is 7 x 4.2 x 3 cm.  Contained within this   large area of enhancement is the dominant 2.5 cm mass, which represents the   mammographic and sonographic correlate. 2. BI-RADS Assessment Category 6: Known biopsy proven malignancy- Appropriate   action should be taken. Pathologically proven level 1 left axillary   lymphadenopathy, with numerous enlarged, malignant lymph nodes. There is also   level 2 left axillary lymphadenopathy. 3. Retraction of the skin of the lateral left breast, which is thickened. However, no evidence of malignant skin invasion/involvement. 4. The malignant mass closely approximates the pectoral muscle, but there is no   evidence of direct muscle invasion/involvement. RECOMMENDATIONS:   Appropriate action is recommended. The patient is undergoing neoadjuvant   chemotherapy. There is no evidence of contralateral disease. Records reviewed and summarized above. Pathology report(s) reviewed above. Radiology report(s) reviewed above. Assessment/plan:   1. Left IMC, gr 2-3, triple negative, LN + by core: cT1c cN1a cM0, unifocal, stage IIA both anatomic and prognostic   postmenopausal   With multiple LN felt on exam, ordered CT c/a/p and bone scan for staging. CT and Bone scan on 4/14/2020 negative for mets. We explained to the patient that the goal of systemic adjuvant therapy is to improve the chances for cure and decrease the risk of relapse. We explained why a patient can have microscopic cancer spread now even though physical examination, laboratory studies and imaging studies are negative for cancer. We explained that the same treatments used now as adjuvant or preventive treatments rarely if ever are curative in women who develop metastases. We discussed that there is no difference in overall survival between neoadjuvant and adjuvant chemotherapy.  We discussed the rationale for neoadjuvant chemotherapy, if chemotherapy is warranted, as it is in this case: to avoid any potential delays in giving chemotherapy following surgery, to be able to see the response of the tumor to chemotherapy, and to potentially downstage the tumor prior to surgery. I discussed the potential risks of dose-dense chemotherapy with the patient. (DD AC-T, adriamycin 60 mg/m2; cyclophosphamide 600 mg/m2 q 2 weeks x 4; paclitaxel 80 mg/m2 qweekly x 12). Major toxicities include nausea and vomiting, stomatitis, fatigue, and a small risk of heart damage. Anemia frequently results and occasionally requires growth factors and rarely transfusions. Neutropenic fever is uncommon, but can be a life-threatening problem. Also, there is a small but increased risk of myelodysplasia and acute leukemia. We provided the patient with detailed information concerning toxicity including frequent toxicities that only last a few days, such as nausea, vomiting, mouth sores, arthralgia, myalgia, and potentially allergic reactions to paclitaxel, as well as toxicities which can be longer lasting including total alopecia, fatigue, anemia and neuropathy. We provided the patient with detailed information concerning the toxicities of their regimen in addition to our verbal discussion. We discussed the potential addition of carboplatin auc 6 q 3 weeks during weekly paclitaxel as evaluated in CALGB 43716, showing a significant improvement in pCR for patients with stage II-III triple negative breast cancer. Additional side effects of added myelosuppression, fatigue, renal damage with dehydration, and nausea and vomiting were discussed. Would use AUC 5 as that is the dose that is being used in all current investigations   Also discussed the clinical trial NSABP B-59, which is carbo/taxol then DD [facility] [place] +/- atezolizumab. Discussed the positive findings of DSGVB 951 with pembrolizumab   We discussed the risks and benefits of atezolizumab therapy today.  Potential side effects include, but are not limited to fatigue, rash, auto-immune issues, infertility, allergic reactions, and rarely, death. Discussed cold caps (not working with [facility]). Discussed oral and peripheral cryotherapy   Discussed COVID19 risks and LETI guidelines stating neoadj chemotherapy for [place] breast cancer is preferred vs upfront surgery during this pandemic. After this discussion, she is agreeable to chemotherapy and port placement. She has signed informed consent for B-59. The patient was given the following prescriptions with written and verbal instructions on how to use each: Compazine, zofran, olanzapine (held until [facility]), a wig, and emla cream. IV Darwin Del will be used with AC. Neulasta the following day with AC (bone pain side effect discussed). Port placed by Dr. Altagracia Vaughn. TTE on 4/13/2020, EF 61%. Breast MRI on 5/4/2020. Discussed that with her extent of disease, a mastectomy is warranted. B-59 Carbo/Taxol C4D15 today. We will see her back in 1 week for DDAC #1. She has follow up with Dr. Altagracia Vaughn on 7/20/2020, plan for midpoint imaging. 2. Emotional well being: She has excellent support and is coping well with her disease   3. Genetic testing: Discussed potential ramifications of a positive test including the potential need for bilateral mastectomies and bilateral oophorectomies and the risk then for her family members to also have a mutation. VUS discussed also. Testing performed 4/28/20 by blood, negative. 4. Alt increased: Mild, due to taxol   5. Headaches: Dull for few days after treatment. Recommend ibuprofen or tylenol PRN. Encouraged increase PO fluids. Significantly improved. 6. Heartburn: Recommend PRN Pepcid. 7. Folliculitis: Clindamycin gel BID PRN, rx in. Getting better. 8. Anemia: Due to chemo, mild   9. Knee pain: To right, intermittent, better with ambulation. Taking PRN Tylenol and Ibuprofen. Heat pack does help. I appreciate the opportunity to participate in Ms. Casey Kurtz's care.    Signed By: Barbara Caraballo MD

## 2020-07-14 NOTE — PROGRESS NOTES
[facility] [place] [facility] [place] [address] [facility] [address] [employer]: [phone]  F: [phone]      Reason for Visit:   Harley De Oliveira is a 48 y.o. female who is seen in consultation at the request of Dr. Robert Traylor for evaluation of therapy for breast cancer    Rad onc:  Dr. Chris Prieto    Treatment History:   · 20 left breast 3:00, 13cmfn, core bx:  IMC, gr 2-3, 1.3 cm, ER negative, NC negative, HER 2 negative at IHC 0; ki67 68%; LN + by core, 6 mm, gr 2-3  · B-59 Carbo/Taxol +/- atezolizumab 2020-2020  · [employer] 2020: negative    History of Present Illness:   19 mammogram was negative, she felt the L breast mass herself, earlier in 2020. Interval history: In today for follow up and treatment. Complains of gr 1 hot flashes, gr 1 insomnia. FH:  Mother with breast cancer at age 79; maternal aunt with breast cancer in her 45s; maternal aunt with breast cancer at age 61; no ovarian, prostate, or pancreas cancer    Past Medical History:   Diagnosis Date    ADHD     Cancer ([address]     LEFT BREAST CA    Sleep apnea     MILD -NO CPAP      Past Surgical History:   Procedure Laterality Date    HX BREAST BIOPSY Left 4/15/2020    BREAST BIOPSY performed by Milena Rice MD at [address] HX  SECTION  98, 80    HX GI      COLONOSCOPY    HX ORTHOPAEDIC      RIGHT ELBOW-FATTY MASS      Social History     Tobacco Use    Smoking status: Light Tobacco Smoker    Smokeless tobacco: Never Used    Tobacco comment: RARE cigar per patient   Substance Use Topics    Alcohol use:  Yes     Alcohol/week: 6.0 standard drinks     Types: 6 Glasses of wine per week      Family History   Problem Relation Age of Onset    Cancer Mother         Breast    Cancer Maternal Aunt         Breast    Cancer Paternal Uncle     Cancer Maternal Aunt         Breast    Cancer Paternal Uncle      Current Outpatient Medications   Medication Sig    clindamycin (CLINDAGEL) 1 % topical gel APPLY TO AFFECTED AREA TWICE A DAY (USE THIN FILM)    lisdexamfetamine dimesylate (VYVANSE PO) Take  by mouth.  CLONAZEPAM PO Take  by mouth.  pyridoxine, vitamin B6, (VITAMIN B-6) 50 mg tablet Take 50 mg by mouth daily.  cholecalciferol, vitamin D3, (VITAMIN D3 PO) Take 5,000 Units by mouth daily.  prochlorperazine (COMPAZINE) 10 mg tablet Take 1 Tab by mouth every six (6) hours as needed for Nausea.  lidocaine-prilocaine (EMLA) topical cream Apply  to affected area as needed for Pain.  ondansetron hcl (ZOFRAN) 8 mg tablet Take 1 Tab by mouth every eight (8) hours as needed for Nausea. No current facility-administered medications for this visit. Facility-Administered Medications Ordered in Other Visits   Medication Dose Route Frequency    PACLitaxeL (TAXOL) 163 mg in 0.9% sodium chloride 250 mL, overfill volume 25 mL chemo infusion  163 mg IntraVENous ONCE    famotidine (PF) (PEPCID) 20 mg in 0.9% sodium chloride 10 mL injection  20 mg IntraVENous ONCE    diphenhydrAMINE (BENADRYL) capsule 50 mg  50 mg Oral ONCE    dexamethasone (DECADRON) 12 mg in 0.9% sodium chloride 50 mL IVPB  12 mg IntraVENous ONCE    0.9% sodium chloride infusion  25 mL/hr IntraVENous CONTINUOUS      No Known Allergies     Review of Systems: A complete review of systems was obtained, negative except as described above.     Physical Exam:     Visit Vitals  /73 (BP 1 Location: Left arm, BP Patient Position: Sitting)   Pulse 80   Temp 97 °F (36.1 °C) (Temporal)   Resp 18   Ht 5' 2\" (1.575 m)   Wt 204 lb (92.5 kg)   SpO2 100%   BMI 37.31 kg/m²     ECOG PS: 0  General: No distress  Eyes: Anicteric sclerae  HENT: Atraumatic  Neck: Supple  Respiratory: Normal respiratory effort  CV: No peripheral edema  GI: nondistended  Skin: No rashes, ecchymoses, or petechiae  Psych: Alert, oriented, appropriate affect, normal judgment/insight    Breasts:  L breast 3:00, 7-8 cmfn, 1.3 cm mass, left ax, 2cm ax LN, and 1 cm LN     Results:     Lab Results   Component Value Date/Time    WBC 4.1 07/14/2020 08:55 AM    HGB 10.1 (L) 07/14/2020 08:55 AM    HCT 31.2 (L) 07/14/2020 08:55 AM    PLATELET 626 00/97/5506 08:55 AM    MCV 98.1 07/14/2020 08:55 AM    ABS. NEUTROPHILS 2.4 07/14/2020 08:55 AM     Lab Results   Component Value Date/Time    Sodium 141 06/30/2020 09:23 AM    Potassium 3.3 (L) 06/30/2020 09:23 AM    Chloride 107 06/30/2020 09:23 AM    CO2 29 06/30/2020 09:23 AM    Glucose 95 06/30/2020 09:23 AM    BUN 10 06/30/2020 09:23 AM    Creatinine 0.85 06/30/2020 09:23 AM    GFR est AA >60 06/30/2020 09:23 AM    GFR est non-AA >60 06/30/2020 09:23 AM    Calcium 8.8 06/30/2020 09:23 AM     Lab Results   Component Value Date/Time    Bilirubin, total 0.5 06/30/2020 09:23 AM    ALT (SGPT) 79 (H) 06/30/2020 09:23 AM    Alk. phosphatase 95 06/30/2020 09:23 AM    Protein, total 7.1 06/30/2020 09:23 AM    Albumin 3.6 06/30/2020 09:23 AM    Globulin 3.5 06/30/2020 09:23 AM       3/31/20  3D mammogram:  1.6 cm mass in L breast  US L breast  3:00 left breast mass 1.6 cm, multiple LN in L ax tail    4/14/2020 CT c/a/p:  IMPRESSION: Left breast mass. No evidence for metastatic disease in the chest  abdomen or pelvis. There are no target lesions for RECIST classification. 4/14/2020 Bone Scan:  IMPRESSION: No evidence of bony metastatic disease. 5/4/2020 MRI breast:  IMPRESSION:     Right Breast:  1. BI-RADS Assessment Category 1: Negative. No evidence of breast carcinoma  within the right breast.     Left Breast:  1. BI-RADS Assessment Category 6: Known biopsy proven malignancy- Appropriate  action should be taken. Large area of malignant enhancement, occupying most of  the middle and posterior third of the left breast upper outer and lower-outer  quadrants, from 2:00 to 4:00. Malignancy is much larger than it had appeared  mammographically. Greatest measurement is 7 x 4.2 x 3 cm.  Contained within this  large area of enhancement is the dominant 2.5 cm mass, which represents the  mammographic and sonographic correlate. 2. BI-RADS Assessment Category 6: Known biopsy proven malignancy- Appropriate  action should be taken. Pathologically proven level 1 left axillary  lymphadenopathy, with numerous enlarged, malignant lymph nodes. There is also  level 2 left axillary lymphadenopathy. 3. Retraction of the skin of the lateral left breast, which is thickened. However, no evidence of malignant skin invasion/involvement. 4. The malignant mass closely approximates the pectoral muscle, but there is no  evidence of direct muscle invasion/involvement.     RECOMMENDATIONS:  Appropriate action is recommended. The patient is undergoing neoadjuvant  chemotherapy. There is no evidence of contralateral disease. Records reviewed and summarized above. Pathology report(s) reviewed above. Radiology report(s) reviewed above. Assessment/plan:   1. Left IMC, gr 2-3, triple negative, LN + by core:  cT1c cN1a cM0, unifocal, stage IIA both anatomic and prognostic  postmenopausal    With multiple LN felt on exam, ordered CT c/a/p and bone scan for staging. CT and Bone scan on 4/14/2020 negative for mets. We explained to the patient that the goal of systemic adjuvant therapy is to improve the chances for cure and decrease the risk of relapse. We explained why a patient can have microscopic cancer spread now even though physical examination, laboratory studies and imaging studies are negative for cancer. We explained that the same treatments used now as adjuvant or preventive treatments rarely if ever are curative in women who develop metastases. We discussed that there is no difference in overall survival between neoadjuvant and adjuvant chemotherapy.   We discussed the rationale for neoadjuvant chemotherapy, if chemotherapy is warranted, as it is in this case: to avoid any potential delays in giving chemotherapy following surgery, to be able to see the response of the tumor to chemotherapy, and to potentially downstage the tumor prior to surgery. I discussed the potential risks of dose-dense chemotherapy with the patient. (DD AC-T, adriamycin 60 mg/m2; cyclophosphamide 600 mg/m2 q 2 weeks x 4; paclitaxel 80 mg/m2 qweekly x 12). Major toxicities include nausea and vomiting, stomatitis, fatigue, and a small risk of heart damage. Anemia frequently results and occasionally requires growth factors and rarely transfusions. Neutropenic fever is uncommon, but can be a life-threatening problem. Also, there is a small but increased risk of myelodysplasia and acute leukemia. We provided the patient with detailed information concerning toxicity including frequent toxicities that only last a few days, such as nausea, vomiting, mouth sores, arthralgia, myalgia, and potentially allergic reactions to paclitaxel, as well as toxicities which can be longer lasting including total alopecia, fatigue, anemia and neuropathy. We provided the patient with detailed information concerning the toxicities of their regimen in addition to our verbal discussion. We discussed the potential addition of carboplatin auc 6 q 3 weeks during weekly paclitaxel as evaluated in CALGB 28810, showing a significant improvement in pCR for patients with stage II-III triple negative breast cancer. Additional side effects of added myelosuppression, fatigue, renal damage with dehydration, and nausea and vomiting were discussed. Would use AUC 5 as that is the dose that is being used in all current investigations    Also discussed the clinical trial NSABP B-59, which is carbo/taxol then [facility], operated by [facility] +/- atezolizumab. Discussed the positive findings of ZOJAJA 522 with pembrolizumab    We discussed the risks and benefits of atezolizumab therapy today. Potential side effects include, but are not limited to fatigue, rash, auto-immune issues, infertility, allergic reactions, and rarely, death. Discussed cold caps (not working with [facility]). Discussed oral and peripheral cryotherapy    Discussed COVID19 risks and LETI guidelines stating neoadj chemotherapy for [place] breast cancer is preferred vs upfront surgery during this pandemic. After this discussion, she is agreeable to chemotherapy and port placement. She has signed informed consent for B-59. The patient was given the following prescriptions with written and verbal instructions on how to use each:  Compazine, zofran, olanzapine (held until [facility]), a wig, and emla cream.  IV Sherolyn Ladd will be used with AC. Neulasta the following day with AC (bone pain side effect discussed). Port placed by Dr. Robert Good. TTE on 4/13/2020, EF 61%. Breast MRI on 5/4/2020. Discussed that with her extent of disease, a mastectomy is warranted. B-59 Carbo/Taxol C4D15 today. We will see her back in 1 week for DDAC #1. She has follow up with Dr. Robert oGod on 7/20/2020, plan for midpoint imaging. 2. Emotional well being:  She has excellent support and is coping well with her disease    3. Genetic testing:   Discussed potential ramifications of a positive test including the potential need for bilateral mastectomies and bilateral oophorectomies and the risk then for her family members to also have a mutation. VUS discussed also. Testing performed 4/28/20 by blood, negative. 4. Alt increased:  Mild, due to taxol    5. Headaches:  Dull for few days after treatment. Recommend ibuprofen or tylenol PRN. Encouraged increase PO fluids. Significantly improved. 6. Heartburn:  Recommend PRN Pepcid. 7. Folliculitis: Clindamycin gel BID PRN, rx in. Getting better. 8. Anemia:  Due to chemo, mild    9. Knee pain:  To right, intermittent, better with ambulation. Taking PRN Tylenol and Ibuprofen. Heat pack does help. I appreciate the opportunity to participate in Ms. Brennon Kurtz's care.     Signed By: Sarthak Dee MD      No orders of the defined types were placed in this encounter.

## 2020-07-14 NOTE — PROGRESS NOTES
OK TO TREAT    Pt in for follow up visit today per protocol with Dr. Melissa You and RN. This is C4D15 of treatment. Patient reports the following adverse events at this time: gr 1 hot flashes, gr 1 insomnia. Vital signs are stable. Patient to go to infusion center after appointment to receive paclitaxel. Next appt is scheduled for 7/21/20. She also signed the B-59 consent form for Addendum #2, version date April 21, 2020 and Addendum #3, version date June 22, 2020.

## 2020-07-20 ENCOUNTER — OFFICE VISIT (OUTPATIENT)
Dept: SURGERY | Age: 55
End: 2020-07-20

## 2020-07-20 VITALS
BODY MASS INDEX: 37.54 KG/M2 | WEIGHT: 204 LBS | TEMPERATURE: 98.6 F | HEIGHT: 62 IN | DIASTOLIC BLOOD PRESSURE: 82 MMHG | HEART RATE: 67 BPM | SYSTOLIC BLOOD PRESSURE: 141 MMHG

## 2020-07-20 DIAGNOSIS — C50.912 BREAST CANCER METASTASIZED TO AXILLARY LYMPH NODE, LEFT (HCC): Primary | ICD-10-CM

## 2020-07-20 DIAGNOSIS — C77.3 BREAST CANCER METASTASIZED TO AXILLARY LYMPH NODE, LEFT (HCC): Primary | ICD-10-CM

## 2020-07-20 NOTE — LETTER
7/21/20 Patient: Shyann Segura YOB: 1965 Date of Visit: 7/20/2020 Rocio Mcmullen MD 
[address] [address] VIA Facsimile: [phone] Dear Rocio Mcmullen MD, Thank you for referring Ms. Shyann Segura to [address] OFF[address] [place] [address] for evaluation. My notes for this consultation are attached. If you have questions, please do not hesitate to call me. I look forward to following your patient along with you. Sincerely, Dominguez Bocanegra MD

## 2020-07-20 NOTE — PROGRESS NOTES
HISTORY OF PRESENT ILLNESS  Olive Rey is a 48 y.o. female. HPI ESTABLISHED patient here today for follow up LEFT breast cancer. The patient is currently receiving neoadjuvant chemotherapy and has 8 more cycles. She is doing well and states she is no longer able  to palpate the breast breast tumor. Breast cancer-  4/6/2020 - left breast 3:00, 13cmfn, core bx: [place], gr 2-3, 1.3 cm, ER negative, AK negative, HER 2 negative at [facility] 0; ki67 68%; LN + by core, 6 mm, gr 2-3  4/15/2020 - port insertion   4/28/2020 - started neoadjuvant chemotherapy (B-59 Carbo/Taxol +/- atezolizumab) - Dr. Milli Villa  5/4/2020- breast mri 7 cm enhancement left breast, multiple suspicious nodes  4/14/2020- bone scan, ct scan no mets     ROS    Physical Exam  Vitals signs and nursing note reviewed. Chest:      Breasts: Breasts are symmetrical.         Right: No inverted nipple, mass, nipple discharge, skin change or tenderness. Left: No inverted nipple, mass, nipple discharge, skin change or tenderness. Comments: Left breast soft no mass palpable no palp nodes  Lymphadenopathy:      Cervical: No cervical adenopathy. BREAST ULTRASOUND, Preop planning  Indication:preop planning  left Breast 4:00    Technique: The area was scanned using a high-frequency linear-array near-field transducer  Findings:no appreciable mass   Impression: no appreciable mass, nodes smaller   Disposition:  Will schedule mri after neoadjuvant  ASSESSMENT and PLAN    ICD-10-CM ICD-9-CM    1.  Breast cancer metastasized to axillary lymph node, left ([facility])  C50.912 174.9     C77.3 196.3      49 yo female with left breast carcinoma  Breast cancer-  4/6/2020 - left breast 3:00, 13cmfn, core bx: [place], gr 2-3, 1.3 cm, ER negative, AK negative, HER 2 negative at [facility] 0; ki67 68%; LN + by core, 6 mm, gr 2-3  4/15/2020 - port insertion   4/28/2020 - started neoadjuvant chemotherapy (B-59 Carbo/Taxol +/- atezolizumab) - Dr. Milli Villa  5/4/2020- breast mri 7 cm enhancement left breast, multiple suspicious nodes  4/14/2020- bone scan, ct scan no mets       - no appreciable mass on us today or exam  Having clinical response  Will get mri after neoadjuvant  Plan will still be mastectomy, alnd, recon followed by xrt.

## 2020-07-20 NOTE — PATIENT INSTRUCTIONS
Breast Cancer: Care Instructions  Your Care Instructions     Breast cancer occurs when abnormal cells grow out of control in the breast. These cancer cells can spread within the breast, to nearby lymph nodes and other tissues, and to other parts of the body. Being treated for cancer can weaken your body, and you may feel very tired. Get the rest your body needs so you can feel better. Finding out that you have cancer is scary. You may feel many emotions and may need some help coping. Seek out family, friends, and counselors for support. You also can do things at home to make yourself feel better while you go through treatment. Call the [employer] (6-736.651.9857) or visit its website at [employer] for more information. Follow-up care is a key part of your treatment and safety. Be sure to make and go to all appointments, and call your doctor if you are having problems. It's also a good idea to know your test results and keep a list of the medicines you take. How can you care for yourself at home? · Take your medicines exactly as prescribed. Call your doctor if you think you are having a problem with your medicine. You may get medicine for nausea and vomiting if you have these side effects. · Follow your doctor's instructions to relieve pain. Pain from cancer and surgery can almost always be controlled. Use pain medicine when you first notice pain, before it becomes severe. · Eat healthy food. If you do not feel like eating, try to eat food that has protein and extra calories to keep up your strength and prevent weight loss. Drink liquid meal replacements for extra calories and protein. Try to eat your main meal early. · Get some physical activity every day, but do not get too tired. Keep doing the hobbies you enjoy as your energy allows. · Do not smoke. Smoking can make your cancer worse. If you need help quitting, talk to your doctor about stop-smoking programs and medicines.  These can increase your chances of quitting for good. · Take steps to control your stress and workload. Learn relaxation techniques. ? Share your feelings. Stress and tension affect our emotions. By expressing your feelings to others, you may be able to understand and cope with them. ? Consider joining a support group. Talking about a problem with your spouse, a good friend, or other people with similar problems is a good way to reduce tension and stress. ? Express yourself through art. Try writing, crafts, dance, or art to relieve stress. Some dance, writing, or art groups may be available just for people who have cancer. ? Be kind to your body and mind. Getting enough sleep, eating a healthy diet, and taking time to do things you enjoy can contribute to an overall feeling of balance in your life and can help reduce stress. ? Get help if you need it. Discuss your concerns with your doctor or counselor. · If you are vomiting or have diarrhea:  ? Drink plenty of fluids (enough so that your urine is light yellow or clear like water) to prevent dehydration. Choose water and other caffeine-free clear liquids. If you have kidney, heart, or liver disease and have to limit fluids, talk with your doctor before you increase the amount of fluids you drink. ? When you are able to eat, try clear soups, mild foods, and liquids until all symptoms are gone for 12 to 48 hours. Other good choices include dry toast, crackers, cooked cereal, and gelatin dessert, such as Jell-O.  · If you have not already done so, prepare a list of advance directives. Advance directives are instructions to your doctor and family members about what kind of care you want if you become unable to speak or express yourself. When should you call for help? [id number] anytime you think you may need emergency care. For example, call if:  · You passed out (lost consciousness). Call your doctor now or seek immediate medical care if:  · You have a fever.   · You have abnormal bleeding. · You think you have an infection. · You have new or worse pain. · You have new symptoms, such as a cough, belly pain, vomiting, diarrhea, or a rash. Watch closely for changes in your health, and be sure to contact your doctor if:  · You are much more tired than usual.  · You have swollen glands in your armpits, groin, or neck. · You do not get better as expected. Where can you learn more? Go to http://irvin-thu.info/  Enter V321 in the search box to learn more about \"Breast Cancer: Care Instructions. \"  Current as of: August 22, 2019               Content Version: 12.5  © 3193-3297 [employer], [employer]. Care instructions adapted under license by [employer] (which disclaims liability or warranty for this information). If you have questions about a medical condition or this instruction, always ask your healthcare professional. [address] any warranty or liability for your use of this information.

## 2020-07-21 ENCOUNTER — RESEARCH ENCOUNTER (OUTPATIENT)
Dept: ONCOLOGY | Age: 55
End: 2020-07-21

## 2020-07-21 ENCOUNTER — HOSPITAL ENCOUNTER (OUTPATIENT)
Dept: INFUSION THERAPY | Age: 55
Discharge: HOME | End: 2020-07-21
Payer: COMMERCIAL

## 2020-07-21 ENCOUNTER — OFFICE VISIT (OUTPATIENT)
Dept: ONCOLOGY | Age: 55
End: 2020-07-21

## 2020-07-21 VITALS
BODY MASS INDEX: 37.54 KG/M2 | DIASTOLIC BLOOD PRESSURE: 78 MMHG | WEIGHT: 204 LBS | HEART RATE: 82 BPM | TEMPERATURE: 99 F | OXYGEN SATURATION: 98 % | SYSTOLIC BLOOD PRESSURE: 133 MMHG | RESPIRATION RATE: 18 BRPM | HEIGHT: 62 IN

## 2020-07-21 VITALS
HEART RATE: 81 BPM | DIASTOLIC BLOOD PRESSURE: 83 MMHG | OXYGEN SATURATION: 98 % | TEMPERATURE: 99 F | WEIGHT: 204.2 LBS | RESPIRATION RATE: 18 BRPM | BODY MASS INDEX: 36.18 KG/M2 | SYSTOLIC BLOOD PRESSURE: 139 MMHG | HEIGHT: 63 IN

## 2020-07-21 DIAGNOSIS — C50.412 MALIGNANT NEOPLASM OF UPPER-OUTER QUADRANT OF LEFT BREAST IN FEMALE, ESTROGEN RECEPTOR NEGATIVE (HCC): Primary | ICD-10-CM

## 2020-07-21 DIAGNOSIS — Z51.11 CHEMOTHERAPY MANAGEMENT, ENCOUNTER FOR: ICD-10-CM

## 2020-07-21 DIAGNOSIS — Z17.1 MALIGNANT NEOPLASM OF UPPER-OUTER QUADRANT OF LEFT BREAST IN FEMALE, ESTROGEN RECEPTOR NEGATIVE (HCC): Primary | ICD-10-CM

## 2020-07-21 LAB
ALBUMIN SERPL-MCNC: 3.4 G/DL (ref 3.5–5)
ALBUMIN/GLOB SERPL: 0.9 {RATIO} (ref 1.1–2.2)
ALP SERPL-CCNC: 100 U/L (ref 45–117)
ALT SERPL-CCNC: 116 U/L (ref 12–78)
ANION GAP SERPL CALC-SCNC: 4 MMOL/L (ref 5–15)
AST SERPL-CCNC: 44 U/L (ref 15–37)
BASOPHILS # BLD: 0 K/UL (ref 0–0.1)
BASOPHILS NFR BLD: 1 % (ref 0–1)
BILIRUB SERPL-MCNC: 0.4 MG/DL (ref 0.2–1)
BUN SERPL-MCNC: 15 MG/DL (ref 6–20)
BUN/CREAT SERPL: 16 (ref 12–20)
CALCIUM SERPL-MCNC: 8.5 MG/DL (ref 8.5–10.1)
CHLORIDE SERPL-SCNC: 109 MMOL/L (ref 97–108)
CO2 SERPL-SCNC: 26 MMOL/L (ref 21–32)
CORTIS AM PEAK SERPL-MCNC: 7.9 UG/DL (ref 4.3–22.45)
CREAT SERPL-MCNC: 0.91 MG/DL (ref 0.55–1.02)
DIFFERENTIAL METHOD BLD: ABNORMAL
EOSINOPHIL # BLD: 0 K/UL (ref 0–0.4)
EOSINOPHIL NFR BLD: 1 % (ref 0–7)
ERYTHROCYTE [DISTWIDTH] IN BLOOD BY AUTOMATED COUNT: 17 % (ref 11.5–14.5)
GLOBULIN SER CALC-MCNC: 3.6 G/DL (ref 2–4)
GLUCOSE SERPL-MCNC: 128 MG/DL (ref 65–100)
HCT VFR BLD AUTO: 31.6 % (ref 35–47)
HGB BLD-MCNC: 10.1 G/DL (ref 11.5–16)
IMM GRANULOCYTES # BLD AUTO: 0 K/UL (ref 0–0.04)
IMM GRANULOCYTES NFR BLD AUTO: 0 % (ref 0–0.5)
LYMPHOCYTES # BLD: 1.2 K/UL (ref 0.8–3.5)
LYMPHOCYTES NFR BLD: 31 % (ref 12–49)
MCH RBC QN AUTO: 31.7 PG (ref 26–34)
MCHC RBC AUTO-ENTMCNC: 32 G/DL (ref 30–36.5)
MCV RBC AUTO: 99.1 FL (ref 80–99)
MONOCYTES # BLD: 0.3 K/UL (ref 0–1)
MONOCYTES NFR BLD: 6 % (ref 5–13)
NEUTS SEG # BLD: 2.4 K/UL (ref 1.8–8)
NEUTS SEG NFR BLD: 61 % (ref 32–75)
NRBC # BLD: 0 K/UL (ref 0–0.01)
NRBC BLD-RTO: 0 PER 100 WBC
PLATELET # BLD AUTO: 167 K/UL (ref 150–400)
PMV BLD AUTO: 9.9 FL (ref 8.9–12.9)
POTASSIUM SERPL-SCNC: 3.4 MMOL/L (ref 3.5–5.1)
PROT SERPL-MCNC: 7 G/DL (ref 6.4–8.2)
RBC # BLD AUTO: 3.19 M/UL (ref 3.8–5.2)
SODIUM SERPL-SCNC: 139 MMOL/L (ref 136–145)
TSH SERPL DL<=0.05 MIU/L-ACNC: 2.31 UIU/ML (ref 0.36–3.74)
WBC # BLD AUTO: 3.9 K/UL (ref 3.6–11)

## 2020-07-21 PROCEDURE — 74011250637 HC RX REV CODE- 250/637: Performed by: NURSE PRACTITIONER

## 2020-07-21 PROCEDURE — 84443 ASSAY THYROID STIM HORMONE: CPT

## 2020-07-21 PROCEDURE — 74011250636 HC RX REV CODE- 250/636: Performed by: INTERNAL MEDICINE

## 2020-07-21 PROCEDURE — 36415 COLL VENOUS BLD VENIPUNCTURE: CPT

## 2020-07-21 PROCEDURE — 74011000250 HC RX REV CODE- 250: Performed by: INTERNAL MEDICINE

## 2020-07-21 PROCEDURE — 96367 TX/PROPH/DG ADDL SEQ IV INF: CPT

## 2020-07-21 PROCEDURE — 80053 COMPREHEN METABOLIC PANEL: CPT

## 2020-07-21 PROCEDURE — 85025 COMPLETE CBC W/AUTO DIFF WBC: CPT

## 2020-07-21 PROCEDURE — 82533 TOTAL CORTISOL: CPT

## 2020-07-21 PROCEDURE — 74011000258 HC RX REV CODE- 258: Performed by: INTERNAL MEDICINE

## 2020-07-21 PROCEDURE — 77030016057 HC NDL HUBR APOL -B

## 2020-07-21 PROCEDURE — 96375 TX/PRO/DX INJ NEW DRUG ADDON: CPT

## 2020-07-21 PROCEDURE — 96417 CHEMO IV INFUS EACH ADDL SEQ: CPT

## 2020-07-21 PROCEDURE — 96413 CHEMO IV INFUSION 1 HR: CPT

## 2020-07-21 PROCEDURE — 96411 CHEMO IV PUSH ADDL DRUG: CPT

## 2020-07-21 PROCEDURE — 96377 APPLICATON ON-BODY INJECTOR: CPT

## 2020-07-21 RX ORDER — POTASSIUM CHLORIDE 750 MG/1
40 TABLET, FILM COATED, EXTENDED RELEASE ORAL
Status: COMPLETED | OUTPATIENT
Start: 2020-07-21 | End: 2020-07-21

## 2020-07-21 RX ORDER — OLANZAPINE 10 MG/1
TABLET ORAL
Qty: 8 TAB | Refills: 1 | Status: SHIPPED | OUTPATIENT
Start: 2020-07-21 | End: 2020-09-14 | Stop reason: ALTCHOICE

## 2020-07-21 RX ADMIN — DEXAMETHASONE SODIUM PHOSPHATE 12 MG: 10 INJECTION, SOLUTION INTRAMUSCULAR; INTRAVENOUS at 11:22

## 2020-07-21 RX ADMIN — DOXORUBICIN HYDROCHLORIDE 60 MG: 2 INJECTION, SOLUTION INTRAVENOUS at 12:45

## 2020-07-21 RX ADMIN — FOSAPREPITANT 150 MG: 150 INJECTION, POWDER, LYOPHILIZED, FOR SOLUTION INTRAVENOUS at 11:22

## 2020-07-21 RX ADMIN — SODIUM CHLORIDE 25 ML/HR: 900 INJECTION, SOLUTION INTRAVENOUS at 11:45

## 2020-07-21 RX ADMIN — POTASSIUM CHLORIDE 40 MEQ: 750 TABLET, FILM COATED, EXTENDED RELEASE ORAL at 13:37

## 2020-07-21 RX ADMIN — Medication 1200 MG: at 14:05

## 2020-07-21 RX ADMIN — PEGFILGRASTIM 6 MG: KIT SUBCUTANEOUS at 14:47

## 2020-07-21 RX ADMIN — PALONOSETRON HYDROCHLORIDE 0.25 MG: 0.25 INJECTION INTRAVENOUS at 11:21

## 2020-07-21 RX ADMIN — CYCLOPHOSPHAMIDE 1210 MG: 1 INJECTION, POWDER, FOR SOLUTION INTRAVENOUS; ORAL at 13:24

## 2020-07-21 RX ADMIN — DOXORUBICIN HYDROCHLORIDE 60 MG: 2 INJECTION, SOLUTION INTRAVENOUS at 12:40

## 2020-07-21 NOTE — PROGRESS NOTES
[facility]  [address] [facility], [address] [place] [address] [address]  Ruthie Flores: [phone]  F: [phone]      Reason for Visit:   Elizabeth Stewart is a 48 y.o. female who is seen in consultation at the request of Dr. Mt Vera for evaluation of therapy for breast cancer    Rad onc:  Dr. Galileo Ellington    Treatment History:   · 20 left breast 3:00, 13cmfn, core bx:  IMC, gr 2-3, 1.3 cm, ER negative, OK negative, HER 2 negative at IHC 0; ki67 68%; LN + by core, 6 mm, gr 2-3  · B-59 Carbo/Taxol +/- atezolizumab 2020-2020  · [employer] 2020: negative  · B-59 DDAC 2020-    History of Present Illness:   19 mammogram was negative, she felt the L breast mass herself, earlier in 2020. Interval history: In today for follow up and treatment. Complains of gr 1 constipation, gr 1 hot flashes, gr 1 insomnia, gr 1 sob. FH:  Mother with breast cancer at age 79; maternal aunt with breast cancer in her 45s; maternal aunt with breast cancer at age 61; no ovarian, prostate, or pancreas cancer    Past Medical History:   Diagnosis Date    ADHD     Cancer ([address]     LEFT BREAST CA    Sleep apnea     MILD -NO CPAP      Past Surgical History:   Procedure Laterality Date    HX BREAST BIOPSY Left 4/15/2020    BREAST BIOPSY performed by Chrissy Guardado MD at [address] [place] [address]  HX  SECTION  98, 80    HX GI      COLONOSCOPY    HX ORTHOPAEDIC      RIGHT ELBOW-FATTY MASS      Social History     Tobacco Use    Smoking status: Light Tobacco Smoker    Smokeless tobacco: Never Used    Tobacco comment: RARE cigar per patient   Substance Use Topics    Alcohol use:  Yes     Alcohol/week: 6.0 standard drinks     Types: 6 Glasses of wine per week      Family History   Problem Relation Age of Onset    Cancer Mother         Breast    Cancer Maternal Aunt         Breast    Cancer Paternal Uncle     Cancer Maternal Aunt         Breast    Cancer Paternal Uncle      Current Outpatient Medications   Medication Sig    OLANZapine (ZyPREXA) 10 mg tablet Please start taking for 4 nights starting the day after chemo.  clindamycin (CLINDAGEL) 1 % topical gel APPLY TO AFFECTED AREA TWICE A DAY (USE THIN FILM)    lisdexamfetamine dimesylate (VYVANSE PO) Take  by mouth.  CLONAZEPAM PO Take  by mouth.  pyridoxine, vitamin B6, (VITAMIN B-6) 50 mg tablet Take 50 mg by mouth daily.  cholecalciferol, vitamin D3, (VITAMIN D3 PO) Take 5,000 Units by mouth daily.  prochlorperazine (COMPAZINE) 10 mg tablet Take 1 Tab by mouth every six (6) hours as needed for Nausea.  lidocaine-prilocaine (EMLA) topical cream Apply  to affected area as needed for Pain.  ondansetron hcl (ZOFRAN) 8 mg tablet Take 1 Tab by mouth every eight (8) hours as needed for Nausea. No current facility-administered medications for this visit.       Facility-Administered Medications Ordered in Other Visits   Medication Dose Route Frequency    INV B- 59 atezolizumab / placebo 1,200 mg in 0.9% sodium chloride 250 mL, overfill volume 25 mL INVESTIGATIONAL IVPB  1,200 mg IntraVENous ONCE    DOXOrubicin (ADRIAMYCIN) chemo syringe 60 mg ++SYRINGE 1 of 2++  60 mg IntraVENous ONCE    DOXOrubicin (ADRIAMYCIN) chemo syringe 60 mg ++SYRINGE 2 of 2++  60 mg IntraVENous ONCE    cyclophosphamide (CYTOXAN) 1,210 mg in 0.9% sodium chloride 250 mL, overfill volume 25 mL chemo infusion  1,210 mg IntraVENous ONCE    0.9% sodium chloride infusion  25 mL/hr IntraVENous CONTINUOUS    fosaprepitant (EMEND) 150 mg in 0.9% sodium chloride 150 mL IVPB  150 mg IntraVENous ONCE    palonosetron HCl (ALOXI) injection 0.25 mg  0.25 mg IntraVENous ONCE    dexamethasone (DECADRON) 12 mg in 0.9% sodium chloride 50 mL IVPB  12 mg IntraVENous ONCE    pegfilgrastim (NEULASTA) wearable SQ injector 6 mg  6 mg SubCUTAneous ONCE      No Known Allergies     Review of Systems: A complete review of systems was obtained, negative except as described above. Physical Exam:     Visit Vitals  /78 (BP 1 Location: Left arm, BP Patient Position: Sitting)   Pulse 82   Temp 99 °F (37.2 °C) (Temporal)   Resp 18   Ht 5' 2\" (1.575 m)   Wt 204 lb (92.5 kg)   SpO2 98%   BMI 37.31 kg/m²     ECOG PS: 0  General: No distress  Eyes: Anicteric sclerae  HENT: Atraumatic  Neck: Supple  Respiratory: Normal respiratory effort  CV: No peripheral edema  GI: nondistended  Skin: No rashes, ecchymoses, or petechiae  Psych: Alert, oriented, appropriate affect, normal judgment/insight    Breasts:  L breast 3:00, 7-8 cmfn, 1.3 cm mass, left ax, 2cm ax LN, and 1 cm LN  (last exam, deferred today)    Results:     Lab Results   Component Value Date/Time    WBC 3.9 07/21/2020 08:56 AM    HGB 10.1 (L) 07/21/2020 08:56 AM    HCT 31.6 (L) 07/21/2020 08:56 AM    PLATELET 051 70/39/4835 08:56 AM    MCV 99.1 (H) 07/21/2020 08:56 AM    ABS. NEUTROPHILS 2.4 07/21/2020 08:56 AM     Lab Results   Component Value Date/Time    Sodium 141 06/30/2020 09:23 AM    Potassium 3.3 (L) 06/30/2020 09:23 AM    Chloride 107 06/30/2020 09:23 AM    CO2 29 06/30/2020 09:23 AM    Glucose 95 06/30/2020 09:23 AM    BUN 10 06/30/2020 09:23 AM    Creatinine 0.85 06/30/2020 09:23 AM    GFR est AA >60 06/30/2020 09:23 AM    GFR est non-AA >60 06/30/2020 09:23 AM    Calcium 8.8 06/30/2020 09:23 AM     Lab Results   Component Value Date/Time    Bilirubin, total 0.5 06/30/2020 09:23 AM    ALT (SGPT) 79 (H) 06/30/2020 09:23 AM    Alk. phosphatase 95 06/30/2020 09:23 AM    Protein, total 7.1 06/30/2020 09:23 AM    Albumin 3.6 06/30/2020 09:23 AM    Globulin 3.5 06/30/2020 09:23 AM       3/31/20  3D mammogram:  1.6 cm mass in L breast  US L breast  3:00 left breast mass 1.6 cm, multiple LN in L ax tail    4/14/2020 CT c/a/p:  IMPRESSION: Left breast mass. No evidence for metastatic disease in the chest  abdomen or pelvis. There are no target lesions for RECIST classification.     4/14/2020 Bone Scan:  IMPRESSION: No evidence of bony metastatic disease. 5/4/2020 MRI breast:  IMPRESSION:     Right Breast:  1. BI-RADS Assessment Category 1: Negative. No evidence of breast carcinoma  within the right breast.     Left Breast:  1. BI-RADS Assessment Category 6: Known biopsy proven malignancy- Appropriate  action should be taken. Large area of malignant enhancement, occupying most of  the middle and posterior third of the left breast upper outer and lower-outer  quadrants, from 2:00 to 4:00. Malignancy is much larger than it had appeared  mammographically. Greatest measurement is 7 x 4.2 x 3 cm. Contained within this  large area of enhancement is the dominant 2.5 cm mass, which represents the  mammographic and sonographic correlate. 2. BI-RADS Assessment Category 6: Known biopsy proven malignancy- Appropriate  action should be taken. Pathologically proven level 1 left axillary  lymphadenopathy, with numerous enlarged, malignant lymph nodes. There is also  level 2 left axillary lymphadenopathy. 3. Retraction of the skin of the lateral left breast, which is thickened. However, no evidence of malignant skin invasion/involvement. 4. The malignant mass closely approximates the pectoral muscle, but there is no  evidence of direct muscle invasion/involvement.     RECOMMENDATIONS:  Appropriate action is recommended. The patient is undergoing neoadjuvant  chemotherapy. There is no evidence of contralateral disease. Records reviewed and summarized above. Pathology report(s) reviewed above. Radiology report(s) reviewed above. Assessment/plan:   1. Left IMC, gr 2-3, triple negative, LN + by core:  cT1c cN1a cM0, unifocal, stage IIA both anatomic and prognostic  postmenopausal    With multiple LN felt on exam, ordered CT c/a/p and bone scan for staging. CT and Bone scan on 4/14/2020 negative for mets.      We explained to the patient that the goal of systemic adjuvant therapy is to improve the chances for cure and decrease the risk of relapse. We explained why a patient can have microscopic cancer spread now even though physical examination, laboratory studies and imaging studies are negative for cancer. We explained that the same treatments used now as adjuvant or preventive treatments rarely if ever are curative in women who develop metastases. We discussed that there is no difference in overall survival between neoadjuvant and adjuvant chemotherapy. We discussed the rationale for neoadjuvant chemotherapy, if chemotherapy is warranted, as it is in this case: to avoid any potential delays in giving chemotherapy following surgery, to be able to see the response of the tumor to chemotherapy, and to potentially downstage the tumor prior to surgery. I discussed the potential risks of dose-dense chemotherapy with the patient. (DD AC-T, adriamycin 60 mg/m2; cyclophosphamide 600 mg/m2 q 2 weeks x 4; paclitaxel 80 mg/m2 qweekly x 12). Major toxicities include nausea and vomiting, stomatitis, fatigue, and a small risk of heart damage. Anemia frequently results and occasionally requires growth factors and rarely transfusions. Neutropenic fever is uncommon, but can be a life-threatening problem. Also, there is a small but increased risk of myelodysplasia and acute leukemia. We provided the patient with detailed information concerning toxicity including frequent toxicities that only last a few days, such as nausea, vomiting, mouth sores, arthralgia, myalgia, and potentially allergic reactions to paclitaxel, as well as toxicities which can be longer lasting including total alopecia, fatigue, anemia and neuropathy. We provided the patient with detailed information concerning the toxicities of their regimen in addition to our verbal discussion.     We discussed the potential addition of carboplatin auc 6 q 3 weeks during weekly paclitaxel as evaluated in CALGB 62553, showing a significant improvement in pCR for patients with stage II-III triple negative breast cancer. Additional side effects of added myelosuppression, fatigue, renal damage with dehydration, and nausea and vomiting were discussed. Would use AUC 5 as that is the dose that is being used in all current investigations    Also discussed the clinical trial NSABP B-59, which is carbo/taxol then DD [facility] +/- atezolizumab. Discussed the positive findings of BFHEQ 159 with pembrolizumab    We discussed the risks and benefits of atezolizumab therapy today. Potential side effects include, but are not limited to fatigue, rash, auto-immune issues, infertility, allergic reactions, and rarely, death. Discussed cold caps (not working with [facility]). Discussed oral and peripheral cryotherapy    Discussed COVID19 risks and LETI guidelines stating neoadj chemotherapy for [place] breast cancer is preferred vs upfront surgery during this pandemic. After this discussion, she is agreeable to chemotherapy and port placement. She has signed informed consent for B-59. The patient was given the following prescriptions with written and verbal instructions on how to use each:  Compazine, zofran, olanzapine (held until [facility]), a wig, and emla cream.  IV Neal Guille will be used with AC. Neulasta the following day with AC (bone pain side effect discussed). Port placed by Dr. Lazaro Lilly. TTE on 4/13/2020, EF 61%. Breast MRI on 5/4/2020. Discussed that with her extent of disease, a mastectomy is warranted. B-59 DDAC #1 today. We will see her back in 2 weeks for DDAC #2. She had follow up with Dr. Lazaro Lilly on 7/20/2020, with [facility], which showed response to treatment per patient. Next follow up with Dr. Lazaro Lilly is in 7 weeks. 2. Emotional well being:  She has excellent support and is coping well with her disease.   She is starting counseling with Dulce Bustillo on 7/24/2020.     3. Genetic testing:   Discussed potential ramifications of a positive test including the potential need for bilateral mastectomies and bilateral oophorectomies and the risk then for her family members to also have a mutation. VUS discussed also. Testing performed 4/28/20 by blood, negative. 4. Alt increased:  Mild, due to taxol    5. Headaches:  Dull for few days after treatment. Recommend ibuprofen or tylenol PRN. Encouraged increase PO fluids. Significantly improved. 6. Heartburn:  Recommend PRN Pepcid. 7. Folliculitis: Clindamycin gel BID PRN, rx in. Getting better. 8. Anemia:  Due to chemo, mild    9. Knee pain:  To right, intermittent, better with ambulation. Taking PRN Tylenol and Ibuprofen. Heat pack does help. I appreciate the opportunity to participate in Ms. Mary Kurtz's care. Signed By: Tawanna Damian MD      No orders of the defined types were placed in this encounter.

## 2020-07-21 NOTE — PROGRESS NOTES
[facility] Progress Note    Date: 2020    Name: Carlos Adams    MRN: [id number]         : 1965    Ms. Yasmin Kirk Arrived ambulatory and in no distress for cycle 1 day 1 of Clinical Trial NSABP B-59 treatment 2: DDAC+ cytoxan+ Atezolizumab/placebo. regimen. Assessment was completed, no acute issues at this time, no new complaints voiced. R chest port accessed without difficulty with 1 inch, labs drawn and in process. Ok to treat obtained from research. Discussed treatment plan and possible side effects with patient gave time for questions. Education provided to patient about Neulasta On Body Injector including: side effects, how/when to remove the device, as well as what to do in the event of device malfunction. Opportunity for questions was provided and all questions were answered. Patient verbalized understanding. Chemotherapy Flowsheet 2020   Cycle C1D1   Date 2020   Drug / Regimen Clinical Trial:NSABP B-59 treatment 2: Doxorubicn+cytoxan+atezolizumab placebo   Pre Meds -   Notes -          Proceeded to appt with Dr. Ayaka Madrigal    Ms. Kurtz's vitals were reviewed. Visit Vitals  /78   Pulse 82   Temp 99 °F (37.2 °C)   Resp 18   Ht 5' 2.99\" (1.6 m)   Wt 92.6 kg (204 lb 3.2 oz)   SpO2 98%   BMI 36.18 kg/m²       Lab results were obtained and reviewed.   Recent Results (from the past 12 hour(s))   CBC WITH AUTOMATED DIFF    Collection Time: 20  8:56 AM   Result Value Ref Range    WBC 3.9 3.6 - 11.0 K/uL    RBC 3.19 (L) 3.80 - 5.20 M/uL    HGB 10.1 (L) 11.5 - 16.0 g/dL    HCT 31.6 (L) 35.0 - 47.0 %    MCV 99.1 (H) 80.0 - 99.0 FL    MCH 31.7 26.0 - 34.0 PG    MCHC 32.0 30.0 - 36.5 g/dL    RDW 17.0 (H) 11.5 - 14.5 %    PLATELET 595 347 - 983 K/uL    MPV 9.9 8.9 - 12.9 FL    NRBC 0.0 0  WBC    ABSOLUTE NRBC 0.00 0.00 - 0.01 K/uL    NEUTROPHILS 61 32 - 75 %    LYMPHOCYTES 31 12 - 49 %    MONOCYTES 6 5 - 13 %    EOSINOPHILS 1 0 - 7 %    BASOPHILS 1 0 - 1 %    IMMATURE GRANULOCYTES 0 0.0 - 0.5 %    ABS. NEUTROPHILS 2.4 1.8 - 8.0 K/UL    ABS. LYMPHOCYTES 1.2 0.8 - 3.5 K/UL    ABS. MONOCYTES 0.3 0.0 - 1.0 K/UL    ABS. EOSINOPHILS 0.0 0.0 - 0.4 K/UL    ABS. BASOPHILS 0.0 0.0 - 0.1 K/UL    ABS. IMM. GRANS. 0.0 0.00 - 0.04 K/UL    DF AUTOMATED     METABOLIC PANEL, COMPREHENSIVE    Collection Time: 07/21/20  8:56 AM   Result Value Ref Range    Sodium 139 136 - 145 mmol/L    Potassium 3.4 (L) 3.5 - 5.1 mmol/L    Chloride 109 (H) 97 - 108 mmol/L    CO2 26 21 - 32 mmol/L    Anion gap 4 (L) 5 - 15 mmol/L    Glucose 128 (H) 65 - 100 mg/dL    BUN 15 6 - 20 MG/DL    Creatinine 0.91 0.55 - 1.02 MG/DL    BUN/Creatinine ratio 16 12 - 20      GFR est AA >60 >60 ml/min/1.73m2    GFR est non-AA >60 >60 ml/min/1.73m2    Calcium 8.5 8.5 - 10.1 MG/DL    Bilirubin, total 0.4 0.2 - 1.0 MG/DL    ALT (SGPT) 116 (H) 12 - 78 U/L    AST (SGOT) 44 (H) 15 - 37 U/L    Alk. phosphatase 100 45 - 117 U/L    Protein, total 7.0 6.4 - 8.2 g/dL    Albumin 3.4 (L) 3.5 - 5.0 g/dL    Globulin 3.6 2.0 - 4.0 g/dL    A-G Ratio 0.9 (L) 1.1 - 2.2     CORTISOL, AM    Collection Time: 07/21/20  8:56 AM   Result Value Ref Range    Cortisol, a.m. 7.9 4.30 - 22.45 ug/dL   TSH 3RD GENERATION    Collection Time: 07/21/20  8:56 AM   Result Value Ref Range    TSH 2.31 0.36 - 3.74 uIU/mL     Pre-medications  were administered as ordered and chemotherapy was initiated.      Medications Administered     0.9% sodium chloride infusion     Admin Date  07/21/2020 Action  New Bag Dose  25 mL/hr Rate  25 mL/hr Route  IntraVENous Administered By  Robinson Sang          cyclophosphamide (CYTOXAN) 1,210 mg in 0.9% sodium chloride 250 mL, overfill volume 25 mL chemo infusion     Admin Date  07/21/2020 Action  New Bag Dose  1210 mg Rate  671 mL/hr Route  IntraVENous Administered By  Robinson Sang          dexamethasone (DECADRON) 12 mg in 0.9% sodium chloride 50 mL IVPB     Admin Date  07/21/2020 Action  Given Dose  12 mg Route  IntraVENous Administered By  Lana Latesha          DOXOrubicin (ADRIAMYCIN) chemo syringe 60 mg ++SYRINGE 1 of 2++     Admin Date  07/21/2020 Action  Given Dose  60 mg Route  IntraVENous Administered By  Lana Auburn          DOXOrubicin (ADRIAMYCIN) chemo syringe 60 mg ++SYRINGE 2 of 2++     Admin Date  07/21/2020 Action  Given Dose  60 mg Route  IntraVENous Administered By  [facility]          fosaprepitant (EMEND) 150 mg in 0.9% sodium chloride 150 mL IVPB     Admin Date  07/21/2020 Action  Given Dose  150 mg Rate  450 mL/hr Route  IntraVENous Administered By  Lana Latesha          INV B- 59 atezolizumab / placebo 1,200 mg in 0.9% sodium chloride 250 mL, overfill volume 25 mL INVESTIGATIONAL IVPB     Admin Date  07/21/2020 Action  Given Dose  1200 mg Rate  590 mL/hr Route  IntraVENous Administered By  [facility]          palonosetron HCl (ALOXI) injection 0.25 mg     Admin Date  07/21/2020 Action  Given Dose  0.25 mg Route  IntraVENous Administered By  [facility]          pegfilgrastim (NEULASTA) wearable SQ injector 6 mg     Admin Date  07/21/2020 Action  Given Dose  6 mg Route  SubCUTAneous Administered By  [facility]          potassium chloride SR (KLOR-CON 10) tablet 40 mEq     Admin Date  07/21/2020 Action  Given Dose  40 mEq Route  Oral Administered By  [facility]                  Ms. Donell Olea tolerated treatment well and was discharged from [place] [address] in stable condition. She is to return on 8/4 for her next appointment.     [facility]  July 21, 2020

## 2020-07-24 ENCOUNTER — VIRTUAL VISIT (OUTPATIENT)
Dept: INTERNAL MEDICINE CLINIC | Age: 55
End: 2020-07-24

## 2020-07-24 ENCOUNTER — DOCUMENTATION ONLY (OUTPATIENT)
Dept: INTERNAL MEDICINE CLINIC | Age: 55
End: 2020-07-24

## 2020-07-24 DIAGNOSIS — F06.31 DEPRESSION DUE TO PHYSICAL ILLNESS: Primary | ICD-10-CM

## 2020-07-24 DIAGNOSIS — F90.9 ATTENTION DEFICIT HYPERACTIVITY DISORDER (ADHD), UNSPECIFIED ADHD TYPE: ICD-10-CM
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
## 2020-07-24 NOTE — PROGRESS NOTES
Oscar Martin,    It was good to meet with you today! Mert attached the info we talked about:  - The goals worksheet for your goal of having an ugly cry  - A handout with short mindfulness exercises  - Here is the cancer support organization: "[employer]"o.nl. org/connect_workshops/tagged/breast_cancer (scroll down to see workshops)  - And 2 links for progressive muscle relaxation:  6 minute progressive muscle relaxation: [email or web address]  20 minute pmr:  [email or web address]    Let me know if you have any questions.     Daryn Rowell

## 2020-07-24 NOTE — PROGRESS NOTES
Jessi Brumfield forgot I wanted to send you this 3 minute moving meditation.   I really like it, give it a try!  [email or web address]

## 2020-07-27 ENCOUNTER — TELEPHONE (OUTPATIENT)
Dept: ONCOLOGY | Age: 55
End: 2020-07-27

## 2020-07-27 ENCOUNTER — HOSPITAL ENCOUNTER (OUTPATIENT)
Dept: RADIATION THERAPY | Age: 55
Discharge: HOME | End: 2020-07-27

## 2020-07-27 NOTE — TELEPHONE ENCOUNTER
7/27/2020 3:50 PM: Patient stated on Saturday and Sunday, she experiencing swelling the morning that improved throughout the day. Patient stated that she can still see \"a little swelling around her ring\" but that the swelling is not as notable today. Patient denied redness, discoloration, pain, and warmth upon palpation. Also denied numbness and tingling. Advised patient that Delio Paige NP, recommends continuing to monitor for now and to make sure she is drinking plenty of water. Encouraged patient to call this office back if the swelling worsens. Patient verbalized understanding and denied further questions or concerns.

## 2020-07-28 RX ORDER — PALONOSETRON 0.05 MG/ML
0.25 INJECTION, SOLUTION INTRAVENOUS ONCE
Status: COMPLETED | OUTPATIENT
Start: 2020-08-04 | End: 2020-08-04

## 2020-07-28 RX ORDER — DOXORUBICIN HYDROCHLORIDE 2 MG/ML
60 INJECTION, SOLUTION INTRAVENOUS ONCE
Status: COMPLETED | OUTPATIENT
Start: 2020-08-04 | End: 2020-08-04

## 2020-07-28 RX ORDER — SODIUM CHLORIDE 9 MG/ML
25 INJECTION, SOLUTION INTRAVENOUS CONTINUOUS
Status: DISCONTINUED | OUTPATIENT
Start: 2020-08-04 | End: 2020-08-05 | Stop reason: HOSPADM

## 2020-08-03 ENCOUNTER — PATIENT MESSAGE (OUTPATIENT)
Dept: ONCOLOGY | Age: 55
End: 2020-08-03

## 2020-08-04 ENCOUNTER — OFFICE VISIT (OUTPATIENT)
Dept: ONCOLOGY | Age: 55
End: 2020-08-04
Payer: COMMERCIAL

## 2020-08-04 ENCOUNTER — HOSPITAL ENCOUNTER (OUTPATIENT)
Dept: INFUSION THERAPY | Age: 55
Discharge: HOME | End: 2020-08-04
Payer: COMMERCIAL

## 2020-08-04 ENCOUNTER — RESEARCH ENCOUNTER (OUTPATIENT)
Dept: ONCOLOGY | Age: 55
End: 2020-08-04

## 2020-08-04 VITALS
DIASTOLIC BLOOD PRESSURE: 85 MMHG | HEIGHT: 63 IN | WEIGHT: 202.9 LBS | OXYGEN SATURATION: 98 % | BODY MASS INDEX: 35.95 KG/M2 | TEMPERATURE: 97.9 F | SYSTOLIC BLOOD PRESSURE: 132 MMHG | RESPIRATION RATE: 18 BRPM | HEART RATE: 78 BPM

## 2020-08-04 DIAGNOSIS — Z17.1 MALIGNANT NEOPLASM OF UPPER-OUTER QUADRANT OF LEFT BREAST IN FEMALE, ESTROGEN RECEPTOR NEGATIVE (HCC): Primary | ICD-10-CM

## 2020-08-04 DIAGNOSIS — C50.412 MALIGNANT NEOPLASM OF UPPER-OUTER QUADRANT OF LEFT BREAST IN FEMALE, ESTROGEN RECEPTOR NEGATIVE (HCC): Primary | ICD-10-CM

## 2020-08-04 LAB
ALBUMIN SERPL-MCNC: 3.5 G/DL (ref 3.5–5)
ALBUMIN/GLOB SERPL: 0.9 {RATIO} (ref 1.1–2.2)
ALP SERPL-CCNC: 140 U/L (ref 45–117)
ALT SERPL-CCNC: 46 U/L (ref 12–78)
ANION GAP SERPL CALC-SCNC: 4 MMOL/L (ref 5–15)
AST SERPL-CCNC: 23 U/L (ref 15–37)
BASOPHILS # BLD: 0 K/UL (ref 0–0.1)
BASOPHILS NFR BLD: 0 % (ref 0–1)
BILIRUB SERPL-MCNC: 0.3 MG/DL (ref 0.2–1)
BUN SERPL-MCNC: 11 MG/DL (ref 6–20)
BUN/CREAT SERPL: 11 (ref 12–20)
CALCIUM SERPL-MCNC: 8.6 MG/DL (ref 8.5–10.1)
CHLORIDE SERPL-SCNC: 107 MMOL/L (ref 97–108)
CO2 SERPL-SCNC: 28 MMOL/L (ref 21–32)
CREAT SERPL-MCNC: 0.96 MG/DL (ref 0.55–1.02)
DIFFERENTIAL METHOD BLD: ABNORMAL
EOSINOPHIL # BLD: 0 K/UL (ref 0–0.4)
EOSINOPHIL NFR BLD: 0 % (ref 0–7)
ERYTHROCYTE [DISTWIDTH] IN BLOOD BY AUTOMATED COUNT: 17 % (ref 11.5–14.5)
GLOBULIN SER CALC-MCNC: 3.7 G/DL (ref 2–4)
GLUCOSE SERPL-MCNC: 106 MG/DL (ref 65–100)
HCT VFR BLD AUTO: 29.3 % (ref 35–47)
HGB BLD-MCNC: 9.5 G/DL (ref 11.5–16)
IMM GRANULOCYTES # BLD AUTO: 0 K/UL
IMM GRANULOCYTES NFR BLD AUTO: 0 %
LYMPHOCYTES # BLD: 1.7 K/UL (ref 0.8–3.5)
LYMPHOCYTES NFR BLD: 19 % (ref 12–49)
MCH RBC QN AUTO: 32.4 PG (ref 26–34)
MCHC RBC AUTO-ENTMCNC: 32.4 G/DL (ref 30–36.5)
MCV RBC AUTO: 100 FL (ref 80–99)
METAMYELOCYTES NFR BLD MANUAL: 2 %
MONOCYTES # BLD: 1 K/UL (ref 0–1)
MONOCYTES NFR BLD: 12 % (ref 5–13)
MYELOCYTES NFR BLD MANUAL: 2 %
NEUTS BAND NFR BLD MANUAL: 5 % (ref 0–6)
NEUTS SEG # BLD: 5.7 K/UL (ref 1.8–8)
NEUTS SEG NFR BLD: 60 % (ref 32–75)
NRBC # BLD: 0 K/UL (ref 0–0.01)
NRBC BLD-RTO: 0 PER 100 WBC
PLATELET # BLD AUTO: 309 K/UL (ref 150–400)
PMV BLD AUTO: 9.9 FL (ref 8.9–12.9)
POTASSIUM SERPL-SCNC: 3.8 MMOL/L (ref 3.5–5.1)
PROT SERPL-MCNC: 7.2 G/DL (ref 6.4–8.2)
RBC # BLD AUTO: 2.93 M/UL (ref 3.8–5.2)
RBC MORPH BLD: ABNORMAL
SODIUM SERPL-SCNC: 139 MMOL/L (ref 136–145)
WBC # BLD AUTO: 8.7 K/UL (ref 3.6–11)

## 2020-08-04 PROCEDURE — 99214 OFFICE O/P EST MOD 30 MIN: CPT | Performed by: INTERNAL MEDICINE

## 2020-08-04 PROCEDURE — 77030016057 HC NDL HUBR APOL -B

## 2020-08-04 PROCEDURE — 96377 APPLICATON ON-BODY INJECTOR: CPT

## 2020-08-04 PROCEDURE — 96411 CHEMO IV PUSH ADDL DRUG: CPT

## 2020-08-04 PROCEDURE — 74011250636 HC RX REV CODE- 250/636: Performed by: INTERNAL MEDICINE

## 2020-08-04 PROCEDURE — 96367 TX/PROPH/DG ADDL SEQ IV INF: CPT

## 2020-08-04 PROCEDURE — 74011000258 HC RX REV CODE- 258: Performed by: INTERNAL MEDICINE

## 2020-08-04 PROCEDURE — 85025 COMPLETE CBC W/AUTO DIFF WBC: CPT

## 2020-08-04 PROCEDURE — 96413 CHEMO IV INFUSION 1 HR: CPT

## 2020-08-04 PROCEDURE — 36415 COLL VENOUS BLD VENIPUNCTURE: CPT

## 2020-08-04 PROCEDURE — 80053 COMPREHEN METABOLIC PANEL: CPT

## 2020-08-04 PROCEDURE — 96375 TX/PRO/DX INJ NEW DRUG ADDON: CPT

## 2020-08-04 RX ORDER — HEPARIN 100 UNIT/ML
500 SYRINGE INTRAVENOUS AS NEEDED
Status: DISCONTINUED | OUTPATIENT
Start: 2020-08-04 | End: 2020-08-05 | Stop reason: HOSPADM

## 2020-08-04 RX ORDER — SODIUM CHLORIDE 9 MG/ML
25 INJECTION, SOLUTION INTRAVENOUS CONTINUOUS
Status: DISCONTINUED | OUTPATIENT
Start: 2020-08-11 | End: 2020-08-12 | Stop reason: HOSPADM

## 2020-08-04 RX ORDER — SODIUM CHLORIDE 0.9 % (FLUSH) 0.9 %
5-10 SYRINGE (ML) INJECTION AS NEEDED
Status: DISCONTINUED | OUTPATIENT
Start: 2020-08-04 | End: 2020-08-05 | Stop reason: HOSPADM

## 2020-08-04 RX ADMIN — PEGFILGRASTIM 6 MG: KIT SUBCUTANEOUS at 13:19

## 2020-08-04 RX ADMIN — Medication 500 UNITS: at 13:25

## 2020-08-04 RX ADMIN — PALONOSETRON HYDROCHLORIDE 0.25 MG: 0.25 INJECTION INTRAVENOUS at 11:27

## 2020-08-04 RX ADMIN — DEXAMETHASONE SODIUM PHOSPHATE 12 MG: 10 INJECTION, SOLUTION INTRAMUSCULAR; INTRAVENOUS at 11:33

## 2020-08-04 RX ADMIN — FOSAPREPITANT 150 MG: 150 INJECTION, POWDER, LYOPHILIZED, FOR SOLUTION INTRAVENOUS at 11:31

## 2020-08-04 RX ADMIN — DOXORUBICIN HYDROCHLORIDE 60 MG: 2 INJECTION, SOLUTION INTRAVENOUS at 12:38

## 2020-08-04 RX ADMIN — Medication 10 ML: at 13:25

## 2020-08-04 RX ADMIN — CYCLOPHOSPHAMIDE 1210 MG: 1 INJECTION, POWDER, FOR SOLUTION INTRAVENOUS; ORAL at 12:47

## 2020-08-04 RX ADMIN — DOXORUBICIN HYDROCHLORIDE 60 MG: 2 INJECTION, SOLUTION INTRAVENOUS at 12:32

## 2020-08-04 RX ADMIN — SODIUM CHLORIDE 25 ML/HR: 900 INJECTION, SOLUTION INTRAVENOUS at 11:19

## 2020-08-04 NOTE — PROGRESS NOTES
True Escobar is a 48 y.o. female Follow up for the Evaluation of Breast Cancer. 1. Have you been to the ER, urgent care clinic since your last visit? Hospitalized since your last visit? Yes went to [address] [place] [address] on 07/29 and Discharged on 07/31 due to Chest Pains. 2. Have you seen or consulted any other health care providers outside of the [address] [place] [address] since your last visit? Include any pap smears or colon screening.  No

## 2020-08-04 NOTE — PROGRESS NOTES
[facility] Progress Note Date: 2020 Name: Coley Sever MRN: [id number] : 1965 
 
0845. Ms. Tania Galindo Arrived ambulatory and in no distress for C2D15 of Clinical Trial NSABP B-59: DDAC + Atezo/Placebo Regimen. Assessment was completed, patient reports she was hospitalized last week at [facility] due to \"cardiac issues and low potassium\". NP notified and reports she is aware. R chest wall port accessed without difficulty using 1 inch mancera, labs drawn & sent for processing. OK to proceed with treatment received from NP and research RN. Chemotherapy Flowsheet 2020 Cycle C2D15 Date 2020 Drug / Regimen Clinical Trial:NSABP B-59: DDAC + Atexo/Placebo Pre Meds -  
Notes -  
 
 
09. Patient proceed to appointment with Dr. Diana Kramer. Ms. Leslie Montemayor vitals were reviewed. Patient Vitals for the past 12 hrs: 
 Temp Pulse Resp BP SpO2  
20 1305  78  132/85   
20 0853 97.9 °F (36.6 °C) 84 18 131/85 98 % Lab results were obtained and reviewed. Recent Results (from the past 12 hour(s)) CBC WITH AUTOMATED DIFF Collection Time: 20  8:55 AM  
Result Value Ref Range WBC 8.7 3.6 - 11.0 K/uL  
 RBC 2.93 (L) 3.80 - 5.20 M/uL HGB 9.5 (L) 11.5 - 16.0 g/dL HCT 29.3 (L) 35.0 - 47.0 % .0 (H) 80.0 - 99.0 FL  
 MCH 32.4 26.0 - 34.0 PG  
 MCHC 32.4 30.0 - 36.5 g/dL  
 RDW 17.0 (H) 11.5 - 14.5 % PLATELET 440 785 - 379 K/uL MPV 9.9 8.9 - 12.9 FL  
 NRBC 0.0 0  WBC ABSOLUTE NRBC 0.00 0.00 - 0.01 K/uL NEUTROPHILS 60 32 - 75 % BAND NEUTROPHILS 5 0 - 6 % LYMPHOCYTES 19 12 - 49 % MONOCYTES 12 5 - 13 % EOSINOPHILS 0 0 - 7 % BASOPHILS 0 0 - 1 % METAMYELOCYTES 2 (H) 0 % MYELOCYTES 2 (H) 0 % IMMATURE GRANULOCYTES 0 %  
 ABS. NEUTROPHILS 5.7 1.8 - 8.0 K/UL  
 ABS. LYMPHOCYTES 1.7 0.8 - 3.5 K/UL  
 ABS. MONOCYTES 1.0 0.0 - 1.0 K/UL  
 ABS. EOSINOPHILS 0.0 0.0 - 0.4 K/UL  
 ABS. BASOPHILS 0.0 0.0 - 0.1 K/UL ABS. IMM. GRANS. 0.0 K/UL  
 DF MANUAL    
 RBC COMMENTS ANISOCYTOSIS 
1+ METABOLIC PANEL, COMPREHENSIVE Collection Time: 08/04/20  8:55 AM  
Result Value Ref Range Sodium 139 136 - 145 mmol/L Potassium 3.8 3.5 - 5.1 mmol/L Chloride 107 97 - 108 mmol/L  
 CO2 28 21 - 32 mmol/L Anion gap 4 (L) 5 - 15 mmol/L Glucose 106 (H) 65 - 100 mg/dL BUN 11 6 - 20 MG/DL Creatinine 0.96 0.55 - 1.02 MG/DL  
 BUN/Creatinine ratio 11 (L) 12 - 20 GFR est AA >60 >60 ml/min/1.73m2 GFR est non-AA >60 >60 ml/min/1.73m2 Calcium 8.6 8.5 - 10.1 MG/DL Bilirubin, total 0.3 0.2 - 1.0 MG/DL  
 ALT (SGPT) 46 12 - 78 U/L  
 AST (SGOT) 23 15 - 37 U/L Alk. phosphatase 140 (H) 45 - 117 U/L Protein, total 7.2 6.4 - 8.2 g/dL Albumin 3.5 3.5 - 5.0 g/dL Globulin 3.7 2.0 - 4.0 g/dL A-G Ratio 0.9 (L) 1.1 - 2.2 Medications: 
 
Medications Administered 0.9% sodium chloride infusion Admin Date 08/04/2020 Action New Bag Dose 25 mL/hr Rate 25 mL/hr Route IntraVENous Administered By 
Mata Ramirez RN  
  
  
 cyclophosphamide (CYTOXAN) 1,210 mg in 0.9% sodium chloride 250 mL, overfill volume 25 mL chemo infusion Admin Date 08/04/2020 Action New Bag Dose 1210 mg Rate 671 mL/hr Route IntraVENous Administered By 
Mata Ramirez RN  
  
  
 dexamethasone (DECADRON) 12 mg in 0.9% sodium chloride 50 mL IVPB Admin Date 08/04/2020 Action Given Dose 
12 mg Route IntraVENous Administered By 
Mata Ramirez RN  
  
  
 DOXOrubicin (ADRIAMYCIN) chemo syringe 60 mg (Syringe 1 of 2. Total Dose = 120 mg) Admin Date 08/04/2020 Action Given Dose 60 mg Route IntraVENous Administered By 
Mata Ramirez RN  
  
  
 DOXOrubicin (ADRIAMYCIN) chemo syringe 60 mg (Syringe 2 of 2. Total Dose = 120 mg) Admin Date 08/04/2020 Action Given Dose 60 mg Route IntraVENous Administered By 
Mata Ramirez RN  
  
  
 fosaprepitant (EMEND) 150 mg in 0.9% sodium chloride 150 mL IVPB Admin Date 08/04/2020 Action Given Dose 
150 mg Rate 450 mL/hr Route IntraVENous Administered By 
Dania Mahajan RN  
  
  
 heparin (porcine) pf 500 Units Admin Date 08/04/2020 Action Given Dose 
500 Units Route IntraVENous Administered By 
Dania Mahajan RN  
  
  
 palonosetron HCl (ALOXI) injection 0.25 mg   
 Admin Date 08/04/2020 Action Given Dose 0.25 mg Route IntraVENous Administered By 
Dania Mahajan RN  
  
  
 pegfilgrastim (NEULASTA) wearable SQ injector 6 mg Admin Date 08/04/2020 Action Given Dose 6 mg Route SubCUTAneous Administered By 
Dania Mahajan RN  
  
  
 sodium chloride (NS) flush 5-10 mL Admin Date 08/04/2020 Action Given Dose 
10 mL Route IntraVENous Administered By 
Dania Mahajan RN  
  
  
  
 
 
1064. Ms. Lisa Rai tolerated treatment well and was discharged from [place] [address] in stable condition. Port de-accessed, flushed & heparinized per protocol. She is to return on 08/11/20 for her next appointment. Paula Veliz RN August 4, 2020

## 2020-08-04 NOTE — PROGRESS NOTES
OK TO TREAT    Pt in for labs and follow up visit today per protocol with Dr. Gregory Miner and RN. Allyn Burkitt is C2 AC.  Patient reports the following adverse events at this time: gr 1 loss of appetite, gr 1 constipation, gr 1 hot flashes, gr 1 insomnia. Vital signs are stable.  Patient to go to infusion center after appointment to receive  [facility]. Per physician note, pt was admitted to [facility] on 7/29 for intermittent chest pain, had cardiac workup, per patient, this was negative, obtaining records. No chest pain since 7/29/2020. Patient discussed with PCP, who is referring her to cardiology. Likely gerd from the [facility]. Pt recommended to take Pepcid bid. An JASMEET report was submitted on 8/3/20. Next appt is scheduled for 8/11/20.

## 2020-08-04 NOTE — PROGRESS NOTES
[facility] [place] [facility] [place] [address] [facility], [address] [facility] W: [phone]  F: [phone] Reason for Visit:  
Ruven Dubin is a 48 y.o. female who is seen in consultation at the request of Dr. Jamia Price for evaluation of therapy for breast cancer Rad onc:  Dr. Erinn Vaca Treatment History: · 20 left breast 3:00, 13cmfn, core bx:  IMC, gr 2-3, 1.3 cm, ER negative, [place] negative, HER 2 negative at IHC 0; ki67 68%; LN + by core, 6 mm, gr 2-3 · B-59 Carbo/Taxol +/- atezolizumab 2020-2020 · [employer] 2020: negative · B-59 DDAC 2020- History of Present Illness:  
19 mammogram was negative, she felt the L breast mass herself, earlier in 2020. Interval history: In today for follow up and treatment. Complains of gr 1 loss of appetite, gr 1 constipation, gr 1 hot flashes, gr 1 insomnia. Reports she had chills and fever 101 on 2020. She was [facility] to [facility] on  for chest pain and had cardiac work up. COVID testing negative. FH:  Mother with breast cancer at age 79; maternal aunt with breast cancer in her 45s; maternal aunt with breast cancer at age 61; no ovarian, prostate, or pancreas cancer Past Medical History:  
Diagnosis Date  ADHD  Cancer ([address] LEFT BREAST CA  
 Sleep apnea MILD -NO CPAP Past Surgical History:  
Procedure Laterality Date  HX BREAST BIOPSY Left 4/15/2020 BREAST BIOPSY performed by Jeny Abarca MD at [facility] MAIN OR  
 HX  SECTION  98, 99  
 HX GI    
 COLONOSCOPY  
 HX ORTHOPAEDIC    
 RIGHT ELBOW-FATTY MASS Social History Tobacco Use  Smoking status: Light Tobacco Smoker  Smokeless tobacco: Never Used  Tobacco comment: RARE cigar per patient Substance Use Topics  Alcohol use: Yes Alcohol/week: 6.0 standard drinks Types: 6 Glasses of wine per week Family History Problem Relation Age of Onset  Cancer Mother Breast  
 Cancer Maternal Aunt Breast  
 Cancer Paternal Uncle  Cancer Maternal Aunt Breast  
 Cancer Paternal Uncle Current Outpatient Medications Medication Sig  OLANZapine (ZyPREXA) 10 mg tablet Please start taking for 4 nights starting the day after chemo.  clindamycin (CLINDAGEL) 1 % topical gel APPLY TO AFFECTED AREA TWICE A DAY (USE THIN FILM)  lisdexamfetamine dimesylate (VYVANSE PO) Take  by mouth.  CLONAZEPAM PO Take  by mouth.  pyridoxine, vitamin B6, (VITAMIN B-6) 50 mg tablet Take 50 mg by mouth daily.  cholecalciferol, vitamin D3, (VITAMIN D3 PO) Take 5,000 Units by mouth daily.  prochlorperazine (COMPAZINE) 10 mg tablet Take 1 Tab by mouth every six (6) hours as needed for Nausea.  lidocaine-prilocaine (EMLA) topical cream Apply  to affected area as needed for Pain.  ondansetron hcl (ZOFRAN) 8 mg tablet Take 1 Tab by mouth every eight (8) hours as needed for Nausea. No current facility-administered medications for this visit. Facility-Administered Medications Ordered in Other Visits Medication Dose Route Frequency  DOXOrubicin (ADRIAMYCIN) chemo syringe 60 mg (Syringe 1 of 2. Total Dose = 120 mg)  60 mg IntraVENous ONCE  
 DOXOrubicin (ADRIAMYCIN) chemo syringe 60 mg (Syringe 2 of 2. Total Dose = 120 mg)  60 mg IntraVENous ONCE  cyclophosphamide (CYTOXAN) 1,210 mg in 0.9% sodium chloride 250 mL, overfill volume 25 mL chemo infusion  1,210 mg IntraVENous ONCE  
 fosaprepitant (EMEND) 150 mg in 0.9% sodium chloride 150 mL IVPB  150 mg IntraVENous ONCE  palonosetron HCl (ALOXI) injection 0.25 mg  0.25 mg IntraVENous ONCE  
 dexamethasone (DECADRON) 12 mg in 0.9% sodium chloride 50 mL IVPB  12 mg IntraVENous ONCE  pegfilgrastim (NEULASTA) wearable SQ injector 6 mg  6 mg SubCUTAneous ONCE  
 0.9% sodium chloride infusion  25 mL/hr IntraVENous CONTINUOUS No Known Allergies Review of Systems: A complete review of systems was obtained, negative except as described above. Physical Exam:  
 
Visit Vitals BP (P) 131/82 (BP 1 Location: Left arm, BP Patient Position: Sitting) Pulse (P) 84 Temp (P) 98 °F (36.7 °C) (Temporal) Resp (P) 18 Ht (P) 5' 3\" (1.6 m) Wt (P) 202 lb (91.6 kg) BMI (P) 35.78 kg/m² ECOG PS: 0 General: No distress Eyes: Anicteric sclerae HENT: Atraumatic Neck: Supple Respiratory: Normal respiratory effort CV: No peripheral edema GI: nondistended Skin: No rashes, ecchymoses, or petechiae Psych: Alert, oriented, appropriate affect, normal judgment/insight Breasts:  L breast 3:00, 7-8 cmfn, 1.3 cm mass, left ax, 1.5 cm ax LN, and 1 cm LN Results:  
 
Lab Results Component Value Date/Time WBC 8.7 08/04/2020 08:55 AM  
 HGB 9.5 (L) 08/04/2020 08:55 AM  
 HCT 29.3 (L) 08/04/2020 08:55 AM  
 PLATELET 825 92/11/6045 08:55 AM  
 .0 (H) 08/04/2020 08:55 AM  
 ABS. NEUTROPHILS PENDING 08/04/2020 08:55 AM  
 
Lab Results Component Value Date/Time Sodium 139 07/21/2020 08:56 AM  
 Potassium 3.4 (L) 07/21/2020 08:56 AM  
 Chloride 109 (H) 07/21/2020 08:56 AM  
 CO2 26 07/21/2020 08:56 AM  
 Glucose 128 (H) 07/21/2020 08:56 AM  
 BUN 15 07/21/2020 08:56 AM  
 Creatinine 0.91 07/21/2020 08:56 AM  
 GFR est AA >60 07/21/2020 08:56 AM  
 GFR est non-AA >60 07/21/2020 08:56 AM  
 Calcium 8.5 07/21/2020 08:56 AM  
 
Lab Results Component Value Date/Time Bilirubin, total 0.4 07/21/2020 08:56 AM  
 ALT (SGPT) 116 (H) 07/21/2020 08:56 AM  
 Alk. phosphatase 100 07/21/2020 08:56 AM  
 Protein, total 7.0 07/21/2020 08:56 AM  
 Albumin 3.4 (L) 07/21/2020 08:56 AM  
 Globulin 3.6 07/21/2020 08:56 AM  
 
 
3/31/20 
3D mammogram:  1.6 cm mass in L breast 
US L breast 
3:00 left breast mass 1.6 cm, multiple LN in L ax tail 4/14/2020 CT c/a/p: 
IMPRESSION: Left breast mass.  No evidence for metastatic disease in the chest 
 abdomen or pelvis. There are no target lesions for RECIST classification. 4/14/2020 Bone Scan: 
IMPRESSION: No evidence of bony metastatic disease. 5/4/2020 MRI breast: 
IMPRESSION: 
  
Right Breast: 1. BI-RADS Assessment Category 1: Negative. No evidence of breast carcinoma 
within the right breast. 
  
Left Breast: 1. BI-RADS Assessment Category 6: Known biopsy proven malignancy- Appropriate 
action should be taken. Large area of malignant enhancement, occupying most of 
the middle and posterior third of the left breast upper outer and lower-outer 
quadrants, from 2:00 to 4:00. Malignancy is much larger than it had appeared 
mammographically. Greatest measurement is 7 x 4.2 x 3 cm. Contained within this 
large area of enhancement is the dominant 2.5 cm mass, which represents the 
mammographic and sonographic correlate. 2. BI-RADS Assessment Category 6: Known biopsy proven malignancy- Appropriate 
action should be taken. Pathologically proven level 1 left axillary 
lymphadenopathy, with numerous enlarged, malignant lymph nodes. There is also 
level 2 left axillary lymphadenopathy. 3. Retraction of the skin of the lateral left breast, which is thickened. However, no evidence of malignant skin invasion/involvement. 4. The malignant mass closely approximates the pectoral muscle, but there is no 
evidence of direct muscle invasion/involvement. 
  
RECOMMENDATIONS: 
Appropriate action is recommended. The patient is undergoing neoadjuvant 
chemotherapy. There is no evidence of contralateral disease. 7/30/2020 [facility]A chest at [facility]: Normal CTA of the chest with no pulmonary embolism or acute aortic abnormalities identified. Consider mild CHF/pulmonary edema Records reviewed and summarized above. Pathology report(s) reviewed above. Radiology report(s) reviewed above. Assessment/plan: 1. Left IMC, gr 2-3, triple negative, LN + by core:  cT1c cN1a cM0, unifocal, stage IIA both anatomic and prognostic postmenopausal 
 
With multiple LN felt on exam, ordered CT c/a/p and bone scan for staging. CT and Bone scan on 4/14/2020 negative for mets. We explained to the patient that the goal of systemic adjuvant therapy is to improve the chances for cure and decrease the risk of relapse. We explained why a patient can have microscopic cancer spread now even though physical examination, laboratory studies and imaging studies are negative for cancer. We explained that the same treatments used now as adjuvant or preventive treatments rarely if ever are curative in women who develop metastases. We discussed that there is no difference in overall survival between neoadjuvant and adjuvant chemotherapy. We discussed the rationale for neoadjuvant chemotherapy, if chemotherapy is warranted, as it is in this case: to avoid any potential delays in giving chemotherapy following surgery, to be able to see the response of the tumor to chemotherapy, and to potentially downstage the tumor prior to surgery. I discussed the potential risks of dose-dense chemotherapy with the patient. (DD AC-T, adriamycin 60 mg/m2; cyclophosphamide 600 mg/m2 q 2 weeks x 4; paclitaxel 80 mg/m2 qweekly x 12). Major toxicities include nausea and vomiting, stomatitis, fatigue, and a small risk of heart damage. Anemia frequently results and occasionally requires growth factors and rarely transfusions. Neutropenic fever is uncommon, but can be a life-threatening problem. Also, there is a small but increased risk of myelodysplasia and acute leukemia. We provided the patient with detailed information concerning toxicity including frequent toxicities that only last a few days, such as nausea, vomiting, mouth sores, arthralgia, myalgia, and potentially allergic reactions to paclitaxel, as well as toxicities which can be longer lasting including total alopecia, fatigue, anemia and neuropathy.  We provided the patient with detailed information concerning the toxicities of their regimen in addition to our verbal discussion. We discussed the potential addition of carboplatin auc 6 q 3 weeks during weekly paclitaxel as evaluated in CALGB 94676, showing a significant improvement in pCR for patients with stage II-III triple negative breast cancer. Additional side effects of added myelosuppression, fatigue, renal damage with dehydration, and nausea and vomiting were discussed. Would use AUC 5 as that is the dose that is being used in all current investigations Also discussed the clinical trial NSABP B-59, which is carbo/taxol then DD [facility] +/- atezolizumab. Discussed the positive findings of MERCK 522 with pembrolizumab We discussed the risks and benefits of atezolizumab therapy today. Potential side effects include, but are not limited to fatigue, rash, auto-immune issues, infertility, allergic reactions, and rarely, death. Discussed cold caps (not working with [facility]). Discussed oral and peripheral cryotherapy Discussed COVID19 risks and LETI guidelines stating neoadj chemotherapy for [place] breast cancer is preferred vs upfront surgery during this pandemic. After this discussion, she is agreeable to chemotherapy and port placement. She has signed informed consent for B-59. The patient was given the following prescriptions with written and verbal instructions on how to use each:  Compazine, zofran, olanzapine (held until [facility]), a wig, and emla cream.  IV Jacqulyne Knoll will be used with AC. Neulasta the following day with AC (bone pain side effect discussed). Port placed by Dr. Lani Herrera. TTE on 4/13/2020, EF 61%, TTE at [facility] on 7/30/2020, EF 60%. Breast MRI on 5/4/2020. Discussed that with her extent of disease, a mastectomy is warranted. B-59 DDAC #2 today. We will see her back in 2 weeks for DDAC # 3. She would like to stop zyprexa.    
 
She had follow up with Dr. Lani Herrera on 7/20/2020, with [facility] [address] [facility], which showed response to treatment per patient. Next follow up with Dr. Lani Herrera is in 7 weeks. Surgery scheduled for 10/8/20 2. Emotional well being:  She has excellent support and is coping well with her disease. She is starting counseling with Carlos Enrique Klein on 7/24/2020.  
 
3. Genetic testing:   Discussed potential ramifications of a positive test including the potential need for bilateral mastectomies and bilateral oophorectomies and the risk then for her family members to also have a mutation. VUS discussed also. Testing performed 4/28/20 by blood, negative. 4. Alt increased:  Mild, due to taxol 5. Headaches:  Dull for few days after treatment. Recommend ibuprofen or tylenol PRN. Encouraged increase PO fluids. Significantly improved. 6. Heartburn:  Recommend Pepcid bid 7. Folliculitis: Clindamycin gel BID PRN, rx in. Getting better. 8. Anemia:  Due to chemo, mild; not clinically significant 9. Knee pain:  To right, intermittent, better with ambulation. Taking PRN Tylenol and Ibuprofen. Heat pack does help. 10. Chest pain:  Was admitted to [facility] on 7/29 for intermittent chest pain, had cardiac workup, per patient, this was negative, obtaining records. No chest pain since 7/29/2020. Patient discussed with PCP, who is referring her to cardiology. Likely gerd from the [facility], see above I appreciate the opportunity to participate in Ms. Rand Kurtz's care. Signed By: Herson Vivas NP No orders of the defined types were placed in this encounter.

## 2020-08-11 ENCOUNTER — RESEARCH ENCOUNTER (OUTPATIENT)
Dept: ONCOLOGY | Age: 55
End: 2020-08-11

## 2020-08-11 ENCOUNTER — HOSPITAL ENCOUNTER (OUTPATIENT)
Dept: INFUSION THERAPY | Age: 55
Discharge: HOME | End: 2020-08-11
Payer: COMMERCIAL

## 2020-08-11 VITALS
WEIGHT: 200.9 LBS | TEMPERATURE: 98.5 F | RESPIRATION RATE: 18 BRPM | HEART RATE: 87 BPM | OXYGEN SATURATION: 99 % | SYSTOLIC BLOOD PRESSURE: 126 MMHG | BODY MASS INDEX: 35.6 KG/M2 | DIASTOLIC BLOOD PRESSURE: 73 MMHG | HEIGHT: 63 IN

## 2020-08-11 PROCEDURE — 96413 CHEMO IV INFUSION 1 HR: CPT

## 2020-08-11 PROCEDURE — 74011250636 HC RX REV CODE- 250/636: Performed by: INTERNAL MEDICINE

## 2020-08-11 PROCEDURE — 74011000250 HC RX REV CODE- 250: Performed by: INTERNAL MEDICINE

## 2020-08-11 PROCEDURE — 77030012965 HC NDL HUBR BBMI -A

## 2020-08-11 RX ADMIN — SODIUM CHLORIDE 25 ML/HR: 900 INJECTION, SOLUTION INTRAVENOUS at 09:50

## 2020-08-11 RX ADMIN — Medication 1200 MG: at 10:12

## 2020-08-11 NOTE — PROGRESS NOTES
OK TO TREAT    Pt in for treatment today per protocol. This is C2 of treatment with Atezo/Placebo. Patient reports the following adverse events at this time: gr1 fatigue, g1 nausea, g1 dyspnea on exertion, g1 cough, gr1 hemorrhoids  Vital signs are stable. Patient is in the infusion center to receive Atezo/Placebo. Next appt is scheduled for 8/18/20.

## 2020-08-11 NOTE — PROGRESS NOTES
[facility] VISIT NOTE 
 
[phone]. Pt arrived at [facility] ambulatory and in no distress for C2D22 Clinical Trial: NSABP B-59 Treatment 2: Atezolizumab/Placebo. Assessment completed, pt c/o hemorrhoids of which pt was given a prescription cream for. RCW chest port accessed with . 75 in mancera with no difficulty. Positive blood return noted and flushes easily. Confirmed with Research RN that no labs are to be drawn today. Received ok to treat from Donnell Macias. Medications received: 
Nicole Crespo Atezolizumab/Placebo IV Tolerated treatment well, no adverse reaction noted. Port de-accessed and flushed per protocol. Positive blood return noted. Patient Vitals for the past 12 hrs: 
 Temp Pulse Resp BP SpO2  
08/11/20 1045  87 18 126/73   
08/11/20 0858 98.5 °F (36.9 °C) 90 18 123/66 99 % 1045. D/C'd from [facility] ambulatory and in no distress.  Next appointment is 8/18/20 at 9:00 am.

## 2020-08-12 RX ORDER — PALONOSETRON 0.05 MG/ML
0.25 INJECTION, SOLUTION INTRAVENOUS ONCE
Status: COMPLETED | OUTPATIENT
Start: 2020-08-18 | End: 2020-08-18

## 2020-08-12 RX ORDER — DOXORUBICIN HYDROCHLORIDE 2 MG/ML
60 INJECTION, SOLUTION INTRAVENOUS ONCE
Status: COMPLETED | OUTPATIENT
Start: 2020-08-18 | End: 2020-08-18

## 2020-08-12 RX ORDER — SODIUM CHLORIDE 9 MG/ML
25 INJECTION, SOLUTION INTRAVENOUS CONTINUOUS
Status: DISCONTINUED | OUTPATIENT
Start: 2020-08-18 | End: 2020-08-19 | Stop reason: HOSPADM

## 2020-08-18 ENCOUNTER — HOSPITAL ENCOUNTER (OUTPATIENT)
Dept: INFUSION THERAPY | Age: 55
Discharge: HOME | End: 2020-08-18
Payer: COMMERCIAL

## 2020-08-18 ENCOUNTER — OFFICE VISIT (OUTPATIENT)
Dept: ONCOLOGY | Age: 55
End: 2020-08-18
Payer: COMMERCIAL

## 2020-08-18 ENCOUNTER — RESEARCH ENCOUNTER (OUTPATIENT)
Dept: ONCOLOGY | Age: 55
End: 2020-08-18

## 2020-08-18 ENCOUNTER — DOCUMENTATION ONLY (OUTPATIENT)
Dept: SURGERY | Age: 55
End: 2020-08-18

## 2020-08-18 VITALS
HEART RATE: 86 BPM | TEMPERATURE: 99.1 F | BODY MASS INDEX: 34.73 KG/M2 | RESPIRATION RATE: 18 BRPM | HEIGHT: 63 IN | DIASTOLIC BLOOD PRESSURE: 72 MMHG | OXYGEN SATURATION: 98 % | WEIGHT: 196 LBS | SYSTOLIC BLOOD PRESSURE: 131 MMHG

## 2020-08-18 VITALS
BODY MASS INDEX: 34.87 KG/M2 | HEART RATE: 86 BPM | DIASTOLIC BLOOD PRESSURE: 86 MMHG | TEMPERATURE: 99.1 F | OXYGEN SATURATION: 98 % | WEIGHT: 196.8 LBS | HEIGHT: 63 IN | RESPIRATION RATE: 18 BRPM | SYSTOLIC BLOOD PRESSURE: 147 MMHG

## 2020-08-18 DIAGNOSIS — Z17.1 MALIGNANT NEOPLASM OF UPPER-OUTER QUADRANT OF LEFT BREAST IN FEMALE, ESTROGEN RECEPTOR NEGATIVE (HCC): Primary | ICD-10-CM

## 2020-08-18 DIAGNOSIS — Z51.11 CHEMOTHERAPY MANAGEMENT, ENCOUNTER FOR: ICD-10-CM

## 2020-08-18 DIAGNOSIS — C50.412 MALIGNANT NEOPLASM OF UPPER-OUTER QUADRANT OF LEFT BREAST IN FEMALE, ESTROGEN RECEPTOR NEGATIVE (HCC): Primary | ICD-10-CM

## 2020-08-18 LAB
ALBUMIN SERPL-MCNC: 3.6 G/DL (ref 3.5–5)
ALBUMIN/GLOB SERPL: 1 {RATIO} (ref 1.1–2.2)
ALP SERPL-CCNC: 154 U/L (ref 45–117)
ALT SERPL-CCNC: 37 U/L (ref 12–78)
ANION GAP SERPL CALC-SCNC: 5 MMOL/L (ref 5–15)
AST SERPL-CCNC: 21 U/L (ref 15–37)
BASOPHILS # BLD: 0.1 K/UL (ref 0–0.1)
BASOPHILS NFR BLD: 1 % (ref 0–1)
BILIRUB SERPL-MCNC: 0.1 MG/DL (ref 0.2–1)
BUN SERPL-MCNC: 9 MG/DL (ref 6–20)
BUN/CREAT SERPL: 8 (ref 12–20)
CALCIUM SERPL-MCNC: 8.9 MG/DL (ref 8.5–10.1)
CHLORIDE SERPL-SCNC: 108 MMOL/L (ref 97–108)
CO2 SERPL-SCNC: 28 MMOL/L (ref 21–32)
CREAT SERPL-MCNC: 1.07 MG/DL (ref 0.55–1.02)
DIFFERENTIAL METHOD BLD: ABNORMAL
EOSINOPHIL # BLD: 0 K/UL (ref 0–0.4)
EOSINOPHIL NFR BLD: 0 % (ref 0–7)
ERYTHROCYTE [DISTWIDTH] IN BLOOD BY AUTOMATED COUNT: 16.9 % (ref 11.5–14.5)
GLOBULIN SER CALC-MCNC: 3.7 G/DL (ref 2–4)
GLUCOSE SERPL-MCNC: 86 MG/DL (ref 65–100)
HCT VFR BLD AUTO: 28.4 % (ref 35–47)
HGB BLD-MCNC: 9.1 G/DL (ref 11.5–16)
IMM GRANULOCYTES # BLD AUTO: 1.3 K/UL (ref 0–0.04)
IMM GRANULOCYTES NFR BLD AUTO: 10 % (ref 0–0.5)
LYMPHOCYTES # BLD: 1.6 K/UL (ref 0.8–3.5)
LYMPHOCYTES NFR BLD: 13 % (ref 12–49)
MCH RBC QN AUTO: 32.7 PG (ref 26–34)
MCHC RBC AUTO-ENTMCNC: 32 G/DL (ref 30–36.5)
MCV RBC AUTO: 102.2 FL (ref 80–99)
MONOCYTES # BLD: 1.5 K/UL (ref 0–1)
MONOCYTES NFR BLD: 12 % (ref 5–13)
NEUTS SEG # BLD: 8 K/UL (ref 1.8–8)
NEUTS SEG NFR BLD: 64 % (ref 32–75)
NRBC # BLD: 0.07 K/UL (ref 0–0.01)
NRBC BLD-RTO: 0.6 PER 100 WBC
PLATELET # BLD AUTO: 180 K/UL (ref 150–400)
PMV BLD AUTO: 9.9 FL (ref 8.9–12.9)
POTASSIUM SERPL-SCNC: 3.5 MMOL/L (ref 3.5–5.1)
PROT SERPL-MCNC: 7.3 G/DL (ref 6.4–8.2)
RBC # BLD AUTO: 2.78 M/UL (ref 3.8–5.2)
RBC MORPH BLD: ABNORMAL
SODIUM SERPL-SCNC: 141 MMOL/L (ref 136–145)
WBC # BLD AUTO: 12.5 K/UL (ref 3.6–11)

## 2020-08-18 PROCEDURE — 96377 APPLICATON ON-BODY INJECTOR: CPT

## 2020-08-18 PROCEDURE — 96375 TX/PRO/DX INJ NEW DRUG ADDON: CPT

## 2020-08-18 PROCEDURE — 36415 COLL VENOUS BLD VENIPUNCTURE: CPT

## 2020-08-18 PROCEDURE — 96411 CHEMO IV PUSH ADDL DRUG: CPT

## 2020-08-18 PROCEDURE — 96409 CHEMO IV PUSH SNGL DRUG: CPT

## 2020-08-18 PROCEDURE — 80053 COMPREHEN METABOLIC PANEL: CPT

## 2020-08-18 PROCEDURE — 85025 COMPLETE CBC W/AUTO DIFF WBC: CPT

## 2020-08-18 PROCEDURE — 99214 OFFICE O/P EST MOD 30 MIN: CPT | Performed by: INTERNAL MEDICINE

## 2020-08-18 PROCEDURE — 96413 CHEMO IV INFUSION 1 HR: CPT

## 2020-08-18 PROCEDURE — 77030016057 HC NDL HUBR APOL -B

## 2020-08-18 PROCEDURE — 74011250636 HC RX REV CODE- 250/636: Performed by: INTERNAL MEDICINE

## 2020-08-18 PROCEDURE — 74011000258 HC RX REV CODE- 258: Performed by: INTERNAL MEDICINE

## 2020-08-18 RX ADMIN — PEGFILGRASTIM 6 MG: KIT SUBCUTANEOUS at 12:10

## 2020-08-18 RX ADMIN — SODIUM CHLORIDE 1210 MG: 900 INJECTION, SOLUTION INTRAVENOUS at 11:36

## 2020-08-18 RX ADMIN — DEXAMETHASONE SODIUM PHOSPHATE 12 MG: 10 INJECTION, SOLUTION INTRAMUSCULAR; INTRAVENOUS at 10:05

## 2020-08-18 RX ADMIN — PALONOSETRON 0.25 MG: 0.05 INJECTION, SOLUTION INTRAVENOUS at 10:30

## 2020-08-18 RX ADMIN — DOXORUBICIN HYDROCHLORIDE 60 MG: 2 INJECTION, SOLUTION INTRAVENOUS at 11:10

## 2020-08-18 RX ADMIN — DOXORUBICIN HYDROCHLORIDE 60 MG: 2 INJECTION, SOLUTION INTRAVENOUS at 11:15

## 2020-08-18 RX ADMIN — FOSAPREPITANT 150 MG: 150 INJECTION, POWDER, LYOPHILIZED, FOR SOLUTION INTRAVENOUS at 10:05

## 2020-08-18 RX ADMIN — SODIUM CHLORIDE 25 ML/HR: 900 INJECTION, SOLUTION INTRAVENOUS at 10:02

## 2020-08-18 NOTE — PROGRESS NOTES
Pt in for treatment today per protocol.  This is C3 of treatment with AC. Patient reports the following adverse events at this time: gr 1 constipation, gr 1 nausea, gr 1 hot flashes, gr 1 swelling to LE.  Vital signs are stable. Patient to go to the infusion center to [facility]. Next appt is scheduled for 9/1/20.

## 2020-08-18 NOTE — PROGRESS NOTES
Darío Murrieta is a 48 y.o. female Follow up for the Evaluation of Breast Cancer. 1. Have you been to the ER, urgent care clinic since your last visit? Hospitalized since your last visit? No 
 
2. Have you seen or consulted any other health care providers outside of the [address] [place] [address] since your last visit? Include any pap smears or colon screening.  No

## 2020-08-18 NOTE — PROGRESS NOTES
After speaking with Jaswinder Moore from Osiel's office we will be scheduling this patients surgery on 10/08/2020 at [facility]. Patient is seeing Dr nAa Barajas on September 14,2020 .

## 2020-08-18 NOTE — PROGRESS NOTES
[facility] [place] [facility] [address] [facility], [address] [place] [address] [address] W: [phone]  F: [phone] Reason for Visit:  
Elizabeth Stewart is a 48 y.o. female who is seen in consultation at the request of Dr. Mt Vera for evaluation of therapy for breast cancer Rad onc:  Dr. Galileo Ellington Treatment History: · 20 left breast 3:00, 13cmfn, core bx:  IMC, gr 2-3, 1.3 cm, ER negative, PA negative, HER 2 negative at IHC 0; ki67 68%; LN + by core, 6 mm, gr 2-3 · B-59 Carbo/Taxol +/- atezolizumab 2020-2020 · [employer] 2020: negative · B-59 DDAC 2020- History of Present Illness:  
19 mammogram was negative, she felt the L breast mass herself, earlier in 2020. Interval history: In today for follow up and treatment. Complains of gr 1 constipation, gr 1 nausea, gr 1 hot flashes, gr 1 swelling to LE. She was admitted to [facility] on  for chest pain and had cardiac work up. COVID testing negative. FH:  Mother with breast cancer at age 79; maternal aunt with breast cancer in her 45s; maternal aunt with breast cancer at age 61; no ovarian, prostate, or pancreas cancer Past Medical History:  
Diagnosis Date  ADHD  Cancer ([address] LEFT BREAST CA  
 Sleep apnea MILD -NO CPAP Past Surgical History:  
Procedure Laterality Date  HX BREAST BIOPSY Left 4/15/2020 BREAST BIOPSY performed by Chrissy Guardado MD at [facility] MAIN OR  
 HX  SECTION  98, 99  
 HX GI    
 COLONOSCOPY  
 HX ORTHOPAEDIC    
 RIGHT ELBOW-FATTY MASS Social History Tobacco Use  Smoking status: Light Tobacco Smoker  Smokeless tobacco: Never Used  Tobacco comment: RARE cigar per patient Substance Use Topics  Alcohol use: Yes Alcohol/week: 6.0 standard drinks Types: 6 Glasses of wine per week Family History Problem Relation Age of Onset  Cancer Mother Breast  
 Cancer Maternal Aunt Breast  
 Cancer Paternal Uncle  Cancer Maternal Aunt Breast  
 Cancer Paternal Uncle Current Outpatient Medications Medication Sig  OLANZapine (ZyPREXA) 10 mg tablet Please start taking for 4 nights starting the day after chemo.  clindamycin (CLINDAGEL) 1 % topical gel APPLY TO AFFECTED AREA TWICE A DAY (USE THIN FILM)  lisdexamfetamine dimesylate (VYVANSE PO) Take  by mouth.  CLONAZEPAM PO Take  by mouth.  pyridoxine, vitamin B6, (VITAMIN B-6) 50 mg tablet Take 50 mg by mouth daily.  cholecalciferol, vitamin D3, (VITAMIN D3 PO) Take 5,000 Units by mouth daily.  prochlorperazine (COMPAZINE) 10 mg tablet Take 1 Tab by mouth every six (6) hours as needed for Nausea.  lidocaine-prilocaine (EMLA) topical cream Apply  to affected area as needed for Pain.  ondansetron hcl (ZOFRAN) 8 mg tablet Take 1 Tab by mouth every eight (8) hours as needed for Nausea. No current facility-administered medications for this visit. Facility-Administered Medications Ordered in Other Visits Medication Dose Route Frequency  DOXOrubicin (ADRIAMYCIN) chemo syringe 60 mg (Syringe 1 of 2. Total Dose = 120 mg)  60 mg IntraVENous ONCE  
 DOXOrubicin (ADRIAMYCIN) chemo syringe 60 mg (Syringe 2 of 2. Total Dose = 120 mg)  60 mg IntraVENous ONCE  cyclophosphamide (CYTOXAN) 1,210 mg in 0.9% sodium chloride 250 mL, overfill volume 25 mL chemo infusion  1,210 mg IntraVENous ONCE  
 fosaprepitant (EMEND) 150 mg in 0.9% sodium chloride 150 mL IVPB  150 mg IntraVENous ONCE  palonosetron HCl (ALOXI) injection 0.25 mg  0.25 mg IntraVENous ONCE  
 dexamethasone (DECADRON) 12 mg in 0.9% sodium chloride 50 mL IVPB  12 mg IntraVENous ONCE  pegfilgrastim (NEULASTA) wearable SQ injector 6 mg  6 mg SubCUTAneous ONCE  
 0.9% sodium chloride infusion  25 mL/hr IntraVENous CONTINUOUS No Known Allergies Review of Systems: A complete review of systems was obtained, negative except as described above. Physical Exam:  
 
Visit Vitals /72 (BP 1 Location: Left arm, BP Patient Position: Sitting) Pulse 86 Temp 99.1 °F (37.3 °C) (Temporal) Resp 18 Ht 5' 3\" (1.6 m) Wt 196 lb (88.9 kg) SpO2 98% BMI 34.72 kg/m² ECOG PS: 0 General: No distress Eyes: Anicteric sclerae HENT: Atraumatic Neck: Supple Respiratory: Normal respiratory effort CV: No peripheral edema GI: nondistended Skin: No rashes, ecchymoses, or petechiae Psych: Alert, oriented, appropriate affect, normal judgment/insight Breasts: L breast 3:00, 7-8 cmfn, 1.3 cm mass, left ax, 1 cm ax LN Results:  
 
Lab Results Component Value Date/Time WBC 8.7 08/04/2020 08:55 AM  
 HGB 9.5 (L) 08/04/2020 08:55 AM  
 HCT 29.3 (L) 08/04/2020 08:55 AM  
 PLATELET 335 41/13/8715 08:55 AM  
 .0 (H) 08/04/2020 08:55 AM  
 ABS. NEUTROPHILS 5.7 08/04/2020 08:55 AM  
 
Lab Results Component Value Date/Time Sodium 139 08/04/2020 08:55 AM  
 Potassium 3.8 08/04/2020 08:55 AM  
 Chloride 107 08/04/2020 08:55 AM  
 CO2 28 08/04/2020 08:55 AM  
 Glucose 106 (H) 08/04/2020 08:55 AM  
 BUN 11 08/04/2020 08:55 AM  
 Creatinine 0.96 08/04/2020 08:55 AM  
 GFR est AA >60 08/04/2020 08:55 AM  
 GFR est non-AA >60 08/04/2020 08:55 AM  
 Calcium 8.6 08/04/2020 08:55 AM  
 
Lab Results Component Value Date/Time Bilirubin, total 0.3 08/04/2020 08:55 AM  
 ALT (SGPT) 46 08/04/2020 08:55 AM  
 Alk. phosphatase 140 (H) 08/04/2020 08:55 AM  
 Protein, total 7.2 08/04/2020 08:55 AM  
 Albumin 3.5 08/04/2020 08:55 AM  
 Globulin 3.7 08/04/2020 08:55 AM  
 
 
3/31/20 
3D mammogram:  1.6 cm mass in L breast 
US L breast 
3:00 left breast mass 1.6 cm, multiple LN in L ax tail 4/14/2020 CT c/a/p: 
IMPRESSION: Left breast mass. No evidence for metastatic disease in the chest 
abdomen or pelvis. There are no target lesions for RECIST classification.  
 
4/14/2020 Bone Scan: 
 IMPRESSION: No evidence of bony metastatic disease. 5/4/2020 MRI breast: 
IMPRESSION: 
  
Right Breast: 1. BI-RADS Assessment Category 1: Negative. No evidence of breast carcinoma 
within the right breast. 
  
Left Breast: 1. BI-RADS Assessment Category 6: Known biopsy proven malignancy- Appropriate 
action should be taken. Large area of malignant enhancement, occupying most of 
the middle and posterior third of the left breast upper outer and lower-outer 
quadrants, from 2:00 to 4:00. Malignancy is much larger than it had appeared 
mammographically. Greatest measurement is 7 x 4.2 x 3 cm. Contained within this 
large area of enhancement is the dominant 2.5 cm mass, which represents the 
mammographic and sonographic correlate. 2. BI-RADS Assessment Category 6: Known biopsy proven malignancy- Appropriate 
action should be taken. Pathologically proven level 1 left axillary 
lymphadenopathy, with numerous enlarged, malignant lymph nodes. There is also 
level 2 left axillary lymphadenopathy. 3. Retraction of the skin of the lateral left breast, which is thickened. However, no evidence of malignant skin invasion/involvement. 4. The malignant mass closely approximates the pectoral muscle, but there is no 
evidence of direct muscle invasion/involvement. 
  
RECOMMENDATIONS: 
Appropriate action is recommended. The patient is undergoing neoadjuvant 
chemotherapy. There is no evidence of contralateral disease. 7/30/2020 CTA chest at [address]: Normal CTA of the chest with no pulmonary embolism or acute aortic abnormalities identified. Consider mild CHF/pulmonary edema Records reviewed and summarized above. Pathology report(s) reviewed above. Radiology report(s) reviewed above. Assessment/plan: 1. Left IMC, gr 2-3, triple negative, LN + by core:  cT1c cN1a cM0, unifocal, stage IIA both anatomic and prognostic 
postmenopausal 
 
With multiple LN felt on exam, ordered CT c/a/p and bone scan for staging. CT and Bone scan on 4/14/2020 negative for mets. We explained to the patient that the goal of systemic adjuvant therapy is to improve the chances for cure and decrease the risk of relapse. We explained why a patient can have microscopic cancer spread now even though physical examination, laboratory studies and imaging studies are negative for cancer. We explained that the same treatments used now as adjuvant or preventive treatments rarely if ever are curative in women who develop metastases. We discussed that there is no difference in overall survival between neoadjuvant and adjuvant chemotherapy. We discussed the rationale for neoadjuvant chemotherapy, if chemotherapy is warranted, as it is in this case: to avoid any potential delays in giving chemotherapy following surgery, to be able to see the response of the tumor to chemotherapy, and to potentially downstage the tumor prior to surgery. I discussed the potential risks of dose-dense chemotherapy with the patient. (DD AC-T, adriamycin 60 mg/m2; cyclophosphamide 600 mg/m2 q 2 weeks x 4; paclitaxel 80 mg/m2 qweekly x 12). Major toxicities include nausea and vomiting, stomatitis, fatigue, and a small risk of heart damage. Anemia frequently results and occasionally requires growth factors and rarely transfusions. Neutropenic fever is uncommon, but can be a life-threatening problem. Also, there is a small but increased risk of myelodysplasia and acute leukemia. We provided the patient with detailed information concerning toxicity including frequent toxicities that only last a few days, such as nausea, vomiting, mouth sores, arthralgia, myalgia, and potentially allergic reactions to paclitaxel, as well as toxicities which can be longer lasting including total alopecia, fatigue, anemia and neuropathy. We provided the patient with detailed information concerning the toxicities of their regimen in addition to our verbal discussion. We discussed the potential addition of carboplatin auc 6 q 3 weeks during weekly paclitaxel as evaluated in CALGB 30468, showing a significant improvement in pCR for patients with stage II-III triple negative breast cancer. Additional side effects of added myelosuppression, fatigue, renal damage with dehydration, and nausea and vomiting were discussed. Would use AUC 5 as that is the dose that is being used in all current investigations Also discussed the clinical trial NSABP B-59, which is carbo/taxol then DD [facility] +/- atezolizumab. Discussed the positive findings of MERCK 522 with pembrolizumab We discussed the risks and benefits of atezolizumab therapy today. Potential side effects include, but are not limited to fatigue, rash, auto-immune issues, infertility, allergic reactions, and rarely, death. Discussed cold caps (not working with [facility]). Discussed oral and peripheral cryotherapy Discussed COVID19 risks and LETI guidelines stating neoadj chemotherapy for [place] breast cancer is preferred vs upfront surgery during this pandemic. After this discussion, she is agreeable to chemotherapy and port placement. She has signed informed consent for B-59. The patient was given the following prescriptions with written and verbal instructions on how to use each:  Compazine, zofran, olanzapine (held until [facility]), a wig, and emla cream.  IV Shirlean Flies will be used with AC. Neulasta the following day with AC (bone pain side effect discussed). Port placed by Dr. Bhupinder Renee. TTE on 4/13/2020, EF 61%, TTE at [facility] on 7/30/2020, EF 60%. Breast MRI on 5/4/2020. Discussed that with her extent of disease, a mastectomy is warranted. B-59 DDAC #3 today. We will see her back in 2 weeks for DDAC # 4. She would like to stop zyprexa. She had follow up with Dr. Bhupinder Renee on 7/20/2020, with [facility] [address], which showed response to treatment per patient. Next follow up with Dr. Bhupinder Renee is in 7 weeks. Surgery scheduled for 10/8/20 2. Emotional well being:  She has excellent support and is coping well with her disease. She is starting counseling with Cristina Townsend on 7/24/2020.  
 
3. Genetic testing:   Discussed potential ramifications of a positive test including the potential need for bilateral mastectomies and bilateral oophorectomies and the risk then for her family members to also have a mutation. VUS discussed also. Testing performed 4/28/20 by blood, negative. 4. Alt increased:  Mild, due to taxol 5. Headaches:  Dull for few days after treatment. Recommend ibuprofen or tylenol PRN. Encouraged increase PO fluids. Significantly improved. 6. Heartburn:  Recommend Pepcid bid 7. Folliculitis: Clindamycin gel BID PRN, rx in. Getting better. 8. Anemia:  Due to chemo, mild; not clinically significant 9. Knee pain:  To right, intermittent, better with ambulation. Taking PRN Tylenol and Ibuprofen. Heat pack does help. 10. Chest pain:  Was admitted to [address] [place] [address] on 7/29 for intermittent chest pain, had cardiac workup, per patient, this was negative, obtaining records. No chest pain since 7/29/2020. Patient discussed with PCP, who is referring her to cardiology. Likely gerd from the [facility], see above. None with past cycle. 11. Nausea: Taking zofran daily in AM, also taking compazine and zofran PRN with some relief. Recommend zofran every 8 and compazine PRN. Will monitor. I appreciate the opportunity to participate in Ms. Yomi Kurtz's care. Signed By: Amadou Fitzgerald MD   
 
No orders of the defined types were placed in this encounter.

## 2020-08-26 RX ORDER — SODIUM CHLORIDE 9 MG/ML
25 INJECTION, SOLUTION INTRAVENOUS CONTINUOUS
Status: DISCONTINUED | OUTPATIENT
Start: 2020-09-01 | End: 2020-09-02 | Stop reason: HOSPADM

## 2020-08-26 RX ORDER — PALONOSETRON 0.05 MG/ML
0.25 INJECTION, SOLUTION INTRAVENOUS ONCE
Status: COMPLETED | OUTPATIENT
Start: 2020-09-01 | End: 2020-09-01

## 2020-08-26 RX ORDER — DOXORUBICIN HYDROCHLORIDE 2 MG/ML
60 INJECTION, SOLUTION INTRAVENOUS ONCE
Status: COMPLETED | OUTPATIENT
Start: 2020-09-01 | End: 2020-09-01

## 2020-09-01 ENCOUNTER — OFFICE VISIT (OUTPATIENT)
Dept: ONCOLOGY | Age: 55
End: 2020-09-01
Payer: COMMERCIAL

## 2020-09-01 ENCOUNTER — RESEARCH ENCOUNTER (OUTPATIENT)
Dept: ONCOLOGY | Age: 55
End: 2020-09-01

## 2020-09-01 ENCOUNTER — HOSPITAL ENCOUNTER (OUTPATIENT)
Dept: INFUSION THERAPY | Age: 55
Discharge: HOME | End: 2020-09-01
Payer: COMMERCIAL

## 2020-09-01 VITALS
BODY MASS INDEX: 34.75 KG/M2 | OXYGEN SATURATION: 100 % | WEIGHT: 196.1 LBS | HEART RATE: 83 BPM | TEMPERATURE: 97 F | RESPIRATION RATE: 16 BRPM | HEIGHT: 63 IN | DIASTOLIC BLOOD PRESSURE: 79 MMHG | SYSTOLIC BLOOD PRESSURE: 148 MMHG

## 2020-09-01 VITALS
TEMPERATURE: 97 F | BODY MASS INDEX: 34.72 KG/M2 | OXYGEN SATURATION: 100 % | HEART RATE: 93 BPM | RESPIRATION RATE: 16 BRPM | SYSTOLIC BLOOD PRESSURE: 136 MMHG | HEIGHT: 63 IN | DIASTOLIC BLOOD PRESSURE: 71 MMHG

## 2020-09-01 DIAGNOSIS — C50.412 MALIGNANT NEOPLASM OF UPPER-OUTER QUADRANT OF LEFT BREAST IN FEMALE, ESTROGEN RECEPTOR NEGATIVE (HCC): Primary | ICD-10-CM

## 2020-09-01 DIAGNOSIS — Z17.1 MALIGNANT NEOPLASM OF UPPER-OUTER QUADRANT OF LEFT BREAST IN FEMALE, ESTROGEN RECEPTOR NEGATIVE (HCC): Primary | ICD-10-CM

## 2020-09-01 LAB
ALBUMIN SERPL-MCNC: 3.6 G/DL (ref 3.5–5)
ALBUMIN/GLOB SERPL: 1 {RATIO} (ref 1.1–2.2)
ALP SERPL-CCNC: 146 U/L (ref 45–117)
ALT SERPL-CCNC: 36 U/L (ref 12–78)
ANION GAP SERPL CALC-SCNC: 6 MMOL/L (ref 5–15)
AST SERPL-CCNC: 20 U/L (ref 15–37)
BASOPHILS # BLD: 0 K/UL (ref 0–0.1)
BASOPHILS NFR BLD: 0 % (ref 0–1)
BILIRUB SERPL-MCNC: 0.2 MG/DL (ref 0.2–1)
BUN SERPL-MCNC: 12 MG/DL (ref 6–20)
BUN/CREAT SERPL: 13 (ref 12–20)
CALCIUM SERPL-MCNC: 9.1 MG/DL (ref 8.5–10.1)
CHLORIDE SERPL-SCNC: 106 MMOL/L (ref 97–108)
CO2 SERPL-SCNC: 27 MMOL/L (ref 21–32)
CREAT SERPL-MCNC: 0.91 MG/DL (ref 0.55–1.02)
DIFFERENTIAL METHOD BLD: ABNORMAL
EOSINOPHIL # BLD: 0.1 K/UL (ref 0–0.4)
EOSINOPHIL NFR BLD: 1 % (ref 0–7)
ERYTHROCYTE [DISTWIDTH] IN BLOOD BY AUTOMATED COUNT: 17.4 % (ref 11.5–14.5)
GLOBULIN SER CALC-MCNC: 3.6 G/DL (ref 2–4)
GLUCOSE SERPL-MCNC: 114 MG/DL (ref 65–100)
HCT VFR BLD AUTO: 27.9 % (ref 35–47)
HGB BLD-MCNC: 9.1 G/DL (ref 11.5–16)
IMM GRANULOCYTES # BLD AUTO: 0 K/UL
IMM GRANULOCYTES NFR BLD AUTO: 0 %
LYMPHOCYTES # BLD: 1.4 K/UL (ref 0.8–3.5)
LYMPHOCYTES NFR BLD: 10 % (ref 12–49)
MCH RBC QN AUTO: 33.5 PG (ref 26–34)
MCHC RBC AUTO-ENTMCNC: 32.6 G/DL (ref 30–36.5)
MCV RBC AUTO: 102.6 FL (ref 80–99)
METAMYELOCYTES NFR BLD MANUAL: 1 %
MONOCYTES # BLD: 2 K/UL (ref 0–1)
MONOCYTES NFR BLD: 14 % (ref 5–13)
MYELOCYTES NFR BLD MANUAL: 2 %
NEUTS BAND NFR BLD MANUAL: 3 % (ref 0–6)
NEUTS SEG # BLD: 10.2 K/UL (ref 1.8–8)
NEUTS SEG NFR BLD: 69 % (ref 32–75)
NRBC # BLD: 0.04 K/UL (ref 0–0.01)
NRBC BLD-RTO: 0.3 PER 100 WBC
PLATELET # BLD AUTO: 223 K/UL (ref 150–400)
PMV BLD AUTO: 9.3 FL (ref 8.9–12.9)
POTASSIUM SERPL-SCNC: 3.4 MMOL/L (ref 3.5–5.1)
PROT SERPL-MCNC: 7.2 G/DL (ref 6.4–8.2)
RBC # BLD AUTO: 2.72 M/UL (ref 3.8–5.2)
RBC MORPH BLD: ABNORMAL
RBC MORPH BLD: ABNORMAL
SODIUM SERPL-SCNC: 139 MMOL/L (ref 136–145)
WBC # BLD AUTO: 14.1 K/UL (ref 3.6–11)

## 2020-09-01 PROCEDURE — 80053 COMPREHEN METABOLIC PANEL: CPT

## 2020-09-01 PROCEDURE — 96377 APPLICATON ON-BODY INJECTOR: CPT

## 2020-09-01 PROCEDURE — 96417 CHEMO IV INFUS EACH ADDL SEQ: CPT

## 2020-09-01 PROCEDURE — 85025 COMPLETE CBC W/AUTO DIFF WBC: CPT

## 2020-09-01 PROCEDURE — 96375 TX/PRO/DX INJ NEW DRUG ADDON: CPT

## 2020-09-01 PROCEDURE — 74011000250 HC RX REV CODE- 250: Performed by: INTERNAL MEDICINE

## 2020-09-01 PROCEDURE — 96411 CHEMO IV PUSH ADDL DRUG: CPT

## 2020-09-01 PROCEDURE — 74011000258 HC RX REV CODE- 258: Performed by: INTERNAL MEDICINE

## 2020-09-01 PROCEDURE — 96367 TX/PROPH/DG ADDL SEQ IV INF: CPT

## 2020-09-01 PROCEDURE — 77030016057 HC NDL HUBR APOL -B

## 2020-09-01 PROCEDURE — 74011250636 HC RX REV CODE- 250/636: Performed by: INTERNAL MEDICINE

## 2020-09-01 PROCEDURE — 96413 CHEMO IV INFUSION 1 HR: CPT

## 2020-09-01 PROCEDURE — 36415 COLL VENOUS BLD VENIPUNCTURE: CPT

## 2020-09-01 PROCEDURE — 99214 OFFICE O/P EST MOD 30 MIN: CPT | Performed by: INTERNAL MEDICINE

## 2020-09-01 RX ADMIN — SODIUM CHLORIDE 1210 MG: 900 INJECTION, SOLUTION INTRAVENOUS at 12:19

## 2020-09-01 RX ADMIN — PALONOSETRON 0.25 MG: 0.05 INJECTION, SOLUTION INTRAVENOUS at 10:59

## 2020-09-01 RX ADMIN — DEXAMETHASONE SODIUM PHOSPHATE 12 MG: 10 INJECTION, SOLUTION INTRAMUSCULAR; INTRAVENOUS at 11:01

## 2020-09-01 RX ADMIN — Medication 1200 MG: at 13:03

## 2020-09-01 RX ADMIN — DOXORUBICIN HYDROCHLORIDE 60 MG: 2 INJECTION, SOLUTION INTRAVENOUS at 12:10

## 2020-09-01 RX ADMIN — FOSAPREPITANT 150 MG: 150 INJECTION, POWDER, LYOPHILIZED, FOR SOLUTION INTRAVENOUS at 11:01

## 2020-09-01 RX ADMIN — DOXORUBICIN HYDROCHLORIDE 60 MG: 2 INJECTION, SOLUTION INTRAVENOUS at 12:05

## 2020-09-01 RX ADMIN — PEGFILGRASTIM 6 MG: KIT SUBCUTANEOUS at 13:39

## 2020-09-01 RX ADMIN — SODIUM CHLORIDE 25 ML/HR: 900 INJECTION, SOLUTION INTRAVENOUS at 10:41

## 2020-09-01 NOTE — PROGRESS NOTES
OK TO TREAT    Pt in for labs and treatment today per protocol.  This is C4 of treatment with AC and C3 with Atezo/Placebo. Patient reports the following adverse events at this time: gr 1 loss of appetite, gr 2 constipation, gr 2 nausea, gr 1 vomiting, gr 1 hot flashes, gr 1 sob, gr 1 LE edema in ankles  Vital signs are stable. Patient to go to the infusion center to [employer] and Atezo/Placebo. Next appt is scheduled for 9/15/20 (post-tx and MRI).

## 2020-09-01 NOTE — PROGRESS NOTES
hospitals Progress Note Date: 2020 Name: Luis Armando Borden MRN: [id number] : 1965 Ms. Lissette Martin Arrived ambulatory and in no distress for C2D43 of Clinical Trial: NSABP B-59 Treatment 2: Doxorubicin + Cytoxan + Tecentriq/Placebo  Regimen. Assessment was completed, no acute issues at this time, no new complaints voiced. Right chest wall port accessed without difficulty, labs drawn & sent for processing. Chemotherapy Flowsheet 2020 Cycle [id number] Date 2020 Drug / Regimen Clinical Trial: NSABP B-59 Treatment 2: Doxorubicin + Cytoxan + Tecentriq/Placebo Pre Meds -  
Notes -  
 
 
 
0915 Patient proceed to appointment with Dr. Jackson Rose. Received OK to treat from research RN. Ms. Maggie Ana vitals were reviewed. Patient Vitals for the past 12 hrs: 
 Temp Pulse Resp BP SpO2  
20 1340  83 16 148/79   
20 0904 97 °F (36.1 °C) 93 16 136/71 100 % Lab results were obtained and reviewed. Recent Results (from the past 12 hour(s)) CBC WITH AUTOMATED DIFF Collection Time: 20  9:12 AM  
Result Value Ref Range WBC 14.1 (H) 3.6 - 11.0 K/uL  
 RBC 2.72 (L) 3.80 - 5.20 M/uL HGB 9.1 (L) 11.5 - 16.0 g/dL HCT 27.9 (L) 35.0 - 47.0 % .6 (H) 80.0 - 99.0 FL  
 MCH 33.5 26.0 - 34.0 PG  
 MCHC 32.6 30.0 - 36.5 g/dL  
 RDW 17.4 (H) 11.5 - 14.5 % PLATELET 300 388 - 776 K/uL MPV 9.3 8.9 - 12.9 FL  
 NRBC 0.3 (H) 0  WBC ABSOLUTE NRBC 0.04 (H) 0.00 - 0.01 K/uL NEUTROPHILS 69 32 - 75 % BAND NEUTROPHILS 3 0 - 6 % LYMPHOCYTES 10 (L) 12 - 49 % MONOCYTES 14 (H) 5 - 13 % EOSINOPHILS 1 0 - 7 % BASOPHILS 0 0 - 1 % METAMYELOCYTES 1 (H) 0 % MYELOCYTES 2 (H) 0 % IMMATURE GRANULOCYTES 0 %  
 ABS. NEUTROPHILS 10.2 (H) 1.8 - 8.0 K/UL  
 ABS. LYMPHOCYTES 1.4 0.8 - 3.5 K/UL  
 ABS. MONOCYTES 2.0 (H) 0.0 - 1.0 K/UL  
 ABS. EOSINOPHILS 0.1 0.0 - 0.4 K/UL  
 ABS. BASOPHILS 0.0 0.0 - 0.1 K/UL ABS. IMM. GRANS. 0.0 K/UL  
 DF MANUAL    
 RBC COMMENTS POLYCHROMASIA 1+ 
    
 RBC COMMENTS MACROCYTOSIS 
1+ METABOLIC PANEL, COMPREHENSIVE Collection Time: 09/01/20  9:12 AM  
Result Value Ref Range Sodium 139 136 - 145 mmol/L Potassium 3.4 (L) 3.5 - 5.1 mmol/L Chloride 106 97 - 108 mmol/L  
 CO2 27 21 - 32 mmol/L Anion gap 6 5 - 15 mmol/L Glucose 114 (H) 65 - 100 mg/dL BUN 12 6 - 20 MG/DL Creatinine 0.91 0.55 - 1.02 MG/DL  
 BUN/Creatinine ratio 13 12 - 20 GFR est AA >60 >60 ml/min/1.73m2 GFR est non-AA >60 >60 ml/min/1.73m2 Calcium 9.1 8.5 - 10.1 MG/DL Bilirubin, total 0.2 0.2 - 1.0 MG/DL  
 ALT (SGPT) 36 12 - 78 U/L  
 AST (SGOT) 20 15 - 37 U/L Alk. phosphatase 146 (H) 45 - 117 U/L Protein, total 7.2 6.4 - 8.2 g/dL Albumin 3.6 3.5 - 5.0 g/dL Globulin 3.6 2.0 - 4.0 g/dL A-G Ratio 1.0 (L) 1.1 - 2.2 Medications: 
Medications Administered 0.9% sodium chloride infusion Admin Date 
09/01/2020 Action New Bag Dose 25 mL/hr Rate 25 mL/hr Route IntraVENous Administered By Michele Godoy RN  
  
  
 cyclophosphamide (CYTOXAN) 1,210 mg in 0.9% sodium chloride 250 mL, overfill volume 25 mL chemo infusion Admin Date 
09/01/2020 Action New Bag Dose 1210 mg Rate 671 mL/hr Route IntraVENous Administered By Michele Godoy RN  
  
  
 dexamethasone (DECADRON) 12 mg in 0.9% sodium chloride 50 mL IVPB Admin Date 
09/01/2020 Action Given Dose 
12 mg Route IntraVENous Administered By Michele Young RN  
  
  
 DOXOrubicin (ADRIAMYCIN) chemo syringe 60 mg (Syringe 1 of 2. Total Dose = 120 mg) Admin Date 
09/01/2020 Action Given Dose 60 mg Route IntraVENous Administered By Michele Young RN  
  
  
 DOXOrubicin (ADRIAMYCIN) chemo syringe 60 mg (Syringe 2 of 2. Total Dose = 120 mg) Admin Date 
09/01/2020 Action Given Dose 60 mg Route IntraVENous Administered By Michele Godoy RN  
 fosaprepitant (EMEND) 150 mg in 0.9% sodium chloride 150 mL IVPB Admin Date 
09/01/2020 Action Given Dose 
150 mg Rate 450 mL/hr Route IntraVENous Administered By Elaina Morejon RN  
  
  
 INV NSABP B-59 atezolizumab / placebo 1,200 mg in 0.9% sodium chloride 250 mL, overfill volume 25 mL [employer]STIGATIONAL IVPB Admin Date 
09/01/2020 Action Given Dose 
1200 mg Rate 
590 mL/hr Route IntraVENous Administered By Elaina Morejon RN  
  
  
 palonosetron HCl (ALOXI) injection 0.25 mg   
 Admin Date 
09/01/2020 Action Given Dose 0.25 mg Route IntraVENous Administered By Elaina Morejon RN  
  
  
 pegfilgrastim (NEULASTA) wearable SQ injector 6 mg Admin Date 
09/01/2020 Action Given Dose 6 mg Route SubCUTAneous Administered By Elaina Morejon RN  
  
  
  
 
 
 
Ms. Keely Malik tolerated treatment well and was discharged from [place] [address] in stable condition at 1340. Port de-accessed, flushed & heparinized per protocol. She is to return on September 15 at 0830 for her next appointment. Adenike Mendez RN September 1, 2020

## 2020-09-01 NOTE — PROGRESS NOTES
[facility] [place] [address] [place] [address], [address] W: [phone]  F: [phone] Reason for Visit:  
Harley De Oliveira is a 48 y.o. female who is seen in consultation at the request of Dr. Robert Traylor for evaluation of therapy for breast cancer Rad onc:  Dr. Chris Prieto Treatment History: · 20 left breast 3:00, 13cmfn, core bx:  IMC, gr 2-3, 1.3 cm, ER negative, AR negative, HER 2 negative at IHC 0; ki67 68%; LN + by core, 6 mm, gr 2-3 · B-59 Carbo/Taxol +/- atezolizumab 2020-2020 · [facility] 2020: negative · B-59 DDAC 2020- History of Present Illness:  
19 mammogram was negative, she felt the L breast mass herself, earlier in 2020. Interval history: In today for follow up and treatment. Complains of gr 1 loss of appetite, gr 2 constipation, gr 2 nausea, gr 1 vomiting, gr 1 hot flashes, gr 1 sob, gr 1 LE edema in ankles Nausea was worse this last cycle FH: Mother with breast cancer at age 79; maternal aunt with breast cancer in her 45s; maternal aunt with breast cancer at age 61; no ovarian, prostate, or pancreas cancer Past Medical History:  
Diagnosis Date  ADHD  Cancer ([address] LEFT BREAST CA  
 Sleep apnea MILD -NO CPAP Past Surgical History:  
Procedure Laterality Date  HX BREAST BIOPSY Left 4/15/2020 BREAST BIOPSY performed by Milena Rice MD at [facility] MAIN OR  
 HX  SECTION  98, 99  
 HX GI    
 COLONOSCOPY  
 HX ORTHOPAEDIC    
 RIGHT ELBOW-FATTY MASS Social History Tobacco Use  Smoking status: Light Tobacco Smoker  Smokeless tobacco: Never Used  Tobacco comment: RARE cigar per patient Substance Use Topics  Alcohol use: Yes Alcohol/week: 6.0 standard drinks Types: 6 Glasses of wine per week Family History Problem Relation Age of Onset  Cancer Mother Breast  
 Cancer Maternal Aunt      Breast  
  Cancer Paternal Uncle  Cancer Maternal Aunt Breast  
 Cancer Paternal Uncle Current Outpatient Medications Medication Sig  OLANZapine (ZyPREXA) 10 mg tablet Please start taking for 4 nights starting the day after chemo.  clindamycin (CLINDAGEL) 1 % topical gel APPLY TO AFFECTED AREA TWICE A DAY (USE THIN FILM)  lisdexamfetamine dimesylate (VYVANSE PO) Take  by mouth.  CLONAZEPAM PO Take  by mouth.  pyridoxine, vitamin B6, (VITAMIN B-6) 50 mg tablet Take 50 mg by mouth daily.  cholecalciferol, vitamin D3, (VITAMIN D3 PO) Take 5,000 Units by mouth daily.  prochlorperazine (COMPAZINE) 10 mg tablet Take 1 Tab by mouth every six (6) hours as needed for Nausea.  lidocaine-prilocaine (EMLA) topical cream Apply  to affected area as needed for Pain.  ondansetron hcl (ZOFRAN) 8 mg tablet Take 1 Tab by mouth every eight (8) hours as needed for Nausea. No current facility-administered medications for this visit. Facility-Administered Medications Ordered in Other Visits Medication Dose Route Frequency  INV atezolizumab / placebo 1,200 mg in 0.9% sodium chloride 250 mL, overfill volume 25 mL INVESTIGATIONAL IVPB  1,200 mg IntraVENous ONCE  
 0.9% sodium chloride infusion  25 mL/hr IntraVENous CONTINUOUS  
 DOXOrubicin (ADRIAMYCIN) chemo syringe 60 mg (Syringe 1 of 2. Total Dose = 120 mg)  60 mg IntraVENous ONCE  
 DOXOrubicin (ADRIAMYCIN) chemo syringe 60 mg (Syringe 2 of 2. Total Dose = 120 mg)  60 mg IntraVENous ONCE  cyclophosphamide (CYTOXAN) 1,210 mg in 0.9% sodium chloride 250 mL, overfill volume 25 mL chemo infusion  1,210 mg IntraVENous ONCE  
 fosaprepitant (EMEND) 150 mg in 0.9% sodium chloride 150 mL IVPB  150 mg IntraVENous ONCE  palonosetron HCl (ALOXI) injection 0.25 mg  0.25 mg IntraVENous ONCE  
 dexamethasone (DECADRON) 12 mg in 0.9% sodium chloride 50 mL IVPB  12 mg IntraVENous ONCE  
  pegfilgrastim (NEULASTA) wearable SQ injector 6 mg  6 mg SubCUTAneous ONCE No Known Allergies Review of Systems: A complete review of systems was obtained, negative except as described above. Physical Exam:  
 
Visit Vitals /71 (BP 1 Location: Left arm, BP Patient Position: Sitting) Pulse 93 Temp 97 °F (36.1 °C) (Temporal) Resp 16 Ht 5' 3\" (1.6 m) SpO2 100% BMI 34.72 kg/m² ECOG PS: 0 General: No distress Eyes: Anicteric sclerae HENT: Atraumatic Neck: Supple Respiratory: Normal respiratory effort CV: No peripheral edema GI: nondistended Skin: No rashes, ecchymoses, or petechiae Psych: Alert, oriented, appropriate affect, normal judgment/insight Breasts: L breast 3:00, 7-8 cmfn, not able to palpate mass or LN Results:  
 
Lab Results Component Value Date/Time WBC 12.5 (H) 08/18/2020 08:50 AM  
 HGB 9.1 (L) 08/18/2020 08:50 AM  
 HCT 28.4 (L) 08/18/2020 08:50 AM  
 PLATELET 131 48/54/2211 08:50 AM  
 .2 (H) 08/18/2020 08:50 AM  
 ABS. NEUTROPHILS 8.0 08/18/2020 08:50 AM  
 
Lab Results Component Value Date/Time Sodium 141 08/18/2020 08:50 AM  
 Potassium 3.5 08/18/2020 08:50 AM  
 Chloride 108 08/18/2020 08:50 AM  
 CO2 28 08/18/2020 08:50 AM  
 Glucose 86 08/18/2020 08:50 AM  
 BUN 9 08/18/2020 08:50 AM  
 Creatinine 1.07 (H) 08/18/2020 08:50 AM  
 GFR est AA >60 08/18/2020 08:50 AM  
 GFR est non-AA 53 (L) 08/18/2020 08:50 AM  
 Calcium 8.9 08/18/2020 08:50 AM  
 
Lab Results Component Value Date/Time Bilirubin, total 0.1 (L) 08/18/2020 08:50 AM  
 ALT (SGPT) 37 08/18/2020 08:50 AM  
 Alk. phosphatase 154 (H) 08/18/2020 08:50 AM  
 Protein, total 7.3 08/18/2020 08:50 AM  
 Albumin 3.6 08/18/2020 08:50 AM  
 Globulin 3.7 08/18/2020 08:50 AM  
 
 
3/31/20 
3D mammogram:  1.6 cm mass in L breast 
US L breast 
3:00 left breast mass 1.6 cm, multiple LN in L ax tail 4/14/2020 CT c/a/p: 
 IMPRESSION: Left breast mass. No evidence for metastatic disease in the chest 
abdomen or pelvis. There are no target lesions for RECIST classification. 4/14/2020 Bone Scan: 
IMPRESSION: No evidence of bony metastatic disease. 5/4/2020 MRI breast: 
IMPRESSION: 
  
Right Breast: 1. BI-RADS Assessment Category 1: Negative. No evidence of breast carcinoma 
within the right breast. 
  
Left Breast: 1. BI-RADS Assessment Category 6: Known biopsy proven malignancy- Appropriate 
action should be taken. Large area of malignant enhancement, occupying most of 
the middle and posterior third of the left breast upper outer and lower-outer 
quadrants, from 2:00 to 4:00. Malignancy is much larger than it had appeared 
mammographically. Greatest measurement is 7 x 4.2 x 3 cm. Contained within this 
large area of enhancement is the dominant 2.5 cm mass, which represents the 
mammographic and sonographic correlate. 2. BI-RADS Assessment Category 6: Known biopsy proven malignancy- Appropriate 
action should be taken. Pathologically proven level 1 left axillary 
lymphadenopathy, with numerous enlarged, malignant lymph nodes. There is also 
level 2 left axillary lymphadenopathy. 3. Retraction of the skin of the lateral left breast, which is thickened. However, no evidence of malignant skin invasion/involvement. 4. The malignant mass closely approximates the pectoral muscle, but there is no 
evidence of direct muscle invasion/involvement. 
  
RECOMMENDATIONS: 
Appropriate action is recommended. The patient is undergoing neoadjuvant 
chemotherapy. There is no evidence of contralateral disease. 7/30/2020 CTA chest at [facility]: Normal CTA of the chest with no pulmonary embolism or acute aortic abnormalities identified. Consider mild CHF/pulmonary edema Records reviewed and summarized above. Pathology report(s) reviewed above. Radiology report(s) reviewed above. Assessment/plan: 1. Left IMC, gr 2-3, triple negative, LN + by core:  cT1c cN1a cM0, unifocal, stage IIA both anatomic and prognostic 
postmenopausal 
 
With multiple LN felt on exam, ordered CT c/a/p and bone scan for staging. CT and Bone scan on 4/14/2020 negative for mets. We explained to the patient that the goal of systemic adjuvant therapy is to improve the chances for cure and decrease the risk of relapse. We explained why a patient can have microscopic cancer spread now even though physical examination, laboratory studies and imaging studies are negative for cancer. We explained that the same treatments used now as adjuvant or preventive treatments rarely if ever are curative in women who develop metastases. We discussed that there is no difference in overall survival between neoadjuvant and adjuvant chemotherapy. We discussed the rationale for neoadjuvant chemotherapy, if chemotherapy is warranted, as it is in this case: to avoid any potential delays in giving chemotherapy following surgery, to be able to see the response of the tumor to chemotherapy, and to potentially downstage the tumor prior to surgery. I discussed the potential risks of dose-dense chemotherapy with the patient. (DD AC-T, adriamycin 60 mg/m2; cyclophosphamide 600 mg/m2 q 2 weeks x 4; paclitaxel 80 mg/m2 qweekly x 12). Major toxicities include nausea and vomiting, stomatitis, fatigue, and a small risk of heart damage. Anemia frequently results and occasionally requires growth factors and rarely transfusions. Neutropenic fever is uncommon, but can be a life-threatening problem. Also, there is a small but increased risk of myelodysplasia and acute leukemia.  We provided the patient with detailed information concerning toxicity including frequent toxicities that only last a few days, such as nausea, vomiting, mouth sores, arthralgia, myalgia, and potentially allergic reactions to paclitaxel, as well as toxicities which can be longer lasting including total alopecia, fatigue, anemia and neuropathy. We provided the patient with detailed information concerning the toxicities of their regimen in addition to our verbal discussion. We discussed the potential addition of carboplatin auc 6 q 3 weeks during weekly paclitaxel as evaluated in CALGB 64933, showing a significant improvement in pCR for patients with stage II-III triple negative breast cancer. Additional side effects of added myelosuppression, fatigue, renal damage with dehydration, and nausea and vomiting were discussed. Would use AUC 5 as that is the dose that is being used in all current investigations Also discussed the clinical trial NSABP B-59, which is carbo/taxol then DD [facility] [place] +/- atezolizumab. Discussed the positive findings of MERCK 522 with pembrolizumab We discussed the risks and benefits of atezolizumab therapy today. Potential side effects include, but are not limited to fatigue, rash, auto-immune issues, infertility, allergic reactions, and rarely, death. Discussed cold caps (not working with [facility] [place]). Discussed oral and peripheral cryotherapy Discussed COVID19 risks and LETI guidelines stating neoadj chemotherapy for [place] breast cancer is preferred vs upfront surgery during this pandemic. After this discussion, she is agreeable to chemotherapy and port placement. She has signed informed consent for B-59. The patient was given the following prescriptions with written and verbal instructions on how to use each:  Compazine, zofran, olanzapine (held until [facility] [place]), a wig, and emla cream.  IV Maralyn Page will be used with AC. Neulasta the following day with AC (bone pain side effect discussed). Port placed by Dr. Tiffanie English. TTE on 4/13/2020, EF 61%, TTE at [address] on 7/30/2020, EF 60%. Breast MRI on 5/4/2020. Discussed that with her extent of disease, a mastectomy is warranted. B-59 DDAC #4 today. We will see her back in 2 weeks. She had follow up with Dr. Alek Veliz on 7/20/2020, with [facility] [address] [facility], which showed response to treatment per patient. Next follow up with Dr. Alek Veliz is in 7 weeks. Surgery scheduled for 10/8/20 2. Emotional well being:  She has excellent support and is coping well with her disease. She is starting counseling with Merlinda Daring on 7/24/2020.  
 
3. Genetic testing:   Discussed potential ramifications of a positive test including the potential need for bilateral mastectomies and bilateral oophorectomies and the risk then for her family members to also have a mutation. VUS discussed also. Testing performed 4/28/20 by blood, negative. 4. Alt increased:  resolved 5. Heartburn:  Recommend Pepcid bid 6. Anemia:  Due to chemo, mild; not clinically significant 7. Chest pain:  Was admitted to [facility] on 7/29 for intermittent chest pain, had cardiac workup, per patient, this was negative, obtaining records. No chest pain since 7/29/2020. Patient discussed with PCP, who is referring her to cardiology. Likely gerd from the [facility], see above. Mild with past cycle after neulasta 8. Nausea: worse; Taking zofran q8h, but still having nausea, also taking compazine  PRN with some relief. Will add olanzapine back over 3 days 9. Constipation:  Due to zofran, worse, handout discussed, having her start colace 10. Taste changes:  Due to chemo, should resolve with time 11. Hypokalemia:  Mild, she will increase her PO banana intake I appreciate the opportunity to participate in Ms. Ankit Kurtz's care. Signed By: Yolette Mantilla MD   
 
No orders of the defined types were placed in this encounter.

## 2020-09-01 NOTE — PROGRESS NOTES
Antonio Florian is a 48 y.o. female Follow up for the Evaluation of Breast Cancer. 1. Have you been to the ER, urgent care clinic since your last visit? Hospitalized since your last visit? No 
 
2. Have you seen or consulted any other health care providers outside of the [address] [place] [address] since your last visit? Include any pap smears or colon screening.  No

## 2020-09-14 ENCOUNTER — OFFICE VISIT (OUTPATIENT)
Dept: SURGERY | Age: 55
End: 2020-09-14
Payer: COMMERCIAL

## 2020-09-14 VITALS
TEMPERATURE: 99.9 F | SYSTOLIC BLOOD PRESSURE: 140 MMHG | DIASTOLIC BLOOD PRESSURE: 71 MMHG | WEIGHT: 196 LBS | HEIGHT: 63 IN | HEART RATE: 93 BPM | BODY MASS INDEX: 34.73 KG/M2

## 2020-09-14 DIAGNOSIS — C77.3 BREAST CANCER METASTASIZED TO AXILLARY LYMPH NODE, LEFT (HCC): Primary | ICD-10-CM

## 2020-09-14 DIAGNOSIS — C50.912 BREAST CANCER METASTASIZED TO AXILLARY LYMPH NODE, LEFT (HCC): Primary | ICD-10-CM

## 2020-09-14 PROCEDURE — 99213 OFFICE O/P EST LOW 20 MIN: CPT | Performed by: SURGERY

## 2020-09-14 NOTE — PATIENT INSTRUCTIONS
MRI of the Breast: About This Test 
What is it? MRI (magnetic resonance imaging) is a test that uses a magnetic field and pulses of radio wave energy to make pictures of the organs and structures inside the body. An MRI can give your doctor information about your breasts, chest wall, and underarm. When you have an MRI, you lie on a table and the table moves into the MRI machine. Why is this test done? An MRI of the breast can help find breast cancer and how far along it is (its stage). It can also look for infection. How do you prepare for the test? 
In general, there's nothing you have to do before this test, unless your doctor tells you to. Tell your doctor if you get nervous in tight spaces. You may get a medicine to help you relax. If you think you'll get this medicine, be sure you have someone to take you home. How is the test done? · You may have contrast material (dye) put into your arm through a tube called an IV. · You will lie on a table that's part of the MRI scanner. · The table will slide into the space that contains the magnet. · Inside the scanner, you will hear a fan and feel air moving. You may hear tapping, thumping, or snapping noises. You may be given earplugs or headphones to reduce the noise. · You will be asked to hold still during the scan. You may be asked to hold your breath for short periods. · You may be alone in the scanning room. But a technologist will watch through a window and talk with you during the test. 
How does having an MRI of the breast feel? You won't have pain from the magnetic field or radio waves used for the MRI test. But you may be tired or sore from lying in one position for a long time. If a contrast material is used, you may feel some coolness when it is put into your IV. In rare cases, you may feel: · Tingling in the mouth if you have metal dental fillings. · Warmth in the area being checked.  This is normal. Tell the technologist if you have nausea, vomiting, a headache, dizziness, pain, burning, or breathing problems. How long does the test take? The test usually takes 30 to 60 minutes but can take as long as 2 hours. What are the risks of an MRI of the breast? 
There are no known harmful effects from the strong magnetic field used for an MRI. But the magnet is very powerful. It may affect any metal implants or other medical devices you have. An MRI may be more likely than other tests to report a problem in the breast when a problem is not there (false-positive). A false-positive result may lead to more tests such as a biopsy when no serious problem is really present. So MRI is not used as a screening test for women at low or average risk for breast cancer. Risks from contrast material 
Contrast material that contains gadolinium may be used in this test. But for most people, the benefit of its use in this test outweighs the risk. Be sure to tell your doctor if you have kidney problems or are pregnant. There is a slight chance of an allergic reaction if contrast material is used during the test. But most reactions are mild and can be treated using medicine. If you breastfeed and are concerned about whether the contrast material used in this test is safe, talk to your doctor. Most experts believe that very little dye passes into breast milk and even less is passed on to the baby. But if you are concerned, you can stop breastfeeding for up to 24 hours after the test. During this time, you can give your baby breast milk that you stored before the test. Don't use the breast milk you pump in the 24 hours after the test. Throw it out. What happens after the test? 
· You will probably be able to go home right away. It depends on the reason for the test. 
· You can go back to your usual activities right away. Follow-up care is a key part of your treatment and safety.  Be sure to make and go to all appointments, and call your doctor if you are having problems. It's also a good idea to keep a list of the medicines you take. Ask your doctor when you can expect to have your test results. Where can you learn more? Go to http://irvin-thu.info/ Enter T419 in the search box to learn more about \"MRI of the Breast: About This Test.\" Current as of: December 9, 2019               Content Version: 12.6 © 5135-2392 [employer]. Care instructions adapted under license by [employer] (which disclaims liability or warranty for this information). If you have questions about a medical condition or this instruction, always ask your healthcare professional. [address] any warranty or liability for your use of this information.

## 2020-09-14 NOTE — H&P (VIEW-ONLY)
HISTORY OF PRESENT ILLNESS Coley Sever is a 48 y.o. female. HPI ESTABLISHED patient here to discuss surgery for LEFT breast cancer. She completed neoadjuvant chemotherapy about 2 weeks ago. No breast problems. She has already met with Dr. Junior Shanks. ESTABLISHED patient here today for follow up LEFT breast cancer. The patient is currently receiving neoadjuvant chemotherapy and has 8 more cycles. She is doing well and states she is no longer able  to palpate the breast breast tumor. Breast cancer- 
4/6/2020 - left breast 3:00, 13cmfn, core bx: Imer, gr 2-3, 1.3 cm, ER negative, WA negative, HER 2 negative at IHC 0; ki67 68%; LN + by core, 6 mm, gr 2-3 4/15/2020 - port insertion 4/28/2020 - 7/14/2020 - neoadjuvant chemotherapy (B-59 Carbo/Taxol +/- atezolizumab) - Dr. Diana Kramer 5/4/2020- breast mri 7 cm enhancement left breast, multiple suspicious nodes 4/14/2020- bone scan, ct scan no mets 7/21/2020 9/1/2020 - B-59 DDAC [facility] 4/28/2020: negative Breast imaging- The patient is scheduled for a breast mri tomorrow 9/15/2020 Review of Systems All other systems reviewed and are negative. Physical Exam 
Vitals signs and nursing note reviewed. Chest:  
   Breasts: Breasts are symmetrical.  
      Right: No inverted nipple, mass, nipple discharge, skin change or tenderness. Left: No inverted nipple, mass, nipple discharge, skin change or tenderness. Lymphadenopathy:  
   Cervical: No cervical adenopathy. ASSESSMENT and PLAN 
  ICD-10-CM ICD-9-CM 1. Breast cancer metastasized to axillary lymph node, left (HCC)  C50.912 174.9   
 C77.3 196.3 Breast cancer- 
4/6/2020 - left breast 3:00, 13cmfn, core bx: Imer, gr 2-3, 1.3 cm, ER negative, WA negative, HER 2 negative at IHC 0; ki67 68%; LN + by core, 6 mm, gr 2-3 4/15/2020 - port insertion 4/28/2020 - 7/14/2020 - neoadjuvant chemotherapy (B-59 Carbo/Taxol +/- atezolizumab) - Dr. Diana Kramer 5/4/2020- breast mri 7 cm enhancement left breast, multiple suspicious nodes 4/14/2020- bone scan, ct scan no mets 7/21/2020 9/1/2020 - B-59 DDAC  
 
- exam normal.  
Patient having mri tomorrow. Will call her with results. She would like bilateral mastectomies She has seen Dr. Justice Yanez and will let him know plan which is bilateral skin sparing mastectomies, left alnd, recon. She will need postop xrt and has met with Dr. Urvashi Malagon. Will schedule.

## 2020-09-14 NOTE — PROGRESS NOTES
HISTORY OF PRESENT ILLNESS Catalina Connors is a 48 y.o. female. HPI ESTABLISHED patient here to discuss surgery for LEFT breast cancer. She completed neoadjuvant chemotherapy about 2 weeks ago. No breast problems. She has already met with Dr. Chely Villarreal. ESTABLISHED patient here today for follow up LEFT breast cancer. The patient is currently receiving neoadjuvant chemotherapy and has 8 more cycles. She is doing well and states she is no longer able  to palpate the breast breast tumor. Breast cancer- 
4/6/2020 - left breast 3:00, 13cmfn, core bx: Imer, gr 2-3, 1.3 cm, ER negative, MD negative, HER 2 negative at IHC 0; ki67 68%; LN + by core, 6 mm, gr 2-3 4/15/2020 - port insertion 4/28/2020 - 7/14/2020 - neoadjuvant chemotherapy (B-59 Carbo/Taxol +/- atezolizumab) - Dr. Rozina Parikh 5/4/2020- breast mri 7 cm enhancement left breast, multiple suspicious nodes 4/14/2020- bone scan, ct scan no mets 7/21/2020 9/1/2020 - B-59 DDAC [facility] 4/28/2020: negative Breast imaging- The patient is scheduled for a breast mri tomorrow 9/15/2020 Review of Systems All other systems reviewed and are negative. Physical Exam 
Vitals signs and nursing note reviewed. Chest:  
   Breasts: Breasts are symmetrical.  
      Right: No inverted nipple, mass, nipple discharge, skin change or tenderness. Left: No inverted nipple, mass, nipple discharge, skin change or tenderness. Lymphadenopathy:  
   Cervical: No cervical adenopathy. ASSESSMENT and PLAN 
  ICD-10-CM ICD-9-CM 1. Breast cancer metastasized to axillary lymph node, left (HCC)  C50.912 174.9   
 C77.3 196.3 Breast cancer- 
4/6/2020 - left breast 3:00, 13cmfn, core bx: Imer, gr 2-3, 1.3 cm, ER negative, MD negative, HER 2 negative at IHC 0; ki67 68%; LN + by core, 6 mm, gr 2-3 4/15/2020 - port insertion 4/28/2020 - 7/14/2020 - neoadjuvant chemotherapy (B-59 Carbo/Taxol +/- atezolizumab) - Dr. Rozina Parikh 5/4/2020- breast mri 7 cm enhancement left breast, multiple suspicious nodes 4/14/2020- bone scan, ct scan no mets 7/21/2020 9/1/2020 - B-59 DDAC  
 
- exam normal.  
Patient having mri tomorrow. Will call her with results. She would like bilateral mastectomies She has seen Dr. David Du and will let him know plan which is bilateral skin sparing mastectomies, left alnd, recon. She will need postop xrt and has met with Dr. Galileo Ellingotn. Will schedule.

## 2020-09-14 NOTE — LETTER
9/15/20 Patient: Gilford Hasten YOB: 1965 Date of Visit: 9/14/2020 Hira Hernandez MD 
[facility] [address] [facility] [address] VIA Facsimile: [phone] Toney Crigler, MD 
[address] [place] [address] [phone] [facility] [address] VIA In Basket Dear Havery Goldstein, MD Toney Crigler, MD, Thank you for referring Ms. Gilford Hasten to [address] [facility] [place] MAIN OFFICE [address] [place] [address] for evaluation. My notes for this consultation are attached. If you have questions, please do not hesitate to call me. I look forward to following your patient along with you. Sincerely, Sofi Ochoa MD

## 2020-09-15 ENCOUNTER — OFFICE VISIT (OUTPATIENT)
Dept: ONCOLOGY | Age: 55
End: 2020-09-15
Payer: COMMERCIAL

## 2020-09-15 ENCOUNTER — HOSPITAL ENCOUNTER (OUTPATIENT)
Dept: INFUSION THERAPY | Age: 55
Discharge: HOME | End: 2020-09-15
Payer: COMMERCIAL

## 2020-09-15 ENCOUNTER — HOSPITAL ENCOUNTER (OUTPATIENT)
Dept: MRI IMAGING | Age: 55
Discharge: HOME | End: 2020-09-15
Attending: INTERNAL MEDICINE
Payer: COMMERCIAL

## 2020-09-15 ENCOUNTER — RESEARCH ENCOUNTER (OUTPATIENT)
Dept: ONCOLOGY | Age: 55
End: 2020-09-15

## 2020-09-15 VITALS
OXYGEN SATURATION: 100 % | HEART RATE: 88 BPM | RESPIRATION RATE: 16 BRPM | TEMPERATURE: 98.2 F | DIASTOLIC BLOOD PRESSURE: 74 MMHG | SYSTOLIC BLOOD PRESSURE: 114 MMHG

## 2020-09-15 VITALS
OXYGEN SATURATION: 100 % | HEART RATE: 88 BPM | HEIGHT: 63 IN | SYSTOLIC BLOOD PRESSURE: 114 MMHG | DIASTOLIC BLOOD PRESSURE: 74 MMHG | BODY MASS INDEX: 34.72 KG/M2 | TEMPERATURE: 98.2 F | RESPIRATION RATE: 16 BRPM

## 2020-09-15 DIAGNOSIS — C50.412 MALIGNANT NEOPLASM OF UPPER-OUTER QUADRANT OF LEFT BREAST IN FEMALE, ESTROGEN RECEPTOR NEGATIVE (HCC): ICD-10-CM

## 2020-09-15 DIAGNOSIS — Z17.1 MALIGNANT NEOPLASM OF UPPER-OUTER QUADRANT OF LEFT BREAST IN FEMALE, ESTROGEN RECEPTOR NEGATIVE (HCC): ICD-10-CM

## 2020-09-15 DIAGNOSIS — C50.412 MALIGNANT NEOPLASM OF UPPER-OUTER QUADRANT OF LEFT BREAST IN FEMALE, ESTROGEN RECEPTOR NEGATIVE (HCC): Primary | ICD-10-CM

## 2020-09-15 DIAGNOSIS — Z51.11 CHEMOTHERAPY MANAGEMENT, ENCOUNTER FOR: ICD-10-CM

## 2020-09-15 DIAGNOSIS — Z17.1 MALIGNANT NEOPLASM OF UPPER-OUTER QUADRANT OF LEFT BREAST IN FEMALE, ESTROGEN RECEPTOR NEGATIVE (HCC): Primary | ICD-10-CM

## 2020-09-15 LAB
ALBUMIN SERPL-MCNC: 3.7 G/DL (ref 3.5–5)
ALBUMIN/GLOB SERPL: 1 {RATIO} (ref 1.1–2.2)
ALP SERPL-CCNC: 157 U/L (ref 45–117)
ALT SERPL-CCNC: 32 U/L (ref 12–78)
ANION GAP SERPL CALC-SCNC: 4 MMOL/L (ref 5–15)
AST SERPL-CCNC: 17 U/L (ref 15–37)
BASO+EOS+MONOS # BLD AUTO: 1 K/UL (ref 0.2–1.2)
BASO+EOS+MONOS NFR BLD AUTO: 11 % (ref 3.2–16.9)
BILIRUB SERPL-MCNC: 0.4 MG/DL (ref 0.2–1)
BUN SERPL-MCNC: 8 MG/DL (ref 6–20)
BUN/CREAT SERPL: 9 (ref 12–20)
CALCIUM SERPL-MCNC: 9.3 MG/DL (ref 8.5–10.1)
CHLORIDE SERPL-SCNC: 105 MMOL/L (ref 97–108)
CO2 SERPL-SCNC: 30 MMOL/L (ref 21–32)
CORTIS SERPL-MCNC: 11.8 UG/DL
CREAT SERPL-MCNC: 0.89 MG/DL (ref 0.55–1.02)
DIFFERENTIAL METHOD BLD: ABNORMAL
ERYTHROCYTE [DISTWIDTH] IN BLOOD BY AUTOMATED COUNT: 17.4 % (ref 11.8–15.8)
GLOBULIN SER CALC-MCNC: 3.8 G/DL (ref 2–4)
GLUCOSE SERPL-MCNC: 88 MG/DL (ref 65–100)
HCT VFR BLD AUTO: 27.6 % (ref 35–47)
HGB BLD-MCNC: 9.3 G/DL (ref 11.5–16)
LYMPHOCYTES # BLD: 0.6 K/UL (ref 0.8–3.5)
LYMPHOCYTES NFR BLD: 7 % (ref 12–49)
MCH RBC QN AUTO: 34.4 PG (ref 26–34)
MCHC RBC AUTO-ENTMCNC: 33.7 G/DL (ref 30–36.5)
MCV RBC AUTO: 102.2 FL (ref 80–99)
NEUTS SEG # BLD: 7.5 K/UL (ref 1.8–8)
NEUTS SEG NFR BLD: 83 % (ref 32–75)
PLATELET # BLD AUTO: 200 K/UL (ref 150–400)
POTASSIUM SERPL-SCNC: 3.5 MMOL/L (ref 3.5–5.1)
PROT SERPL-MCNC: 7.5 G/DL (ref 6.4–8.2)
RBC # BLD AUTO: 2.7 M/UL (ref 3.8–5.2)
SODIUM SERPL-SCNC: 139 MMOL/L (ref 136–145)
WBC # BLD AUTO: 9.1 K/UL (ref 3.6–11)

## 2020-09-15 PROCEDURE — 82533 TOTAL CORTISOL: CPT

## 2020-09-15 PROCEDURE — A9585 GADOBUTROL INJECTION: HCPCS | Performed by: INTERNAL MEDICINE

## 2020-09-15 PROCEDURE — 80053 COMPREHEN METABOLIC PANEL: CPT

## 2020-09-15 PROCEDURE — 36415 COLL VENOUS BLD VENIPUNCTURE: CPT

## 2020-09-15 PROCEDURE — 85025 COMPLETE CBC W/AUTO DIFF WBC: CPT

## 2020-09-15 PROCEDURE — 99214 OFFICE O/P EST MOD 30 MIN: CPT | Performed by: INTERNAL MEDICINE

## 2020-09-15 PROCEDURE — 77049 MRI BREAST C-+ W/CAD BI: CPT

## 2020-09-15 PROCEDURE — 74011250636 HC RX REV CODE- 250/636: Performed by: INTERNAL MEDICINE

## 2020-09-15 RX ADMIN — GADOBUTROL 9 ML: 604.72 INJECTION INTRAVENOUS at 15:00

## 2020-09-15 NOTE — PROGRESS NOTES
Pt in for labs and follow up visit today per protocol with Dr. Isadora Higgins and RN. Today is post chemo visit. Patient reports the following adverse events at this time: gr 1 constipation, gr 1 fatigue, gr 2 nausea, gr 1 hot flashes, gr 1 insomnia, gr 1 concentration, gr 1 sob. Vital signs are stable. Surgery is scheduled for 10/8/2020.

## 2020-09-15 NOTE — PROGRESS NOTES
Julee Jones is a 48 y.o. female Follow up for the Evaluation of Breast Cancer. 1. Have you been to the ER, urgent care clinic since your last visit? Hospitalized since your last visit? No 
 
2. Have you seen or consulted any other health care providers outside of the [address] [place] [address] since your last visit? Include any pap smears or colon screening.  No

## 2020-09-15 NOTE — PROGRESS NOTES
[facility] [place] at [facility] 88 [address] [facility], [address] [place] [address] W: [phone]  F: [phone] Reason for Visit:  
Harley De Oliveira is a 48 y.o. female who is seen in consultation at the request of Dr. Robert Traylor for evaluation of therapy for breast cancer Rad onc:  Dr. Chris Prieto Treatment History: · 20 left breast 3:00, 13cmfn, core bx:  IMC, gr 2-3, 1.3 cm, ER negative, FL negative, HER 2 negative at IHC 0; ki67 68%; LN + by core, 6 mm, gr 2-3 · B-59 Carbo/Taxol +/- atezolizumab 2020-2020 · [facility] 2020: negative · B-59 DDAC 2020-2020 History of Present Illness:  
19 mammogram was negative, she felt the L breast mass herself, earlier in 2020. Interval history: In today for follow up and treatment. Complains of gr 1 constipation, gr 1 fatigue, gr 2 nausea, gr 1 hot flashes, gr 1 insomnia, gr 1 concentration, gr 1 sob. Nausea was worse this last cycle FH: Mother with breast cancer at age 79; maternal aunt with breast cancer in her 45s; maternal aunt with breast cancer at age 61; no ovarian, prostate, or pancreas cancer Past Medical History:  
Diagnosis Date  ADHD  Cancer ([address] LEFT BREAST CA  
 Sleep apnea MILD -NO CPAP Past Surgical History:  
Procedure Laterality Date  HX BREAST BIOPSY Left 4/15/2020 BREAST BIOPSY performed by Milena Rice MD at [facility] MAIN OR  
 HX  SECTION  98, 99  
 HX GI    
 COLONOSCOPY  
 HX ORTHOPAEDIC    
 RIGHT ELBOW-FATTY MASS Social History Tobacco Use  Smoking status: Light Tobacco Smoker  Smokeless tobacco: Never Used  Tobacco comment: RARE cigar per patient Substance Use Topics  Alcohol use: Yes Alcohol/week: 6.0 standard drinks Types: 6 Glasses of wine per week Family History Problem Relation Age of Onset  Cancer Mother Breast  
 Cancer Maternal Aunt      Breast  
  Cancer Paternal Uncle  Cancer Maternal Aunt Breast  
 Cancer Paternal Uncle Current Outpatient Medications Medication Sig  
 lisdexamfetamine dimesylate (VYVANSE PO) Take  by mouth.  CLONAZEPAM PO Take  by mouth.  pyridoxine, vitamin B6, (VITAMIN B-6) 50 mg tablet Take 50 mg by mouth daily.  cholecalciferol, vitamin D3, (VITAMIN D3 PO) Take 5,000 Units by mouth daily.  lidocaine-prilocaine (EMLA) topical cream Apply  to affected area as needed for Pain. No current facility-administered medications for this visit. No Known Allergies Review of Systems: A complete review of systems was obtained, negative except as described above. Physical Exam:  
 
Visit Vitals /74 (BP 1 Location: Left arm, BP Patient Position: Sitting) Pulse 88 Temp 98.2 °F (36.8 °C) (Temporal) Resp 16 Ht 5' 3\" (1.6 m) SpO2 100% BMI 34.72 kg/m² ECOG PS: 0 General: No distress Eyes: Anicteric sclerae HENT: Atraumatic Neck: Supple Respiratory: Normal respiratory effort CV: No peripheral edema GI: nondistended Skin: No rashes, ecchymoses, or petechiae Psych: Alert, oriented, appropriate affect, normal judgment/insight Breasts: L breast 3:00, 7-8 cmfn, not able to palpate mass or LN Results:  
 
Lab Results Component Value Date/Time WBC 9.1 09/15/2020 09:00 AM  
 HGB 9.3 (L) 09/15/2020 09:00 AM  
 HCT 27.6 (L) 09/15/2020 09:00 AM  
 PLATELET 494 18/38/8719 09:00 AM  
 .2 (H) 09/15/2020 09:00 AM  
 ABS. NEUTROPHILS 7.5 09/15/2020 09:00 AM  
 
Lab Results Component Value Date/Time Sodium 139 09/15/2020 09:00 AM  
 Potassium 3.5 09/15/2020 09:00 AM  
 Chloride 105 09/15/2020 09:00 AM  
 CO2 30 09/15/2020 09:00 AM  
 Glucose 88 09/15/2020 09:00 AM  
 BUN 8 09/15/2020 09:00 AM  
 Creatinine 0.89 09/15/2020 09:00 AM  
 GFR est AA >60 09/15/2020 09:00 AM  
 GFR est non-AA >60 09/15/2020 09:00 AM  
 Calcium 9.3 09/15/2020 09:00 AM  
 
Lab Results Component Value Date/Time Bilirubin, total 0.4 09/15/2020 09:00 AM  
 ALT (SGPT) 32 09/15/2020 09:00 AM  
 Alk. phosphatase 157 (H) 09/15/2020 09:00 AM  
 Protein, total 7.5 09/15/2020 09:00 AM  
 Albumin 3.7 09/15/2020 09:00 AM  
 Globulin 3.8 09/15/2020 09:00 AM  
 
 
3/31/20 
3D mammogram:  1.6 cm mass in L breast 
US L breast 
3:00 left breast mass 1.6 cm, multiple LN in L ax tail 4/14/2020 CT c/a/p: 
IMPRESSION: Left breast mass. No evidence for metastatic disease in the chest 
abdomen or pelvis. There are no target lesions for RECIST classification. 4/14/2020 Bone Scan: 
IMPRESSION: No evidence of bony metastatic disease. 5/4/2020 MRI breast: 
IMPRESSION: 
  
Right Breast: 1. BI-RADS Assessment Category 1: Negative. No evidence of breast carcinoma 
within the right breast. 
  
Left Breast: 1. BI-RADS Assessment Category 6: Known biopsy proven malignancy- Appropriate 
action should be taken. Large area of malignant enhancement, occupying most of 
the middle and posterior third of the left breast upper outer and lower-outer 
quadrants, from 2:00 to 4:00. Malignancy is much larger than it had appeared 
mammographically. Greatest measurement is 7 x 4.2 x 3 cm. Contained within this 
large area of enhancement is the dominant 2.5 cm mass, which represents the 
mammographic and sonographic correlate. 2. BI-RADS Assessment Category 6: Known biopsy proven malignancy- Appropriate 
action should be taken. Pathologically proven level 1 left axillary 
lymphadenopathy, with numerous enlarged, malignant lymph nodes. There is also 
level 2 left axillary lymphadenopathy. 3. Retraction of the skin of the lateral left breast, which is thickened. However, no evidence of malignant skin invasion/involvement.  
4. The malignant mass closely approximates the pectoral muscle, but there is no 
evidence of direct muscle invasion/involvement. 
  
RECOMMENDATIONS: 
 Appropriate action is recommended. The patient is undergoing neoadjuvant 
chemotherapy. There is no evidence of contralateral disease. 7/30/2020 CTA chest at [facility]: Normal CTA of the chest with no pulmonary embolism or acute aortic abnormalities identified. Consider mild CHF/pulmonary edema Records reviewed and summarized above. Pathology report(s) reviewed above. Radiology report(s) reviewed above. Assessment/plan: 1. Left IMC, gr 2-3, triple negative, LN + by core:  cT1c cN1a cM0, unifocal, stage IIA both anatomic and prognostic 
postmenopausal 
 
With multiple LN felt on exam, ordered CT c/a/p and bone scan for staging. CT and Bone scan on 4/14/2020 negative for mets. We explained to the patient that the goal of systemic adjuvant therapy is to improve the chances for cure and decrease the risk of relapse. We explained why a patient can have microscopic cancer spread now even though physical examination, laboratory studies and imaging studies are negative for cancer. We explained that the same treatments used now as adjuvant or preventive treatments rarely if ever are curative in women who develop metastases. We discussed that there is no difference in overall survival between neoadjuvant and adjuvant chemotherapy. We discussed the rationale for neoadjuvant chemotherapy, if chemotherapy is warranted, as it is in this case: to avoid any potential delays in giving chemotherapy following surgery, to be able to see the response of the tumor to chemotherapy, and to potentially downstage the tumor prior to surgery. I discussed the potential risks of dose-dense chemotherapy with the patient. (DD AC-T, adriamycin 60 mg/m2; cyclophosphamide 600 mg/m2 q 2 weeks x 4; paclitaxel 80 mg/m2 qweekly x 12). Major toxicities include nausea and vomiting, stomatitis, fatigue, and a small risk of heart damage.  Anemia frequently results and occasionally requires growth factors and rarely transfusions. Neutropenic fever is uncommon, but can be a life-threatening problem. Also, there is a small but increased risk of myelodysplasia and acute leukemia. We provided the patient with detailed information concerning toxicity including frequent toxicities that only last a few days, such as nausea, vomiting, mouth sores, arthralgia, myalgia, and potentially allergic reactions to paclitaxel, as well as toxicities which can be longer lasting including total alopecia, fatigue, anemia and neuropathy. We provided the patient with detailed information concerning the toxicities of their regimen in addition to our verbal discussion. We discussed the potential addition of carboplatin auc 6 q 3 weeks during weekly paclitaxel as evaluated in CALGB 10446, showing a significant improvement in pCR for patients with stage II-III triple negative breast cancer. Additional side effects of added myelosuppression, fatigue, renal damage with dehydration, and nausea and vomiting were discussed. Would use AUC 5 as that is the dose that is being used in all current investigations Also discussed the clinical trial NSABP B-59, which is carbo/taxol then DD [facility] +/- atezolizumab. Discussed the positive findings of MERCK 522 with pembrolizumab We discussed the risks and benefits of atezolizumab therapy today. Potential side effects include, but are not limited to fatigue, rash, auto-immune issues, infertility, allergic reactions, and rarely, death. Discussed cold caps (not working with [facility]). Discussed oral and peripheral cryotherapy Discussed COVID19 risks and LETI guidelines stating neoadj chemotherapy for [place] breast cancer is preferred vs upfront surgery during this pandemic. After this discussion, she is agreeable to chemotherapy and port placement. She has signed informed consent for B-59.   
 
The patient was given the following prescriptions with written and verbal instructions on how to use each:  Compazine, zofran, olanzapine (held until [facility]), a wig, and emla cream.  IV Deannie Sandra will be used with AC. Neulasta the following day with AC (bone pain side effect discussed). Port placed by Dr. Estrella Mendoza. TTE on 4/13/2020, EF 61%, TTE at [facility] on 7/30/2020, EF 60%. Breast MRI on 5/4/2020. Discussed that with her extent of disease, a mastectomy is warranted. S/p DDAC on 9/1/2020. She had follow up with Dr. Estrella Mendoza on 7/20/2020, with [facility] [address], which showed response to treatment per patient. Next follow up with Dr. Estrella Mendoza is on 9/14/2020. Bilateral mastectomy tentatively scheduled for 10/8/20 MRI breast scheduled for today. 2. Emotional well being:  She has excellent support and is coping well with her disease. She started counseling with Katiuska Feliciano on 7/24/2020.  
 
3. Genetic testing:   Discussed potential ramifications of a positive test including the potential need for bilateral mastectomies and bilateral oophorectomies and the risk then for her family members to also have a mutation. VUS discussed also. Testing performed 4/28/20 by blood, negative. 4. Alt increased:  resolved 5. Heartburn:  Recommend Pepcid bid 6. Anemia:  Due to chemo, mild; not clinically significant 7. Chest pain:  Was admitted to [facility] on 7/29 for intermittent chest pain, had cardiac workup, per patient, this was negative, obtaining records. No chest pain since 7/29/2020. Patient discussed with PCP, who is referring her to cardiology. Likely gerd from the [facility], see above. Mild with past cycle after neulasta 8. Nausea: Taking zofran q8h, but still having nausea, also taking compazine  PRN with some relief. Previously added olanzapine with #4. This has improved since completing chemo. 9. Constipation:  Due to zofran, worse, handout discussed, started colace. 10. Taste changes:  Due to chemo, should resolve with time 11. Hypokalemia:  Mild, she will increase her PO banana intake I appreciate the opportunity to participate in Ms. [facility] SHARON Kurtz's care. Signed By: Samir Mccormick MD   
 
No orders of the defined types were placed in this encounter.

## 2020-09-17 ENCOUNTER — RESEARCH ENCOUNTER (OUTPATIENT)
Dept: ONCOLOGY | Age: 55
End: 2020-09-17

## 2020-09-17 NOTE — PROGRESS NOTES
Ms. Piero Turner called regarding potassium level and constipation she had been experiencing. I notified her that there K was 3.5 and encouraged her to continue eating potassium rich foods. Spoke with Bennie Hewitt NP regarding additional measures and instructed patient to continue Senna at night with the addition of Miralax. Encourage the pt to follow up if she continues to have issues.

## 2020-09-24 DIAGNOSIS — C50.412 MALIGNANT NEOPLASM OF UPPER-OUTER QUADRANT OF LEFT BREAST IN FEMALE, ESTROGEN RECEPTOR NEGATIVE (HCC): Primary | ICD-10-CM

## 2020-09-24 DIAGNOSIS — Z17.1 MALIGNANT NEOPLASM OF UPPER-OUTER QUADRANT OF LEFT BREAST IN FEMALE, ESTROGEN RECEPTOR NEGATIVE (HCC): Primary | ICD-10-CM

## 2020-09-30 DIAGNOSIS — Z17.1 MALIGNANT NEOPLASM OF UPPER-OUTER QUADRANT OF LEFT BREAST IN FEMALE, ESTROGEN RECEPTOR NEGATIVE (HCC): Primary | ICD-10-CM

## 2020-09-30 DIAGNOSIS — C50.412 MALIGNANT NEOPLASM OF UPPER-OUTER QUADRANT OF LEFT BREAST IN FEMALE, ESTROGEN RECEPTOR NEGATIVE (HCC): Primary | ICD-10-CM

## 2020-10-01 RX ORDER — DOCUSATE SODIUM 100 MG/1
100 CAPSULE, LIQUID FILLED ORAL AS NEEDED
COMMUNITY
End: 2021-01-01

## 2020-10-01 NOTE — PERIOP NOTES
[facility] Ambulatory Surgery Unit Pre-operative Instructions Surgery/Procedure Date  10/8            Tentative Arrival Time 0915 1. On the day of your surgery/procedure, please report to the Ambulatory Surgery Unit Registration Desk and sign in at your designated time. The Ambulatory Surgery Unit is located in [facility] on the [facility] side of the [facility] across from the [facility]. Please have all of your health insurance cards and a photo ID. 2. You must have someone with you to drive you home, as you should not drive a car for 24 hours following anesthesia. Please make arrangements for a responsible adult friend or family member to stay with you for at least the first 24 hours after your surgery. 3. Do not have anything to eat or drink (including water, gum, mints, coffee, juice) after 11:59 PM  10/7. This may not apply to medications prescribed by your physician. (Please note below the special instructions with medications to take the morning of surgery, if applicable.) 4. We recommend you do not drink any alcoholic beverages for 24 hours before and after your surgery. 5. Contact your surgeons office for instructions on the following medications: non-steroidal anti-inflammatory drugs (i.e. Advil, Aleve), vitamins, and supplements. (Some surgeons will want you to stop these medications prior to surgery and others may allow you to take them) **If you are currently taking Plavix, Coumadin, Aspirin and/or other blood-thinning agents, contact your surgeon for instructions. ** Your surgeon will partner with the physician prescribing these medications to determine if it is safe to stop or if you need to continue taking. Please do not stop taking these medications without instructions from your surgeon.  
 
6. In an effort to help prevent surgical site infection, we ask that you shower with an anti-bacterial soap (i.e. Dial/Safeguard, or the soap provided to you at your preadmission testing appointment) for 3 days prior to and on the morning of surgery, using a fresh towel after each shower. (Please begin this process with fresh bed linens.) Do not apply any lotions, powders, or deodorants after the shower on the day of your procedure. If applicable, please do not shave the operative site for 48 hours prior to surgery. 7. Wear comfortable clothes. Wear glasses instead of contacts. Do not bring any jewelry or money (other than copays or fees as instructed). Do not wear make-up, particularly mascara, the morning of your surgery. Do not wear nail polish, particularly if you are having foot /hand surgery. Wear your hair loose or down, no ponytails, buns, yehuda pins or clips. All body piercings must be removed. 8. You should understand that if you do not follow these instructions your surgery may be cancelled. If your physical condition changes (i.e. fever, cold or flu) please contact your surgeon as soon as possible. 9. It is important that you be on time. If a situation occurs where you may be late, or if you have any questions or problems, please call [phone]. 
 
10. Your surgery time may be subject to change. You will receive a phone call the day prior to surgery to confirm your arrival time. 11. Pediatric patients: please bring a change of clothes, diapers, bottle/sippy cup, pacifier, etc. 
 
 
Special Instructions: Take all medications and inhalers, as prescribed, on the morning of surgery with a sip of water EXCEPT: n/a Insulin Dependent Diabetic patients: Take your diabetic medications as prescribed the day before surgery. Hold all diabetic medications the day of surgery. If you are scheduled to arrive for surgery after 8:00 AM, and your AM blood sugar is >200, please call Ambulatory Surgery.  
 
I understand a pre-operative phone call will be made to verify my surgery time.  In the event that I am not available, I give permission for a message to be left on my answering service and/or with another person? yes Reviewed all instructions with patient and patient verbalized understanding 
 
 ___________________      ___________________      ________________ 
(Signature of Patient)          (Witness)                   (Date and Time)

## 2020-10-02 ENCOUNTER — DOCUMENTATION ONLY (OUTPATIENT)
Dept: SURGERY | Age: 55
End: 2020-10-02

## 2020-10-02 NOTE — PROGRESS NOTES
Certification Information   Certification Number: [id number] Last Update Date: 10/01/2020   Request Category: HEALTH SERVICES REV (REQ CAT = HS) PREAUTH   Status: ACTIVE      Member Information   Member Number: [id number]  Name: Paresh Polanco   YOB: 1965 Gender: Female   Relationship: EMPLOYEE Coverage Type: HMO OPEN ACCESS POS    Coverage Begin Date: 09/01/2018 Coverage End Date:    Group Name: Pam Jesus      Request Category Information    Service Category: SURGICAL (Service Type = 2 - Surgical)     Diagnosis Codes   Code Description   Principal: [id number] MALIGNANT NEOPLASM UNS SITE LEFT FEMALE BREAST   (2) [address] NODES     Other Servicing Provider Information   Servicing Provider Name Provider Id  Taxonomy Role    Kayla Sears MD [id number]  [facility] 34 REQUESTING PROVIDER    Requesting Contact Name: Oz Quesada Contact Phone: [phone]    [facility] 97. [phone]  [address], [facility] OTHER SERVICING PROVIDER    Kayla Sears MD [id number]  [address] PROVIDER      Primary Servicing Provider Information   Servicing Provider Name   Provider Address Provider Id  Taxonomy    Kayla Sears MD   [facility] 100 [facility], [address] [place] [address] [id number]   [id number] - GENERAL SURGERY      Service Information   Service Type Description Quantity Requested Quantity Description Location    (1) SURGICAL PROCEDURE REVIEW [ 2 - SURGICAL ] 1 EACH  - OUTPATIENT FACILITY      Procedure(s)    Procedure Code Qualifier Procedure Code Procedure Description Quantity Approved Procedure Date   [facility] CPT P0385299 MODIFIED RADICAL MASTECTOMY 1 50/96/2079     Certification Action   Certification  Effective Date Certification  Expiration Date Level Of Care Units Decision    10/08/2020 96/70/5383 [ Not Applicable ] 1 CERTIFIED           Service Information   Service Type Description Quantity Requested Quantity Description Location    (2) SURGICAL PROCEDURE REVIEW [ 2 - SURGICAL ] 1 EACH 22 - OUTPATIENT FACILITY      Procedure(s)    Procedure Code Qualifier Procedure Code Procedure Description Quantity Approved Procedure Date   [facility] CPT 23745 MASTECTOMY-SIMPLE COMPLETE 1 74/92/0944     Certification Action   Certification  Effective Date Certification  Expiration Date Level Of Care Units Decision    10/08/2020 51/65/2454 [ Not Applicable ] 1 CERTIFIED           Message Text     Certification Information  Certification Number: [id number] Last Update Date: 10/01/2020  Request Category: HEALTH SERVICES REV (REQ CAT = HS) PREAUTH  Status: ACTIVE     Member Information  Member Number: [id number]  Name: Ten Hernandez  YOB: 1965 Gender: Female  Relationship: EMPLOYEE Coverage Type: HMO OPEN ACCESS POS   Coverage Begin Date: 09/01/2018 Coverage End Date:   Group Name: [facility] Preston [id number]     Request Category Information   Service Category: SURGICAL (Service Type = 2 - Surgical)    Diagnosis Codes  Code Description  Principal: [id number] MALIGNANT NEOPLASM UNS SITE LEFT FEMALE BREAST  (2) [address] NODES    Other Servicing Provider Information  Servicing Provider Name Provider Id  Taxonomy Role   Audrey Molina MD [id number]  [facility] 34 REQUESTING PROVIDER   Requesting Contact Name: Rosendo Garibay Contact Phone: [phone]   [facility] 53. [phone]  [address], [facility] OTHER SERVICING PROVIDER   Audrey Molina MD [phone]  [address] [employer] [address] PROVIDER     Primary Servicing Provider Information  Servicing Provider Name   Provider Address Provider Id  Taxonomy   Audrey Molina MD   [facility] [address] 100 [facility], [address] [place] [address] [id number]   [id number] - GENERAL SURGERY     Service Information  Service Type Description Quantity Requested Quantity Description Location   (1) SURGICAL PROCEDURE REVIEW [ 2 - SURGICAL ] 1 EACH 22 - OUTPATIENT FACILITY     Procedure(s)   Procedure Code Qualifier Procedure Code Procedure Description Quantity Approved Procedure Date  [facility] CPT 61133 MODIFIED RADICAL MASTECTOMY 1 88/98/2888    Certification Action  Certification  Effective Date Certification  Expiration Date Level Of Care Units Decision   10/08/2020 09/05/4696 [ Not Applicable ] 1 CERTIFIED         Service Information  Service Type Description Quantity Requested Quantity Description Location   (2) SURGICAL PROCEDURE REVIEW [ 2 - SURGICAL ] 1 EACH 22 - OUTPATIENT FACILITY     Procedure(s)   Procedure Code Qualifier Procedure Code Procedure Description Quantity Approved Procedure Date  [facility] CPT 39285 MASTECTOMY-SIMPLE COMPLETE 1 51/28/7670    Certification Action  Certification  Effective Date Certification  Expiration Date Level Of Care Units Decision   10/08/2020 71/57/1779 [ Not Applicable ] 1 CERTIFIED       AUTHORIZATION FOR UP COMING SURGERY DR Boyd Malik / DR Paz Standard  Message Text

## 2020-10-04 ENCOUNTER — HOSPITAL ENCOUNTER (OUTPATIENT)
Dept: PREADMISSION TESTING | Age: 55
Discharge: HOME | End: 2020-10-04
Payer: COMMERCIAL

## 2020-10-04 PROCEDURE — 87635 SARS-COV-2 COVID-19 AMP PRB: CPT

## 2020-10-05 LAB
HEALTH STATUS, XMCV2T: NORMAL
SARS-COV-2, COV2NT: NOT DETECTED
SOURCE, COVRS: NORMAL
SPECIMEN SOURCE, FCOV2M: NORMAL
SPECIMEN TYPE, XMCV1T: NORMAL

## 2020-10-06 ENCOUNTER — HOSPITAL ENCOUNTER (OUTPATIENT)
Dept: MRI IMAGING | Age: 55
Discharge: HOME | End: 2020-10-06
Attending: NURSE PRACTITIONER
Payer: COMMERCIAL

## 2020-10-06 DIAGNOSIS — C50.412 MALIGNANT NEOPLASM OF UPPER-OUTER QUADRANT OF LEFT BREAST IN FEMALE, ESTROGEN RECEPTOR NEGATIVE (HCC): ICD-10-CM

## 2020-10-06 DIAGNOSIS — Z17.1 MALIGNANT NEOPLASM OF UPPER-OUTER QUADRANT OF LEFT BREAST IN FEMALE, ESTROGEN RECEPTOR NEGATIVE (HCC): ICD-10-CM

## 2020-10-06 PROCEDURE — 74011250636 HC RX REV CODE- 250/636: Performed by: NURSE PRACTITIONER

## 2020-10-06 PROCEDURE — 70553 MRI BRAIN STEM W/O & W/DYE: CPT

## 2020-10-06 PROCEDURE — A9575 INJ GADOTERATE MEGLUMI 0.1ML: HCPCS | Performed by: NURSE PRACTITIONER

## 2020-10-06 RX ORDER — GADOTERATE MEGLUMINE 376.9 MG/ML
20 INJECTION INTRAVENOUS
Status: COMPLETED | OUTPATIENT
Start: 2020-10-06 | End: 2020-10-06

## 2020-10-06 RX ADMIN — GADOTERATE MEGLUMINE 20 ML: 376.9 INJECTION INTRAVENOUS at 19:31

## 2020-10-07 ENCOUNTER — TELEPHONE (OUTPATIENT)
Dept: ONCOLOGY | Age: 55
End: 2020-10-07

## 2020-10-07 ENCOUNTER — ANESTHESIA EVENT (OUTPATIENT)
Dept: SURGERY | Age: 55
End: 2020-10-07
Payer: COMMERCIAL

## 2020-10-07 NOTE — TELEPHONE ENCOUNTER
10/7/2020 5:16 PM: Called patient and left voicemail advising of MRI results. Requested that the patient call back to discuss cardiology referral.    ----- Message from Chasidy Ashley NP sent at 10/7/2020  8:14 AM EDT -----  Can you let her know, this is normal.  Can you see if she has been referred to cardiology yet? She was going to reach out to her PCP. Thanks!

## 2020-10-07 NOTE — PROGRESS NOTES
Can you let her know, this is normal.  Can you see if she has been referred to cardiology yet? She was going to reach out to her PCP. Thanks!

## 2020-10-08 ENCOUNTER — ANESTHESIA (OUTPATIENT)
Dept: SURGERY | Age: 55
End: 2020-10-08
Payer: COMMERCIAL

## 2020-10-08 ENCOUNTER — HOSPITAL ENCOUNTER (OUTPATIENT)
Age: 55
Setting detail: OBSERVATION
Discharge: HOME | End: 2020-10-09
Attending: SURGERY | Admitting: PLASTIC SURGERY
Payer: COMMERCIAL

## 2020-10-08 ENCOUNTER — PATIENT MESSAGE (OUTPATIENT)
Dept: ONCOLOGY | Age: 55
End: 2020-10-08

## 2020-10-08 DIAGNOSIS — C50.412 MALIGNANT NEOPLASM OF UPPER-OUTER QUADRANT OF LEFT BREAST IN FEMALE, ESTROGEN RECEPTOR NEGATIVE (HCC): ICD-10-CM

## 2020-10-08 DIAGNOSIS — Z17.1 MALIGNANT NEOPLASM OF UPPER-OUTER QUADRANT OF LEFT BREAST IN FEMALE, ESTROGEN RECEPTOR NEGATIVE (HCC): ICD-10-CM

## 2020-10-08 PROBLEM — Z90.10 ABSENCE OF BREAST: Status: ACTIVE | Noted: 2020-10-08

## 2020-10-08 PROBLEM — C50.919 BREAST CANCER (HCC): Status: ACTIVE | Noted: 2020-10-08

## 2020-10-08 LAB
APPEARANCE UR: ABNORMAL
BACTERIA URNS QL MICRO: ABNORMAL /HPF
BILIRUB UR QL CFM: NEGATIVE
COLOR UR: ABNORMAL
EPITH CASTS URNS QL MICRO: ABNORMAL /LPF
GLUCOSE UR STRIP.AUTO-MCNC: NEGATIVE MG/DL
HGB UR QL STRIP: NEGATIVE
HYALINE CASTS URNS QL MICRO: ABNORMAL /LPF (ref 0–5)
KETONES UR QL STRIP.AUTO: ABNORMAL MG/DL
LEUKOCYTE ESTERASE UR QL STRIP.AUTO: ABNORMAL
NITRITE UR QL STRIP.AUTO: NEGATIVE
PH UR STRIP: 7 [PH] (ref 5–8)
PROT UR STRIP-MCNC: 30 MG/DL
RBC #/AREA URNS HPF: ABNORMAL /HPF (ref 0–5)
SP GR UR REFRACTOMETRY: 1.02 (ref 1–1.03)
UA: UC IF INDICATED,UAUC: ABNORMAL
UROBILINOGEN UR QL STRIP.AUTO: 1 EU/DL (ref 0.2–1)
WBC URNS QL MICRO: ABNORMAL /HPF (ref 0–4)

## 2020-10-08 PROCEDURE — 2709999900 HC NON-CHARGEABLE SUPPLY: Performed by: SURGERY

## 2020-10-08 PROCEDURE — 77030020269 HC MISC IMPL: Performed by: SURGERY

## 2020-10-08 PROCEDURE — 74011250637 HC RX REV CODE- 250/637: Performed by: SURGERY

## 2020-10-08 PROCEDURE — 77030011266 HC ELECTRD BLD INSL COVD -A: Performed by: SURGERY

## 2020-10-08 PROCEDURE — 77030011825 HC SUPP SURG PSTOP S2SG -B: Performed by: SURGERY

## 2020-10-08 PROCEDURE — 77030010507 HC ADH SKN DERMBND J&J -B: Performed by: SURGERY

## 2020-10-08 PROCEDURE — 77030021352 HC CBL LD SYS DISP COVD -B: Performed by: SURGERY

## 2020-10-08 PROCEDURE — C1789 PROSTHESIS, BREAST, IMP: HCPCS | Performed by: SURGERY

## 2020-10-08 PROCEDURE — 74011000250 HC RX REV CODE- 250: Performed by: REGISTERED NURSE

## 2020-10-08 PROCEDURE — 77030002996 HC SUT SLK J&J -A: Performed by: SURGERY

## 2020-10-08 PROCEDURE — 19307 MAST MOD RAD: CPT | Performed by: SURGERY

## 2020-10-08 PROCEDURE — 99218 HC RM OBSERVATION: CPT

## 2020-10-08 PROCEDURE — 77030020263 HC SOL INJ SOD CL0.9% LFCR 1000ML: Performed by: SURGERY

## 2020-10-08 PROCEDURE — 19303 MAST SIMPLE COMPLETE: CPT | Performed by: SURGERY

## 2020-10-08 PROCEDURE — 77030020061 HC IV BLD WRMR ADMIN SET 3M -B: Performed by: ANESTHESIOLOGY

## 2020-10-08 PROCEDURE — 77030011267 HC ELECTRD BLD COVD -A: Performed by: SURGERY

## 2020-10-08 PROCEDURE — 77030003029 HC SUT VCRL J&J -B: Performed by: SURGERY

## 2020-10-08 PROCEDURE — 88307 TISSUE EXAM BY PATHOLOGIST: CPT

## 2020-10-08 PROCEDURE — 77030026438 HC STYL ET INTUB CARD -A: Performed by: ANESTHESIOLOGY

## 2020-10-08 PROCEDURE — 77030031139 HC SUT VCRL2 J&J -A: Performed by: SURGERY

## 2020-10-08 PROCEDURE — 87086 URINE CULTURE/COLONY COUNT: CPT

## 2020-10-08 PROCEDURE — 74011250636 HC RX REV CODE- 250/636: Performed by: ANESTHESIOLOGY

## 2020-10-08 PROCEDURE — 81001 URINALYSIS AUTO W/SCOPE: CPT

## 2020-10-08 PROCEDURE — 74011250636 HC RX REV CODE- 250/636: Performed by: PLASTIC SURGERY

## 2020-10-08 PROCEDURE — 77030040504 HC DRN WND MDII -B: Performed by: SURGERY

## 2020-10-08 PROCEDURE — 77030008684 HC TU ET CUF COVD -B: Performed by: ANESTHESIOLOGY

## 2020-10-08 PROCEDURE — 74011250636 HC RX REV CODE- 250/636: Performed by: SURGERY

## 2020-10-08 PROCEDURE — 77030005513 HC CATH URETH FOL11 MDII -B: Performed by: SURGERY

## 2020-10-08 PROCEDURE — 74011000250 HC RX REV CODE- 250: Performed by: SURGERY

## 2020-10-08 PROCEDURE — 88305 TISSUE EXAM BY PATHOLOGIST: CPT

## 2020-10-08 PROCEDURE — 77030013567 HC DRN WND RESERV BARD -A: Performed by: SURGERY

## 2020-10-08 PROCEDURE — 76030000007 HC AMB SURG OR TIME 3.5 TO 4: Performed by: SURGERY

## 2020-10-08 PROCEDURE — 74011000258 HC RX REV CODE- 258: Performed by: PLASTIC SURGERY

## 2020-10-08 PROCEDURE — 77030034626 HC LIGASURE SM JAW SEAL OPN SURG COVD -E: Performed by: SURGERY

## 2020-10-08 PROCEDURE — 77030019908 HC STETH ESOPH SIMS -A: Performed by: ANESTHESIOLOGY

## 2020-10-08 PROCEDURE — 88309 TISSUE EXAM BY PATHOLOGIST: CPT

## 2020-10-08 PROCEDURE — 77030002986 HC SUT PROL J&J -A: Performed by: SURGERY

## 2020-10-08 PROCEDURE — 94762 N-INVAS EAR/PLS OXIMTRY CONT: CPT

## 2020-10-08 PROCEDURE — 77030002933 HC SUT MCRYL J&J -A: Performed by: SURGERY

## 2020-10-08 PROCEDURE — 74011250636 HC RX REV CODE- 250/636: Performed by: REGISTERED NURSE

## 2020-10-08 PROCEDURE — 77010033678 HC OXYGEN DAILY

## 2020-10-08 PROCEDURE — 74011250637 HC RX REV CODE- 250/637: Performed by: PLASTIC SURGERY

## 2020-10-08 PROCEDURE — 77030002916 HC SUT ETHLN J&J -A: Performed by: SURGERY

## 2020-10-08 PROCEDURE — 76210000038 HC AMBSU PH I REC 2.5 TO 3 HR: Performed by: SURGERY

## 2020-10-08 PROCEDURE — C9290 INJ, BUPIVACAINE LIPOSOME: HCPCS | Performed by: SURGERY

## 2020-10-08 PROCEDURE — 76060000067 HC AMB SURG ANES 3.5 TO 4 HR: Performed by: SURGERY

## 2020-10-08 PROCEDURE — 77030010512 HC APPL CLP LIG J&J -C: Performed by: SURGERY

## 2020-10-08 PROCEDURE — 77030041680 HC PNCL ELECSURG SMK EVAC CNMD -B: Performed by: SURGERY

## 2020-10-08 DEVICE — TEXTURED, HIGH PROFILE, SUTURE TABS, INTEGRAL INJECTION DOME, 375CC
Type: IMPLANTABLE DEVICE | Site: BREAST | Status: FUNCTIONAL
Brand: ARTOURA BREAST TISSUE EXPANDER

## 2020-10-08 DEVICE — GRAFT HUM TISS M THK1.2-2MM M PERF CNTOUR ACELLULAR DERM: Type: IMPLANTABLE DEVICE | Site: BREAST | Status: FUNCTIONAL

## 2020-10-08 RX ORDER — ONDANSETRON 2 MG/ML
INJECTION INTRAMUSCULAR; INTRAVENOUS AS NEEDED
Status: DISCONTINUED | OUTPATIENT
Start: 2020-10-08 | End: 2020-10-08 | Stop reason: HOSPADM

## 2020-10-08 RX ORDER — HYDROMORPHONE HYDROCHLORIDE 2 MG/1
4 TABLET ORAL
Status: DISCONTINUED | OUTPATIENT
Start: 2020-10-08 | End: 2020-10-09 | Stop reason: HOSPADM

## 2020-10-08 RX ORDER — IBUPROFEN 600 MG/1
600 TABLET ORAL EVERY 6 HOURS
Status: DISCONTINUED | OUTPATIENT
Start: 2020-10-08 | End: 2020-10-09 | Stop reason: HOSPADM

## 2020-10-08 RX ORDER — HEPARIN SODIUM 5000 [USP'U]/ML
5000 INJECTION, SOLUTION INTRAVENOUS; SUBCUTANEOUS EVERY 12 HOURS
Status: DISCONTINUED | OUTPATIENT
Start: 2020-10-09 | End: 2020-10-09 | Stop reason: HOSPADM

## 2020-10-08 RX ORDER — MORPHINE SULFATE 10 MG/ML
2 INJECTION, SOLUTION INTRAMUSCULAR; INTRAVENOUS
Status: DISCONTINUED | OUTPATIENT
Start: 2020-10-08 | End: 2020-10-08 | Stop reason: SDUPTHER

## 2020-10-08 RX ORDER — LIDOCAINE HYDROCHLORIDE 20 MG/ML
INJECTION, SOLUTION EPIDURAL; INFILTRATION; INTRACAUDAL; PERINEURAL AS NEEDED
Status: DISCONTINUED | OUTPATIENT
Start: 2020-10-08 | End: 2020-10-08 | Stop reason: HOSPADM

## 2020-10-08 RX ORDER — DEXAMETHASONE SODIUM PHOSPHATE 4 MG/ML
INJECTION, SOLUTION INTRA-ARTICULAR; INTRALESIONAL; INTRAMUSCULAR; INTRAVENOUS; SOFT TISSUE AS NEEDED
Status: DISCONTINUED | OUTPATIENT
Start: 2020-10-08 | End: 2020-10-08 | Stop reason: HOSPADM

## 2020-10-08 RX ORDER — SODIUM CHLORIDE, SODIUM LACTATE, POTASSIUM CHLORIDE, CALCIUM CHLORIDE 600; 310; 30; 20 MG/100ML; MG/100ML; MG/100ML; MG/100ML
25 INJECTION, SOLUTION INTRAVENOUS CONTINUOUS
Status: DISCONTINUED | OUTPATIENT
Start: 2020-10-08 | End: 2020-10-09 | Stop reason: HOSPADM

## 2020-10-08 RX ORDER — MIDAZOLAM HYDROCHLORIDE 1 MG/ML
INJECTION, SOLUTION INTRAMUSCULAR; INTRAVENOUS AS NEEDED
Status: DISCONTINUED | OUTPATIENT
Start: 2020-10-08 | End: 2020-10-08 | Stop reason: HOSPADM

## 2020-10-08 RX ORDER — GLYCOPYRROLATE 0.2 MG/ML
INJECTION INTRAMUSCULAR; INTRAVENOUS AS NEEDED
Status: DISCONTINUED | OUTPATIENT
Start: 2020-10-08 | End: 2020-10-08 | Stop reason: HOSPADM

## 2020-10-08 RX ORDER — SODIUM CHLORIDE, SODIUM LACTATE, POTASSIUM CHLORIDE, CALCIUM CHLORIDE 600; 310; 30; 20 MG/100ML; MG/100ML; MG/100ML; MG/100ML
100 INJECTION, SOLUTION INTRAVENOUS CONTINUOUS
Status: DISCONTINUED | OUTPATIENT
Start: 2020-10-08 | End: 2020-10-09 | Stop reason: HOSPADM

## 2020-10-08 RX ORDER — SODIUM CHLORIDE 0.9 % (FLUSH) 0.9 %
5-40 SYRINGE (ML) INJECTION EVERY 8 HOURS
Status: DISCONTINUED | OUTPATIENT
Start: 2020-10-08 | End: 2020-10-09 | Stop reason: HOSPADM

## 2020-10-08 RX ORDER — SCOLOPAMINE TRANSDERMAL SYSTEM 1 MG/1
1 PATCH, EXTENDED RELEASE TRANSDERMAL ONCE
Status: DISCONTINUED | OUTPATIENT
Start: 2020-10-08 | End: 2020-10-09 | Stop reason: HOSPADM

## 2020-10-08 RX ORDER — HYDROMORPHONE HYDROCHLORIDE 2 MG/1
2 TABLET ORAL
Status: DISCONTINUED | OUTPATIENT
Start: 2020-10-08 | End: 2020-10-09 | Stop reason: HOSPADM

## 2020-10-08 RX ORDER — SODIUM CHLORIDE 0.9 % (FLUSH) 0.9 %
5-40 SYRINGE (ML) INJECTION AS NEEDED
Status: DISCONTINUED | OUTPATIENT
Start: 2020-10-08 | End: 2020-10-09 | Stop reason: HOSPADM

## 2020-10-08 RX ORDER — FENTANYL CITRATE 50 UG/ML
INJECTION, SOLUTION INTRAMUSCULAR; INTRAVENOUS AS NEEDED
Status: DISCONTINUED | OUTPATIENT
Start: 2020-10-08 | End: 2020-10-08 | Stop reason: HOSPADM

## 2020-10-08 RX ORDER — SODIUM CHLORIDE 0.9 % (FLUSH) 0.9 %
5-40 SYRINGE (ML) INJECTION AS NEEDED
Status: DISCONTINUED | OUTPATIENT
Start: 2020-10-08 | End: 2020-10-08 | Stop reason: HOSPADM

## 2020-10-08 RX ORDER — DOCUSATE SODIUM 100 MG/1
100 CAPSULE, LIQUID FILLED ORAL 2 TIMES DAILY
Status: DISCONTINUED | OUTPATIENT
Start: 2020-10-08 | End: 2020-10-09 | Stop reason: HOSPADM

## 2020-10-08 RX ORDER — MORPHINE SULFATE 2 MG/ML
2 INJECTION, SOLUTION INTRAMUSCULAR; INTRAVENOUS
Status: DISCONTINUED | OUTPATIENT
Start: 2020-10-08 | End: 2020-10-09 | Stop reason: HOSPADM

## 2020-10-08 RX ORDER — HYDROMORPHONE HYDROCHLORIDE 2 MG/ML
INJECTION, SOLUTION INTRAMUSCULAR; INTRAVENOUS; SUBCUTANEOUS AS NEEDED
Status: DISCONTINUED | OUTPATIENT
Start: 2020-10-08 | End: 2020-10-08 | Stop reason: HOSPADM

## 2020-10-08 RX ORDER — ROCURONIUM BROMIDE 10 MG/ML
INJECTION, SOLUTION INTRAVENOUS AS NEEDED
Status: DISCONTINUED | OUTPATIENT
Start: 2020-10-08 | End: 2020-10-08 | Stop reason: HOSPADM

## 2020-10-08 RX ORDER — FENTANYL CITRATE 50 UG/ML
25 INJECTION, SOLUTION INTRAMUSCULAR; INTRAVENOUS
Status: DISCONTINUED | OUTPATIENT
Start: 2020-10-08 | End: 2020-10-09 | Stop reason: HOSPADM

## 2020-10-08 RX ORDER — SCOLOPAMINE TRANSDERMAL SYSTEM 1 MG/1
PATCH, EXTENDED RELEASE TRANSDERMAL
Status: DISPENSED
Start: 2020-10-08 | End: 2020-10-08

## 2020-10-08 RX ORDER — HYDROMORPHONE HYDROCHLORIDE 1 MG/ML
.2-.5 INJECTION, SOLUTION INTRAMUSCULAR; INTRAVENOUS; SUBCUTANEOUS
Status: DISCONTINUED | OUTPATIENT
Start: 2020-10-08 | End: 2020-10-09 | Stop reason: HOSPADM

## 2020-10-08 RX ORDER — SUCCINYLCHOLINE CHLORIDE 20 MG/ML
INJECTION INTRAMUSCULAR; INTRAVENOUS AS NEEDED
Status: DISCONTINUED | OUTPATIENT
Start: 2020-10-08 | End: 2020-10-08 | Stop reason: HOSPADM

## 2020-10-08 RX ORDER — MORPHINE SULFATE 10 MG/ML
2 INJECTION, SOLUTION INTRAMUSCULAR; INTRAVENOUS
Status: DISCONTINUED | OUTPATIENT
Start: 2020-10-08 | End: 2020-10-09 | Stop reason: HOSPADM

## 2020-10-08 RX ORDER — DIPHENHYDRAMINE HYDROCHLORIDE 50 MG/ML
12.5 INJECTION, SOLUTION INTRAMUSCULAR; INTRAVENOUS AS NEEDED
Status: ACTIVE | OUTPATIENT
Start: 2020-10-08 | End: 2020-10-08

## 2020-10-08 RX ORDER — ONDANSETRON 2 MG/ML
4 INJECTION INTRAMUSCULAR; INTRAVENOUS AS NEEDED
Status: DISCONTINUED | OUTPATIENT
Start: 2020-10-08 | End: 2020-10-09 | Stop reason: HOSPADM

## 2020-10-08 RX ORDER — ACETAMINOPHEN 500 MG
1000 TABLET ORAL EVERY 6 HOURS
Status: DISCONTINUED | OUTPATIENT
Start: 2020-10-08 | End: 2020-10-09 | Stop reason: HOSPADM

## 2020-10-08 RX ORDER — SODIUM CHLORIDE 0.9 % (FLUSH) 0.9 %
5-40 SYRINGE (ML) INJECTION EVERY 8 HOURS
Status: DISCONTINUED | OUTPATIENT
Start: 2020-10-08 | End: 2020-10-08 | Stop reason: HOSPADM

## 2020-10-08 RX ORDER — WATER FOR INJECTION,STERILE
VIAL (ML) INJECTION
Status: DISPENSED
Start: 2020-10-08 | End: 2020-10-08

## 2020-10-08 RX ORDER — NEOSTIGMINE METHYLSULFATE 1 MG/ML
INJECTION, SOLUTION INTRAVENOUS AS NEEDED
Status: DISCONTINUED | OUTPATIENT
Start: 2020-10-08 | End: 2020-10-08 | Stop reason: HOSPADM

## 2020-10-08 RX ORDER — ONDANSETRON 2 MG/ML
8 INJECTION INTRAMUSCULAR; INTRAVENOUS
Status: DISCONTINUED | OUTPATIENT
Start: 2020-10-08 | End: 2020-10-09 | Stop reason: HOSPADM

## 2020-10-08 RX ORDER — PROPOFOL 10 MG/ML
INJECTION, EMULSION INTRAVENOUS AS NEEDED
Status: DISCONTINUED | OUTPATIENT
Start: 2020-10-08 | End: 2020-10-08 | Stop reason: HOSPADM

## 2020-10-08 RX ORDER — CEFAZOLIN SODIUM 1 G/3ML
INJECTION, POWDER, FOR SOLUTION INTRAMUSCULAR; INTRAVENOUS
Status: DISPENSED
Start: 2020-10-08 | End: 2020-10-08

## 2020-10-08 RX ORDER — GABAPENTIN 300 MG/1
300 CAPSULE ORAL 3 TIMES DAILY
Status: DISCONTINUED | OUTPATIENT
Start: 2020-10-08 | End: 2020-10-09 | Stop reason: HOSPADM

## 2020-10-08 RX ORDER — SODIUM CHLORIDE, SODIUM LACTATE, POTASSIUM CHLORIDE, CALCIUM CHLORIDE 600; 310; 30; 20 MG/100ML; MG/100ML; MG/100ML; MG/100ML
25 INJECTION, SOLUTION INTRAVENOUS CONTINUOUS
Status: DISCONTINUED | OUTPATIENT
Start: 2020-10-08 | End: 2020-10-08 | Stop reason: HOSPADM

## 2020-10-08 RX ADMIN — SODIUM CHLORIDE, SODIUM LACTATE, POTASSIUM CHLORIDE, AND CALCIUM CHLORIDE 25 ML/HR: 600; 310; 30; 20 INJECTION, SOLUTION INTRAVENOUS at 09:59

## 2020-10-08 RX ADMIN — ONDANSETRON HYDROCHLORIDE 4 MG: 2 INJECTION, SOLUTION INTRAMUSCULAR; INTRAVENOUS at 13:35

## 2020-10-08 RX ADMIN — MEPERIDINE HYDROCHLORIDE 12.5 MG: 25 INJECTION, SOLUTION INTRAMUSCULAR; INTRAVENOUS; SUBCUTANEOUS at 16:09

## 2020-10-08 RX ADMIN — IBUPROFEN 600 MG: 600 TABLET, FILM COATED ORAL at 23:07

## 2020-10-08 RX ADMIN — SUCCINYLCHOLINE CHLORIDE 120 MG: 20 INJECTION, SOLUTION INTRAMUSCULAR; INTRAVENOUS at 11:05

## 2020-10-08 RX ADMIN — Medication 10 ML: at 21:39

## 2020-10-08 RX ADMIN — HYDROMORPHONE HYDROCHLORIDE 4 MG: 2 TABLET ORAL at 21:50

## 2020-10-08 RX ADMIN — MEPERIDINE HYDROCHLORIDE 12.5 MG: 25 INJECTION, SOLUTION INTRAMUSCULAR; INTRAVENOUS; SUBCUTANEOUS at 15:54

## 2020-10-08 RX ADMIN — SODIUM CHLORIDE, SODIUM LACTATE, POTASSIUM CHLORIDE, AND CALCIUM CHLORIDE 100 ML/HR: 600; 310; 30; 20 INJECTION, SOLUTION INTRAVENOUS at 19:49

## 2020-10-08 RX ADMIN — ACETAMINOPHEN 1000 MG: 500 TABLET ORAL at 23:07

## 2020-10-08 RX ADMIN — FENTANYL CITRATE 100 MCG: 50 INJECTION, SOLUTION INTRAMUSCULAR; INTRAVENOUS at 11:05

## 2020-10-08 RX ADMIN — SODIUM CHLORIDE, SODIUM LACTATE, POTASSIUM CHLORIDE, AND CALCIUM CHLORIDE: 600; 310; 30; 20 INJECTION, SOLUTION INTRAVENOUS at 13:50

## 2020-10-08 RX ADMIN — ROCURONIUM BROMIDE 10 MG: 10 INJECTION INTRAVENOUS at 11:14

## 2020-10-08 RX ADMIN — CEFAZOLIN SODIUM 1 G: 1 INJECTION, POWDER, FOR SOLUTION INTRAMUSCULAR; INTRAVENOUS at 18:39

## 2020-10-08 RX ADMIN — LIDOCAINE HYDROCHLORIDE 80 MG: 20 INJECTION, SOLUTION EPIDURAL; INFILTRATION; INTRACAUDAL; PERINEURAL at 11:05

## 2020-10-08 RX ADMIN — DEXAMETHASONE SODIUM PHOSPHATE 8 MG: 4 INJECTION, SOLUTION INTRAMUSCULAR; INTRAVENOUS at 11:22

## 2020-10-08 RX ADMIN — Medication 10 ML: at 17:00

## 2020-10-08 RX ADMIN — FENTANYL CITRATE 25 MCG: 50 INJECTION, SOLUTION INTRAMUSCULAR; INTRAVENOUS at 15:13

## 2020-10-08 RX ADMIN — WATER 2 G: 1 INJECTION INTRAMUSCULAR; INTRAVENOUS; SUBCUTANEOUS at 10:57

## 2020-10-08 RX ADMIN — FENTANYL CITRATE 25 MCG: 50 INJECTION, SOLUTION INTRAMUSCULAR; INTRAVENOUS at 15:49

## 2020-10-08 RX ADMIN — GLYCOPYRROLATE 0.4 MG: 0.2 INJECTION, SOLUTION INTRAMUSCULAR; INTRAVENOUS at 13:37

## 2020-10-08 RX ADMIN — HYDROMORPHONE HYDROCHLORIDE 0.6 MG: 2 INJECTION, SOLUTION INTRAMUSCULAR; INTRAVENOUS; SUBCUTANEOUS at 12:11

## 2020-10-08 RX ADMIN — Medication 3 AMPULE: at 10:00

## 2020-10-08 RX ADMIN — MIDAZOLAM HYDROCHLORIDE 4 MG: 1 INJECTION, SOLUTION INTRAMUSCULAR; INTRAVENOUS at 10:57

## 2020-10-08 RX ADMIN — GABAPENTIN 300 MG: 300 CAPSULE ORAL at 21:39

## 2020-10-08 RX ADMIN — PROPOFOL 200 MG: 10 INJECTION, EMULSION INTRAVENOUS at 11:05

## 2020-10-08 RX ADMIN — GABAPENTIN 300 MG: 300 CAPSULE ORAL at 18:43

## 2020-10-08 RX ADMIN — Medication 3 MG: at 13:37

## 2020-10-08 RX ADMIN — HYDROMORPHONE HYDROCHLORIDE 0.4 MG: 2 INJECTION, SOLUTION INTRAMUSCULAR; INTRAVENOUS; SUBCUTANEOUS at 11:38

## 2020-10-08 RX ADMIN — IBUPROFEN 600 MG: 600 TABLET, FILM COATED ORAL at 18:38

## 2020-10-08 RX ADMIN — ROCURONIUM BROMIDE 10 MG: 10 INJECTION INTRAVENOUS at 11:05

## 2020-10-08 RX ADMIN — ACETAMINOPHEN 1000 MG: 500 TABLET ORAL at 18:38

## 2020-10-08 RX ADMIN — DOCUSATE SODIUM 100 MG: 100 CAPSULE, LIQUID FILLED ORAL at 18:38

## 2020-10-08 RX ADMIN — HYDROMORPHONE HYDROCHLORIDE 0.4 MG: 2 INJECTION, SOLUTION INTRAMUSCULAR; INTRAVENOUS; SUBCUTANEOUS at 12:27

## 2020-10-08 NOTE — OP NOTES
Name: Marc Connors Surgeon: Tanisha Brady MD  
Account #: [id number] Surgery Date: 10/8/2020 : 1965 Age: 48 y.o. Location:  [facility] OPERATIVE REPORT  
 
PREOPERATIVE DIAGNOSES:  
1. Left breast cancer. 2.  Acquired absence of bilateral breasts. POSTOPERATIVE DIAGNOSES:  
1. Left breast cancer. 2.  Acquired absence of bilateral breasts. OPERATIVE PROCEDURE:  
1. Immediate bilateral breast reconstruction with pre-pectoral placement of tissue expanders. 2.  Acellular dermal allografts to bilateral breasts. SURGEON:  Bill Tyson MD 
 
ASSISTANT: Leigh Mason SA. 
  
ANESTHESIA: General endotracheal.  
 
INDICATIONS: The patient is a 48 y.o. female who was diagnosed with a breast cancer on the left side. She was planning on mastectomy on that side, as well as a prophylactic mastectomy on the right side, with Dr. Aaliyah Del Toro. She had completed neoadjuvant chemotherapy. She was an excellent candidate for immediate breast reconstruction. The pros and cons of both tissue expander/ implant-based reconstruction as well as autologous tissue reconstruction were discussed at length. The decision was made to proceed with first stage immediate bilateral breast reconstruction with tissue expanders and acellular dermal allografts. The procedure, as well as the alternatives, possible complications, and anticipated scars were outlined with the patient, and she agrees to proceed, having given her informed and written consent. She understands that post-operative radiotherapy might be indicated, and its possible effects and impact on reconstruction have been discussed. PROCEDURE IN DETAIL: The patient was positively identified in the pre-operative holding area, and she was marked in the upright position. She received pre-operative intravenous antibiotics.   She was taken to the operating room and placed in the supine position with all pressure points padded. Pneumatic compression stockings were applied to bilateral lower extremities. Following successful general endotracheal anesthesia, a flaherty catheter was placed. The arms were secured to padded arm boards. The chest was prepped and draped in the usual sterile fashion. At this point she underwent bilateral skin-sparing mastectomies through horizontally-oriented lenticuar excisions which included the nipple areolar complexes, and axillary lymph node dissection on the left by Dr. Kal Segura. This will be dictated separately by her. At the conclusion of her portion of the procedure, I presented to the operating room and scrubbed in. Attention was first directed to the right side. The breast pocket was inspected and irrigated, and hemostasis assured. The inframammary fold was recreated with interrupted #3-0 vicryl sutures to the chest wall. Exparel was expanded with 40 mL injectable saline and 40 mL 0.25% plain marcaine to a total of 100 mL. Fifty mL of this mixture was injected laterally in the pocket along the intercostal nerves, as well as into the deep subcutaneous tissues surrounding the breast pocket. Next, the breast footprint was marked within the pocket. A medium size piece of contour shape Alloderm ready to use graft was selected and rinsed in saline. It was properly oriented and placed into the breast pocket. It was secured in an inferior sling position with interrupted #3-0 vicryl sutures. A second piece of  contour shape Alloderm ready to use graft, this one small size, was selected and rinsed in saline. It was properly oriented and placed into the breast pocket in an upper pole position, opposite the first piece. It was secured about the periphery with interrupted #3-0 vicryl sutures to the chest wall. The pocket was measured again and a tissue expander selected. The surgeon was the only person to handle the expander. It was bathed in saline solution.   The integrated fill port was accessed with the accompanying butterfly needle, and all air was evacuated. The tissue expander was placed with proper orientation in pre-pectoral location within the breast pocket. It was secured with #2-0 prolene sutures at the inferior, medial, and lateral tab positions. The free edges of the two Alloderm grafts were then trimmed as needed, to remove any excess, after estimating proper position once saline is instilled. Those free edges were then sewn to each other with #2-0 vicryl in running whipstitch fashion for total coverage of the tissue expander. A 19-Fr jakob drain was placed in the dependent portion of the breast pocket, brought out through a separate #15 blade stab incision in the lateral inframammary fold, and secured with a #2-0 nylon suture. Next, attention was directed to the contralateral breast pocket. An identical, mirror-image procedure was performed with irrigation of the breast pocket, recreation of the inframammary fold, infiltration of the remainder of the exparel mixture, placement of two pieces of opposing Alloderm grafts inferiorly and superiorly, pre-pectoral placement of a tissue expander, total coverage of the expander, and placement of a drain. A thorough inspection of each breast pocket was performed and hemostasis assured. Skin closure was then performed on each side with a deep dermal layer of interrupted #2-0 and #3-0 vicryl sutures, followed by #4-0 monocryl in running subcuticular fashion. Then the integrated fill ports of each tissue expander were accessed with the accompanying butterfly needles, assisted with the magnetic finder. Each expander was filled to 350 mL with injectable normal saline. This did not place excess tension on the skin. Instrument, sponge, and needle counts were reported to be correct. The incisions were cleaned, and skin glue was applied.   Drains were hooked to bulb suction. A surgical bra was placed after the skin glue was dry. She was allowed to awaken from anesthesia and was extubated without apparent complication. She was taken to the recovery room in excellent condition. COMPLICATIONS: None. EBL: 20 mL DRAINS: 19 Fr Carmelo x2 (one in each breast). SPECIMENS: None for reconstructive portion of the procedure. IMPLANT INFORMATION:   
 
RIGHT BREAST: GlobaTrek High Profile Breast Tissue Expander, Reference #: [id number], Serial #: [id number]. Nominal fill of 375 mL, today filled to 350 mL with injectable normal saline. Two AlloDerm Select Tissue Matrix RTU: one Contour Medium Size, Perforated, medium thickness, 132 sq cm graft, Reference #: [id number]; Lot number: [id number], and one Contour small Size, Perforated, medium thickness, 77 sq cm graft, Reference #: [id number]; Lot number:  [id number]. LEFT BREAST:  [address] High Profile Breast Tissue Expander Reference #: [id number], Serial #: [id number]. Nominal fill of 375 mL, today filled to 350 mL with injectable normal saline. Two AlloDerm Select Tissue Matrix RTU: one Contour Medium Size, Perforated, medium thickness, 132 sq cm graft, Reference #: [id number]; Lot number: [id number], and one Contour small Size, Perforated, medium thickness, 77 sq cm graft, Reference #: [id number]; Lot number:  [id number]. Isaak Ambriz MD, FACS 
 
CC:  Jacquelynn Media, MD Otis Schirmer, MD

## 2020-10-08 NOTE — INTERVAL H&P NOTE
Update History & Physical 
 
The Patient's History and Physical of 9/14/2020 Bilateral mastectomies, left alnd  was reviewed with the patient and I examined the patient. There was no change. The surgical site was confirmed by the patient and me. Plan:  The risk, benefits, expected outcome, and alternative to the recommended procedure have been discussed with the patient. Patient understands and wants to proceed with the procedure.  
 
Electronically signed by Nika Rich MD on 10/8/2020 at 9:24 AM

## 2020-10-08 NOTE — ANESTHESIA PREPROCEDURE EVALUATION
Relevant Problems No relevant active problems Anesthetic History No history of anesthetic complications Review of Systems / Medical History Patient summary reviewed, nursing notes reviewed and pertinent labs reviewed Pulmonary Sleep apnea: No treatment Neuro/Psych Psychiatric history Cardiovascular Within defined limits Exercise tolerance: >4 METS 
  
GI/Hepatic/Renal 
Within defined limits Endo/Other Obesity and cancer (breast cancer) Other Findings Comments: ADHD Physical Exam 
 
Airway Mallampati: I 
TM Distance: 4 - 6 cm Neck ROM: normal range of motion Mouth opening: Normal 
 
 Cardiovascular Regular rate and rhythm,  S1 and S2 normal,  no murmur, click, rub, or gallop Dental 
No notable dental hx Pulmonary Breath sounds clear to auscultation Abdominal 
GI exam deferred Other Findings Anesthetic Plan ASA: 2 Anesthesia type: general 
 
 
 
 
Induction: Intravenous Anesthetic plan and risks discussed with: Patient

## 2020-10-08 NOTE — BRIEF OP NOTE
Brief Postoperative Note Patient: Julius Toribio YOB: 1965 MRN: [id number] Date of Procedure: 10/8/2020 Pre-Op Diagnosis: LEFT BREAST CANCER Post-Op Diagnosis: Same as preoperative diagnosis. Procedure(s): LEFT BREAST MASTECTOMY WITH AXILLARY NODE DISSECTION AND RIGHT SIMPLE MASTECTOMY / IMMEDIATE BILATERAL BREAST RECONSTRUCTION WITH TISSUE EXPANDERS AND ALLODERM GRAFTS 
BREAST RECONSTRUCTION Surgeon(s): MD Lolita Ron MD 
 
Surgical Assistant: None Anesthesia: General  
 
Estimated Blood Loss (mL): Minimal 
 
Complications:none Specimens:  
ID Type Source Tests Collected by Time Destination 1 : Right prophylactic mastectomy Preservative Breast  Kerrie Barnhart MD 10/8/2020 1152 Pathology 2 : Left mastectomy Preservative Breast  Kerrie Barnhart MD 10/8/2020 1215 Pathology 3 : Left axillary contents Preservative Lymph Node  Kerrie Barnhart MD 10/8/2020 1242 Pathology 4 : Additional right breast skin Preservative Breast  Kerrie Barnhart MD 10/8/2020 1329 Pathology 5 : Additional medial left breast skin Preservative Breast  Kerrie Barnhart MD 10/8/2020 1414 Pathology 6 : Additional lateral left breast skin Preservative Breast  Kerrie Barnhart MD 10/8/2020 1416 Pathology Implants:  
Implant Name Type Inv. Item Serial No.  Lot No. LRB No. Used Action AlloDerm Select Acellular Matrix Contour Small Perforated Med 1.6mm 77 cm2   [facility] [id number] Right 1 Implanted AlloDerm Select Acellular Matrix Contour Perforated Medium 1.6mm 132cm2   N/A [facility] [employer] [id number] Right 1 Implanted EXPANDER BRST 375CC [id number] P6.4CM MARINO TEXT HI PROF - [id number]  EXPANDER BRST 375CC [id number] P6.4CM MARINO TEXT HI PROF [id number] [employer] [id number] Right 1 Implanted AlloDerm Select Acellular Matrix Contour Small Perforated  1.6mm 77cm2 N/A [facility] [id number] Left 1 Implanted AlloDerm Select Acellular Matrix Contour Medium Perforated 1.6mm 132 cm2   N/A [facility] Josi Calderón [id number] Left 1 Implanted EXPANDER BRST 375CC [id number] P6.4CM MARINO TEXT HI PROF - [id number]  EXPANDER BRST 375CC [id number] P6.4CM MARINO TEXT HI PROF [id number] [employer] [id number] Left 1 Implanted Drains:  
Stevie Beam #1 10/08/20 Right Breast (Active) Site Assessment Clean, dry, & intact 10/08/20 1459 Dressing Status Clean, dry, & intact 10/08/20 1459 Drainage Description Sanguinous 10/08/20 1459 Carmelo Drain Airleak No 10/08/20 1459 Status Patent; Charged 10/08/20 1459 Output (ml) 20 ml 10/08/20 1459 Stevie Beam #2 10/08/20 Left Breast (Active) Site Assessment Clean, dry, & intact 10/08/20 1459 Dressing Status Clean, dry, & intact 10/08/20 1459 Drainage Description Sanguinous 10/08/20 1459 Caremlo Drain Airleak No 10/08/20 1459 Output (ml) 30 ml 10/08/20 1459 Stevie Beam #3 10/08/20 (Active) Findings: breasts and left nodes removed Electronically Signed by Yunier Rico MD on 10/8/2020 at 3:49 PM 
Dictated stt

## 2020-10-08 NOTE — PERIOP NOTES
Permission received to review discharge instructions and discuss private health information with MARINO Mattson.

## 2020-10-08 NOTE — PERIOP NOTES
Pt reporting pain in R breast, pt given fentanyl 25mcg IV x 1 dose, pt now sleeping again, Wolfgang drains milked and emptied. Incisions inspected, intact. VSS  SCD's reconnected. 1540-Pt's sister in law given her implant cards + information on how to empty WOLFGANG drains and general care of them. Also updated on room information. 1615-Pt tolerating ice chips. Pt feeling warm on and off. VSS

## 2020-10-08 NOTE — OP NOTES
[place] 
OPERATIVE REPORT Name:  Jeramy Corona 
MR#:  [id number] :  1965 ACCOUNT #:  [de-identified] DATE OF SERVICE:  10/08/2020 
7 PREOPERATIVE DIAGNOSES:  Left breast cancer with axillary metastasis. POSTOPERATIVE DIAGNOSES:  Left breast cancer with axillary metastasis. PROCEDURE PERFORMED:  Left breast mastectomy, skin sparing mastectomy with axillary lymph node dissection, and a right prophylactic mastectomy. SURGEON:  Alex Rizo MD 
 
ASSISTANT:  Nellie Bailey. ANESTHESIA:  General. 
 
COMPLICATIONS:  None. SPECIMENS REMOVED: 
1. Right prophylactic mastectomy. 2.  Left mastectomy. 3.  Left axillary contents. IMPLANTS:  No implants for my portion of the procedure. ESTIMATED BLOOD LOSS:  Minimal. 
 
FINDINGS:  Breast and left axillary nodes removed. INDICATIONS FOR PROCEDURE:  This is a pleasant 15-year-old female who had a left breast cancer with metastasis to axillary nodes. She needed a mastectomy with axillary node dissection and also decided to have a right prophylactic mastectomy. Reconstruction was to be done by Dr. Lexis Martinez. PROCEDURE IN DETAIL:  The patient was seen in the preop holding area where surgical site was marked by surgeon. Informed consent was obtained. She went to the operating room, laid supine position where general endotracheal anesthesia was induced. Bilateral breasts were prepped and draped in usual fashion. A time-out was performed. Prior to this, a Buck catheter was placed without complication. Attention was turned to the right side. Elliptical incision was made around the nipple-areolar complex with a 10-blade. Bovie cautery was used to dissect superior, medial, inferior, and lateral skin flaps.   The breast was removed in total from the chest wall in a medial-to-lateral fashion incorporating the pectoral fascia and marked short stitch superior and long stitch lateral. Bovie cautery was used to obtain hemostasis. A moist lap was packed in the cavity. Next, in the same fashion, an elliptical incision was made around the nipple-areolar complex with a 10-blade. Bovie cautery was used to dissect superior, medial, inferior, and lateral skin flaps. Bovie cautery was used to remove the breast from the chest wall in a medial-to-lateral fashion incorporating the pectoral fascia. The breast was marked short stitch superior and long stitch lateral and sent for permanent pathology. Through the mastectomy incision, a lymphadenectomy was performed in the left axilla. Bovie cautery was used to dissect free the pectoralis minor muscle. Axillary vein and thoracodorsal bundle along thoracic were identified and preserved. LigaSure was used to remove the axillary contents and these were sent for permanent pathology. Next, clips were applied on the vessels and moist lap was packed in the cavity. All sponge, needle, and instrument counts were correct. The patient was in stable condition. For Dr. Harmon Finely portion of the procedure, please see his operative report. MD TIMMY [email or web address] 
D:  10/08/2020 15:52 
T:  10/08/2020 16:55 
JOB #:  [id number]

## 2020-10-08 NOTE — ANESTHESIA POSTPROCEDURE EVALUATION
Procedure(s): LEFT BREAST MASTECTOMY WITH AXILLARY NODE DISSECTION AND RIGHT SIMPLE MASTECTOMY / IMMEDIATE BILATERAL BREAST RECONSTRUCTION WITH TISSUE EXPANDERS AND ALLODERM GRAFTS 
BREAST RECONSTRUCTION. general 
 
Anesthesia Post Evaluation Patient location during evaluation: PACU Note status: Adequate. Level of consciousness: responsive to verbal stimuli and sleepy but conscious Pain management: satisfactory to patient Airway patency: patent Anesthetic complications: no 
Cardiovascular status: acceptable Respiratory status: acceptable Hydration status: acceptable Comments: +Post-Anesthesia Evaluation and Assessment Patient: Lalo Jordan MRN: [id number]  SSN: [id number] YOB: 1965  Age: 48 y.o. Sex: female Cardiovascular Function/Vital Signs /73   Pulse 78   Temp 37 °C (98.6 °F)   Resp 15   Ht 5' 3\" (1.6 m)   Wt 85.3 kg (188 lb)   SpO2 98%   BMI 33.30 kg/m² Patient is status post Procedure(s): LEFT BREAST MASTECTOMY WITH AXILLARY NODE DISSECTION AND RIGHT SIMPLE MASTECTOMY / IMMEDIATE BILATERAL BREAST RECONSTRUCTION WITH TISSUE EXPANDERS AND ALLODERM GRAFTS 
BREAST RECONSTRUCTION. Nausea/Vomiting: Controlled. Postoperative hydration reviewed and adequate. Pain: 
Pain Scale 1: Numeric (0 - 10) (10/08/20 1600) Pain Intensity 1: 5 (10/08/20 1600) Managed. Neurological Status:  
Neuro (WDL): Exceptions to WDL (10/08/20 1530) At baseline. Mental Status and Level of Consciousness: Arousable. Pulmonary Status:  
O2 Device: Nasal cannula (10/08/20 1600) Adequate oxygenation and airway patent. Complications related to anesthesia: None Post-anesthesia assessment completed. No concerns. Signed By: Edita La MD  
 10/8/2020 Post anesthesia nausea and vomiting:  controlled INITIAL Post-op Vital signs:  
Vitals Value Taken Time /62 10/8/2020  4:30 PM  
Temp 37 °C (98.6 °F) 10/8/2020  4:00 PM  
 Pulse 73 10/8/2020  4:54 PM  
Resp 20 10/8/2020  4:54 PM  
SpO2 99 % 10/8/2020  4:54 PM  
Vitals shown include unvalidated device data.

## 2020-10-08 NOTE — PERIOP NOTES
Coley Sever 1965 
[id number] Situation: 
Verbal report given from: SUSANA Webb CRNA, RN Procedure: Procedure(s): LEFT BREAST MASTECTOMY WITH AXILLARY NODE DISSECTION AND RIGHT SIMPLE MASTECTOMY / IMMEDIATE BILATERAL BREAST RECONSTRUCTION WITH TISSUE EXPANDERS AND ALLODERM GRAFTS 
BREAST RECONSTRUCTION Background: 
 
Preoperative diagnosis: LEFT BREAST CANCER Postoperative diagnosis: LEFT BREAST CANCER :  Dr. Sara Acuna Assistant(s): Circ-1: Louisa Oppenheim, RN 
Circ-2: Jean Pierre Kumar RN 
Circ-Relief: Pricila Rivas Scrub Tech-1: Gregory Yoon Scrub Tech-2: Antoinette Campuzano Scrub Tech-Relief: Alena Cho, RT Surg Asst-1: Ginny Jones Specimens:  
ID Type Source Tests Collected by Time Destination 1 : Right prophylactic mastectomy Preservative Breast  Alannah Dimas MD 10/8/2020 1152 Pathology 2 : Left mastectomy Preservative Breast  Alananh Dimas MD 10/8/2020 1215 Pathology 3 : Left axillary contents Preservative Lymph Node  Alannah Dimas MD 10/8/2020 1242 Pathology 4 : Additional right breast skin Preservative Breast  Alannah Dimas MD 10/8/2020 1329 Pathology 5 : Additional medial left breast skin Preservative Breast  Alannah Dimas MD 10/8/2020 1414 Pathology 6 : Additional lateral left breast skin Preservative Breast  Alannah Dimas MD 10/8/2020 1416 Pathology Assessment: 
Intra-procedure medications Anesthesia gave intra-procedure sedation and medications, see anesthesia flow sheet Intravenous fluids: Harl Purl Vital signs stable Recommendation:

## 2020-10-09 VITALS
HEART RATE: 68 BPM | TEMPERATURE: 97.5 F | HEIGHT: 63 IN | RESPIRATION RATE: 17 BRPM | WEIGHT: 188 LBS | SYSTOLIC BLOOD PRESSURE: 119 MMHG | DIASTOLIC BLOOD PRESSURE: 74 MMHG | BODY MASS INDEX: 33.31 KG/M2 | OXYGEN SATURATION: 100 %

## 2020-10-09 LAB
BACTERIA SPEC CULT: NORMAL
SERVICE CMNT-IMP: NORMAL

## 2020-10-09 PROCEDURE — 74011250636 HC RX REV CODE- 250/636: Performed by: PLASTIC SURGERY

## 2020-10-09 PROCEDURE — 74011000258 HC RX REV CODE- 258: Performed by: PLASTIC SURGERY

## 2020-10-09 PROCEDURE — 74011250637 HC RX REV CODE- 250/637: Performed by: PLASTIC SURGERY

## 2020-10-09 PROCEDURE — 99218 HC RM OBSERVATION: CPT

## 2020-10-09 RX ADMIN — SODIUM CHLORIDE, SODIUM LACTATE, POTASSIUM CHLORIDE, AND CALCIUM CHLORIDE 100 ML/HR: 600; 310; 30; 20 INJECTION, SOLUTION INTRAVENOUS at 06:49

## 2020-10-09 RX ADMIN — ACETAMINOPHEN 1000 MG: 500 TABLET ORAL at 06:06

## 2020-10-09 RX ADMIN — IBUPROFEN 600 MG: 600 TABLET, FILM COATED ORAL at 06:06

## 2020-10-09 RX ADMIN — GABAPENTIN 300 MG: 300 CAPSULE ORAL at 09:34

## 2020-10-09 RX ADMIN — HYDROMORPHONE HYDROCHLORIDE 2 MG: 2 TABLET ORAL at 09:34

## 2020-10-09 RX ADMIN — CEFAZOLIN SODIUM 1 G: 1 INJECTION, POWDER, FOR SOLUTION INTRAMUSCULAR; INTRAVENOUS at 04:11

## 2020-10-09 RX ADMIN — Medication 10 ML: at 06:14

## 2020-10-09 RX ADMIN — HEPARIN SODIUM 5000 UNITS: 5000 INJECTION INTRAVENOUS; SUBCUTANEOUS at 09:35

## 2020-10-09 RX ADMIN — DOCUSATE SODIUM 100 MG: 100 CAPSULE, LIQUID FILLED ORAL at 09:34

## 2020-10-09 NOTE — DISCHARGE INSTRUCTIONS
You have numbing medication in your incision called Exparel. This numbing medication will hopefully last for 72 hours. Please leave the Exparel armband on until full feeling in area. Andrez Martell M.D., F.A.C.S. D.[facility]  [phone]    TISSUE EXPANDER BREAST RECONSTRUCTION POST-OPERATIVE INSTRUCTIONS      SURGICAL BRA:  You will be in a surgical bra following the procedure. This bra, or another soft, front-fastening sports bra (with no underwire) should be worn at all times except when showering for the first two weeks. If the surgical bra is irritating to your skin, you may place gauze pads between your skin and the bra for added comfort. You may wash the surgical bra if it gets soiled, but allow it to air-dry, do not place it in the dryer. SURGICAL DRAINS:  Please refer to the WOLFGANG Drain Instructions below for details. ACTIVITY:  Take it easy for the first several days. No cleaning, housework, or strenuous activity. Do not lift anything over 10 pounds, including children, and do not lift your hands over your head. You may resume non-vigorous activities at two weeks, then gently increase your activity as tolerated. No running, weight lifting, cross fit, or other strenuous activities until four weeks. BATHING:  You may shower one day after surgery. NO tub baths, hot tubs, or swimming for one week after removal of all WOLFGANG drains. Remove any gauze or other bandages before showering. Your incisions will usually be covered with skin glue. This can get wet in the shower. Simply pat everything dry after the shower. Skin glue will usually fall off on its own, but you may scrub it in the shower or peel it off starting one week after your surgery. MEDICATION:  You will receive prescriptions for an antibiotic, a narcotic pain medication (usually dilaudid, generic name is hydromorphone), and a non-narcotic pain medication (gabapentin).   Take the antibiotic until it is finished. Take the narcotic pain medication as needed according to the directions. And take the gabapentin as needed until it is gone. If you are otherwise not taking any narcotic pain medications, you may stop taking the gabapentin if you wish. Do not drive while taking narcotic pain medication. Avoid aspirin for two weeks after surgery. You may take ibuprofen (e.g. Advil)  and/or acetaminophen (Tylenol) according to the package instructions in addition to, or instead of, the prescription pain medicine. EXPANSION:  The tissue expander will typically have some saline fluid placed in it at the time of surgery. The expansion process, to stretch the skin and create the new breast, is performed by filling the tissue expander during office visits. This usually begins approximately three weeks after your surgery. THINGS TO WATCH FOR:  If you experience excessive or sudden swelling, spreading or increasing redness, increasing pain, foul-smelling drainage, separation of any incisions, fever, shaking chills, or any other concerns, please call the office immediately ([phone]). FOLLOW-UP APPOINTMENT: please call the office at [phone] during regular business hours to schedule an appointment for Monday, October 19, 2020. APPOINTMENT LOCATION:     [XX] [facility]    [address] [place] [address] [facility]     [   ] [facility]    [address] [facility], [facility] [address]    PURPOSE:  You have had surgery during which a Ronnie-Mix drain, or WOLFGANG drain, has been placed by your surgeon. A WOLFGANG drain is a rubber tube which goes under the skin and drains excess fluid during the healing process so that this fluid does not accumulate. There is a one-way valve which allows the fluid to collect in the bulb on the end of the drain.   The drain is usually held in place by a single stitch. The color of the drainage can range from reddish to pink to straw-colored. CARE OF THE DRAIN:  Caring for the WOLFGANG drain is fairly easy. First, protect the drain so that it does not get pulled inadvertently. You may fasten them to your clothing with a safety pin through the floppy tag on the bulb. DO NOT place a pin through either the drain tubing or the bulb itself. The drain works by maintaining a constant low pressure of suction when the bulb is in the collapsed (squeezed in) position. If the bulb will not maintain this collapsed position when it is recapped, please call the office, as the drain may not be working properly. MEASURING THE DRAINAGE:  Grasp the bulb in one hand and remove the cap with the other hand. This will cause the bulb to relax into a round shape. Holding the open end over a specimen cup, squirt the fluid into the cup by squeezing the bulb. Once the bulb is empty, squeeze it in (collapse it) with one hand, and replace the cap with your other hand. Then holding the measuring cup level, note how much fluid there is (in milliliters, mL), and record this number on the chart below. Discard the fluid in the toilet and rinse out the specimen cup. Note: your specimen cup may be in cubic centimeters (cc). 1 cc = 1 mL. REMOVAL:  The WOLFGANG drain will be removed in the office when the daily drainage is at a low enough level. You may be asked to call the office with your measuring totals to determine if the drain(s) is (are) ready to be removed. BRING THE CHART BELOW WITH YOU TO YOUR OFFICE VISIT. Removal involves minimal discomfort without the need for anesthesia. The drain site in the skin heals on its own once the drain is removed without any further stitches. Just use a bandaid or gauze over the site until it is dry. The site may get wet in the shower. SHOWERING:  You may shower after you are discharged home while you have one or more WOLFGANG drains in.   Just let the water run over the drain sites and then pat them dry when you are done. Some patients like to place a long string necklace around their necks during showers, to which they can safety pin the tag on the bulb to prevent it from dangling. DO NOT take a tub bath, go swimming (pool or lake/ocean), or otherwise submerge your body in water before all WOLFGANG drains have been removed and you are permitted by your surgeon.     THINGS TO WATCH FOR:  Please call the office immediately ([phone]) if you notice:   Sudden bright or dark red bleeding into the bulb or around the drain site in the skin   Dislodgment of the WOLFGANG drain or if the drain falls out   Spreading redness around the drain site in the skin   Cloudy or foul-smelling drainage in the bulb or around the drain site   Fever, shaking chills, excessive swelling, pain or discomfort   Any other concerns or questions        Date           #1 (AM)             #1 (PM)             Daily Total #1  (AM+PM)             #2 (AM)           #2 (PM)           Daily  Total  #2  (AM+PM)

## 2020-10-09 NOTE — PROGRESS NOTES
Discharge instructions were reviewed with the pt. Pts questions were answered. AVS did not have her new medications on it but the pt got a confirmation from the pharmacy that medications are available for pickup. . Dr. Cristela Manjarrez was paged but there were no changes made to the AVS. Dr. Cristela Manjarrez stated in his notes the medication he wants the pt to take at home and that he called them into the pharmacy. The pt stated she if she has any questions about the medications she will call the doctor or ask the pharmacist. Pts IV was taken out. Pt stated she left with all her belongings. Pt is to be discharged home and will have help from her mother. Pt was given a WOLFGANG drain output chart and a cup to use to measure the output.

## 2020-10-09 NOTE — PROGRESS NOTES
Problem: Pain Goal: *Control of Pain Outcome: Progressing Towards Goal 
  
Problem: Patient Education: Go to Patient Education Activity Goal: Patient/Family Education Outcome: Progressing Towards Goal 
  
Problem: Falls - Risk of 
Goal: *Absence of Falls Description: Document Kevin Alcala Fall Risk and appropriate interventions in the flowsheet. Outcome: Progressing Towards Goal 
Note: Fall Risk Interventions: 
  
 
  
 
  
 
  
 
  
 
 
  
Problem: Patient Education: Go to Patient Education Activity Goal: Patient/Family Education Outcome: Progressing Towards Goal

## 2020-10-09 NOTE — PROGRESS NOTES
Problem: Pain Goal: *Control of Pain Outcome: Progressing Towards Goal 
  
Problem: Patient Education: Go to Patient Education Activity Goal: Patient/Family Education Outcome: Progressing Towards Goal 
  
Problem: Falls - Risk of 
Goal: *Absence of Falls Description: Document Giovani Chamorro Fall Risk and appropriate interventions in the flowsheet. Outcome: Progressing Towards Goal 
Note: Fall Risk Interventions: 
  
 
  
 
  
 
  
 
  
 
 
  
Problem: Patient Education: Go to Patient Education Activity Goal: Patient/Family Education Outcome: Progressing Towards Goal

## 2020-10-09 NOTE — PROGRESS NOTES
RAMBO Plan: *Home 
 *co-worker to transport at d/c 
 *OBS letter signed & placed on chart Patient is discharging home today without any needs or concerns. Patient states that her mother will be staying with her for a week to assist as needed. Follow-up appointment is on the MELISSA Steiner 
[id number]

## 2020-10-09 NOTE — PROGRESS NOTES
General Surgery End of Shift Nursing Note Bedside shift change report given to [address] [place], [place] [address]raymond Lozano (oncoming nurse) by Smith Hayes (offgoing nurse). Report included the following information SBAR, Kardex and Intake/Output. Shift worked:   7a-7p Summary of shift:    Pt arrived on the floor at 1730. Pts had no complaints of pain. Pts vital signs are steady. Pts drains are charged and draining. Pts dressing is clean dry and intact. Pt has not voided yet. Pt has tolerated her diet well. Issues for physician to address:   none Number times ambulated in hallway past shift: 0 Number of times OOB to chair past shift: 0 Pain Management: 
Current medication: see mar Patient states pain is manageable on current pain medication: YES 
 
GI: 
 
Current diet:  DIET CLEAR LIQUID Tolerating current diet: YES Passing flatus: YES Last Bowel Movement: unknown Appearance: unknown Respiratory: 
 
Incentive Spirometer at bedside: YES Patient instructed on use: YES Patient Safety: 
 
Falls Score: 0 Bed Alarm On? No 
Sitter? No 
 
Lou Sandy

## 2020-10-09 NOTE — PROGRESS NOTES
Problem: Pain Goal: *Control of Pain 10/9/2020 1047 by Angel Desouza Outcome: Resolved/Met 
10/9/2020 1046 by Angel Desouza Outcome: Progressing Towards Goal 
  
Problem: Patient Education: Go to Patient Education Activity Goal: Patient/Family Education 10/9/2020 1047 by Angel Desouza Outcome: Resolved/Met 
10/9/2020 1046 by Angel Desouza Outcome: Progressing Towards Goal 
  
Problem: Falls - Risk of 
Goal: *Absence of Falls Description: Document John Paul Radha Fall Risk and appropriate interventions in the flowsheet. 10/9/2020 1047 by Angel Desouza Outcome: Resolved/Met Note: Fall Risk Interventions: 
  
 
  
 
  
 
  
 
  
 
 
10/9/2020 1046 by Angel Desouza Outcome: Progressing Towards Goal 
Note: Fall Risk Interventions: 
  
 
  
 
  
 
  
 
  
 
 
  
Problem: Patient Education: Go to Patient Education Activity Goal: Patient/Family Education 10/9/2020 1047 by Angel Desouza Outcome: Resolved/Met 
10/9/2020 1046 by Angel Desouza Outcome: Progressing Towards Goal

## 2020-10-09 NOTE — PROGRESS NOTES
Pt c/o of minimal pain during the night V/S remained stable though out the night. Drains had minimal serosanguineous out put. Pt was also able to ambulate with minimal assistance and rested comformtably.

## 2020-10-09 NOTE — PROGRESS NOTES
HISTORY OF PRESENT ILLNESS Isabella De Leon is a 48 y.o. female. HPI s/p bilateral mastectomies, left alnd Doing well Pain controlled Review of Systems All other systems reviewed and are negative. Physical Exam 
Vitals signs and nursing note reviewed. bilateral expanders intact No hematoma Drains intact ASSESSMENT and PLAN 
  ICD-10-CM ICD-9-CM 1. Malignant neoplasm of upper-outer quadrant of left breast in female, estrogen receptor negative (HCC)  C50.412 174.4 CANCELED: VERIFY CONSENT HAS BEEN OBTAINED  
 Z17.1 V86.1 CANCELED: VERIFY CONSENT HAS BEEN OBTAINED  
 
- dc home per Dr. Bonifacio Elizabeth 
- f/u 1-2 weeks

## 2020-10-09 NOTE — PROGRESS NOTES
POD#1 s/p bilat mastectomies, expander reconstruction No c/o; pain controlled AVSS 
JPs serosanguineous Awake and alert 
bilat breasts: incisions CDI; skin flaps viable; no hematoma Stable Plan: reg diet WOLFGANG teaching D/C home today F/u with Dr. Justice aYnez in 10 days Prescriptions for dilaudid, duricef, and gabapentin sent electronically by Dr. Justice Yanez to [facility] on [address], through his office EMR ([employer]formerly [employer]

## 2020-10-21 ENCOUNTER — ANCILLARY PROCEDURE (OUTPATIENT)
Dept: CARDIOLOGY CLINIC | Age: 55
End: 2020-10-21

## 2020-10-21 VITALS
DIASTOLIC BLOOD PRESSURE: 76 MMHG | HEIGHT: 63 IN | BODY MASS INDEX: 33.31 KG/M2 | SYSTOLIC BLOOD PRESSURE: 124 MMHG | WEIGHT: 188 LBS

## 2020-10-21 DIAGNOSIS — Z17.1 MALIGNANT NEOPLASM OF UPPER-OUTER QUADRANT OF LEFT BREAST IN FEMALE, ESTROGEN RECEPTOR NEGATIVE (HCC): ICD-10-CM

## 2020-10-21 DIAGNOSIS — C50.412 MALIGNANT NEOPLASM OF UPPER-OUTER QUADRANT OF LEFT BREAST IN FEMALE, ESTROGEN RECEPTOR NEGATIVE (HCC): ICD-10-CM

## 2020-10-21 PROCEDURE — 93306 TTE W/DOPPLER COMPLETE: CPT | Performed by: INTERNAL MEDICINE

## 2020-10-22 LAB
ECHO AO ROOT DIAM: 2.84 CM
ECHO AV AREA PEAK VELOCITY: 2.2 CM2
ECHO AV AREA VTI: 2.35 CM2
ECHO AV AREA/BSA PEAK VELOCITY: 1.2 CM2/M2
ECHO AV AREA/BSA VTI: 1.2 CM2/M2
ECHO AV MEAN GRADIENT: 5.81 MMHG
ECHO AV PEAK GRADIENT: 11.57 MMHG
ECHO AV PEAK VELOCITY: 170.05 CM/S
ECHO AV VTI: 33.98 CM
ECHO LA AREA 4C: 20.23 CM2
ECHO LA MAJOR AXIS: 3.86 CM
ECHO LA MINOR AXIS: 2.05 CM
ECHO LA VOL 2C: 48.14 ML (ref 22–52)
ECHO LA VOL 4C: 62.62 ML (ref 22–52)
ECHO LA VOL BP: 57.39 ML (ref 22–52)
ECHO LA VOL/BSA BIPLANE: 30.47 ML/M2 (ref 16–28)
ECHO LA VOLUME INDEX A2C: 25.56 ML/M2 (ref 16–28)
ECHO LA VOLUME INDEX A4C: 33.24 ML/M2 (ref 16–28)
ECHO LV E' LATERAL VELOCITY: 9.53 CM/S
ECHO LV E' SEPTAL VELOCITY: 9.27 CM/S
ECHO LV EDV A2C: 101.44 ML
ECHO LV EDV A4C: 130.17 ML
ECHO LV EDV BP: 114.83 ML (ref 56–104)
ECHO LV EDV INDEX A4C: 69.1 ML/M2
ECHO LV EDV INDEX BP: 61 ML/M2
ECHO LV EDV NDEX A2C: 53.9 ML/M2
ECHO LV EJECTION FRACTION A2C: 61 PERCENT
ECHO LV EJECTION FRACTION A4C: 60 PERCENT
ECHO LV EJECTION FRACTION BIPLANE: 60.6 PERCENT (ref 55–100)
ECHO LV ESV A2C: 39.19 ML
ECHO LV ESV A4C: 52.63 ML
ECHO LV ESV BP: 45.25 ML (ref 19–49)
ECHO LV ESV INDEX A2C: 20.8 ML/M2
ECHO LV ESV INDEX A4C: 27.9 ML/M2
ECHO LV ESV INDEX BP: 24 ML/M2
ECHO LV GLOBAL LONGITUDINAL STRAIN (GLS): -18.4 PERCENT
ECHO LV INTERNAL DIMENSION DIASTOLIC: 4.69 CM (ref 3.9–5.3)
ECHO LV INTERNAL DIMENSION SYSTOLIC: 3.19 CM
ECHO LV IVSD: 0.84 CM (ref 0.6–0.9)
ECHO LV MASS 2D: 131.9 G (ref 67–162)
ECHO LV MASS INDEX 2D: 70 G/M2 (ref 43–95)
ECHO LV POSTERIOR WALL DIASTOLIC: 0.86 CM (ref 0.6–0.9)
ECHO LVOT DIAM: 2.03 CM
ECHO LVOT PEAK GRADIENT: 5.37 MMHG
ECHO LVOT PEAK VELOCITY: 115.86 CM/S
ECHO LVOT SV: 79.8 ML
ECHO LVOT VTI: 24.7 CM
ECHO MV A VELOCITY: 68.52 CM/S
ECHO MV E DECELERATION TIME (DT): 244 MS
ECHO MV E VELOCITY: 83.56 CM/S
ECHO MV E/A RATIO: 1.22
ECHO MV E/E' LATERAL: 8.77
ECHO MV E/E' RATIO (AVERAGED): 8.89
ECHO MV E/E' SEPTAL: 9.01
ECHO MV MAX VELOCITY: 88.45 CM/S
ECHO MV MEAN GRADIENT: 1.26 MMHG
ECHO MV PEAK GRADIENT: 3.13 MMHG
ECHO MV PRESSURE HALF TIME (PHT): 70.76 MS
ECHO MV VTI: 22.96 CM
ECHO RA AREA 4C: 17.4 CM2
ECHO RV INTERNAL DIMENSION: 3.61 CM
ECHO RV TAPSE: 2.91 CM (ref 1.5–2)
GLOBAL LONGITUDINAL STRAIN 2 CHAMBER: -20.2 PERCENT
GLOBAL LONGITUDINAL STRAIN 4 CHAMBER: -20.8 PERCENT
GLOBAL LONGITUDINAL STRAIN LONG AXIS: -14.1 PERCENT

## 2020-10-22 NOTE — PROGRESS NOTES
HISTORY OF PRESENT ILLNESS Inderjit Guo is a 48 y.o. female. HPI ESTABLISHED patient here for post op appointment s/p LEFT skin sparing mastectomy and ALND and RIGHT prophylactic mastectomy with reconstruction on 10/8/2020. She is doing great. Saw Dr. Enrique Ambriz on Tuesday and had WOLFGANG drains removed. Not having any pain. Breast cancer- 
4/6/2020 - left breast 3:00, 13cmfn, core bx: [place], gr 2-3, 1.3 cm, ER negative, NC negative, HER 2 negative at IHC 0; ki67 68%; LN + by core, 6 mm, gr 2-3 4/15/2020 - port insertion 4/28/2020 - 7/14/2020 - neoadjuvant chemotherapy (B-59 Carbo/Taxol +/- atezolizumab) - Dr. Lily Bautista 5/4/2020- breast mri 7 cm enhancement left breast, multiple suspicious nodes 4/14/2020- bone scan, ct scan no mets  
7/21/2020 9/1/2020 - B-59 DDAC  
10/9/2020 - Left breast mastectomy, skin sparing mastectomy with axillary lymph node dissection, and a right prophylactic mastectomy with reconstruction. Called with surg path, PCR in breast and nodes. 
  
 
[facility] 4/28/2020: negative FINAL PATHOLOGIC DIAGNOSIS 1. Right breast, prophylactic mastectomy:  
Benign breast, with focal benign ductal calcifications. Negative for carcinoma 2. Left breast, simple mastectomy:  
Tumor bed, 5 cm, no residual carcinoma identified, multiple sections examined Fat necrosis, fibrosis and old hemorrhage; compatible with remote biopsy site Fibrocystic changes and benign calcifications Small seborrheic keratosis of skin See synoptic report 3. Left axillary contents, lymph node dissection:  
Ten lymph nodes, no carcinoma present (0/10) Focal fibrous scarring consistent with neoadjuvant treatment effect 4. Additional skin right breast, excision:  
Benign skin, no tumor present 5. Additional medial left breast skin, excision:  
Benign skin, no tumor present 6. Additional lateral left breast skin, excision:  
Benign skin, no tumor present INVASIVE CARCINOMA OF THE BREAST: Resection SPECIMEN  
 Procedure: Total mastectomy Specimen Laterality: Left TUMOR Tumor bed site: Lower outer quadrant, Upper outer quadrant Histologic Type: No residual invasive carcinoma Histologic Grade (Raul Histologic Score): No residual invasive carcinoma Tumor Size: No residual invasive carcinoma Ductal Carcinoma In Situ (DCIS): Not identified Lobular Carcinoma In Situ (LCIS): Not identified Tumor Extent Skin: Uninvolved Microcalcifications: Present in non-neoplastic tissue Treatment Effect in the Breast: No residual invasive carcinoma is present in the breast  
after presurgical therapy Treatment Effect in the Lymph Nodes: No lymph node metastases. Fibrous scarring or histiocytic aggregates, possibly related to prior lymph node  
metastases with pathologic complete response MARGINS  
LYMPH NODES Regional Lymph Nodes: Uninvolved by tumor cells Total Number of Lymph Nodes Examined: 10 PATHOLOGIC STAGE CLASSIFICATION (pTNM, AJCC 8th Edition) TNM Descriptors: y (post-treatment) Primary Tumor (pT): pT0 Regional Lymph Nodes (pN): pN0 HORMONE RECEPTOR and HER-2 status from core biopsy April 2020 [address] 56:  
Estrogen receptor: Negative Progesterone receptor: Negative HER-2 IHC: Negative Review of Systems All other systems reviewed and are negative. Physical Exam 
Vitals signs and nursing note reviewed. Chest:  
 
 
   Comments: Expanders intact Palpable seroma right reconstructed breast  
No erythema ASSESSMENT and PLAN 
  ICD-10-CM ICD-9-CM 1. Breast cancer metastasized to axillary lymph node, left (HCC)  C50.912 174.9   
 C77.3 196.3   
 
- s/p bilateral mastectomy, alnd recon. Doing well Has palpable seroma on right. Spoke with Dr. Lexis Martinez who will see her today. Had pcr on surg path Started arm stretches, exercises. If no improvement, pt.  
F/u in 4-6 months with us.

## 2020-10-23 ENCOUNTER — OFFICE VISIT (OUTPATIENT)
Dept: SURGERY | Age: 55
End: 2020-10-23
Payer: COMMERCIAL

## 2020-10-23 VITALS
WEIGHT: 188 LBS | DIASTOLIC BLOOD PRESSURE: 84 MMHG | SYSTOLIC BLOOD PRESSURE: 136 MMHG | BODY MASS INDEX: 33.31 KG/M2 | HEART RATE: 77 BPM | TEMPERATURE: 98.5 F | HEIGHT: 63 IN

## 2020-10-23 DIAGNOSIS — C50.912 BREAST CANCER METASTASIZED TO AXILLARY LYMPH NODE, LEFT (HCC): Primary | ICD-10-CM

## 2020-10-23 DIAGNOSIS — C77.3 BREAST CANCER METASTASIZED TO AXILLARY LYMPH NODE, LEFT (HCC): Primary | ICD-10-CM

## 2020-10-23 PROCEDURE — 99024 POSTOP FOLLOW-UP VISIT: CPT | Performed by: SURGERY

## 2020-10-23 NOTE — LETTER
10/23/20 Patient: Juan Jose Kurtz YOB: 1965 Date of Visit: 10/23/2020 Willy Lopez MD 
[facility] [address] [facility] [address] [address] VIA Facsimile: [phone] Dear Willy Lopez MD, Thank you for referring Ms. Juan Jose Kurtz to [address] MAIN OFFICE [address] [place] [address] for evaluation. My notes for this consultation are attached. If you have questions, please do not hesitate to call me. I look forward to following your patient along with you. Sincerely, Cam Gr MD

## 2020-10-27 ENCOUNTER — HOSPITAL ENCOUNTER (OUTPATIENT)
Dept: RADIATION THERAPY | Age: 55
Discharge: HOME | End: 2020-10-27

## 2020-10-27 ENCOUNTER — RESEARCH ENCOUNTER (OUTPATIENT)
Dept: ONCOLOGY | Age: 55
End: 2020-10-27

## 2020-10-27 ENCOUNTER — OFFICE VISIT (OUTPATIENT)
Dept: ONCOLOGY | Age: 55
End: 2020-10-27
Payer: COMMERCIAL

## 2020-10-27 ENCOUNTER — HOSPITAL ENCOUNTER (OUTPATIENT)
Dept: INFUSION THERAPY | Age: 55
Discharge: HOME | End: 2020-10-27

## 2020-10-27 VITALS
HEIGHT: 63 IN | RESPIRATION RATE: 16 BRPM | WEIGHT: 194 LBS | OXYGEN SATURATION: 100 % | SYSTOLIC BLOOD PRESSURE: 140 MMHG | HEART RATE: 84 BPM | TEMPERATURE: 98.1 F | DIASTOLIC BLOOD PRESSURE: 86 MMHG | BODY MASS INDEX: 34.38 KG/M2

## 2020-10-27 VITALS
DIASTOLIC BLOOD PRESSURE: 86 MMHG | WEIGHT: 194.3 LBS | RESPIRATION RATE: 16 BRPM | HEART RATE: 84 BPM | TEMPERATURE: 98.1 F | OXYGEN SATURATION: 100 % | BODY MASS INDEX: 34.42 KG/M2 | SYSTOLIC BLOOD PRESSURE: 140 MMHG

## 2020-10-27 DIAGNOSIS — C50.412 MALIGNANT NEOPLASM OF UPPER-OUTER QUADRANT OF LEFT BREAST IN FEMALE, ESTROGEN RECEPTOR NEGATIVE (HCC): Primary | ICD-10-CM

## 2020-10-27 DIAGNOSIS — Z17.1 MALIGNANT NEOPLASM OF UPPER-OUTER QUADRANT OF LEFT BREAST IN FEMALE, ESTROGEN RECEPTOR NEGATIVE (HCC): Primary | ICD-10-CM

## 2020-10-27 PROCEDURE — 99214 OFFICE O/P EST MOD 30 MIN: CPT | Performed by: INTERNAL MEDICINE

## 2020-10-27 NOTE — PROGRESS NOTES
Patient in for ctDNA labs and follow up visit today per protocol with Dr. Adriana Adams and RN. Today is post surgery visit. Patient reports the following adverse events at this time: gr 1 constipation, gr 1 chills, gr 2 hot flashes, gr 2 pain, 8/10 pain to breasts, gr 1 neuropathy, gr 1 swelling. ctDNA labs drawn per protocol. Vital signs are stable. Next appointment is scheduled for 11/11/2020 to start adj treatment.

## 2020-10-27 NOTE — PROGRESS NOTES
Genoveva Mooney is a 48 y.o. female Follow up for the Evaluation of Breast Cancer. 1. Have you been to the ER, urgent care clinic since your last visit? Hospitalized since your last visit? Yes.  
 
2. Have you seen or consulted any other health care providers outside of the [address] [place] [address] since your last visit? Include any pap smears or colon screening.  No

## 2020-10-27 NOTE — PROGRESS NOTES
[facility] Lab Visit:  0643  Pt arrived ambulatory and in no distress, labs drawn in rt Ac 1 stick per [employer] . Departed [facility] ambulatory and in no distress. Visit Vitals  BP (!) 140/86   Pulse 84   Temp 98.1 °F (36.7 °C)   Resp 16   Wt 88.1 kg (194 lb 4.8 oz)   SpO2 100%   BMI 34.42 kg/m²       Labs available in CC once resulted.

## 2020-10-27 NOTE — PROGRESS NOTES
Cancer [place] at [facility] [address] [facility], [address] W: [phone]  F: [phone] Reason for Visit:  
Inderjit Guo is a 48 y.o. female who is seen in consultation at the request of Dr. Carlos Meade for evaluation of therapy for breast cancer Rad onc:  Dr. Trace Keene Treatment History: · 20 left breast 3:00, 13cmfn, core bx:  IMC, gr 2-3, 1.3 cm, ER negative, WI negative, HER 2 negative at IHC 0; ki67 68%; LN + by core, 6 mm, gr 2-3 · B-59 Carbo/Taxol +/- atezolizumab 2020-2020 · [facility] 2020: negative · B-59 DDAC 2020-2020 
· 10/8/2020 Bilateral mastectomy:  Right breast: benign, left breast: pCR, 0/10 LNs, ypT0 ypN0 cM0 History of Present Illness:  
19 mammogram was negative, she felt the L breast mass herself, earlier in 2020. Interval history: In today for follow up. Complains of gr 1 constipation, gr 1 chills, gr 2 hot flashes, gr 2 pain, 8/10 pain to breasts, gr 1 neuropathy, gr 1 swelling. FH:  Mother with breast cancer at age 79; maternal aunt with breast cancer in her 45s; maternal aunt with breast cancer at age 61; no ovarian, prostate, or pancreas cancer Past Medical History:  
Diagnosis Date  ADHD  Anxiety and depression  Cancer ([facility] [address] LEFT BREAST CA  
 Ill-defined condition   
 chemotherapy  Limb alert care status NO STICKS OR BLOOD PRESSURE IN LEFT ARM  Sleep apnea MILD -NO CPAP Past Surgical History:  
Procedure Laterality Date  HX BREAST BIOPSY Left 4/15/2020 BREAST BIOPSY performed by Rande Severin, MD at [address] [place] [address] HX BREAST RECONSTRUCTION Bilateral 10/8/2020 BREAST RECONSTRUCTION performed by Vicky Rea MD at [facility] AMBULATORY OR  
 HX  SECTION  98, 80  
 HX GI    
 COLONOSCOPY  
 HX MASTECTOMY Bilateral 10/8/2020  LEFT BREAST MASTECTOMY WITH AXILLARY NODE DISSECTION AND RIGHT SIMPLE MASTECTOMY / IMMEDIATE BILATERAL BREAST RECONSTRUCTION WITH TISSUE EXPANDERS AND ALLODERM GRAFTS performed by Tracey Zepeda MD at [facility] AMBULATORY OR  
 HX ORTHOPAEDIC    
 RIGHT ELBOW-FATTY MASS  
 HX VASCULAR ACCESS Right 04/2020 Social History Tobacco Use  Smoking status: Light Tobacco Smoker  Smokeless tobacco: Never Used  Tobacco comment: Quit April 2020 Substance Use Topics  Alcohol use: Not Currently Alcohol/week: 6.0 standard drinks Types: 6 Glasses of wine per week Comment: PER PT ON 10/1/2020 Family History Problem Relation Age of Onset  Cancer Mother Breast  
 Cancer Maternal Aunt Breast  
 Cancer Paternal Uncle  Cancer Maternal Aunt Breast  
 Cancer Paternal Uncle Current Outpatient Medications Medication Sig  
 docusate sodium (COLACE) 100 mg capsule Take 100 mg by mouth as needed for Constipation.  lisdexamfetamine dimesylate (VYVANSE PO) Take 30 mg by mouth daily.  CLONAZEPAM PO Take  by mouth as needed.  pyridoxine, vitamin B6, (VITAMIN B-6) 50 mg tablet Take 50 mg by mouth daily.  cholecalciferol, vitamin D3, (VITAMIN D3 PO) Take 5,000 Units by mouth daily.  lidocaine-prilocaine (EMLA) topical cream Apply  to affected area as needed for Pain. (Patient taking differently: Apply 1 Each to affected area as needed for Pain. Goes on port for when accessed) No current facility-administered medications for this visit. No Known Allergies Review of Systems: A complete review of systems was obtained, negative except as described above. Physical Exam:  
 
Visit Vitals BP (!) 140/86 (BP 1 Location: Left arm, BP Patient Position: Sitting) Pulse 84 Temp 98.1 °F (36.7 °C) (Temporal) Resp 16 Ht 5' 3\" (1.6 m) Wt 194 lb (88 kg) SpO2 100% BMI 34.37 kg/m² ECOG PS: 0 General: No distress Eyes: Anicteric sclerae HENT: Atraumatic Neck: Supple Respiratory: Normal respiratory effort CV: No peripheral edema GI: nondistended Skin: No rashes, ecchymoses, or petechiae Psych: Alert, oriented, appropriate affect, normal judgment/insight Results:  
 
Lab Results Component Value Date/Time WBC 9.1 09/15/2020 09:00 AM  
 HGB 9.3 (L) 09/15/2020 09:00 AM  
 HCT 27.6 (L) 09/15/2020 09:00 AM  
 PLATELET 067 43/95/2294 09:00 AM  
 .2 (H) 09/15/2020 09:00 AM  
 ABS. NEUTROPHILS 7.5 09/15/2020 09:00 AM  
 
Lab Results Component Value Date/Time Sodium 139 09/15/2020 09:00 AM  
 Potassium 3.5 09/15/2020 09:00 AM  
 Chloride 105 09/15/2020 09:00 AM  
 CO2 30 09/15/2020 09:00 AM  
 Glucose 88 09/15/2020 09:00 AM  
 BUN 8 09/15/2020 09:00 AM  
 Creatinine 0.89 09/15/2020 09:00 AM  
 GFR est AA >60 09/15/2020 09:00 AM  
 GFR est non-AA >60 09/15/2020 09:00 AM  
 Calcium 9.3 09/15/2020 09:00 AM  
 
Lab Results Component Value Date/Time Bilirubin, total 0.4 09/15/2020 09:00 AM  
 ALT (SGPT) 32 09/15/2020 09:00 AM  
 Alk. phosphatase 157 (H) 09/15/2020 09:00 AM  
 Protein, total 7.5 09/15/2020 09:00 AM  
 Albumin 3.7 09/15/2020 09:00 AM  
 Globulin 3.8 09/15/2020 09:00 AM  
 
 
3/31/20 
3D mammogram:  1.6 cm mass in L breast 
US L breast 
3:00 left breast mass 1.6 cm, multiple LN in L ax tail 4/14/2020 CT c/a/p: 
IMPRESSION: Left breast mass. No evidence for metastatic disease in the chest 
abdomen or pelvis. There are no target lesions for RECIST classification. 4/14/2020 Bone Scan: 
IMPRESSION: No evidence of bony metastatic disease. 5/4/2020 MRI breast: 
IMPRESSION: 
  
Right Breast: 1. BI-RADS Assessment Category 1: Negative. No evidence of breast carcinoma 
within the right breast. 
  
Left Breast: 1. BI-RADS Assessment Category 6: Known biopsy proven malignancy- Appropriate 
action should be taken. Large area of malignant enhancement, occupying most of 
the middle and posterior third of the left breast upper outer and lower-outer quadrants, from 2:00 to 4:00. Malignancy is much larger than it had appeared 
mammographically. Greatest measurement is 7 x 4.2 x 3 cm. Contained within this 
large area of enhancement is the dominant 2.5 cm mass, which represents the 
mammographic and sonographic correlate. 2. BI-RADS Assessment Category 6: Known biopsy proven malignancy- Appropriate 
action should be taken. Pathologically proven level 1 left axillary 
lymphadenopathy, with numerous enlarged, malignant lymph nodes. There is also 
level 2 left axillary lymphadenopathy. 3. Retraction of the skin of the lateral left breast, which is thickened. However, no evidence of malignant skin invasion/involvement. 4. The malignant mass closely approximates the pectoral muscle, but there is no 
evidence of direct muscle invasion/involvement. 
  
RECOMMENDATIONS: 
Appropriate action is recommended. The patient is undergoing neoadjuvant 
chemotherapy. There is no evidence of contralateral disease. 7/30/2020 CTA chest at [address]: Normal CTA of the chest with no pulmonary embolism or acute aortic abnormalities identified. Consider mild CHF/pulmonary edema Records reviewed and summarized above. Pathology report(s) reviewed above. Radiology report(s) reviewed above. Assessment/plan: 1. Left IMC, gr 2-3, triple negative, LN + by core:  cT1c cN1a cM0, unifocal, stage IIA both anatomic and prognostic Postmenopausal 
 
S/p neoadj chemo with B-59, pCR With multiple LN felt on exam, ordered CT c/a/p and bone scan for staging. CT and Bone scan on 4/14/2020 negative for mets. We explained to the patient that the goal of systemic adjuvant therapy is to improve the chances for cure and decrease the risk of relapse. We explained why a patient can have microscopic cancer spread now even though physical examination, laboratory studies and imaging studies are negative for cancer.  We explained that the same treatments used now as adjuvant or preventive treatments rarely if ever are curative in women who develop metastases. She has signed informed consent for B-59. TTE on 4/13/2020, EF 61%, TTE at [facility] on 7/30/2020, EF 60%. Will continue B-59 on 11/11/20 with atezolizumab/placebo. She has seen rad onc and is being set up at [facility] 2. Emotional well being:  She has excellent support and is coping well with her disease. She started counseling with Linda Pappas on 7/24/2020.  
 
3. Genetic testing:   Discussed potential ramifications of a positive test including the potential need for bilateral mastectomies and bilateral oophorectomies and the risk then for her family members to also have a mutation. VUS discussed also. Testing performed 4/28/20 by blood, negative. 4. Breast pain:  Post surgical 
 
5. Neuropathy:  Gr 1, will monitor I appreciate the opportunity to participate in Ms. Cyndi Kurtz's care. Signed By: Sweetie Hagan MD   
 
No orders of the defined types were placed in this encounter.

## 2020-11-05 RX ORDER — SODIUM CHLORIDE 9 MG/ML
25 INJECTION, SOLUTION INTRAVENOUS CONTINUOUS
Status: DISPENSED | OUTPATIENT
Start: 2020-11-11 | End: 2020-11-11

## 2020-11-11 ENCOUNTER — RESEARCH ENCOUNTER (OUTPATIENT)
Dept: ONCOLOGY | Age: 55
End: 2020-11-11

## 2020-11-11 ENCOUNTER — OFFICE VISIT (OUTPATIENT)
Dept: ONCOLOGY | Age: 55
End: 2020-11-11
Payer: COMMERCIAL

## 2020-11-11 ENCOUNTER — HOSPITAL ENCOUNTER (OUTPATIENT)
Dept: INFUSION THERAPY | Age: 55
Discharge: HOME | End: 2020-11-11
Payer: COMMERCIAL

## 2020-11-11 VITALS
BODY MASS INDEX: 34.3 KG/M2 | RESPIRATION RATE: 18 BRPM | DIASTOLIC BLOOD PRESSURE: 98 MMHG | TEMPERATURE: 98.7 F | SYSTOLIC BLOOD PRESSURE: 173 MMHG | HEART RATE: 72 BPM | WEIGHT: 193.6 LBS | OXYGEN SATURATION: 100 % | HEIGHT: 63 IN

## 2020-11-11 VITALS
OXYGEN SATURATION: 100 % | HEART RATE: 83 BPM | TEMPERATURE: 98.7 F | SYSTOLIC BLOOD PRESSURE: 139 MMHG | HEIGHT: 63 IN | RESPIRATION RATE: 18 BRPM | WEIGHT: 193 LBS | BODY MASS INDEX: 34.2 KG/M2 | DIASTOLIC BLOOD PRESSURE: 89 MMHG

## 2020-11-11 DIAGNOSIS — Z51.11 CHEMOTHERAPY MANAGEMENT, ENCOUNTER FOR: ICD-10-CM

## 2020-11-11 DIAGNOSIS — C50.412 MALIGNANT NEOPLASM OF UPPER-OUTER QUADRANT OF LEFT BREAST IN FEMALE, ESTROGEN RECEPTOR NEGATIVE (HCC): Primary | ICD-10-CM

## 2020-11-11 DIAGNOSIS — Z17.1 MALIGNANT NEOPLASM OF UPPER-OUTER QUADRANT OF LEFT BREAST IN FEMALE, ESTROGEN RECEPTOR NEGATIVE (HCC): Primary | ICD-10-CM

## 2020-11-11 LAB
ALBUMIN SERPL-MCNC: 3.5 G/DL (ref 3.5–5)
ALBUMIN/GLOB SERPL: 0.9 {RATIO} (ref 1.1–2.2)
ALP SERPL-CCNC: 126 U/L (ref 45–117)
ALT SERPL-CCNC: 92 U/L (ref 12–78)
ANION GAP SERPL CALC-SCNC: 3 MMOL/L (ref 5–15)
AST SERPL-CCNC: 51 U/L (ref 15–37)
BASOPHILS # BLD: 0 K/UL (ref 0–0.1)
BASOPHILS NFR BLD: 0 % (ref 0–1)
BILIRUB SERPL-MCNC: 0.2 MG/DL (ref 0.2–1)
BUN SERPL-MCNC: 10 MG/DL (ref 6–20)
BUN/CREAT SERPL: 12 (ref 12–20)
CALCIUM SERPL-MCNC: 8.9 MG/DL (ref 8.5–10.1)
CHLORIDE SERPL-SCNC: 109 MMOL/L (ref 97–108)
CO2 SERPL-SCNC: 29 MMOL/L (ref 21–32)
CORTIS AM PEAK SERPL-MCNC: 12.4 UG/DL (ref 4.3–22.45)
CREAT SERPL-MCNC: 0.82 MG/DL (ref 0.55–1.02)
DIFFERENTIAL METHOD BLD: ABNORMAL
EOSINOPHIL # BLD: 0.1 K/UL (ref 0–0.4)
EOSINOPHIL NFR BLD: 3 % (ref 0–7)
ERYTHROCYTE [DISTWIDTH] IN BLOOD BY AUTOMATED COUNT: 12.9 % (ref 11.5–14.5)
GLOBULIN SER CALC-MCNC: 3.7 G/DL (ref 2–4)
GLUCOSE SERPL-MCNC: 106 MG/DL (ref 65–100)
HCT VFR BLD AUTO: 35.2 % (ref 35–47)
HGB BLD-MCNC: 10.9 G/DL (ref 11.5–16)
IMM GRANULOCYTES # BLD AUTO: 0 K/UL (ref 0–0.04)
IMM GRANULOCYTES NFR BLD AUTO: 0 % (ref 0–0.5)
LYMPHOCYTES # BLD: 0.7 K/UL (ref 0.8–3.5)
LYMPHOCYTES NFR BLD: 19 % (ref 12–49)
MCH RBC QN AUTO: 29.7 PG (ref 26–34)
MCHC RBC AUTO-ENTMCNC: 31 G/DL (ref 30–36.5)
MCV RBC AUTO: 95.9 FL (ref 80–99)
MONOCYTES # BLD: 0.4 K/UL (ref 0–1)
MONOCYTES NFR BLD: 11 % (ref 5–13)
NEUTS SEG # BLD: 2.3 K/UL (ref 1.8–8)
NEUTS SEG NFR BLD: 67 % (ref 32–75)
NRBC # BLD: 0 K/UL (ref 0–0.01)
NRBC BLD-RTO: 0 PER 100 WBC
PLATELET # BLD AUTO: 172 K/UL (ref 150–400)
PMV BLD AUTO: 9.7 FL (ref 8.9–12.9)
POTASSIUM SERPL-SCNC: 3.3 MMOL/L (ref 3.5–5.1)
PROT SERPL-MCNC: 7.2 G/DL (ref 6.4–8.2)
RBC # BLD AUTO: 3.67 M/UL (ref 3.8–5.2)
RBC MORPH BLD: ABNORMAL
SODIUM SERPL-SCNC: 141 MMOL/L (ref 136–145)
TSH SERPL DL<=0.05 MIU/L-ACNC: 1.87 UIU/ML (ref 0.36–3.74)
WBC # BLD AUTO: 3.5 K/UL (ref 3.6–11)

## 2020-11-11 PROCEDURE — 36415 COLL VENOUS BLD VENIPUNCTURE: CPT

## 2020-11-11 PROCEDURE — 74011000250 HC RX REV CODE- 250: Performed by: INTERNAL MEDICINE

## 2020-11-11 PROCEDURE — 82533 TOTAL CORTISOL: CPT

## 2020-11-11 PROCEDURE — 77030012965 HC NDL HUBR BBMI -A

## 2020-11-11 PROCEDURE — 85025 COMPLETE CBC W/AUTO DIFF WBC: CPT

## 2020-11-11 PROCEDURE — 84443 ASSAY THYROID STIM HORMONE: CPT

## 2020-11-11 PROCEDURE — 74011250637 HC RX REV CODE- 250/637: Performed by: NURSE PRACTITIONER

## 2020-11-11 PROCEDURE — 80053 COMPREHEN METABOLIC PANEL: CPT

## 2020-11-11 PROCEDURE — 74011250636 HC RX REV CODE- 250/636: Performed by: INTERNAL MEDICINE

## 2020-11-11 PROCEDURE — 96413 CHEMO IV INFUSION 1 HR: CPT

## 2020-11-11 PROCEDURE — 99214 OFFICE O/P EST MOD 30 MIN: CPT | Performed by: INTERNAL MEDICINE

## 2020-11-11 RX ORDER — POTASSIUM CHLORIDE 750 MG/1
40 TABLET, FILM COATED, EXTENDED RELEASE ORAL
Status: COMPLETED | OUTPATIENT
Start: 2020-11-11 | End: 2020-11-11

## 2020-11-11 RX ADMIN — POTASSIUM CHLORIDE 40 MEQ: 750 TABLET, FILM COATED, EXTENDED RELEASE ORAL at 12:07

## 2020-11-11 RX ADMIN — Medication 1200 MG: at 13:04

## 2020-11-11 RX ADMIN — SODIUM CHLORIDE 25 ML/HR: 900 INJECTION, SOLUTION INTRAVENOUS at 12:49

## 2020-11-11 NOTE — PROGRESS NOTES
Cancer [place] at [address], [address] [facility] [address] [facility] W: [phone]  F: [phone] Reason for Visit:  
Migue Cornell is a 48 y.o. female who is seen in consultation at the request of Dr. Sana Hewitt for evaluation of therapy for breast cancer Rad onc:  Dr. Francoise Ceja Treatment History: · 20 left breast 3:00, 13cmfn, core bx:  IMC, gr 2-3, 1.3 cm, ER negative, [place] negative, HER 2 negative at IHC 0; ki67 68%; LN + by core, 6 mm, gr 2-3 · B-59 Carbo/Taxol +/- atezolizumab 2020-2020 · [facility] 2020: negative · B-59 DDAC 2020-2020 
· 10/8/2020 Bilateral mastectomy:  Right breast: benign, left breast: pCR, 0/10 LNs, ypT0 ypN0 cM0 · B59 atezolizumab/placebo 20- History of Present Illness:  
19 mammogram was negative, she felt the L breast mass herself, earlier in 2020. Interval history: In today for follow up. Complains of gr 1 hot flashes, gr 1 insomnia. FH:  Mother with breast cancer at age 79; maternal aunt with breast cancer in her 45s; maternal aunt with breast cancer at age 61; no ovarian, prostate, or pancreas cancer Past Medical History:  
Diagnosis Date  ADHD  Anxiety and depression  Cancer ([address] LEFT BREAST CA  
 Ill-defined condition   
 chemotherapy  Limb alert care status NO STICKS OR BLOOD PRESSURE IN LEFT ARM  Sleep apnea MILD -NO CPAP Past Surgical History:  
Procedure Laterality Date  HX BREAST BIOPSY Left 4/15/2020 BREAST BIOPSY performed by Rodrigo Mason MD at [address] [place] [address] HX BREAST RECONSTRUCTION Bilateral 10/8/2020 BREAST RECONSTRUCTION performed by Ike Maldonado MD at [facility] AMBULATORY OR  
 HX  SECTION  98, 80  
 HX GI    
 COLONOSCOPY  
 HX MASTECTOMY Bilateral 10/8/2020 LEFT BREAST MASTECTOMY WITH AXILLARY NODE DISSECTION AND RIGHT SIMPLE MASTECTOMY / IMMEDIATE BILATERAL BREAST RECONSTRUCTION WITH TISSUE EXPANDERS AND ALLODERM GRAFTS performed by Shraddha Thomas MD at [facility] AMBULATORY OR  
 HX ORTHOPAEDIC    
 RIGHT ELBOW-FATTY MASS  
 HX VASCULAR ACCESS Right 04/2020 Social History Tobacco Use  Smoking status: Light Tobacco Smoker  Smokeless tobacco: Never Used  Tobacco comment: Quit April 2020 Substance Use Topics  Alcohol use: Not Currently Alcohol/week: 6.0 standard drinks Types: 6 Glasses of wine per week Comment: PER PT ON 10/1/2020 Family History Problem Relation Age of Onset  Cancer Mother Breast  
 Cancer Maternal Aunt Breast  
 Cancer Paternal Uncle  Cancer Maternal Aunt Breast  
 Cancer Paternal Uncle Current Outpatient Medications Medication Sig  
 docusate sodium (COLACE) 100 mg capsule Take 100 mg by mouth as needed for Constipation.  lisdexamfetamine dimesylate (VYVANSE PO) Take 30 mg by mouth daily.  CLONAZEPAM PO Take  by mouth as needed.  pyridoxine, vitamin B6, (VITAMIN B-6) 50 mg tablet Take 50 mg by mouth daily.  cholecalciferol, vitamin D3, (VITAMIN D3 PO) Take 5,000 Units by mouth daily.  lidocaine-prilocaine (EMLA) topical cream Apply  to affected area as needed for Pain. (Patient taking differently: Apply 1 Each to affected area as needed for Pain. Goes on port for when accessed) No current facility-administered medications for this visit. Facility-Administered Medications Ordered in Other Visits Medication Dose Route Frequency  INV NSABP-B59 atezolizumab / placebo 1,200 mg in 0.9% sodium chloride 250 mL, overfill volume 25 mL INVESTIGATIONAL IVPB  1,200 mg IntraVENous ONCE  
 0.9% sodium chloride infusion  25 mL/hr IntraVENous CONTINUOUS No Known Allergies Review of Systems: A complete review of systems was obtained, negative except as described above. Physical Exam:  
 
Visit Vitals /89 (BP 1 Location: Left arm, BP Patient Position: Sitting) Pulse 83 Temp 98.7 °F (37.1 °C) (Temporal) Resp 18 Ht 5' 3\" (1.6 m) Wt 193 lb (87.5 kg) SpO2 100% BMI 34.19 kg/m² ECOG PS: 0 General: No distress Eyes: Anicteric sclerae HENT: Atraumatic Neck: Supple Respiratory: Normal respiratory effort CV: No peripheral edema GI: nondistended Skin: No rashes, ecchymoses, or petechiae Psych: Alert, oriented, appropriate affect, normal judgment/insight Results:  
 
Lab Results Component Value Date/Time WBC 3.5 (L) 11/11/2020 10:25 AM  
 HGB 10.9 (L) 11/11/2020 10:25 AM  
 HCT 35.2 11/11/2020 10:25 AM  
 PLATELET 343 56/99/0779 10:25 AM  
 MCV 95.9 11/11/2020 10:25 AM  
 ABS. NEUTROPHILS PENDING 11/11/2020 10:25 AM  
 
Lab Results Component Value Date/Time Sodium 139 09/15/2020 09:00 AM  
 Potassium 3.5 09/15/2020 09:00 AM  
 Chloride 105 09/15/2020 09:00 AM  
 CO2 30 09/15/2020 09:00 AM  
 Glucose 88 09/15/2020 09:00 AM  
 BUN 8 09/15/2020 09:00 AM  
 Creatinine 0.89 09/15/2020 09:00 AM  
 GFR est AA >60 09/15/2020 09:00 AM  
 GFR est non-AA >60 09/15/2020 09:00 AM  
 Calcium 9.3 09/15/2020 09:00 AM  
 
Lab Results Component Value Date/Time Bilirubin, total 0.4 09/15/2020 09:00 AM  
 ALT (SGPT) 32 09/15/2020 09:00 AM  
 Alk. phosphatase 157 (H) 09/15/2020 09:00 AM  
 Protein, total 7.5 09/15/2020 09:00 AM  
 Albumin 3.7 09/15/2020 09:00 AM  
 Globulin 3.8 09/15/2020 09:00 AM  
 
 
3/31/20 
3D mammogram:  1.6 cm mass in L breast 
US L breast 
3:00 left breast mass 1.6 cm, multiple LN in L ax tail 4/14/2020 CT c/a/p: 
IMPRESSION: Left breast mass. No evidence for metastatic disease in the chest 
abdomen or pelvis. There are no target lesions for RECIST classification.  
 
4/14/2020 Bone Scan: 
 IMPRESSION: No evidence of bony metastatic disease. 5/4/2020 MRI breast: 
IMPRESSION: 
  
Right Breast: 1. BI-RADS Assessment Category 1: Negative. No evidence of breast carcinoma 
within the right breast. 
  
Left Breast: 1. BI-RADS Assessment Category 6: Known biopsy proven malignancy- Appropriate 
action should be taken. Large area of malignant enhancement, occupying most of 
the middle and posterior third of the left breast upper outer and lower-outer 
quadrants, from 2:00 to 4:00. Malignancy is much larger than it had appeared 
mammographically. Greatest measurement is 7 x 4.2 x 3 cm. Contained within this 
large area of enhancement is the dominant 2.5 cm mass, which represents the 
mammographic and sonographic correlate. 2. BI-RADS Assessment Category 6: Known biopsy proven malignancy- Appropriate 
action should be taken. Pathologically proven level 1 left axillary 
lymphadenopathy, with numerous enlarged, malignant lymph nodes. There is also 
level 2 left axillary lymphadenopathy. 3. Retraction of the skin of the lateral left breast, which is thickened. However, no evidence of malignant skin invasion/involvement. 4. The malignant mass closely approximates the pectoral muscle, but there is no 
evidence of direct muscle invasion/involvement. 
  
RECOMMENDATIONS: 
Appropriate action is recommended. The patient is undergoing neoadjuvant 
chemotherapy. There is no evidence of contralateral disease. 7/30/2020 CTA chest at [facility]: Normal CTA of the chest with no pulmonary embolism or acute aortic abnormalities identified. Consider mild CHF/pulmonary edema Records reviewed and summarized above. Pathology report(s) reviewed above. Radiology report(s) reviewed above. Assessment/plan: 1. Left IMC, gr 2-3, triple negative, LN + by core:  cT1c cN1a cM0, unifocal, stage IIA both anatomic and prognostic Postmenopausal 
 
S/p neoadj chemo with B-59, pCR 
 
 With multiple LN felt on exam, ordered CT c/a/p and bone scan for staging. CT and Bone scan on 4/14/2020 negative for mets. We explained to the patient that the goal of systemic adjuvant therapy is to improve the chances for cure and decrease the risk of relapse. We explained why a patient can have microscopic cancer spread now even though physical examination, laboratory studies and imaging studies are negative for cancer. We explained that the same treatments used now as adjuvant or preventive treatments rarely if ever are curative in women who develop metastases. She has signed informed consent for B-59. TTE on 4/13/2020, EF 61%, TTE at [facility] on 7/30/2020, EF 60%. Continue B-59 on 11/11/20 with atezolizumab/placebo. She has seen rad onc and is being set up at [facility], has CT sim 11/10/2020.  
 
2. Emotional well being:  She has excellent support and is coping well with her disease. She started counseling with Evans Rivera on 7/24/2020.  
 
3. Genetic testing:   Discussed potential ramifications of a positive test including the potential need for bilateral mastectomies and bilateral oophorectomies and the risk then for her family members to also have a mutation. VUS discussed also. Testing performed 4/28/20 by blood, negative. 4. Breast pain:  Post surgical 
 
5. Neuropathy:  Gr 1, will monitor This patient was seen in conjunction with Windy Clarke NP. I personally reviewed the history and all points in the assessment and plan with the patient, and performed key points on the exam.  
 
 
I appreciate the opportunity to participate in Ms. Kale Kurtz's care. Signed By: Kuldip Plascencia MD   
 
No orders of the defined types were placed in this encounter.

## 2020-11-11 NOTE — PROGRESS NOTES
Pt in for labs and follow up visit today per protocol with Dr. Lily Bautista and RN. This is C9D1 of treatment. Patient reports the following adverse events at this time: gr 1 hot flashes, gr 1 insomnia. .  Vital signs are stable. Patient to go to infusion center after appointment to receive atezo/placebo. Next appt is scheduled for 12/1/20.

## 2020-11-11 NOTE — PROGRESS NOTES
Anastasia Reyes is a 48 y.o. female Follow up for the Evaluation of Breast Cancer. 1. Have you been to the ER, urgent care clinic since your last visit? Hospitalized since your last visit? Yes.  
 
2. Have you seen or consulted any other health care providers outside of the [address] [place] [address] since your last visit? Include any pap smears or colon screening.  No

## 2020-12-01 ENCOUNTER — RESEARCH ENCOUNTER (OUTPATIENT)
Dept: ONCOLOGY | Age: 55
End: 2020-12-01

## 2020-12-01 ENCOUNTER — HOSPITAL ENCOUNTER (OUTPATIENT)
Dept: INFUSION THERAPY | Age: 55
Discharge: HOME | End: 2020-12-01
Payer: COMMERCIAL

## 2020-12-01 VITALS
RESPIRATION RATE: 18 BRPM | WEIGHT: 197.6 LBS | SYSTOLIC BLOOD PRESSURE: 180 MMHG | OXYGEN SATURATION: 100 % | BODY MASS INDEX: 35 KG/M2 | DIASTOLIC BLOOD PRESSURE: 92 MMHG | TEMPERATURE: 97.1 F | HEART RATE: 73 BPM

## 2020-12-01 PROCEDURE — 77030016057 HC NDL HUBR APOL -B

## 2020-12-01 PROCEDURE — 74011250636 HC RX REV CODE- 250/636: Performed by: INTERNAL MEDICINE

## 2020-12-01 PROCEDURE — 74011000250 HC RX REV CODE- 250: Performed by: INTERNAL MEDICINE

## 2020-12-01 PROCEDURE — 96413 CHEMO IV INFUSION 1 HR: CPT

## 2020-12-01 RX ADMIN — Medication 1200 MG: at 11:37

## 2020-12-01 NOTE — PROGRESS NOTES
[facility] Progress Note Date: 2020 Name: Isabella De Leon MRN: [id number] : 1965 Ms. Brook Bautista Arrived ambulatory and in no distress for C2D1 of Clinical trial: NSABP B-59 Atezolizumab/Placebo Regimen. Assessment was completed, no acute issues at this time, no new complaints voiced. Right chest wall port accessed without difficulty. No labs ordered. RN received ok to treat from research. Patient blood pressure noted to be elevated post infusion. Patient states this always happens after she gets chemo. Patient will recheck her blood pressure when she gets home. No symptoms of lightheaded, dizziness, chest pain, or sob. Chemotherapy Flowsheet 2020 Cycle C2D1 Date 2020 Drug / Regimen Clinical trial: atezolizumab/Placebo Pre Meds -  
Notes - Covid Questionnaire completed. 1. Do you have any symptoms of covid 19? SOB, coughing, fever, or generally not feeling well? - no 
2. Have you been exposed to covid 19 recently? - no 
3. Have you had any recent contact with family/friend that has a pending covid test? no 
 
 
Ms. Kurtz's vitals were reviewed. Visit Vitals BP (!) 169/97 (BP 1 Location: Right arm, BP Patient Position: Sitting) Pulse 79 Temp 97.1 °F (36.2 °C) Resp 18 Wt 89.6 kg (197 lb 9.6 oz) SpO2 97% Breastfeeding No  
BMI 35.00 kg/m² Patient Vitals for the past 12 hrs: 
 Temp Pulse Resp BP SpO2  
20 1214  73 18 (!) 180/92 100 % 20 1016 97.1 °F (36.2 °C) 79 18 (!) 169/97 97 % Medications: 
Medications Administered INV NSABP B-59 atezolizumab / placebo 1,200 mg in 0.9% sodium chloride 250 mL, overfill volume 25 mL INVESTIGATIONAL IVPB Admin Date 2020 Action Given Dose 
1200 mg Rate 
590 mL/hr Route IntraVENous Administered By 
Brittany Soni RN  
  
  
  
 
 
Ms. Brook Bautista tolerated treatment well and was [facility] in stable condition at  1215. Port de-accessed, flushed & heparinized per protocol. She is to return on Future Appointments Date Time Provider Dorota Davenport 12/22/2020 10:00 AM [facility]  
12/22/2020 10:15 AM Rancho Gordon MD [facility]  
1/12/2021 10:00 AM SS [facility]  
2/2/2021 10:00 AM SS INF1 CH4 <2H [facility]  
2/2/2021 10:15 AM Rancho Gordon MD [facility] AMB  
2/23/2021 10:00 AM SS [facility]  
3/16/2021 10:00 AM SS [facility]  
3/16/2021 10:15 AM Rancho Gordon MD [facility] AM[facility]  
4/6/2021 10:00 AM SS [facility]  
4/27/2021 10:00 AM SS [facility]  
4/27/2021 10:15 AM Rancho Gordon MD [facility] AM[facility]  
4/27/2021 10:45 AM Alexys Clark NP [facility] BS AMB

## 2020-12-03 NOTE — PROGRESS NOTES
Pt in for labs and follow up visit today per protocol with Dr. Areli Ryder and RN. This is C10D1 of treatment. Patient reports the following adverse events at this time: gr1 eye dryness. Vital signs are stable, but BP is elevated. Patient to go to infusion center after appointment to receive atezo/placebo. Next appt is scheduled for 12/22/20.

## 2020-12-22 NOTE — PROGRESS NOTES
Pt in for labs and follow up visit today per protocol with Dr. Faye Yanez and RN. This is C11D1 of treatment. Patient reports the following adverse events at this time: gr 1 fatigue, gr 1 hot flashes, gr 1 insomnia, gr 1 anxiety/depression, gr 1 pain, 4/10 to joints. Vital signs are stable. Patient to go to infusion center after appointment to receive atezolizumab/placebo. Next appt is scheduled for 1/12/21.

## 2020-12-22 NOTE — PROGRESS NOTES
John Boswell is a 48 y.o. female Follow up for the Evaluation of Breast Cancer. 1. Have you been to the ER, urgent care clinic since your last visit? Hospitalized since your last visit? No 
 
2. Have you seen or consulted any other health care providers outside of the [address] [place] [address] since your last visit? Include any pap smears or colon screening.  No

## 2020-12-22 NOTE — PROGRESS NOTES
[facility] VISIT NOTE Date: 2020 Name: Coley Sever MRN: [id number] : 1965 
 
1010 Ms. Tania Galindo Arrived ambulatory and in no distress for C3D1 of  Clinical Trial: NSABP Regimen. Assessment was completed, no acute issues at this time, no new complaints voiced. Right chest wall port accessed with 1 inch mancera needle without difficulty, labs drawn & sent for processing. Patient proceeded to appointment with Dorinda Castellano NP. 1. Do you have any symptoms of COVID-19? SOB, coughing, fever, or generally not feeling well NO 
 
2. Have you been exposed to COVID-19 recently? NO 
 
3. Have you had any recent contact with family/friend that has a pending COVID test? NO Chemotherapy Flowsheet 2020 Cycle C3D1 Date 2020 Drug / Regimen Clinical Trial: NSABP B-59 Adjuvant Therapy: Atezolizumab/Placebo Pre Meds -  
Notes given Vitals: 
Patient Vitals for the past 12 hrs: 
 Temp Pulse Resp BP SpO2  
20 1338  68  (!) 166/90   
20 1011 98.7 °F (37.1 °C) 78 16 125/84 99 % Lab results were obtained and reviewed. Recent Results (from the past 12 hour(s)) CBC WITH AUTOMATED DIFF Collection Time: 20 10:27 AM  
Result Value Ref Range WBC 4.6 3.6 - 11.0 K/uL  
 RBC 4.12 3.80 - 5.20 M/uL  
 HGB 11.9 11.5 - 16.0 g/dL HCT 37.8 35.0 - 47.0 % MCV 91.7 80.0 - 99.0 FL  
 MCH 28.9 26.0 - 34.0 PG  
 MCHC 31.5 30.0 - 36.5 g/dL  
 RDW 13.5 11.5 - 14.5 % PLATELET 787 910 - 632 K/uL MPV 10.0 8.9 - 12.9 FL  
 NRBC 0.0 0  WBC ABSOLUTE NRBC 0.00 0.00 - 0.01 K/uL NEUTROPHILS 72 32 - 75 % LYMPHOCYTES 15 12 - 49 % MONOCYTES 10 5 - 13 % EOSINOPHILS 2 0 - 7 % BASOPHILS 1 0 - 1 % IMMATURE GRANULOCYTES 0 0.0 - 0.5 % ABS. NEUTROPHILS 3.3 1.8 - 8.0 K/UL  
 ABS. LYMPHOCYTES 0.7 (L) 0.8 - 3.5 K/UL  
 ABS. MONOCYTES 0.5 0.0 - 1.0 K/UL  
 ABS. EOSINOPHILS 0.1 0.0 - 0.4 K/UL  
 ABS. BASOPHILS 0.0 0.0 - 0.1 K/UL ABS. IMM. GRANS. 0.0 0.00 - 0.04 K/UL  
 DF SMEAR SCANNED    
 PLATELET COMMENTS Large Platelets RBC COMMENTS NORMOCYTIC, NORMOCHROMIC METABOLIC PANEL, COMPREHENSIVE Collection Time: 12/22/20 10:27 AM  
Result Value Ref Range Sodium 141 136 - 145 mmol/L Potassium 3.5 3.5 - 5.1 mmol/L Chloride 108 97 - 108 mmol/L  
 CO2 31 21 - 32 mmol/L Anion gap 2 (L) 5 - 15 mmol/L Glucose 84 65 - 100 mg/dL BUN 12 6 - 20 MG/DL Creatinine 0.80 0.55 - 1.02 MG/DL  
 BUN/Creatinine ratio 15 12 - 20 GFR est AA >60 >60 ml/min/1.73m2 GFR est non-AA >60 >60 ml/min/1.73m2 Calcium 9.1 8.5 - 10.1 MG/DL Bilirubin, total 0.3 0.2 - 1.0 MG/DL  
 ALT (SGPT) 42 12 - 78 U/L  
 AST (SGOT) 22 15 - 37 U/L Alk. phosphatase 101 45 - 117 U/L Protein, total 7.0 6.4 - 8.2 g/dL Albumin 3.4 (L) 3.5 - 5.0 g/dL Globulin 3.6 2.0 - 4.0 g/dL A-G Ratio 0.9 (L) 1.1 - 2.2 Medications received: 
Medications Administered INV [facility] B-59 atezolizumab / placebo 1,200 mg in 0.9% sodium chloride 250 mL, overfill volume 25 mL INVESTIGATIONAL IVPB Admin Date 
12/22/2020 Action Given Dose 
1,200 mg Rate 
590 mL/hr Route IntraVENous Administered By Horace Braga RN  
  
  
  
 
 
Ms. Guanaco Arriaga tolerated treatment well and was discharged from [place] [address] in stable condition at 1340. Port de-accessed, flushed & heparinized per protocol. She is to return on  January 12, 2021 at 1000 for her next appointment. Ranulfo Lassiter RN December 22, 2020 Future Appointments: 
Future Appointments Date Time Provider Dorota Davenport 1/12/2021 10:00 AM [facility]  
2/2/2021 10:00 AM [facility]  
2/2/2021 10:15 AM Low Kincaid MD ONCSF BS AMB  
2/23/2021 10:00 AM SS INF1 CH4 <2H [facility]  
3/16/2021 10:00 AM SS INF1 CH4 <2H [facility]  
3/16/2021 10:15 AM Low Kincaid MD ONCSF BS AMB 4/6/2021 10:00 AM [facility]  
4/27/2021 10:00 AM [facility]  
4/27/2021 10:15 AM Sapphire Delong MD ON[facility] BS AMB  
4/27/2021 10:45 AM Edita Choudhary NP VBCS BS AMB

## 2020-12-22 NOTE — PROGRESS NOTES
[facility] at [address], [address] 100[address] [facility] W: [phone]  F: [phone] Reason for Visit:  
Marc Connors is a 48 y.o. female who is seen in consultation at the request of Dr. Tracey Vega for evaluation of therapy for breast cancer Rad onc:  Dr. Jhon Banks Treatment History: · 20 left breast 3:00, 13cmfn, core bx:  IMC, gr 2-3, 1.3 cm, ER negative, CA negative, HER 2 negative at IHC 0; ki67 68%; LN + by core, 6 mm, gr 2-3 · B-59 Carbo/Taxol +/- atezolizumab 2020-2020 · [facility] 2020: negative · B-59 DDAC 2020-2020 
· 10/8/2020 Bilateral mastectomy:  Right breast: benign, left breast: pCR, 0/10 LNs, ypT0 ypN0 cM0 · B59 atezolizumab/placebo 20- 
· XRT 2020- History of Present Illness:  
19 mammogram was negative, she felt the L breast mass herself, earlier in 2020. Interval history: In today for follow up. Complains of gr 1 fatigue, gr 1 hot flashes, gr 1 insomnia, gr 1 anxiety/depression, gr 1 pain, 4/10 to joints. FH:  Mother with breast cancer at age 79; maternal aunt with breast cancer in her 45s; maternal aunt with breast cancer at age 61; no ovarian, prostate, or pancreas cancer Past Medical History:  
Diagnosis Date  ADHD  Anxiety and depression  Cancer ([address] LEFT BREAST CA  
 Ill-defined condition   
 chemotherapy  Limb alert care status NO STICKS OR BLOOD PRESSURE IN LEFT ARM  Sleep apnea MILD -NO CPAP Past Surgical History:  
Procedure Laterality Date  HX BREAST BIOPSY Left 4/15/2020 BREAST BIOPSY performed by Angel Pate MD at [address] [place] [address] HX BREAST RECONSTRUCTION Bilateral 10/8/2020 BREAST RECONSTRUCTION performed by Robbie Garcia MD at [facility] AMBULATORY OR  
 HX  SECTION  98, 80  
 HX GI    
 COLONOSCOPY  
 HX MASTECTOMY Bilateral 10/8/2020 LEFT BREAST MASTECTOMY WITH AXILLARY NODE DISSECTION AND RIGHT SIMPLE MASTECTOMY / IMMEDIATE BILATERAL BREAST RECONSTRUCTION WITH TISSUE EXPANDERS AND ALLODERM GRAFTS performed by Nakita Stearns MD at [facility] AMBULATORY OR  
 HX ORTHOPAEDIC    
 RIGHT ELBOW-FATTY MASS  
 HX VASCULAR ACCESS Right 04/2020 Social History Tobacco Use  Smoking status: Light Tobacco Smoker  Smokeless tobacco: Never Used  Tobacco comment: Quit April 2020 Substance Use Topics  Alcohol use: Not Currently Alcohol/week: 6.0 standard drinks Types: 6 Glasses of wine per week Comment: PER PT ON 10/1/2020 Family History Problem Relation Age of Onset  Cancer Mother Breast  
 Cancer Maternal Aunt Breast  
 Cancer Paternal Uncle  Cancer Maternal Aunt Breast  
 Cancer Paternal Uncle Current Outpatient Medications Medication Sig  
 docusate sodium (COLACE) 100 mg capsule Take 100 mg by mouth as needed for Constipation.  lisdexamfetamine dimesylate (VYVANSE PO) Take 30 mg by mouth daily.  CLONAZEPAM PO Take  by mouth as needed.  pyridoxine, vitamin B6, (VITAMIN B-6) 50 mg tablet Take 50 mg by mouth daily.  cholecalciferol, vitamin D3, (VITAMIN D3 PO) Take 5,000 Units by mouth daily.  lidocaine-prilocaine (EMLA) topical cream Apply  to affected area as needed for Pain. (Patient taking differently: Apply 1 Each to affected area as needed for Pain. Goes on port for when accessed) No current facility-administered medications for this visit. Facility-Administered Medications Ordered in Other Visits Medication Dose Route Frequency  INV atezolizumab / placebo 1,200 mg in 0.9% sodium chloride 250 mL, overfill volume 25 mL INVESTIGATIONAL IVPB  1,200 mg IntraVENous ONCE No Known Allergies Review of Systems: A complete review of systems was obtained, negative except as described above. Physical Exam:  
 
Visit Vitals /84 (BP 1 Location: Left arm, BP Patient Position: Sitting) Pulse 78 Temp 98.7 °F (37.1 °C) (Temporal) Resp 16 Ht 5' 3\" (1.6 m) Wt 198 lb (89.8 kg) SpO2 99% BMI 35.07 kg/m² ECOG PS: 0 General: No distress Eyes: Anicteric sclerae HENT: Atraumatic Neck: Supple Respiratory: Normal respiratory effort CV: No peripheral edema GI: nondistended Skin: No rashes, ecchymoses, or petechiae Psych: Alert, oriented, appropriate affect, normal judgment/insight Results:  
 
Lab Results Component Value Date/Time WBC 3.5 (L) 11/11/2020 10:25 AM  
 HGB 10.9 (L) 11/11/2020 10:25 AM  
 HCT 35.2 11/11/2020 10:25 AM  
 PLATELET 192 11/19/3952 10:25 AM  
 MCV 95.9 11/11/2020 10:25 AM  
 ABS. NEUTROPHILS 2.3 11/11/2020 10:25 AM  
 
Lab Results Component Value Date/Time Sodium 141 11/11/2020 10:25 AM  
 Potassium 3.3 (L) 11/11/2020 10:25 AM  
 Chloride 109 (H) 11/11/2020 10:25 AM  
 CO2 29 11/11/2020 10:25 AM  
 Glucose 106 (H) 11/11/2020 10:25 AM  
 BUN 10 11/11/2020 10:25 AM  
 Creatinine 0.82 11/11/2020 10:25 AM  
 GFR est AA >60 11/11/2020 10:25 AM  
 GFR est non-AA >60 11/11/2020 10:25 AM  
 Calcium 8.9 11/11/2020 10:25 AM  
 
Lab Results Component Value Date/Time Bilirubin, total 0.2 11/11/2020 10:25 AM  
 ALT (SGPT) 92 (H) 11/11/2020 10:25 AM  
 Alk. phosphatase 126 (H) 11/11/2020 10:25 AM  
 Protein, total 7.2 11/11/2020 10:25 AM  
 Albumin 3.5 11/11/2020 10:25 AM  
 Globulin 3.7 11/11/2020 10:25 AM  
 
 
3/31/20 
3D mammogram:  1.6 cm mass in L breast 
US L breast 
3:00 left breast mass 1.6 cm, multiple LN in L ax tail 4/14/2020 CT c/a/p: 
IMPRESSION: Left breast mass. No evidence for metastatic disease in the chest 
abdomen or pelvis. There are no target lesions for RECIST classification. 4/14/2020 Bone Scan: 
IMPRESSION: No evidence of bony metastatic disease.  
 
5/4/2020 MRI breast: 
IMPRESSION: 
  
Right Breast: 
 1. BI-RADS Assessment Category 1: Negative. No evidence of breast carcinoma 
within the right breast. 
  
Left Breast: 1. BI-RADS Assessment Category 6: Known biopsy proven malignancy- Appropriate 
action should be taken. Large area of malignant enhancement, occupying most of 
the middle and posterior third of the left breast upper outer and lower-outer 
quadrants, from 2:00 to 4:00. Malignancy is much larger than it had appeared 
mammographically. Greatest measurement is 7 x 4.2 x 3 cm. Contained within this 
large area of enhancement is the dominant 2.5 cm mass, which represents the 
mammographic and sonographic correlate. 2. BI-RADS Assessment Category 6: Known biopsy proven malignancy- Appropriate 
action should be taken. Pathologically proven level 1 left axillary 
lymphadenopathy, with numerous enlarged, malignant lymph nodes. There is also 
level 2 left axillary lymphadenopathy. 3. Retraction of the skin of the lateral left breast, which is thickened. However, no evidence of malignant skin invasion/involvement. 4. The malignant mass closely approximates the pectoral muscle, but there is no 
evidence of direct muscle invasion/involvement. 
  
RECOMMENDATIONS: 
Appropriate action is recommended. The patient is undergoing neoadjuvant 
chemotherapy. There is no evidence of contralateral disease. 7/30/2020 [facility] chest at [facility]: Normal CTA of the chest with no pulmonary embolism or acute aortic abnormalities identified. Consider mild CHF/pulmonary edema Records reviewed and summarized above. Pathology report(s) reviewed above. Radiology report(s) reviewed above. Assessment/plan: 1. Left IMC, gr 2-3, triple negative, LN + by core:  cT1c cN1a cM0, unifocal, stage IIA both anatomic and prognostic Postmenopausal 
 
S/p neoadj chemo with B-59, pCR With multiple LN felt on exam, ordered CT c/a/p and bone scan for staging. CT and Bone scan on 4/14/2020 negative for mets. We explained to the patient that the goal of systemic adjuvant therapy is to improve the chances for cure and decrease the risk of relapse. We explained why a patient can have microscopic cancer spread now even though physical examination, laboratory studies and imaging studies are negative for cancer. We explained that the same treatments used now as adjuvant or preventive treatments rarely if ever are curative in women who develop metastases. She has signed informed consent for B-59. TTE on 4/13/2020, EF 61%, TTE at [facility] on 7/30/2020, EF 60%. Continue B-59 on 11/11/20 with atezolizumab/placebo. Currently getting XRT, started on 12/9/2020 2. Emotional well being:  She has excellent support and is coping well with her disease. She started counseling with Crys Mccarthy on 7/24/2020. Taking CBD gummies. 3. Genetic testing:   Discussed potential ramifications of a positive test including the potential need for bilateral mastectomies and bilateral oophorectomies and the risk then for her family members to also have a mutation. VUS discussed also. Testing performed 4/28/20 by blood, negative. 4. Breast pain:  Post surgical 
 
5. Neuropathy:  Now resolved. 6. Joint pain:  Most likely due to arthritis, may try glucosamine. This patient was seen in conjunction with Maryellen Kwan NP. I personally reviewed the history and all points in the assessment and plan with the patient, and performed key points on the exam.  
 
 
I appreciate the opportunity to participate in [facility]. I have personally seen and evaluated the patient in conjunction with Maryellen Kwan NP. I find the patient's history and physical exam are consistent with the NP's documentation. I agree with the above assessment and plan, which I have edited if needed. Signed By: Noé Wynn MD   
 
No orders of the defined types were placed in this encounter.

## 2021-01-01 ENCOUNTER — TELEPHONE (OUTPATIENT)
Dept: ONCOLOGY | Age: 56
End: 2021-01-01

## 2021-01-01 ENCOUNTER — HOSPITAL ENCOUNTER (OUTPATIENT)
Dept: GENERAL RADIOLOGY | Age: 56
Discharge: HOME | End: 2021-11-18
Attending: NURSE PRACTITIONER
Payer: COMMERCIAL

## 2021-01-01 ENCOUNTER — OFFICE VISIT (OUTPATIENT)
Dept: ONCOLOGY | Age: 56
End: 2021-01-01
Payer: COMMERCIAL

## 2021-01-01 ENCOUNTER — APPOINTMENT (OUTPATIENT)
Dept: CT IMAGING | Age: 56
DRG: 054 | End: 2021-01-01
Attending: EMERGENCY MEDICINE
Payer: COMMERCIAL

## 2021-01-01 ENCOUNTER — HOSPITAL ENCOUNTER (OUTPATIENT)
Dept: NUCLEAR MEDICINE | Age: 56
Discharge: HOME | End: 2021-11-12
Attending: NURSE PRACTITIONER
Payer: COMMERCIAL

## 2021-01-01 ENCOUNTER — RESEARCH ENCOUNTER (OUTPATIENT)
Dept: ONCOLOGY | Age: 56
End: 2021-01-01

## 2021-01-01 ENCOUNTER — TELEPHONE (OUTPATIENT)
Dept: PALLATIVE CARE | Age: 56
End: 2021-01-01

## 2021-01-01 ENCOUNTER — HOSPITAL ENCOUNTER (OUTPATIENT)
Dept: INFUSION THERAPY | Age: 56
Discharge: HOME | End: 2021-11-17
Payer: COMMERCIAL

## 2021-01-01 ENCOUNTER — APPOINTMENT (OUTPATIENT)
Dept: INFUSION THERAPY | Age: 56
End: 2021-01-01

## 2021-01-01 ENCOUNTER — HOSPITAL ENCOUNTER (OUTPATIENT)
Dept: INFUSION THERAPY | Age: 56
Discharge: HOME | End: 2021-04-06
Payer: COMMERCIAL

## 2021-01-01 ENCOUNTER — HOSPITAL ENCOUNTER (OUTPATIENT)
Dept: INFUSION THERAPY | Age: 56
Discharge: HOME | End: 2021-04-27
Payer: COMMERCIAL

## 2021-01-01 ENCOUNTER — HOSPITAL ENCOUNTER (OUTPATIENT)
Dept: NON INVASIVE DIAGNOSTICS | Age: 56
Discharge: HOME | End: 2021-11-17
Attending: INTERNAL MEDICINE
Payer: COMMERCIAL

## 2021-01-01 ENCOUNTER — OFFICE VISIT (OUTPATIENT)
Dept: SURGERY | Age: 56
End: 2021-01-01
Payer: COMMERCIAL

## 2021-01-01 ENCOUNTER — HOSPITAL ENCOUNTER (OUTPATIENT)
Dept: RADIATION THERAPY | Age: 56
Discharge: HOME | End: 2021-03-18

## 2021-01-01 ENCOUNTER — HOSPITAL ENCOUNTER (OUTPATIENT)
Dept: INFUSION THERAPY | Age: 56
Discharge: HOME | End: 2021-01-12
Payer: COMMERCIAL

## 2021-01-01 ENCOUNTER — HOSPITAL ENCOUNTER (OUTPATIENT)
Dept: INFUSION THERAPY | Age: 56
Discharge: HOME | End: 2021-02-02
Payer: COMMERCIAL

## 2021-01-01 ENCOUNTER — VIRTUAL VISIT (OUTPATIENT)
Dept: PALLATIVE CARE | Age: 56
End: 2021-01-01
Payer: COMMERCIAL

## 2021-01-01 ENCOUNTER — HOSPITAL ENCOUNTER (OUTPATIENT)
Dept: CT IMAGING | Age: 56
Discharge: HOME | End: 2021-11-12
Attending: NURSE PRACTITIONER
Payer: COMMERCIAL

## 2021-01-01 ENCOUNTER — HOSPITAL ENCOUNTER (OUTPATIENT)
Dept: GENERAL RADIOLOGY | Age: 56
Discharge: HOME | End: 2021-11-24
Attending: STUDENT IN AN ORGANIZED HEALTH CARE EDUCATION/TRAINING PROGRAM
Payer: COMMERCIAL

## 2021-01-01 ENCOUNTER — APPOINTMENT (OUTPATIENT)
Dept: CT IMAGING | Age: 56
End: 2021-01-01
Attending: EMERGENCY MEDICINE
Payer: COMMERCIAL

## 2021-01-01 ENCOUNTER — HOSPITAL ENCOUNTER (OUTPATIENT)
Dept: CT IMAGING | Age: 56
Discharge: HOME | End: 2021-11-18
Attending: NURSE PRACTITIONER
Payer: COMMERCIAL

## 2021-01-01 ENCOUNTER — OFFICE VISIT (OUTPATIENT)
Dept: ONCOLOGY | Age: 56
End: 2021-01-01

## 2021-01-01 ENCOUNTER — HOSPITAL ENCOUNTER (OUTPATIENT)
Dept: GENERAL RADIOLOGY | Age: 56
Discharge: HOME | End: 2021-11-17
Attending: INTERNAL MEDICINE
Payer: COMMERCIAL

## 2021-01-01 ENCOUNTER — HOSPITAL ENCOUNTER (OUTPATIENT)
Dept: MRI IMAGING | Age: 56
Discharge: HOME | End: 2021-11-12
Attending: NURSE PRACTITIONER
Payer: COMMERCIAL

## 2021-01-01 ENCOUNTER — HOSPITAL ENCOUNTER (OUTPATIENT)
Dept: INFUSION THERAPY | Age: 56
Discharge: HOME | End: 2021-05-26
Payer: COMMERCIAL

## 2021-01-01 ENCOUNTER — HOSPITAL ENCOUNTER (OUTPATIENT)
Dept: INFUSION THERAPY | Age: 56
Discharge: HOME | End: 2021-04-30

## 2021-01-01 ENCOUNTER — APPOINTMENT (OUTPATIENT)
Dept: CT IMAGING | Age: 56
DRG: 054 | End: 2021-01-01
Attending: INTERNAL MEDICINE
Payer: COMMERCIAL

## 2021-01-01 ENCOUNTER — HOSPITAL ENCOUNTER (OUTPATIENT)
Dept: INFUSION THERAPY | Age: 56
Discharge: HOME | End: 2021-02-23
Payer: COMMERCIAL

## 2021-01-01 ENCOUNTER — APPOINTMENT (OUTPATIENT)
Dept: MRI IMAGING | Age: 56
DRG: 054 | End: 2021-01-01
Attending: INTERNAL MEDICINE
Payer: COMMERCIAL

## 2021-01-01 ENCOUNTER — HOSPITAL ENCOUNTER (OUTPATIENT)
Dept: INFUSION THERAPY | Age: 56
Discharge: HOME | End: 2021-03-16
Payer: COMMERCIAL

## 2021-01-01 ENCOUNTER — HOSPITAL ENCOUNTER (OUTPATIENT)
Dept: ULTRASOUND IMAGING | Age: 56
Discharge: HOME | End: 2021-11-24
Attending: NURSE PRACTITIONER
Payer: COMMERCIAL

## 2021-01-01 ENCOUNTER — HOSPITAL ENCOUNTER (INPATIENT)
Age: 56
LOS: 6 days | DRG: 054 | End: 2021-12-04
Attending: EMERGENCY MEDICINE | Admitting: INTERNAL MEDICINE
Payer: COMMERCIAL

## 2021-01-01 ENCOUNTER — HOSPITAL ENCOUNTER (EMERGENCY)
Age: 56
Discharge: HOME | End: 2021-11-26
Attending: EMERGENCY MEDICINE
Payer: COMMERCIAL

## 2021-01-01 VITALS
RESPIRATION RATE: 18 BRPM | WEIGHT: 179 LBS | DIASTOLIC BLOOD PRESSURE: 99 MMHG | TEMPERATURE: 98.3 F | HEIGHT: 63 IN | OXYGEN SATURATION: 98 % | HEART RATE: 91 BPM | SYSTOLIC BLOOD PRESSURE: 128 MMHG | BODY MASS INDEX: 31.71 KG/M2

## 2021-01-01 VITALS
DIASTOLIC BLOOD PRESSURE: 74 MMHG | SYSTOLIC BLOOD PRESSURE: 145 MMHG | TEMPERATURE: 97.9 F | OXYGEN SATURATION: 99 % | BODY MASS INDEX: 33.78 KG/M2 | RESPIRATION RATE: 18 BRPM | WEIGHT: 190.7 LBS | HEART RATE: 68 BPM

## 2021-01-01 VITALS
HEART RATE: 67 BPM | SYSTOLIC BLOOD PRESSURE: 158 MMHG | DIASTOLIC BLOOD PRESSURE: 79 MMHG | RESPIRATION RATE: 16 BRPM | OXYGEN SATURATION: 98 %

## 2021-01-01 VITALS
OXYGEN SATURATION: 92 % | BODY MASS INDEX: 31.72 KG/M2 | SYSTOLIC BLOOD PRESSURE: 161 MMHG | HEART RATE: 63 BPM | HEIGHT: 63 IN | DIASTOLIC BLOOD PRESSURE: 91 MMHG | RESPIRATION RATE: 18 BRPM | TEMPERATURE: 97.7 F

## 2021-01-01 VITALS
HEART RATE: 71 BPM | HEIGHT: 63 IN | TEMPERATURE: 98.1 F | WEIGHT: 179 LBS | BODY MASS INDEX: 31.71 KG/M2 | RESPIRATION RATE: 14 BRPM | SYSTOLIC BLOOD PRESSURE: 138 MMHG | OXYGEN SATURATION: 100 % | DIASTOLIC BLOOD PRESSURE: 86 MMHG

## 2021-01-01 VITALS
OXYGEN SATURATION: 97 % | RESPIRATION RATE: 18 BRPM | HEIGHT: 63 IN | HEART RATE: 95 BPM | TEMPERATURE: 97.7 F | BODY MASS INDEX: 32.96 KG/M2 | WEIGHT: 186 LBS

## 2021-01-01 VITALS
RESPIRATION RATE: 18 BRPM | WEIGHT: 191.4 LBS | TEMPERATURE: 96.6 F | BODY MASS INDEX: 33.91 KG/M2 | HEIGHT: 63 IN | SYSTOLIC BLOOD PRESSURE: 167 MMHG | HEART RATE: 70 BPM | OXYGEN SATURATION: 98 % | DIASTOLIC BLOOD PRESSURE: 90 MMHG

## 2021-01-01 VITALS
HEIGHT: 63 IN | RESPIRATION RATE: 16 BRPM | HEART RATE: 62 BPM | TEMPERATURE: 97.5 F | BODY MASS INDEX: 31.71 KG/M2 | DIASTOLIC BLOOD PRESSURE: 84 MMHG | SYSTOLIC BLOOD PRESSURE: 131 MMHG | WEIGHT: 179 LBS | OXYGEN SATURATION: 93 %

## 2021-01-01 VITALS
HEIGHT: 63 IN | DIASTOLIC BLOOD PRESSURE: 80 MMHG | OXYGEN SATURATION: 98 % | RESPIRATION RATE: 18 BRPM | SYSTOLIC BLOOD PRESSURE: 125 MMHG | WEIGHT: 189.5 LBS | BODY MASS INDEX: 33.58 KG/M2 | HEART RATE: 60 BPM | TEMPERATURE: 97.3 F

## 2021-01-01 VITALS
SYSTOLIC BLOOD PRESSURE: 131 MMHG | OXYGEN SATURATION: 98 % | BODY MASS INDEX: 35.61 KG/M2 | DIASTOLIC BLOOD PRESSURE: 72 MMHG | WEIGHT: 201 LBS | HEART RATE: 80 BPM | RESPIRATION RATE: 18 BRPM | HEIGHT: 63 IN | TEMPERATURE: 96.8 F

## 2021-01-01 VITALS
TEMPERATURE: 97.7 F | WEIGHT: 186 LBS | RESPIRATION RATE: 18 BRPM | HEIGHT: 63 IN | HEART RATE: 95 BPM | OXYGEN SATURATION: 97 % | BODY MASS INDEX: 32.96 KG/M2

## 2021-01-01 VITALS
BODY MASS INDEX: 33.49 KG/M2 | OXYGEN SATURATION: 98 % | HEART RATE: 66 BPM | TEMPERATURE: 97.8 F | WEIGHT: 189 LBS | RESPIRATION RATE: 18 BRPM | HEIGHT: 63 IN | DIASTOLIC BLOOD PRESSURE: 78 MMHG | SYSTOLIC BLOOD PRESSURE: 134 MMHG

## 2021-01-01 VITALS
HEIGHT: 63 IN | BODY MASS INDEX: 32.97 KG/M2 | WEIGHT: 186.07 LBS | SYSTOLIC BLOOD PRESSURE: 166 MMHG | DIASTOLIC BLOOD PRESSURE: 94 MMHG

## 2021-01-01 VITALS
BODY MASS INDEX: 34.91 KG/M2 | HEIGHT: 63 IN | SYSTOLIC BLOOD PRESSURE: 128 MMHG | RESPIRATION RATE: 16 BRPM | WEIGHT: 197 LBS | OXYGEN SATURATION: 100 % | DIASTOLIC BLOOD PRESSURE: 61 MMHG | HEART RATE: 76 BPM | TEMPERATURE: 96.6 F

## 2021-01-01 VITALS
DIASTOLIC BLOOD PRESSURE: 72 MMHG | SYSTOLIC BLOOD PRESSURE: 157 MMHG | TEMPERATURE: 97.2 F | OXYGEN SATURATION: 98 % | HEART RATE: 68 BPM | BODY MASS INDEX: 33.98 KG/M2 | HEIGHT: 63 IN | WEIGHT: 191.8 LBS | RESPIRATION RATE: 18 BRPM

## 2021-01-01 VITALS
SYSTOLIC BLOOD PRESSURE: 164 MMHG | HEART RATE: 72 BPM | TEMPERATURE: 98.1 F | DIASTOLIC BLOOD PRESSURE: 82 MMHG | WEIGHT: 200.3 LBS | OXYGEN SATURATION: 99 % | BODY MASS INDEX: 35.49 KG/M2 | HEIGHT: 63 IN | RESPIRATION RATE: 16 BRPM

## 2021-01-01 VITALS
WEIGHT: 200.8 LBS | OXYGEN SATURATION: 99 % | HEART RATE: 62 BPM | RESPIRATION RATE: 16 BRPM | BODY MASS INDEX: 35.58 KG/M2 | DIASTOLIC BLOOD PRESSURE: 83 MMHG | SYSTOLIC BLOOD PRESSURE: 158 MMHG | TEMPERATURE: 97 F | HEIGHT: 63 IN

## 2021-01-01 VITALS
HEART RATE: 71 BPM | WEIGHT: 190 LBS | SYSTOLIC BLOOD PRESSURE: 184 MMHG | HEIGHT: 63 IN | BODY MASS INDEX: 33.66 KG/M2 | DIASTOLIC BLOOD PRESSURE: 96 MMHG

## 2021-01-01 VITALS
WEIGHT: 201.5 LBS | HEART RATE: 65 BPM | HEIGHT: 63 IN | RESPIRATION RATE: 15 BRPM | SYSTOLIC BLOOD PRESSURE: 150 MMHG | TEMPERATURE: 97 F | OXYGEN SATURATION: 98 % | BODY MASS INDEX: 35.7 KG/M2 | DIASTOLIC BLOOD PRESSURE: 68 MMHG

## 2021-01-01 VITALS
OXYGEN SATURATION: 98 % | HEART RATE: 68 BPM | DIASTOLIC BLOOD PRESSURE: 72 MMHG | SYSTOLIC BLOOD PRESSURE: 157 MMHG | TEMPERATURE: 97.2 F | RESPIRATION RATE: 18 BRPM

## 2021-01-01 VITALS
BODY MASS INDEX: 35.07 KG/M2 | DIASTOLIC BLOOD PRESSURE: 68 MMHG | RESPIRATION RATE: 16 BRPM | HEART RATE: 80 BPM | HEIGHT: 63 IN | SYSTOLIC BLOOD PRESSURE: 170 MMHG | TEMPERATURE: 97 F | WEIGHT: 197.9 LBS | OXYGEN SATURATION: 100 %

## 2021-01-01 VITALS — HEIGHT: 63 IN | WEIGHT: 191 LBS | BODY MASS INDEX: 33.84 KG/M2

## 2021-01-01 DIAGNOSIS — C50.412 MALIGNANT NEOPLASM OF UPPER-OUTER QUADRANT OF LEFT BREAST IN FEMALE, ESTROGEN RECEPTOR NEGATIVE (HCC): Primary | ICD-10-CM

## 2021-01-01 DIAGNOSIS — C50.412 MALIGNANT NEOPLASM OF UPPER-OUTER QUADRANT OF LEFT BREAST IN FEMALE, ESTROGEN RECEPTOR NEGATIVE (HCC): ICD-10-CM

## 2021-01-01 DIAGNOSIS — Z51.11 CHEMOTHERAPY MANAGEMENT, ENCOUNTER FOR: ICD-10-CM

## 2021-01-01 DIAGNOSIS — Z17.1 MALIGNANT NEOPLASM OF UPPER-OUTER QUADRANT OF LEFT BREAST IN FEMALE, ESTROGEN RECEPTOR NEGATIVE (HCC): ICD-10-CM

## 2021-01-01 DIAGNOSIS — Z92.3 S/P RADIATION THERAPY: ICD-10-CM

## 2021-01-01 DIAGNOSIS — K76.9 LIVER LESION: ICD-10-CM

## 2021-01-01 DIAGNOSIS — C50.912 BREAST CANCER METASTASIZED TO AXILLARY LYMPH NODE, LEFT (HCC): Primary | ICD-10-CM

## 2021-01-01 DIAGNOSIS — C77.3 BREAST CANCER METASTASIZED TO AXILLARY LYMPH NODE, LEFT (HCC): Primary | ICD-10-CM

## 2021-01-01 DIAGNOSIS — G62.9 NEUROPATHY: ICD-10-CM

## 2021-01-01 DIAGNOSIS — Z17.1 MALIGNANT NEOPLASM OF UPPER-OUTER QUADRANT OF LEFT BREAST IN FEMALE, ESTROGEN RECEPTOR NEGATIVE (HCC): Primary | ICD-10-CM

## 2021-01-01 DIAGNOSIS — R29.6 FREQUENT FALLS: ICD-10-CM

## 2021-01-01 DIAGNOSIS — C79.31 MALIGNANT NEOPLASM OF BREAST METASTATIC TO BRAIN, UNSPECIFIED LATERALITY (HCC): ICD-10-CM

## 2021-01-01 DIAGNOSIS — R42 DIZZINESS: ICD-10-CM

## 2021-01-01 DIAGNOSIS — G89.3 CANCER ASSOCIATED PAIN: ICD-10-CM

## 2021-01-01 DIAGNOSIS — Z85.3 HISTORY OF BREAST CANCER IN FEMALE: ICD-10-CM

## 2021-01-01 DIAGNOSIS — R53.83 FATIGUE, UNSPECIFIED TYPE: ICD-10-CM

## 2021-01-01 DIAGNOSIS — M25.50 ARTHRALGIA, UNSPECIFIED JOINT: ICD-10-CM

## 2021-01-01 DIAGNOSIS — K59.03 DRUG-INDUCED CONSTIPATION: ICD-10-CM

## 2021-01-01 DIAGNOSIS — G89.29 OTHER CHRONIC PAIN: ICD-10-CM

## 2021-01-01 DIAGNOSIS — C50.412 MALIGNANT NEOPLASM OF UPPER-OUTER QUADRANT OF LEFT FEMALE BREAST (HCC): ICD-10-CM

## 2021-01-01 DIAGNOSIS — R51.9 NONINTRACTABLE HEADACHE, UNSPECIFIED CHRONICITY PATTERN, UNSPECIFIED HEADACHE TYPE: ICD-10-CM

## 2021-01-01 DIAGNOSIS — G96.198 LEPTOMENINGEAL DISEASE: ICD-10-CM

## 2021-01-01 DIAGNOSIS — C79.31 METASTASIS TO BRAIN (HCC): ICD-10-CM

## 2021-01-01 DIAGNOSIS — C50.919 METASTATIC BREAST CANCER (HCC): ICD-10-CM

## 2021-01-01 DIAGNOSIS — J90 PLEURAL EFFUSION: ICD-10-CM

## 2021-01-01 DIAGNOSIS — F41.9 ANXIETY: ICD-10-CM

## 2021-01-01 DIAGNOSIS — C79.31 BRAIN METASTASES (HCC): ICD-10-CM

## 2021-01-01 DIAGNOSIS — C50.919 MALIGNANT NEOPLASM OF BREAST METASTATIC TO BRAIN, UNSPECIFIED LATERALITY (HCC): ICD-10-CM

## 2021-01-01 DIAGNOSIS — Z72.820 POOR SLEEP: ICD-10-CM

## 2021-01-01 DIAGNOSIS — C50.912 MALIGNANT NEOPLASM OF LEFT FEMALE BREAST, UNSPECIFIED ESTROGEN RECEPTOR STATUS, UNSPECIFIED SITE OF BREAST (HCC): Primary | ICD-10-CM

## 2021-01-01 DIAGNOSIS — C79.31 BRAIN METASTASIS (HCC): ICD-10-CM

## 2021-01-01 DIAGNOSIS — C79.31 BRAIN METASTASIS (HCC): Primary | ICD-10-CM

## 2021-01-01 DIAGNOSIS — R11.2 NAUSEA AND VOMITING, INTRACTABILITY OF VOMITING NOT SPECIFIED, UNSPECIFIED VOMITING TYPE: ICD-10-CM

## 2021-01-01 DIAGNOSIS — R06.09 DYSPNEA ON EXERTION: ICD-10-CM

## 2021-01-01 DIAGNOSIS — F32.A DEPRESSION, UNSPECIFIED DEPRESSION TYPE: ICD-10-CM

## 2021-01-01 DIAGNOSIS — J90 PLEURAL EFFUSION ON LEFT: ICD-10-CM

## 2021-01-01 DIAGNOSIS — R53.1 WEAKNESS: Primary | ICD-10-CM

## 2021-01-01 DIAGNOSIS — M79.2 NEUROPATHIC PAIN: Primary | ICD-10-CM

## 2021-01-01 DIAGNOSIS — Z90.13 S/P MASTECTOMY, BILATERAL: ICD-10-CM

## 2021-01-01 DIAGNOSIS — R51.9 PERSISTENT HEADACHES: ICD-10-CM

## 2021-01-01 DIAGNOSIS — C79.31 METASTATIC CANCER TO BRAIN (HCC): Primary | ICD-10-CM

## 2021-01-01 DIAGNOSIS — G62.9 NEUROPATHY: Primary | ICD-10-CM

## 2021-01-01 DIAGNOSIS — C50.911 RECURRENT BREAST CANCER, RIGHT (HCC): ICD-10-CM

## 2021-01-01 DIAGNOSIS — C50.919 METASTATIC BREAST CANCER (HCC): Primary | ICD-10-CM

## 2021-01-01 DIAGNOSIS — Z71.89 DNR (DO NOT RESUSCITATE) DISCUSSION: ICD-10-CM

## 2021-01-01 DIAGNOSIS — Z17.1 ESTROGEN RECEPTOR NEGATIVE: ICD-10-CM

## 2021-01-01 DIAGNOSIS — M54.9 UPPER BACK PAIN: Primary | ICD-10-CM

## 2021-01-01 DIAGNOSIS — M54.2 NECK PAIN: ICD-10-CM

## 2021-01-01 DIAGNOSIS — F05 DELIRIUM DUE TO ANOTHER MEDICAL CONDITION, PERSISTENT, MIXED LEVEL OF ACTIVITY: ICD-10-CM

## 2021-01-01 DIAGNOSIS — C77.3 SECONDARY MALIGNANT NEOPLASM OF AXILLARY LYMPH NODES (HCC): ICD-10-CM

## 2021-01-01 DIAGNOSIS — M19.90 ARTHRITIS: ICD-10-CM

## 2021-01-01 DIAGNOSIS — Z71.89 GOALS OF CARE, COUNSELING/DISCUSSION: ICD-10-CM

## 2021-01-01 LAB
ALBUMIN SERPL-MCNC: 2.6 G/DL (ref 3.5–5)
ALBUMIN SERPL-MCNC: 2.7 G/DL (ref 3.5–5)
ALBUMIN SERPL-MCNC: 3 G/DL (ref 3.5–5)
ALBUMIN SERPL-MCNC: 3 G/DL (ref 3.5–5)
ALBUMIN SERPL-MCNC: 3.5 G/DL (ref 3.5–5)
ALBUMIN SERPL-MCNC: 3.5 G/DL (ref 3.5–5)
ALBUMIN SERPL-MCNC: 3.6 G/DL (ref 3.5–5)
ALBUMIN SERPL-MCNC: 3.6 G/DL (ref 3.5–5)
ALBUMIN SERPL-MCNC: 3.8 G/DL (ref 3.5–5)
ALBUMIN/GLOB SERPL: 0.7 {RATIO} (ref 1.1–2.2)
ALBUMIN/GLOB SERPL: 0.8 {RATIO} (ref 1.1–2.2)
ALBUMIN/GLOB SERPL: 0.9 {RATIO} (ref 1.1–2.2)
ALBUMIN/GLOB SERPL: 1 {RATIO} (ref 1.1–2.2)
ALP SERPL-CCNC: 102 U/L (ref 45–117)
ALP SERPL-CCNC: 102 U/L (ref 45–117)
ALP SERPL-CCNC: 689 U/L (ref 45–117)
ALP SERPL-CCNC: 711 U/L (ref 45–117)
ALP SERPL-CCNC: 712 U/L (ref 45–117)
ALP SERPL-CCNC: 714 U/L (ref 45–117)
ALP SERPL-CCNC: 726 U/L (ref 45–117)
ALP SERPL-CCNC: 94 U/L (ref 45–117)
ALP SERPL-CCNC: 98 U/L (ref 45–117)
ALT SERPL-CCNC: 117 U/L (ref 12–78)
ALT SERPL-CCNC: 29 U/L (ref 12–78)
ALT SERPL-CCNC: 33 U/L (ref 12–78)
ALT SERPL-CCNC: 36 U/L (ref 12–78)
ALT SERPL-CCNC: 40 U/L (ref 12–78)
ALT SERPL-CCNC: 78 U/L (ref 12–78)
ALT SERPL-CCNC: 81 U/L (ref 12–78)
ALT SERPL-CCNC: 84 U/L (ref 12–78)
ALT SERPL-CCNC: 94 U/L (ref 12–78)
AMORPH CRY URNS QL MICRO: ABNORMAL
ANION GAP SERPL CALC-SCNC: 11 MMOL/L (ref 5–15)
ANION GAP SERPL CALC-SCNC: 3 MMOL/L (ref 5–15)
ANION GAP SERPL CALC-SCNC: 4 MMOL/L (ref 5–15)
ANION GAP SERPL CALC-SCNC: 4 MMOL/L (ref 5–15)
ANION GAP SERPL CALC-SCNC: 5 MMOL/L (ref 5–15)
ANION GAP SERPL CALC-SCNC: 7 MMOL/L (ref 5–15)
ANION GAP SERPL CALC-SCNC: 7 MMOL/L (ref 5–15)
ANION GAP SERPL CALC-SCNC: 8 MMOL/L (ref 5–15)
ANION GAP SERPL CALC-SCNC: 8 MMOL/L (ref 5–15)
APPEARANCE UR: ABNORMAL
APPEARANCE UR: CLEAR
AST SERPL-CCNC: 14 U/L (ref 15–37)
AST SERPL-CCNC: 17 U/L (ref 15–37)
AST SERPL-CCNC: 23 U/L (ref 15–37)
AST SERPL-CCNC: 26 U/L (ref 15–37)
AST SERPL-CCNC: 46 U/L (ref 15–37)
AST SERPL-CCNC: 58 U/L (ref 15–37)
AST SERPL-CCNC: 59 U/L (ref 15–37)
AST SERPL-CCNC: 69 U/L (ref 15–37)
AST SERPL-CCNC: 81 U/L (ref 15–37)
ATRIAL RATE: 63 BPM
AV R PG: 43.15 MMHG
BACTERIA URNS QL MICRO: NEGATIVE /HPF
BACTERIA URNS QL MICRO: NEGATIVE /HPF
BASOPHILS # BLD: 0 K/UL (ref 0–0.1)
BASOPHILS # BLD: 0.1 K/UL (ref 0–0.1)
BASOPHILS NFR BLD: 0 % (ref 0–1)
BASOPHILS NFR BLD: 1 % (ref 0–1)
BILIRUB DIRECT SERPL-MCNC: 0.1 MG/DL (ref 0–0.2)
BILIRUB SERPL-MCNC: 0.2 MG/DL (ref 0.2–1)
BILIRUB SERPL-MCNC: 0.3 MG/DL (ref 0.2–1)
BILIRUB SERPL-MCNC: 0.3 MG/DL (ref 0.2–1)
BILIRUB SERPL-MCNC: 0.4 MG/DL (ref 0.2–1)
BILIRUB SERPL-MCNC: 0.5 MG/DL (ref 0.2–1)
BILIRUB SERPL-MCNC: 0.5 MG/DL (ref 0.2–1)
BILIRUB SERPL-MCNC: 0.6 MG/DL (ref 0.2–1)
BILIRUB UR QL: NEGATIVE
BILIRUB UR QL: NEGATIVE
BUN SERPL-MCNC: 12 MG/DL (ref 6–20)
BUN SERPL-MCNC: 14 MG/DL (ref 6–20)
BUN SERPL-MCNC: 15 MG/DL (ref 6–20)
BUN SERPL-MCNC: 16 MG/DL (ref 6–20)
BUN SERPL-MCNC: 19 MG/DL (ref 6–20)
BUN SERPL-MCNC: 20 MG/DL (ref 6–20)
BUN SERPL-MCNC: 23 MG/DL (ref 6–20)
BUN SERPL-MCNC: 24 MG/DL (ref 6–20)
BUN SERPL-MCNC: 26 MG/DL (ref 6–20)
BUN SERPL-MCNC: 26 MG/DL (ref 6–20)
BUN SERPL-MCNC: 37 MG/DL (ref 6–20)
BUN/CREAT SERPL: 16 (ref 12–20)
BUN/CREAT SERPL: 16 (ref 12–20)
BUN/CREAT SERPL: 20 (ref 12–20)
BUN/CREAT SERPL: 20 (ref 12–20)
BUN/CREAT SERPL: 22 (ref 12–20)
BUN/CREAT SERPL: 24 (ref 12–20)
BUN/CREAT SERPL: 24 (ref 12–20)
BUN/CREAT SERPL: 26 (ref 12–20)
BUN/CREAT SERPL: 32 (ref 12–20)
BUN/CREAT SERPL: 49 (ref 12–20)
BUN/CREAT SERPL: 49 (ref 12–20)
CALCIUM SERPL-MCNC: 8.4 MG/DL (ref 8.5–10.1)
CALCIUM SERPL-MCNC: 8.4 MG/DL (ref 8.5–10.1)
CALCIUM SERPL-MCNC: 8.8 MG/DL (ref 8.5–10.1)
CALCIUM SERPL-MCNC: 9 MG/DL (ref 8.5–10.1)
CALCIUM SERPL-MCNC: 9.1 MG/DL (ref 8.5–10.1)
CALCIUM SERPL-MCNC: 9.2 MG/DL (ref 8.5–10.1)
CALCIUM SERPL-MCNC: 9.3 MG/DL (ref 8.5–10.1)
CALCIUM SERPL-MCNC: 9.3 MG/DL (ref 8.5–10.1)
CALCIUM SERPL-MCNC: 9.8 MG/DL (ref 8.5–10.1)
CALCULATED P AXIS, ECG09: 37 DEGREES
CALCULATED R AXIS, ECG10: 4 DEGREES
CALCULATED T AXIS, ECG11: 28 DEGREES
CHLORIDE SERPL-SCNC: 101 MMOL/L (ref 97–108)
CHLORIDE SERPL-SCNC: 103 MMOL/L (ref 97–108)
CHLORIDE SERPL-SCNC: 107 MMOL/L (ref 97–108)
CHLORIDE SERPL-SCNC: 109 MMOL/L (ref 97–108)
CHLORIDE SERPL-SCNC: 109 MMOL/L (ref 97–108)
CHLORIDE SERPL-SCNC: 91 MMOL/L (ref 97–108)
CHLORIDE SERPL-SCNC: 91 MMOL/L (ref 97–108)
CHLORIDE SERPL-SCNC: 93 MMOL/L (ref 97–108)
CHLORIDE SERPL-SCNC: 93 MMOL/L (ref 97–108)
CO2 SERPL-SCNC: 22 MMOL/L (ref 21–32)
CO2 SERPL-SCNC: 23 MMOL/L (ref 21–32)
CO2 SERPL-SCNC: 26 MMOL/L (ref 21–32)
CO2 SERPL-SCNC: 28 MMOL/L (ref 21–32)
CO2 SERPL-SCNC: 31 MMOL/L (ref 21–32)
CO2 SERPL-SCNC: 32 MMOL/L (ref 21–32)
COLOR UR: ABNORMAL
COLOR UR: ABNORMAL
COMMENT, HOLDF: NORMAL
CORTIS AM PEAK SERPL-MCNC: 11.1 UG/DL (ref 4.3–22.45)
CORTIS AM PEAK SERPL-MCNC: 14 UG/DL (ref 4.3–22.45)
CORTIS AM PEAK SERPL-MCNC: 2.7 UG/DL (ref 4.3–22.45)
CREAT SERPL-MCNC: 0.53 MG/DL (ref 0.55–1.02)
CREAT SERPL-MCNC: 0.75 MG/DL (ref 0.55–1.02)
CREAT SERPL-MCNC: 0.76 MG/DL (ref 0.55–1.02)
CREAT SERPL-MCNC: 0.76 MG/DL (ref 0.55–1.02)
CREAT SERPL-MCNC: 0.78 MG/DL (ref 0.55–1.02)
CREAT SERPL-MCNC: 0.81 MG/DL (ref 0.55–1.02)
CREAT SERPL-MCNC: 0.82 MG/DL (ref 0.55–1.02)
CREAT SERPL-MCNC: 0.85 MG/DL (ref 0.55–1.02)
CREAT SERPL-MCNC: 0.88 MG/DL (ref 0.55–1.02)
CREAT SERPL-MCNC: 0.91 MG/DL (ref 0.55–1.02)
CREAT SERPL-MCNC: 1.05 MG/DL (ref 0.55–1.02)
DIAGNOSIS, 93000: NORMAL
DIFFERENTIAL METHOD BLD: ABNORMAL
DIFFERENTIAL METHOD BLD: NORMAL
ECHO AO ASC DIAM: 2.83 CM
ECHO AR MAX VEL PISA: 328.45 CM/S
ECHO AV ANNULUS DIAM: 2.56 CM
ECHO AV AREA PEAK VELOCITY: 1.36 CM2
ECHO AV AREA/BSA PEAK VELOCITY: 0.7 CM2/M2
ECHO AV PEAK GRADIENT: 10.63 MMHG
ECHO AV PEAK VELOCITY: 163.05 CM/S
ECHO AV REGURGITANT PHT: 917.92 MS
ECHO IVC PROX: 1 CM
ECHO LA AREA 4C: 8.21 CM2
ECHO LA MAJOR AXIS: 3.4 CM
ECHO LA MINOR AXIS: 1.81 CM
ECHO LA VOL 2C: 23.72 ML (ref 22–52)
ECHO LA VOL 4C: 13.79 ML (ref 22–52)
ECHO LA VOL BP: 20.27 ML (ref 22–52)
ECHO LA VOL/BSA BIPLANE: 10.78 ML/M2 (ref 16–28)
ECHO LA VOLUME INDEX A2C: 12.62 ML/M2 (ref 16–28)
ECHO LA VOLUME INDEX A4C: 7.34 ML/M2 (ref 16–28)
ECHO LV E' LATERAL VELOCITY: 4.36 CM/S
ECHO LV E' SEPTAL VELOCITY: 3.88 CM/S
ECHO LV EDV A2C: 76.41 ML
ECHO LV EDV A4C: 95.38 ML
ECHO LV EDV BP: 87.84 ML (ref 56–104)
ECHO LV EDV INDEX A4C: 50.7 ML/M2
ECHO LV EDV INDEX BP: 46.7 ML/M2
ECHO LV EDV NDEX A2C: 40.6 ML/M2
ECHO LV EJECTION FRACTION A2C: 53 PERCENT
ECHO LV EJECTION FRACTION A4C: 65 PERCENT
ECHO LV EJECTION FRACTION BIPLANE: 60 PERCENT (ref 55–100)
ECHO LV ESV A2C: 35.69 ML
ECHO LV ESV A4C: 33.16 ML
ECHO LV ESV BP: 35.17 ML (ref 19–49)
ECHO LV ESV INDEX A2C: 19 ML/M2
ECHO LV ESV INDEX A4C: 17.6 ML/M2
ECHO LV ESV INDEX BP: 18.7 ML/M2
ECHO LV GLOBAL LONGITUDINAL STRAIN (GLS): -13.2 PERCENT
ECHO LV INTERNAL DIMENSION DIASTOLIC: 4.95 CM (ref 3.9–5.3)
ECHO LV INTERNAL DIMENSION SYSTOLIC: 3.1 CM
ECHO LV IVSD: 0.68 CM (ref 0.6–0.9)
ECHO LV MASS 2D: 109.7 G (ref 67–162)
ECHO LV MASS INDEX 2D: 58.4 G/M2 (ref 43–95)
ECHO LV POSTERIOR WALL DIASTOLIC: 0.69 CM (ref 0.6–0.9)
ECHO LVOT DIAM: 1.9 CM
ECHO LVOT PEAK GRADIENT: 2.43 MMHG
ECHO LVOT PEAK VELOCITY: 77.86 CM/S
ECHO MV A VELOCITY: 83.18 CM/S
ECHO MV E DECELERATION TIME (DT): 245.3 MS
ECHO MV E VELOCITY: 62.05 CM/S
ECHO MV E/A RATIO: 0.75
ECHO MV E/E' LATERAL: 14.23
ECHO MV E/E' RATIO (AVERAGED): 15.11
ECHO MV E/E' SEPTAL: 15.99
ECHO PV MAX VELOCITY: 112.72 CM/S
ECHO PV PEAK INSTANTANEOUS GRADIENT SYSTOLIC: 5.08 MMHG
ECHO PV REGURGITANT MAX VELOCITY: 98.61 CM/S
ECHO RV INTERNAL DIMENSION: 3.39 CM
ECHO RV TAPSE: 2.72 CM (ref 1.5–2)
EOSINOPHIL # BLD: 0 K/UL (ref 0–0.4)
EOSINOPHIL # BLD: 0.1 K/UL (ref 0–0.4)
EOSINOPHIL NFR BLD: 0 % (ref 0–7)
EOSINOPHIL NFR BLD: 1 % (ref 0–7)
EOSINOPHIL NFR BLD: 1 % (ref 0–7)
EOSINOPHIL NFR BLD: 2 % (ref 0–7)
EPITH CASTS URNS QL MICRO: ABNORMAL /LPF
EPITH CASTS URNS QL MICRO: ABNORMAL /LPF
ERYTHROCYTE [DISTWIDTH] IN BLOOD BY AUTOMATED COUNT: 13.2 % (ref 11.5–14.5)
ERYTHROCYTE [DISTWIDTH] IN BLOOD BY AUTOMATED COUNT: 13.2 % (ref 11.5–14.5)
ERYTHROCYTE [DISTWIDTH] IN BLOOD BY AUTOMATED COUNT: 13.5 % (ref 11.5–14.5)
ERYTHROCYTE [DISTWIDTH] IN BLOOD BY AUTOMATED COUNT: 13.8 % (ref 11.5–14.5)
ERYTHROCYTE [DISTWIDTH] IN BLOOD BY AUTOMATED COUNT: 13.8 % (ref 11.5–14.5)
ERYTHROCYTE [DISTWIDTH] IN BLOOD BY AUTOMATED COUNT: 13.9 % (ref 11.5–14.5)
ERYTHROCYTE [DISTWIDTH] IN BLOOD BY AUTOMATED COUNT: 14.1 % (ref 11.5–14.5)
ERYTHROCYTE [DISTWIDTH] IN BLOOD BY AUTOMATED COUNT: 14.5 % (ref 11.5–14.5)
ERYTHROCYTE [DISTWIDTH] IN BLOOD BY AUTOMATED COUNT: 14.6 % (ref 11.5–14.5)
EST. AVERAGE GLUCOSE BLD GHB EST-MCNC: 128 MG/DL
GLOBAL LONGITUDINAL STRAIN 2 CHAMBER: -14.4 PERCENT
GLOBAL LONGITUDINAL STRAIN 4 CHAMBER: -14.6 PERCENT
GLOBAL LONGITUDINAL STRAIN LONG AXIS: -10.7 PERCENT
GLOBULIN SER CALC-MCNC: 3.6 G/DL (ref 2–4)
GLOBULIN SER CALC-MCNC: 3.7 G/DL (ref 2–4)
GLOBULIN SER CALC-MCNC: 3.7 G/DL (ref 2–4)
GLOBULIN SER CALC-MCNC: 3.8 G/DL (ref 2–4)
GLOBULIN SER CALC-MCNC: 3.9 G/DL (ref 2–4)
GLOBULIN SER CALC-MCNC: 4 G/DL (ref 2–4)
GLOBULIN SER CALC-MCNC: 4.4 G/DL (ref 2–4)
GLOBULIN SER CALC-MCNC: 4.5 G/DL (ref 2–4)
GLOBULIN SER CALC-MCNC: 4.6 G/DL (ref 2–4)
GLUCOSE SERPL-MCNC: 112 MG/DL (ref 65–100)
GLUCOSE SERPL-MCNC: 122 MG/DL (ref 65–100)
GLUCOSE SERPL-MCNC: 125 MG/DL (ref 65–100)
GLUCOSE SERPL-MCNC: 125 MG/DL (ref 65–100)
GLUCOSE SERPL-MCNC: 126 MG/DL (ref 65–100)
GLUCOSE SERPL-MCNC: 128 MG/DL (ref 65–100)
GLUCOSE SERPL-MCNC: 135 MG/DL (ref 65–100)
GLUCOSE SERPL-MCNC: 60 MG/DL (ref 65–100)
GLUCOSE SERPL-MCNC: 76 MG/DL (ref 65–100)
GLUCOSE SERPL-MCNC: 88 MG/DL (ref 65–100)
GLUCOSE SERPL-MCNC: 94 MG/DL (ref 65–100)
GLUCOSE UR STRIP.AUTO-MCNC: NEGATIVE MG/DL
GLUCOSE UR STRIP.AUTO-MCNC: NEGATIVE MG/DL
HBA1C MFR BLD: 6.1 % (ref 4–5.6)
HCT VFR BLD AUTO: 36.3 % (ref 35–47)
HCT VFR BLD AUTO: 38 % (ref 35–47)
HCT VFR BLD AUTO: 39.7 % (ref 35–47)
HCT VFR BLD AUTO: 39.9 % (ref 35–47)
HCT VFR BLD AUTO: 40.6 % (ref 35–47)
HCT VFR BLD AUTO: 42.4 % (ref 35–47)
HCT VFR BLD AUTO: 42.5 % (ref 35–47)
HCT VFR BLD AUTO: 42.9 % (ref 35–47)
HCT VFR BLD AUTO: 44.9 % (ref 35–47)
HGB BLD-MCNC: 12 G/DL (ref 11.5–16)
HGB BLD-MCNC: 12.2 G/DL (ref 11.5–16)
HGB BLD-MCNC: 12.3 G/DL (ref 11.5–16)
HGB BLD-MCNC: 12.5 G/DL (ref 11.5–16)
HGB BLD-MCNC: 12.7 G/DL (ref 11.5–16)
HGB BLD-MCNC: 14 G/DL (ref 11.5–16)
HGB BLD-MCNC: 14.1 G/DL (ref 11.5–16)
HGB BLD-MCNC: 14.2 G/DL (ref 11.5–16)
HGB BLD-MCNC: 14.5 G/DL (ref 11.5–16)
HGB UR QL STRIP: ABNORMAL
HGB UR QL STRIP: ABNORMAL
HYALINE CASTS URNS QL MICRO: ABNORMAL /LPF (ref 0–5)
IMM GRANULOCYTES # BLD AUTO: 0 K/UL
IMM GRANULOCYTES # BLD AUTO: 0 K/UL (ref 0–0.04)
IMM GRANULOCYTES # BLD AUTO: 0.1 K/UL (ref 0–0.04)
IMM GRANULOCYTES NFR BLD AUTO: 0 %
IMM GRANULOCYTES NFR BLD AUTO: 0 % (ref 0–0.5)
IMM GRANULOCYTES NFR BLD AUTO: 1 % (ref 0–0.5)
KETONES UR QL STRIP.AUTO: ABNORMAL MG/DL
KETONES UR QL STRIP.AUTO: NEGATIVE MG/DL
LA VOL DISK BP: 18.71 ML (ref 22–52)
LACTATE SERPL-SCNC: 2.4 MMOL/L (ref 0.4–2)
LEUKOCYTE ESTERASE UR QL STRIP.AUTO: ABNORMAL
LEUKOCYTE ESTERASE UR QL STRIP.AUTO: NEGATIVE
LYMPHOCYTES # BLD: 0.4 K/UL (ref 0.8–3.5)
LYMPHOCYTES # BLD: 0.5 K/UL (ref 0.8–3.5)
LYMPHOCYTES # BLD: 0.6 K/UL (ref 0.8–3.5)
LYMPHOCYTES # BLD: 0.7 K/UL (ref 0.8–3.5)
LYMPHOCYTES # BLD: 0.8 K/UL (ref 0.8–3.5)
LYMPHOCYTES # BLD: 1 K/UL (ref 0.8–3.5)
LYMPHOCYTES # BLD: 1.2 K/UL (ref 0.8–3.5)
LYMPHOCYTES # BLD: 1.3 K/UL (ref 0.8–3.5)
LYMPHOCYTES # BLD: 2.4 K/UL (ref 0.8–3.5)
LYMPHOCYTES NFR BLD: 11 % (ref 12–49)
LYMPHOCYTES NFR BLD: 11 % (ref 12–49)
LYMPHOCYTES NFR BLD: 13 % (ref 12–49)
LYMPHOCYTES NFR BLD: 15 % (ref 12–49)
LYMPHOCYTES NFR BLD: 18 % (ref 12–49)
LYMPHOCYTES NFR BLD: 7 % (ref 12–49)
LYMPHOCYTES NFR BLD: 7 % (ref 12–49)
LYMPHOCYTES NFR BLD: 8 % (ref 12–49)
LYMPHOCYTES NFR BLD: 8 % (ref 12–49)
MAGNESIUM SERPL-MCNC: 3.1 MG/DL (ref 1.6–2.4)
MCH RBC QN AUTO: 28.9 PG (ref 26–34)
MCH RBC QN AUTO: 29 PG (ref 26–34)
MCH RBC QN AUTO: 29.2 PG (ref 26–34)
MCH RBC QN AUTO: 29.4 PG (ref 26–34)
MCH RBC QN AUTO: 29.8 PG (ref 26–34)
MCH RBC QN AUTO: 29.9 PG (ref 26–34)
MCHC RBC AUTO-ENTMCNC: 30.8 G/DL (ref 30–36.5)
MCHC RBC AUTO-ENTMCNC: 30.8 G/DL (ref 30–36.5)
MCHC RBC AUTO-ENTMCNC: 32 G/DL (ref 30–36.5)
MCHC RBC AUTO-ENTMCNC: 32.1 G/DL (ref 30–36.5)
MCHC RBC AUTO-ENTMCNC: 32.3 G/DL (ref 30–36.5)
MCHC RBC AUTO-ENTMCNC: 32.6 G/DL (ref 30–36.5)
MCHC RBC AUTO-ENTMCNC: 33.1 G/DL (ref 30–36.5)
MCHC RBC AUTO-ENTMCNC: 33.3 G/DL (ref 30–36.5)
MCHC RBC AUTO-ENTMCNC: 33.4 G/DL (ref 30–36.5)
MCV RBC AUTO: 86.6 FL (ref 80–99)
MCV RBC AUTO: 87.2 FL (ref 80–99)
MCV RBC AUTO: 87.5 FL (ref 80–99)
MCV RBC AUTO: 88.6 FL (ref 80–99)
MCV RBC AUTO: 89.4 FL (ref 80–99)
MCV RBC AUTO: 90.9 FL (ref 80–99)
MCV RBC AUTO: 93.4 FL (ref 80–99)
MCV RBC AUTO: 95.5 FL (ref 80–99)
MCV RBC AUTO: 96.7 FL (ref 80–99)
METAMYELOCYTES NFR BLD MANUAL: 1 %
METAMYELOCYTES NFR BLD MANUAL: 1 %
MONOCYTES # BLD: 0.2 K/UL (ref 0–1)
MONOCYTES # BLD: 0.2 K/UL (ref 0–1)
MONOCYTES # BLD: 0.5 K/UL (ref 0–1)
MONOCYTES # BLD: 0.6 K/UL (ref 0–1)
MONOCYTES # BLD: 0.6 K/UL (ref 0–1)
MONOCYTES # BLD: 0.7 K/UL (ref 0–1)
MONOCYTES # BLD: 1.3 K/UL (ref 0–1)
MONOCYTES NFR BLD: 1 % (ref 5–13)
MONOCYTES NFR BLD: 11 % (ref 5–13)
MONOCYTES NFR BLD: 11 % (ref 5–13)
MONOCYTES NFR BLD: 12 % (ref 5–13)
MONOCYTES NFR BLD: 4 % (ref 5–13)
MONOCYTES NFR BLD: 4 % (ref 5–13)
MONOCYTES NFR BLD: 5 % (ref 5–13)
MONOCYTES NFR BLD: 9 % (ref 5–13)
MONOCYTES NFR BLD: 9 % (ref 5–13)
MYELOCYTES NFR BLD MANUAL: 1 %
MYELOCYTES NFR BLD MANUAL: 3 %
NEUTS BAND NFR BLD MANUAL: 2 %
NEUTS BAND NFR BLD MANUAL: 2 % (ref 0–6)
NEUTS BAND NFR BLD MANUAL: 2 % (ref 0–6)
NEUTS BAND NFR BLD MANUAL: 7 % (ref 0–6)
NEUTS BAND NFR BLD MANUAL: 8 % (ref 0–6)
NEUTS SEG # BLD: 10.1 K/UL (ref 1.8–8)
NEUTS SEG # BLD: 12.2 K/UL (ref 1.8–8)
NEUTS SEG # BLD: 13.2 K/UL (ref 1.8–8)
NEUTS SEG # BLD: 14.5 K/UL (ref 1.8–8)
NEUTS SEG # BLD: 3.2 K/UL (ref 1.8–8)
NEUTS SEG # BLD: 3.5 K/UL (ref 1.8–8)
NEUTS SEG # BLD: 3.7 K/UL (ref 1.8–8)
NEUTS SEG # BLD: 3.9 K/UL (ref 1.8–8)
NEUTS SEG # BLD: 4.4 K/UL (ref 1.8–8)
NEUTS SEG NFR BLD: 66 % (ref 32–75)
NEUTS SEG NFR BLD: 72 % (ref 32–75)
NEUTS SEG NFR BLD: 73 % (ref 32–75)
NEUTS SEG NFR BLD: 76 % (ref 32–75)
NEUTS SEG NFR BLD: 76 % (ref 32–75)
NEUTS SEG NFR BLD: 77 % (ref 32–75)
NEUTS SEG NFR BLD: 85 % (ref 32–75)
NEUTS SEG NFR BLD: 86 % (ref 32–75)
NEUTS SEG NFR BLD: 88 % (ref 32–75)
NITRITE UR QL STRIP.AUTO: NEGATIVE
NITRITE UR QL STRIP.AUTO: NEGATIVE
NRBC # BLD: 0 K/UL (ref 0–0.01)
NRBC # BLD: 0.02 K/UL (ref 0–0.01)
NRBC # BLD: 0.03 K/UL (ref 0–0.01)
NRBC BLD-RTO: 0 PER 100 WBC
NRBC BLD-RTO: 0.1 PER 100 WBC
NRBC BLD-RTO: 0.2 PER 100 WBC
NRBC BLD-RTO: 0.2 PER 100 WBC
NRBC BLD-RTO: 0.6 PER 100 WBC
P-R INTERVAL, ECG05: 140 MS
PH UR STRIP: 7.5 [PH] (ref 5–8)
PH UR STRIP: 7.5 [PH] (ref 5–8)
PHOSPHATE SERPL-MCNC: 1.6 MG/DL (ref 2.6–4.7)
PLATELET # BLD AUTO: 127 K/UL (ref 150–400)
PLATELET # BLD AUTO: 127 K/UL (ref 150–400)
PLATELET # BLD AUTO: 157 K/UL (ref 150–400)
PLATELET # BLD AUTO: 164 K/UL (ref 150–400)
PLATELET # BLD AUTO: 168 K/UL (ref 150–400)
PLATELET # BLD AUTO: 183 K/UL (ref 150–400)
PLATELET # BLD AUTO: 195 K/UL (ref 150–400)
PLATELET # BLD AUTO: 245 K/UL (ref 150–400)
PLATELET # BLD AUTO: 82 K/UL (ref 150–400)
PLATELET COMMENTS,PCOM: ABNORMAL
PMV BLD AUTO: 10 FL (ref 8.9–12.9)
PMV BLD AUTO: 10.2 FL (ref 8.9–12.9)
PMV BLD AUTO: 10.2 FL (ref 8.9–12.9)
PMV BLD AUTO: 10.3 FL (ref 8.9–12.9)
PMV BLD AUTO: 9.3 FL (ref 8.9–12.9)
PMV BLD AUTO: 9.6 FL (ref 8.9–12.9)
PMV BLD AUTO: 9.7 FL (ref 8.9–12.9)
PMV BLD AUTO: 9.8 FL (ref 8.9–12.9)
PMV BLD AUTO: 9.9 FL (ref 8.9–12.9)
POTASSIUM SERPL-SCNC: 3 MMOL/L (ref 3.5–5.1)
POTASSIUM SERPL-SCNC: 3.3 MMOL/L (ref 3.5–5.1)
POTASSIUM SERPL-SCNC: 3.4 MMOL/L (ref 3.5–5.1)
POTASSIUM SERPL-SCNC: 3.4 MMOL/L (ref 3.5–5.1)
POTASSIUM SERPL-SCNC: 3.5 MMOL/L (ref 3.5–5.1)
POTASSIUM SERPL-SCNC: 3.6 MMOL/L (ref 3.5–5.1)
POTASSIUM SERPL-SCNC: 3.6 MMOL/L (ref 3.5–5.1)
POTASSIUM SERPL-SCNC: 3.9 MMOL/L (ref 3.5–5.1)
PROT SERPL-MCNC: 6.2 G/DL (ref 6.4–8.2)
PROT SERPL-MCNC: 6.7 G/DL (ref 6.4–8.2)
PROT SERPL-MCNC: 7.2 G/DL (ref 6.4–8.2)
PROT SERPL-MCNC: 7.3 G/DL (ref 6.4–8.2)
PROT SERPL-MCNC: 7.4 G/DL (ref 6.4–8.2)
PROT SERPL-MCNC: 7.5 G/DL (ref 6.4–8.2)
PROT SERPL-MCNC: 8.2 G/DL (ref 6.4–8.2)
PROT UR STRIP-MCNC: 30 MG/DL
PROT UR STRIP-MCNC: 30 MG/DL
Q-T INTERVAL, ECG07: 456 MS
QRS DURATION, ECG06: 86 MS
QTC CALCULATION (BEZET), ECG08: 466 MS
RBC # BLD AUTO: 4.15 M/UL (ref 3.8–5.2)
RBC # BLD AUTO: 4.18 M/UL (ref 3.8–5.2)
RBC # BLD AUTO: 4.18 M/UL (ref 3.8–5.2)
RBC # BLD AUTO: 4.2 M/UL (ref 3.8–5.2)
RBC # BLD AUTO: 4.25 M/UL (ref 3.8–5.2)
RBC # BLD AUTO: 4.84 M/UL (ref 3.8–5.2)
RBC # BLD AUTO: 4.86 M/UL (ref 3.8–5.2)
RBC # BLD AUTO: 4.91 M/UL (ref 3.8–5.2)
RBC # BLD AUTO: 5.02 M/UL (ref 3.8–5.2)
RBC #/AREA URNS HPF: >100 /HPF (ref 0–5)
RBC #/AREA URNS HPF: ABNORMAL /HPF (ref 0–5)
RBC MORPH BLD: ABNORMAL
RBC MORPH BLD: NORMAL
SAMPLES BEING HELD,HOLD: NORMAL
SODIUM SERPL-SCNC: 127 MMOL/L (ref 136–145)
SODIUM SERPL-SCNC: 127 MMOL/L (ref 136–145)
SODIUM SERPL-SCNC: 130 MMOL/L (ref 136–145)
SODIUM SERPL-SCNC: 130 MMOL/L (ref 136–145)
SODIUM SERPL-SCNC: 133 MMOL/L (ref 136–145)
SODIUM SERPL-SCNC: 136 MMOL/L (ref 136–145)
SODIUM SERPL-SCNC: 139 MMOL/L (ref 136–145)
SODIUM SERPL-SCNC: 140 MMOL/L (ref 136–145)
SODIUM SERPL-SCNC: 140 MMOL/L (ref 136–145)
SP GR UR REFRACTOMETRY: 1.01 (ref 1–1.03)
SP GR UR REFRACTOMETRY: 1.02 (ref 1–1.03)
TROPONIN-HIGH SENSITIVITY: 51 NG/L (ref 0–51)
TSH SERPL DL<=0.05 MIU/L-ACNC: 0.54 UIU/ML (ref 0.36–3.74)
TSH SERPL DL<=0.05 MIU/L-ACNC: 0.81 UIU/ML (ref 0.36–3.74)
TSH SERPL DL<=0.05 MIU/L-ACNC: 1.97 UIU/ML (ref 0.36–3.74)
TSH SERPL DL<=0.05 MIU/L-ACNC: 2.14 UIU/ML (ref 0.36–3.74)
UA: UC IF INDICATED,UAUC: ABNORMAL
UROBILINOGEN UR QL STRIP.AUTO: 0.2 EU/DL (ref 0.2–1)
UROBILINOGEN UR QL STRIP.AUTO: 0.2 EU/DL (ref 0.2–1)
VENTRICULAR RATE, ECG03: 63 BPM
WBC # BLD AUTO: 13.6 K/UL (ref 3.6–11)
WBC # BLD AUTO: 14.7 K/UL (ref 3.6–11)
WBC # BLD AUTO: 14.9 K/UL (ref 3.6–11)
WBC # BLD AUTO: 16.1 K/UL (ref 3.6–11)
WBC # BLD AUTO: 4.3 K/UL (ref 3.6–11)
WBC # BLD AUTO: 5 K/UL (ref 3.6–11)
WBC # BLD AUTO: 5 K/UL (ref 3.6–11)
WBC # BLD AUTO: 5.1 K/UL (ref 3.6–11)
WBC # BLD AUTO: 5.4 K/UL (ref 3.6–11)
WBC URNS QL MICRO: ABNORMAL /HPF (ref 0–4)
WBC URNS QL MICRO: ABNORMAL /HPF (ref 0–4)

## 2021-01-01 PROCEDURE — 99283 EMERGENCY DEPT VISIT LOW MDM: CPT

## 2021-01-01 PROCEDURE — 65660000000 HC RM CCU STEPDOWN

## 2021-01-01 PROCEDURE — 99232 SBSQ HOSP IP/OBS MODERATE 35: CPT | Performed by: INTERNAL MEDICINE

## 2021-01-01 PROCEDURE — 99152 MOD SED SAME PHYS/QHP 5/>YRS: CPT

## 2021-01-01 PROCEDURE — 88333 PATH CONSLTJ SURG CYTO XM 1: CPT

## 2021-01-01 PROCEDURE — 88305 TISSUE EXAM BY PATHOLOGIST: CPT

## 2021-01-01 PROCEDURE — 74011250637 HC RX REV CODE- 250/637: Performed by: INTERNAL MEDICINE

## 2021-01-01 PROCEDURE — 99255 IP/OBS CONSLTJ NEW/EST HI 80: CPT | Performed by: INTERNAL MEDICINE

## 2021-01-01 PROCEDURE — 85025 COMPLETE CBC W/AUTO DIFF WBC: CPT

## 2021-01-01 PROCEDURE — 71275 CT ANGIOGRAPHY CHEST: CPT

## 2021-01-01 PROCEDURE — 96413 CHEMO IV INFUSION 1 HR: CPT

## 2021-01-01 PROCEDURE — 99233 SBSQ HOSP IP/OBS HIGH 50: CPT | Performed by: INTERNAL MEDICINE

## 2021-01-01 PROCEDURE — 36415 COLL VENOUS BLD VENIPUNCTURE: CPT

## 2021-01-01 PROCEDURE — 74011250636 HC RX REV CODE- 250/636: Performed by: INTERNAL MEDICINE

## 2021-01-01 PROCEDURE — 74011250637 HC RX REV CODE- 250/637: Performed by: EMERGENCY MEDICINE

## 2021-01-01 PROCEDURE — 84484 ASSAY OF TROPONIN QUANT: CPT

## 2021-01-01 PROCEDURE — 80053 COMPREHEN METABOLIC PANEL: CPT

## 2021-01-01 PROCEDURE — 74011000636 HC RX REV CODE- 636: Performed by: RADIOLOGY

## 2021-01-01 PROCEDURE — 83735 ASSAY OF MAGNESIUM: CPT

## 2021-01-01 PROCEDURE — 84100 ASSAY OF PHOSPHORUS: CPT

## 2021-01-01 PROCEDURE — 88307 TISSUE EXAM BY PATHOLOGIST: CPT

## 2021-01-01 PROCEDURE — A9575 INJ GADOTERATE MEGLUMI 0.1ML: HCPCS | Performed by: RADIOLOGY

## 2021-01-01 PROCEDURE — 51798 US URINE CAPACITY MEASURE: CPT

## 2021-01-01 PROCEDURE — 74011250637 HC RX REV CODE- 250/637: Performed by: NURSE PRACTITIONER

## 2021-01-01 PROCEDURE — 82533 TOTAL CORTISOL: CPT

## 2021-01-01 PROCEDURE — 94760 N-INVAS EAR/PLS OXIMETRY 1: CPT

## 2021-01-01 PROCEDURE — 77030019905 HC CATH URETH INTMIT MDII -A

## 2021-01-01 PROCEDURE — 99204 OFFICE O/P NEW MOD 45 MIN: CPT | Performed by: INTERNAL MEDICINE

## 2021-01-01 PROCEDURE — 74011250636 HC RX REV CODE- 250/636: Performed by: RADIOLOGY

## 2021-01-01 PROCEDURE — 99213 OFFICE O/P EST LOW 20 MIN: CPT | Performed by: NURSE PRACTITIONER

## 2021-01-01 PROCEDURE — 74177 CT ABD & PELVIS W/CONTRAST: CPT

## 2021-01-01 PROCEDURE — 77012 CT SCAN FOR NEEDLE BIOPSY: CPT

## 2021-01-01 PROCEDURE — 74011000250 HC RX REV CODE- 250: Performed by: NURSE PRACTITIONER

## 2021-01-01 PROCEDURE — 74011000258 HC RX REV CODE- 258: Performed by: INTERNAL MEDICINE

## 2021-01-01 PROCEDURE — 99215 OFFICE O/P EST HI 40 MIN: CPT | Performed by: INTERNAL MEDICINE

## 2021-01-01 PROCEDURE — 93306 TTE W/DOPPLER COMPLETE: CPT

## 2021-01-01 PROCEDURE — 88341 IMHCHEM/IMCYTCHM EA ADD ANTB: CPT

## 2021-01-01 PROCEDURE — 77030016057 HC NDL HUBR APOL -B

## 2021-01-01 PROCEDURE — 71111 X-RAY EXAM RIBS/CHEST4/> VWS: CPT

## 2021-01-01 PROCEDURE — 74011000250 HC RX REV CODE- 250: Performed by: INTERNAL MEDICINE

## 2021-01-01 PROCEDURE — 77030012965 HC NDL HUBR BBMI -A

## 2021-01-01 PROCEDURE — 93306 TTE W/DOPPLER COMPLETE: CPT | Performed by: INTERNAL MEDICINE

## 2021-01-01 PROCEDURE — 73552 X-RAY EXAM OF FEMUR 2/>: CPT

## 2021-01-01 PROCEDURE — 77030020847 HC STATLOK BARD -A

## 2021-01-01 PROCEDURE — 83605 ASSAY OF LACTIC ACID: CPT

## 2021-01-01 PROCEDURE — 92610 EVALUATE SWALLOWING FUNCTION: CPT

## 2021-01-01 PROCEDURE — 73590 X-RAY EXAM OF LOWER LEG: CPT

## 2021-01-01 PROCEDURE — 80048 BASIC METABOLIC PNL TOTAL CA: CPT

## 2021-01-01 PROCEDURE — 84443 ASSAY THYROID STIM HORMONE: CPT

## 2021-01-01 PROCEDURE — 74011250636 HC RX REV CODE- 250/636: Performed by: NURSE PRACTITIONER

## 2021-01-01 PROCEDURE — 74011250637 HC RX REV CODE- 250/637: Performed by: HOSPITALIST

## 2021-01-01 PROCEDURE — 73521 X-RAY EXAM HIPS BI 2 VIEWS: CPT

## 2021-01-01 PROCEDURE — 70553 MRI BRAIN STEM W/O & W/DYE: CPT

## 2021-01-01 PROCEDURE — 70450 CT HEAD/BRAIN W/O DYE: CPT

## 2021-01-01 PROCEDURE — 99284 EMERGENCY DEPT VISIT MOD MDM: CPT

## 2021-01-01 PROCEDURE — 96375 TX/PRO/DX INJ NEW DRUG ADDON: CPT

## 2021-01-01 PROCEDURE — 36600 WITHDRAWAL OF ARTERIAL BLOOD: CPT

## 2021-01-01 PROCEDURE — 78306 BONE IMAGING WHOLE BODY: CPT

## 2021-01-01 PROCEDURE — 2709999900 HC NON-CHARGEABLE SUPPLY

## 2021-01-01 PROCEDURE — 94761 N-INVAS EAR/PLS OXIMETRY MLT: CPT

## 2021-01-01 PROCEDURE — 88112 CYTOPATH CELL ENHANCE TECH: CPT

## 2021-01-01 PROCEDURE — 72125 CT NECK SPINE W/O DYE: CPT

## 2021-01-01 PROCEDURE — 96361 HYDRATE IV INFUSION ADD-ON: CPT

## 2021-01-01 PROCEDURE — 88334 PATH CONSLTJ SURG CYTO XM EA: CPT

## 2021-01-01 PROCEDURE — 77030014132 HC TY LUMBR PUNC BD -A

## 2021-01-01 PROCEDURE — 88342 IMHCHEM/IMCYTCHM 1ST ANTB: CPT

## 2021-01-01 PROCEDURE — 96374 THER/PROPH/DIAG INJ IV PUSH: CPT

## 2021-01-01 PROCEDURE — 71045 X-RAY EXAM CHEST 1 VIEW: CPT

## 2021-01-01 PROCEDURE — 81001 URINALYSIS AUTO W/SCOPE: CPT

## 2021-01-01 PROCEDURE — 88360 TUMOR IMMUNOHISTOCHEM/MANUAL: CPT

## 2021-01-01 PROCEDURE — 99213 OFFICE O/P EST LOW 20 MIN: CPT | Performed by: INTERNAL MEDICINE

## 2021-01-01 PROCEDURE — 80076 HEPATIC FUNCTION PANEL: CPT

## 2021-01-01 PROCEDURE — 74011250636 HC RX REV CODE- 250/636: Performed by: EMERGENCY MEDICINE

## 2021-01-01 PROCEDURE — 93005 ELECTROCARDIOGRAM TRACING: CPT

## 2021-01-01 PROCEDURE — 99153 MOD SED SAME PHYS/QHP EA: CPT

## 2021-01-01 PROCEDURE — 88108 CYTOPATH CONCENTRATE TECH: CPT

## 2021-01-01 PROCEDURE — 77030005513 HC CATH URETH FOL11 MDII -B

## 2021-01-01 PROCEDURE — 99357 PR PROLONGED SVC I/P REQ UNIT/FLOOR TIME EA 30 MIN: CPT | Performed by: INTERNAL MEDICINE

## 2021-01-01 PROCEDURE — 32555 ASPIRATE PLEURA W/ IMAGING: CPT

## 2021-01-01 PROCEDURE — 99356 PR PROLONGED SVC I/P OR OBS SETTING 1ST HOUR: CPT | Performed by: INTERNAL MEDICINE

## 2021-01-01 PROCEDURE — 99223 1ST HOSP IP/OBS HIGH 75: CPT | Performed by: INTERNAL MEDICINE

## 2021-01-01 PROCEDURE — 83036 HEMOGLOBIN GLYCOSYLATED A1C: CPT

## 2021-01-01 PROCEDURE — 65270000029 HC RM PRIVATE

## 2021-01-01 PROCEDURE — 62270 DX LMBR SPI PNXR: CPT

## 2021-01-01 RX ORDER — HYDRALAZINE HYDROCHLORIDE 20 MG/ML
10 INJECTION INTRAMUSCULAR; INTRAVENOUS ONCE
Status: COMPLETED | OUTPATIENT
Start: 2021-01-01 | End: 2021-01-01

## 2021-01-01 RX ORDER — SODIUM CHLORIDE 0.9 % (FLUSH) 0.9 %
10 SYRINGE (ML) INJECTION AS NEEDED
Status: CANCELLED | OUTPATIENT
Start: 2021-12-13

## 2021-01-01 RX ORDER — HALOPERIDOL 1 MG/1
2 TABLET ORAL
Status: DISCONTINUED | OUTPATIENT
Start: 2021-01-01 | End: 2021-12-05 | Stop reason: HOSPADM

## 2021-01-01 RX ORDER — FENTANYL 12.5 UG/1
1 PATCH TRANSDERMAL
COMMUNITY

## 2021-01-01 RX ORDER — LABETALOL 200 MG/1
200 TABLET, FILM COATED ORAL 2 TIMES DAILY
Status: DISCONTINUED | OUTPATIENT
Start: 2021-01-01 | End: 2021-01-01

## 2021-01-01 RX ORDER — SODIUM CHLORIDE 9 MG/ML
10 INJECTION INTRAMUSCULAR; INTRAVENOUS; SUBCUTANEOUS AS NEEDED
Status: CANCELLED | OUTPATIENT
Start: 2021-12-13

## 2021-01-01 RX ORDER — LIDOCAINE HYDROCHLORIDE 10 MG/ML
8 INJECTION, SOLUTION EPIDURAL; INFILTRATION; INTRACAUDAL; PERINEURAL
Status: COMPLETED | OUTPATIENT
Start: 2021-01-01 | End: 2021-01-01

## 2021-01-01 RX ORDER — SODIUM CHLORIDE 0.9 % (FLUSH) 0.9 %
5-10 SYRINGE (ML) INJECTION AS NEEDED
Status: DISCONTINUED | OUTPATIENT
Start: 2021-01-01 | End: 2021-01-01 | Stop reason: HOSPADM

## 2021-01-01 RX ORDER — MEMANTINE HYDROCHLORIDE 10 MG/1
10 TABLET ORAL 2 TIMES DAILY
Status: DISCONTINUED | OUTPATIENT
Start: 2021-01-01 | End: 2021-01-01

## 2021-01-01 RX ORDER — DEXAMETHASONE 4 MG/1
4 TABLET ORAL EVERY 6 HOURS
Qty: 120 TABLET | Refills: 1 | Status: SHIPPED | OUTPATIENT
Start: 2021-01-01

## 2021-01-01 RX ORDER — PREDNISONE 5 MG/1
TABLET ORAL
Qty: 21 TABLET | Refills: 0 | Status: SHIPPED | OUTPATIENT
Start: 2021-01-01 | End: 2021-01-01 | Stop reason: ALTCHOICE

## 2021-01-01 RX ORDER — GABAPENTIN 100 MG/1
100 CAPSULE ORAL 3 TIMES DAILY
Qty: 30 CAPSULE | Refills: 0 | Status: SHIPPED | OUTPATIENT
Start: 2021-01-01

## 2021-01-01 RX ORDER — SODIUM CHLORIDE 9 MG/ML
10 INJECTION INTRAMUSCULAR; INTRAVENOUS; SUBCUTANEOUS AS NEEDED
Status: DISCONTINUED | OUTPATIENT
Start: 2021-01-01 | End: 2021-01-01 | Stop reason: HOSPADM

## 2021-01-01 RX ORDER — SODIUM CHLORIDE 0.9 % (FLUSH) 0.9 %
5-40 SYRINGE (ML) INJECTION AS NEEDED
Status: DISCONTINUED | OUTPATIENT
Start: 2021-01-01 | End: 2021-12-05 | Stop reason: HOSPADM

## 2021-01-01 RX ORDER — HEPARIN 100 UNIT/ML
500 SYRINGE INTRAVENOUS AS NEEDED
Status: DISCONTINUED | OUTPATIENT
Start: 2021-01-01 | End: 2021-01-01 | Stop reason: HOSPADM

## 2021-01-01 RX ORDER — ONDANSETRON 2 MG/ML
8 INJECTION INTRAMUSCULAR; INTRAVENOUS ONCE
Status: CANCELLED
Start: 2021-12-16 | End: 2021-12-16

## 2021-01-01 RX ORDER — HYDRALAZINE HYDROCHLORIDE 20 MG/ML
20 INJECTION INTRAMUSCULAR; INTRAVENOUS ONCE
Status: ACTIVE | OUTPATIENT
Start: 2021-01-01 | End: 2021-01-01

## 2021-01-01 RX ORDER — POTASSIUM CHLORIDE 750 MG/1
40 TABLET, FILM COATED, EXTENDED RELEASE ORAL
Status: DISCONTINUED | OUTPATIENT
Start: 2021-01-01 | End: 2021-01-01 | Stop reason: ALTCHOICE

## 2021-01-01 RX ORDER — HEPARIN 100 UNIT/ML
300-500 SYRINGE INTRAVENOUS AS NEEDED
Status: CANCELLED
Start: 2021-12-13

## 2021-01-01 RX ORDER — ALBUTEROL SULFATE 0.83 MG/ML
2.5 SOLUTION RESPIRATORY (INHALATION) AS NEEDED
Status: CANCELLED
Start: 2021-12-16

## 2021-01-01 RX ORDER — LORAZEPAM 0.5 MG/1
TABLET ORAL
Qty: 60 TABLET | Refills: 0 | Status: SHIPPED | OUTPATIENT
Start: 2021-01-01 | End: 2021-01-01

## 2021-01-01 RX ORDER — DIPHENHYDRAMINE HYDROCHLORIDE 50 MG/ML
25 INJECTION, SOLUTION INTRAMUSCULAR; INTRAVENOUS AS NEEDED
Status: CANCELLED
Start: 2021-12-06

## 2021-01-01 RX ORDER — OXYCODONE HYDROCHLORIDE 5 MG/1
5 TABLET ORAL
Qty: 30 TABLET | Refills: 0 | OUTPATIENT
Start: 2021-01-01 | End: 2021-01-01

## 2021-01-01 RX ORDER — ALBUTEROL SULFATE 0.83 MG/ML
2.5 SOLUTION RESPIRATORY (INHALATION) AS NEEDED
Status: CANCELLED
Start: 2021-12-09

## 2021-01-01 RX ORDER — EPINEPHRINE 1 MG/ML
0.3 INJECTION, SOLUTION, CONCENTRATE INTRAVENOUS AS NEEDED
Status: CANCELLED | OUTPATIENT
Start: 2021-12-16

## 2021-01-01 RX ORDER — DIPHENHYDRAMINE HYDROCHLORIDE 50 MG/ML
25 INJECTION, SOLUTION INTRAMUSCULAR; INTRAVENOUS AS NEEDED
Status: CANCELLED
Start: 2021-12-16

## 2021-01-01 RX ORDER — SODIUM CHLORIDE 9 MG/ML
100 INJECTION, SOLUTION INTRAVENOUS CONTINUOUS
Status: DISCONTINUED | OUTPATIENT
Start: 2021-01-01 | End: 2021-12-05 | Stop reason: HOSPADM

## 2021-01-01 RX ORDER — ONDANSETRON 2 MG/ML
8 INJECTION INTRAMUSCULAR; INTRAVENOUS AS NEEDED
Status: CANCELLED | OUTPATIENT
Start: 2021-12-16

## 2021-01-01 RX ORDER — SODIUM CHLORIDE 0.9 % (FLUSH) 0.9 %
10 SYRINGE (ML) INJECTION AS NEEDED
Status: CANCELLED | OUTPATIENT
Start: 2021-12-09

## 2021-01-01 RX ORDER — HYDROCORTISONE SODIUM SUCCINATE 100 MG/2ML
100 INJECTION, POWDER, FOR SOLUTION INTRAMUSCULAR; INTRAVENOUS AS NEEDED
Status: CANCELLED | OUTPATIENT
Start: 2021-12-09

## 2021-01-01 RX ORDER — MIDAZOLAM HYDROCHLORIDE 1 MG/ML
.5-5 INJECTION, SOLUTION INTRAMUSCULAR; INTRAVENOUS
Status: DISCONTINUED | OUTPATIENT
Start: 2021-01-01 | End: 2021-01-01

## 2021-01-01 RX ORDER — EPINEPHRINE 1 MG/ML
0.3 INJECTION, SOLUTION, CONCENTRATE INTRAVENOUS AS NEEDED
Status: CANCELLED | OUTPATIENT
Start: 2021-12-09

## 2021-01-01 RX ORDER — FENTANYL CITRATE 50 UG/ML
25-100 INJECTION, SOLUTION INTRAMUSCULAR; INTRAVENOUS
Status: DISCONTINUED | OUTPATIENT
Start: 2021-01-01 | End: 2021-01-01

## 2021-01-01 RX ORDER — HYDRALAZINE HYDROCHLORIDE 20 MG/ML
20 INJECTION INTRAMUSCULAR; INTRAVENOUS ONCE
Status: COMPLETED | OUTPATIENT
Start: 2021-01-01 | End: 2021-01-01

## 2021-01-01 RX ORDER — PROMETHAZINE HYDROCHLORIDE 25 MG/1
25 TABLET ORAL
Qty: 12 TABLET | Refills: 0 | Status: SHIPPED | OUTPATIENT
Start: 2021-01-01

## 2021-01-01 RX ORDER — MORPHINE SULFATE 2 MG/ML
2 INJECTION, SOLUTION INTRAMUSCULAR; INTRAVENOUS
Status: DISCONTINUED | OUTPATIENT
Start: 2021-01-01 | End: 2021-01-01

## 2021-01-01 RX ORDER — GADOTERATE MEGLUMINE 376.9 MG/ML
16 INJECTION INTRAVENOUS
Status: COMPLETED | OUTPATIENT
Start: 2021-01-01 | End: 2021-01-01

## 2021-01-01 RX ORDER — SODIUM CHLORIDE 0.9 % (FLUSH) 0.9 %
10 SYRINGE (ML) INJECTION AS NEEDED
Status: CANCELLED | OUTPATIENT
Start: 2021-12-16

## 2021-01-01 RX ORDER — SODIUM CHLORIDE 9 MG/ML
10 INJECTION INTRAMUSCULAR; INTRAVENOUS; SUBCUTANEOUS AS NEEDED
Status: CANCELLED | OUTPATIENT
Start: 2021-12-09

## 2021-01-01 RX ORDER — OXYCODONE HYDROCHLORIDE 5 MG/1
5 TABLET ORAL
Status: DISCONTINUED | OUTPATIENT
Start: 2021-01-01 | End: 2021-01-01

## 2021-01-01 RX ORDER — HYDRALAZINE HYDROCHLORIDE 20 MG/ML
20 INJECTION INTRAMUSCULAR; INTRAVENOUS
Status: DISCONTINUED | OUTPATIENT
Start: 2021-01-01 | End: 2021-12-05 | Stop reason: HOSPADM

## 2021-01-01 RX ORDER — MEMANTINE HYDROCHLORIDE 10 MG/1
5 TABLET ORAL 2 TIMES DAILY
Status: DISCONTINUED | OUTPATIENT
Start: 2021-01-01 | End: 2021-12-05 | Stop reason: HOSPADM

## 2021-01-01 RX ORDER — HYDROCORTISONE SODIUM SUCCINATE 100 MG/2ML
100 INJECTION, POWDER, FOR SOLUTION INTRAMUSCULAR; INTRAVENOUS AS NEEDED
Status: CANCELLED | OUTPATIENT
Start: 2021-12-16

## 2021-01-01 RX ORDER — ONDANSETRON 2 MG/ML
8 INJECTION INTRAMUSCULAR; INTRAVENOUS AS NEEDED
Status: CANCELLED | OUTPATIENT
Start: 2021-12-09

## 2021-01-01 RX ORDER — METOCLOPRAMIDE HYDROCHLORIDE 5 MG/ML
10 INJECTION INTRAMUSCULAR; INTRAVENOUS
Status: COMPLETED | OUTPATIENT
Start: 2021-01-01 | End: 2021-01-01

## 2021-01-01 RX ORDER — ONDANSETRON 2 MG/ML
4 INJECTION INTRAMUSCULAR; INTRAVENOUS EVERY 8 HOURS
Status: DISCONTINUED | OUTPATIENT
Start: 2021-01-01 | End: 2021-12-05 | Stop reason: HOSPADM

## 2021-01-01 RX ORDER — DULOXETIN HYDROCHLORIDE 30 MG/1
30 CAPSULE, DELAYED RELEASE ORAL DAILY
Qty: 90 CAPSULE | Refills: 2 | Status: SHIPPED | OUTPATIENT
Start: 2021-01-01 | End: 2021-01-01

## 2021-01-01 RX ORDER — MEMANTINE HYDROCHLORIDE 10 MG/1
5 TABLET ORAL DAILY
Status: DISCONTINUED | OUTPATIENT
Start: 2021-01-01 | End: 2021-01-01

## 2021-01-01 RX ORDER — DEXAMETHASONE 4 MG/1
4 TABLET ORAL EVERY 6 HOURS
Status: DISCONTINUED | OUTPATIENT
Start: 2021-01-01 | End: 2021-01-01

## 2021-01-01 RX ORDER — HEPARIN 100 UNIT/ML
300-500 SYRINGE INTRAVENOUS AS NEEDED
Status: CANCELLED
Start: 2021-12-16

## 2021-01-01 RX ORDER — ONDANSETRON 2 MG/ML
4 INJECTION INTRAMUSCULAR; INTRAVENOUS
Status: DISCONTINUED | OUTPATIENT
Start: 2021-01-01 | End: 2021-01-01

## 2021-01-01 RX ORDER — LORAZEPAM 1 MG/1
1 TABLET ORAL DAILY PRN
Status: DISCONTINUED | OUTPATIENT
Start: 2021-01-01 | End: 2021-01-01

## 2021-01-01 RX ORDER — DIPHENHYDRAMINE HYDROCHLORIDE 50 MG/ML
25 INJECTION, SOLUTION INTRAMUSCULAR; INTRAVENOUS AS NEEDED
Status: CANCELLED
Start: 2021-12-09

## 2021-01-01 RX ORDER — HYDROCORTISONE SODIUM SUCCINATE 100 MG/2ML
100 INJECTION, POWDER, FOR SOLUTION INTRAMUSCULAR; INTRAVENOUS AS NEEDED
Status: CANCELLED | OUTPATIENT
Start: 2021-12-13

## 2021-01-01 RX ORDER — OXYCODONE HYDROCHLORIDE 5 MG/1
10 TABLET ORAL
Status: DISCONTINUED | OUTPATIENT
Start: 2021-01-01 | End: 2021-12-05 | Stop reason: HOSPADM

## 2021-01-01 RX ORDER — ONDANSETRON 2 MG/ML
8 INJECTION INTRAMUSCULAR; INTRAVENOUS AS NEEDED
Status: CANCELLED | OUTPATIENT
Start: 2021-12-06

## 2021-01-01 RX ORDER — GADOTERATE MEGLUMINE 376.9 MG/ML
17 INJECTION INTRAVENOUS
Status: COMPLETED | OUTPATIENT
Start: 2021-01-01 | End: 2021-01-01

## 2021-01-01 RX ORDER — SODIUM CHLORIDE 9 MG/ML
10 INJECTION INTRAMUSCULAR; INTRAVENOUS; SUBCUTANEOUS AS NEEDED
Status: CANCELLED | OUTPATIENT
Start: 2021-12-16

## 2021-01-01 RX ORDER — ONDANSETRON 2 MG/ML
8 INJECTION INTRAMUSCULAR; INTRAVENOUS ONCE
Status: CANCELLED
Start: 2021-12-13 | End: 2021-12-13

## 2021-01-01 RX ORDER — PROCHLORPERAZINE MALEATE 5 MG
5 TABLET ORAL
Status: DISCONTINUED | OUTPATIENT
Start: 2021-01-01 | End: 2021-12-05 | Stop reason: HOSPADM

## 2021-01-01 RX ORDER — ALBUTEROL SULFATE 0.83 MG/ML
2.5 SOLUTION RESPIRATORY (INHALATION) AS NEEDED
Status: CANCELLED
Start: 2021-12-13

## 2021-01-01 RX ORDER — ACETAMINOPHEN 650 MG/1
650 SUPPOSITORY RECTAL
Status: DISCONTINUED | OUTPATIENT
Start: 2021-01-01 | End: 2021-12-05 | Stop reason: HOSPADM

## 2021-01-01 RX ORDER — PROCHLORPERAZINE MALEATE 10 MG
10 TABLET ORAL
Qty: 50 TABLET | Refills: 5 | Status: SHIPPED | OUTPATIENT
Start: 2021-01-01 | End: 2021-01-01

## 2021-01-01 RX ORDER — POLYETHYLENE GLYCOL 3350 17 G/17G
17 POWDER, FOR SOLUTION ORAL DAILY PRN
Status: DISCONTINUED | OUTPATIENT
Start: 2021-01-01 | End: 2021-12-05 | Stop reason: HOSPADM

## 2021-01-01 RX ORDER — MEMANTINE HYDROCHLORIDE 10 MG/1
5 TABLET ORAL 2 TIMES DAILY
Status: DISCONTINUED | OUTPATIENT
Start: 2021-01-01 | End: 2021-01-01

## 2021-01-01 RX ORDER — EPINEPHRINE 1 MG/ML
0.3 INJECTION, SOLUTION, CONCENTRATE INTRAVENOUS AS NEEDED
Status: CANCELLED | OUTPATIENT
Start: 2021-12-13

## 2021-01-01 RX ORDER — HALOPERIDOL 5 MG/ML
3 INJECTION INTRAMUSCULAR
Status: DISCONTINUED | OUTPATIENT
Start: 2021-01-01 | End: 2021-12-05 | Stop reason: HOSPADM

## 2021-01-01 RX ORDER — DIPHENHYDRAMINE HYDROCHLORIDE 50 MG/ML
50 INJECTION, SOLUTION INTRAMUSCULAR; INTRAVENOUS AS NEEDED
Status: CANCELLED
Start: 2021-12-06

## 2021-01-01 RX ORDER — ONDANSETRON 8 MG/1
8 TABLET, ORALLY DISINTEGRATING ORAL
Qty: 24 TABLET | Refills: 3 | Status: SHIPPED | OUTPATIENT
Start: 2021-01-01 | End: 2021-01-01

## 2021-01-01 RX ORDER — SODIUM CHLORIDE 9 MG/ML
10 INJECTION INTRAMUSCULAR; INTRAVENOUS; SUBCUTANEOUS AS NEEDED
Status: CANCELLED | OUTPATIENT
Start: 2021-12-06

## 2021-01-01 RX ORDER — HYDROCODONE BITARTRATE AND ACETAMINOPHEN 5; 325 MG/1; MG/1
1 TABLET ORAL
Status: COMPLETED | OUTPATIENT
Start: 2021-01-01 | End: 2021-01-01

## 2021-01-01 RX ORDER — DIPHENHYDRAMINE HYDROCHLORIDE 50 MG/ML
25 INJECTION, SOLUTION INTRAMUSCULAR; INTRAVENOUS AS NEEDED
Status: CANCELLED
Start: 2021-12-13

## 2021-01-01 RX ORDER — ACETAMINOPHEN 325 MG/1
650 TABLET ORAL AS NEEDED
Status: CANCELLED
Start: 2021-12-13

## 2021-01-01 RX ORDER — DIPHENHYDRAMINE HYDROCHLORIDE 50 MG/ML
50 INJECTION, SOLUTION INTRAMUSCULAR; INTRAVENOUS AS NEEDED
Status: CANCELLED
Start: 2021-12-16

## 2021-01-01 RX ORDER — ONDANSETRON 2 MG/ML
4 INJECTION INTRAMUSCULAR; INTRAVENOUS
Status: COMPLETED | OUTPATIENT
Start: 2021-01-01 | End: 2021-01-01

## 2021-01-01 RX ORDER — HYDROCODONE BITARTRATE AND ACETAMINOPHEN 5; 325 MG/1; MG/1
1 TABLET ORAL
Qty: 12 TABLET | Refills: 0 | Status: SHIPPED | OUTPATIENT
Start: 2021-01-01 | End: 2021-01-01

## 2021-01-01 RX ORDER — ONDANSETRON 4 MG/1
8 TABLET, ORALLY DISINTEGRATING ORAL EVERY 8 HOURS
Status: DISCONTINUED | OUTPATIENT
Start: 2021-01-01 | End: 2021-12-05 | Stop reason: HOSPADM

## 2021-01-01 RX ORDER — EPINEPHRINE 1 MG/ML
0.3 INJECTION, SOLUTION, CONCENTRATE INTRAVENOUS AS NEEDED
Status: CANCELLED | OUTPATIENT
Start: 2021-12-06

## 2021-01-01 RX ORDER — LORAZEPAM 2 MG/ML
1 INJECTION INTRAMUSCULAR ONCE
Status: CANCELLED
Start: 2021-12-16 | End: 2021-12-16

## 2021-01-01 RX ORDER — HEPARIN 100 UNIT/ML
300-500 SYRINGE INTRAVENOUS AS NEEDED
Status: CANCELLED
Start: 2021-12-06

## 2021-01-01 RX ORDER — POTASSIUM CHLORIDE 7.45 MG/ML
10 INJECTION INTRAVENOUS
Status: COMPLETED | OUTPATIENT
Start: 2021-01-01 | End: 2021-01-01

## 2021-01-01 RX ORDER — ACETAMINOPHEN 325 MG/1
650 TABLET ORAL
Status: DISCONTINUED | OUTPATIENT
Start: 2021-01-01 | End: 2021-12-05 | Stop reason: HOSPADM

## 2021-01-01 RX ORDER — ONDANSETRON 2 MG/ML
8 INJECTION INTRAMUSCULAR; INTRAVENOUS ONCE
Status: CANCELLED
Start: 2021-12-06 | End: 2021-12-06

## 2021-01-01 RX ORDER — SODIUM CHLORIDE 9 MG/ML
25 INJECTION, SOLUTION INTRAVENOUS CONTINUOUS
Status: DISPENSED | OUTPATIENT
Start: 2021-01-01 | End: 2021-01-01

## 2021-01-01 RX ORDER — OXYCODONE HYDROCHLORIDE 5 MG/1
10 TABLET ORAL EVERY 6 HOURS
Status: DISCONTINUED | OUTPATIENT
Start: 2021-01-01 | End: 2021-12-05 | Stop reason: HOSPADM

## 2021-01-01 RX ORDER — SODIUM CHLORIDE 9 MG/ML
25 INJECTION, SOLUTION INTRAVENOUS AS NEEDED
Status: DISCONTINUED | OUTPATIENT
Start: 2021-01-01 | End: 2021-01-01 | Stop reason: HOSPADM

## 2021-01-01 RX ORDER — HEPARIN 100 UNIT/ML
300-500 SYRINGE INTRAVENOUS AS NEEDED
Status: CANCELLED
Start: 2021-12-09

## 2021-01-01 RX ORDER — ONDANSETRON 4 MG/1
4 TABLET, ORALLY DISINTEGRATING ORAL
Status: DISCONTINUED | OUTPATIENT
Start: 2021-01-01 | End: 2021-01-01

## 2021-01-01 RX ORDER — LORAZEPAM 2 MG/ML
1 INJECTION INTRAMUSCULAR ONCE
Status: CANCELLED
Start: 2021-12-13 | End: 2021-12-13

## 2021-01-01 RX ORDER — SODIUM CHLORIDE, SODIUM LACTATE, POTASSIUM CHLORIDE, CALCIUM CHLORIDE 600; 310; 30; 20 MG/100ML; MG/100ML; MG/100ML; MG/100ML
125 INJECTION, SOLUTION INTRAVENOUS CONTINUOUS
Status: DISCONTINUED | OUTPATIENT
Start: 2021-01-01 | End: 2021-01-01

## 2021-01-01 RX ORDER — LORAZEPAM 2 MG/ML
1 INJECTION INTRAMUSCULAR ONCE
Status: CANCELLED
Start: 2021-12-06 | End: 2021-12-06

## 2021-01-01 RX ORDER — ACETAMINOPHEN 325 MG/1
650 TABLET ORAL AS NEEDED
Status: CANCELLED
Start: 2021-12-16

## 2021-01-01 RX ORDER — ACETAMINOPHEN 325 MG/1
650 TABLET ORAL AS NEEDED
Status: CANCELLED
Start: 2021-12-06

## 2021-01-01 RX ORDER — LIDOCAINE HYDROCHLORIDE 10 MG/ML
4 INJECTION, SOLUTION EPIDURAL; INFILTRATION; INTRACAUDAL; PERINEURAL
Status: COMPLETED | OUTPATIENT
Start: 2021-01-01 | End: 2021-01-01

## 2021-01-01 RX ORDER — HYDROCORTISONE SODIUM SUCCINATE 100 MG/2ML
100 INJECTION, POWDER, FOR SOLUTION INTRAMUSCULAR; INTRAVENOUS AS NEEDED
Status: CANCELLED | OUTPATIENT
Start: 2021-12-06

## 2021-01-01 RX ORDER — MEMANTINE HYDROCHLORIDE 5 MG/1
TABLET ORAL
Qty: 120 TABLET | Refills: 5 | Status: SHIPPED | OUTPATIENT
Start: 2021-01-01

## 2021-01-01 RX ORDER — SODIUM CHLORIDE 0.9 % (FLUSH) 0.9 %
10 SYRINGE (ML) INJECTION AS NEEDED
Status: CANCELLED | OUTPATIENT
Start: 2021-12-06

## 2021-01-01 RX ORDER — ONDANSETRON 2 MG/ML
8 INJECTION INTRAMUSCULAR; INTRAVENOUS ONCE
Status: CANCELLED
Start: 2021-12-09 | End: 2021-12-09

## 2021-01-01 RX ORDER — HALOPERIDOL 5 MG/ML
3 INJECTION INTRAMUSCULAR ONCE
Status: COMPLETED | OUTPATIENT
Start: 2021-01-01 | End: 2021-01-01

## 2021-01-01 RX ORDER — LORAZEPAM 2 MG/ML
1 INJECTION INTRAMUSCULAR ONCE
Status: DISPENSED | OUTPATIENT
Start: 2021-01-01 | End: 2021-01-01

## 2021-01-01 RX ORDER — LIDOCAINE 50 MG/G
1 PATCH TOPICAL EVERY 12 HOURS
Qty: 5 EACH | Refills: 0 | Status: SHIPPED | OUTPATIENT
Start: 2021-01-01 | End: 2021-01-01

## 2021-01-01 RX ORDER — DIPHENHYDRAMINE HYDROCHLORIDE 50 MG/ML
50 INJECTION, SOLUTION INTRAMUSCULAR; INTRAVENOUS AS NEEDED
Status: CANCELLED
Start: 2021-12-09

## 2021-01-01 RX ORDER — HYDROMORPHONE HYDROCHLORIDE 1 MG/ML
0.5 INJECTION, SOLUTION INTRAMUSCULAR; INTRAVENOUS; SUBCUTANEOUS
Status: DISCONTINUED | OUTPATIENT
Start: 2021-01-01 | End: 2021-12-05 | Stop reason: HOSPADM

## 2021-01-01 RX ORDER — MEMANTINE HYDROCHLORIDE 10 MG/1
5 TABLET ORAL DAILY
Status: COMPLETED | OUTPATIENT
Start: 2021-01-01 | End: 2021-01-01

## 2021-01-01 RX ORDER — DEXAMETHASONE SODIUM PHOSPHATE 4 MG/ML
4 INJECTION, SOLUTION INTRA-ARTICULAR; INTRALESIONAL; INTRAMUSCULAR; INTRAVENOUS; SOFT TISSUE EVERY 6 HOURS
Status: DISCONTINUED | OUTPATIENT
Start: 2021-01-01 | End: 2021-12-05 | Stop reason: HOSPADM

## 2021-01-01 RX ORDER — DIPHENHYDRAMINE HYDROCHLORIDE 50 MG/ML
50 INJECTION, SOLUTION INTRAMUSCULAR; INTRAVENOUS AS NEEDED
Status: CANCELLED
Start: 2021-12-13

## 2021-01-01 RX ORDER — HYDROMORPHONE HYDROCHLORIDE 1 MG/ML
1 INJECTION, SOLUTION INTRAMUSCULAR; INTRAVENOUS; SUBCUTANEOUS ONCE
Status: COMPLETED | OUTPATIENT
Start: 2021-01-01 | End: 2021-01-01

## 2021-01-01 RX ORDER — PROMETHAZINE HYDROCHLORIDE 25 MG/1
25 TABLET ORAL
Status: COMPLETED | OUTPATIENT
Start: 2021-01-01 | End: 2021-01-01

## 2021-01-01 RX ORDER — ONDANSETRON 2 MG/ML
8 INJECTION INTRAMUSCULAR; INTRAVENOUS AS NEEDED
Status: CANCELLED | OUTPATIENT
Start: 2021-12-13

## 2021-01-01 RX ORDER — SODIUM CHLORIDE 0.9 % (FLUSH) 0.9 %
5-40 SYRINGE (ML) INJECTION EVERY 8 HOURS
Status: DISCONTINUED | OUTPATIENT
Start: 2021-01-01 | End: 2021-12-05 | Stop reason: HOSPADM

## 2021-01-01 RX ORDER — LORAZEPAM 2 MG/ML
1 INJECTION INTRAMUSCULAR ONCE
Status: CANCELLED
Start: 2021-12-09 | End: 2021-12-09

## 2021-01-01 RX ORDER — ALBUTEROL SULFATE 0.83 MG/ML
2.5 SOLUTION RESPIRATORY (INHALATION) AS NEEDED
Status: CANCELLED
Start: 2021-12-06

## 2021-01-01 RX ORDER — ACETAMINOPHEN 325 MG/1
650 TABLET ORAL AS NEEDED
Status: CANCELLED
Start: 2021-12-09

## 2021-01-01 RX ORDER — POTASSIUM CHLORIDE 1500 MG/1
20 TABLET, FILM COATED, EXTENDED RELEASE ORAL 2 TIMES DAILY
COMMUNITY

## 2021-01-01 RX ADMIN — SODIUM CHLORIDE 100 ML/HR: 9 INJECTION, SOLUTION INTRAVENOUS at 00:14

## 2021-01-01 RX ADMIN — ONDANSETRON 8 MG: 4 TABLET, ORALLY DISINTEGRATING ORAL at 05:50

## 2021-01-01 RX ADMIN — HALOPERIDOL LACTATE 3 MG: 5 INJECTION, SOLUTION INTRAMUSCULAR at 19:07

## 2021-01-01 RX ADMIN — GADOTERATE MEGLUMINE 16 ML: 376.9 INJECTION, SOLUTION INTRAVENOUS at 07:50

## 2021-01-01 RX ADMIN — LIDOCAINE HYDROCHLORIDE 8 ML: 10 INJECTION, SOLUTION EPIDURAL; INFILTRATION; INTRACAUDAL; PERINEURAL at 12:00

## 2021-01-01 RX ADMIN — OXYCODONE 10 MG: 5 TABLET ORAL at 21:57

## 2021-01-01 RX ADMIN — Medication 10 ML: at 21:00

## 2021-01-01 RX ADMIN — Medication 10 ML: at 14:00

## 2021-01-01 RX ADMIN — DEXAMETHASONE SODIUM PHOSPHATE 4 MG: 4 INJECTION, SOLUTION INTRAMUSCULAR; INTRAVENOUS at 05:07

## 2021-01-01 RX ADMIN — DEXAMETHASONE SODIUM PHOSPHATE 4 MG: 4 INJECTION, SOLUTION INTRAMUSCULAR; INTRAVENOUS at 12:40

## 2021-01-01 RX ADMIN — OXYCODONE 5 MG: 5 TABLET ORAL at 06:51

## 2021-01-01 RX ADMIN — HYDROMORPHONE HYDROCHLORIDE 0.5 MG: 1 INJECTION, SOLUTION INTRAMUSCULAR; INTRAVENOUS; SUBCUTANEOUS at 15:47

## 2021-01-01 RX ADMIN — LABETALOL HYDROCHLORIDE 300 MG: 200 TABLET, FILM COATED ORAL at 17:22

## 2021-01-01 RX ADMIN — ONDANSETRON 4 MG: 2 INJECTION INTRAMUSCULAR; INTRAVENOUS at 14:41

## 2021-01-01 RX ADMIN — FENTANYL CITRATE 25 MCG: 50 INJECTION INTRAMUSCULAR; INTRAVENOUS at 09:58

## 2021-01-01 RX ADMIN — DEXAMETHASONE SODIUM PHOSPHATE 4 MG: 4 INJECTION, SOLUTION INTRAMUSCULAR; INTRAVENOUS at 17:22

## 2021-01-01 RX ADMIN — METOCLOPRAMIDE 10 MG: 5 INJECTION, SOLUTION INTRAMUSCULAR; INTRAVENOUS at 02:17

## 2021-01-01 RX ADMIN — HYDROMORPHONE HYDROCHLORIDE 1 MG: 1 INJECTION, SOLUTION INTRAMUSCULAR; INTRAVENOUS; SUBCUTANEOUS at 02:17

## 2021-01-01 RX ADMIN — SODIUM CHLORIDE 100 ML/HR: 9 INJECTION, SOLUTION INTRAVENOUS at 18:35

## 2021-01-01 RX ADMIN — OXYCODONE 10 MG: 5 TABLET ORAL at 12:29

## 2021-01-01 RX ADMIN — MEMANTINE HYDROCHLORIDE 5 MG: 10 TABLET ORAL at 08:14

## 2021-01-01 RX ADMIN — DEXAMETHASONE SODIUM PHOSPHATE 4 MG: 4 INJECTION, SOLUTION INTRAMUSCULAR; INTRAVENOUS at 12:52

## 2021-01-01 RX ADMIN — ONDANSETRON 4 MG: 2 INJECTION INTRAMUSCULAR; INTRAVENOUS at 21:48

## 2021-01-01 RX ADMIN — HYDROMORPHONE HYDROCHLORIDE 0.5 MG: 1 INJECTION, SOLUTION INTRAMUSCULAR; INTRAVENOUS; SUBCUTANEOUS at 23:23

## 2021-01-01 RX ADMIN — POTASSIUM BICARBONATE 40 MEQ: 782 TABLET, EFFERVESCENT ORAL at 02:02

## 2021-01-01 RX ADMIN — HALOPERIDOL 2 MG: 1 TABLET ORAL at 12:29

## 2021-01-01 RX ADMIN — MEMANTINE 5 MG: 10 TABLET ORAL at 17:22

## 2021-01-01 RX ADMIN — Medication 10 ML: at 21:48

## 2021-01-01 RX ADMIN — SODIUM CHLORIDE 100 ML/HR: 9 INJECTION, SOLUTION INTRAVENOUS at 00:35

## 2021-01-01 RX ADMIN — DEXAMETHASONE 4 MG: 4 TABLET ORAL at 13:30

## 2021-01-01 RX ADMIN — HALOPERIDOL LACTATE 3 MG: 5 INJECTION, SOLUTION INTRAMUSCULAR at 12:51

## 2021-01-01 RX ADMIN — ONDANSETRON 4 MG: 2 INJECTION INTRAMUSCULAR; INTRAVENOUS at 06:53

## 2021-01-01 RX ADMIN — PROMETHAZINE HYDROCHLORIDE 25 MG: 25 TABLET ORAL at 03:30

## 2021-01-01 RX ADMIN — Medication 10 ML: at 05:07

## 2021-01-01 RX ADMIN — MORPHINE SULFATE 2 MG: 2 INJECTION, SOLUTION INTRAMUSCULAR; INTRAVENOUS at 19:32

## 2021-01-01 RX ADMIN — HYDRALAZINE HYDROCHLORIDE 20 MG: 20 INJECTION INTRAMUSCULAR; INTRAVENOUS at 12:20

## 2021-01-01 RX ADMIN — OXYCODONE 10 MG: 5 TABLET ORAL at 23:05

## 2021-01-01 RX ADMIN — LABETALOL HYDROCHLORIDE 200 MG: 200 TABLET, FILM COATED ORAL at 17:45

## 2021-01-01 RX ADMIN — OXYCODONE 10 MG: 5 TABLET ORAL at 12:21

## 2021-01-01 RX ADMIN — ONDANSETRON 4 MG: 2 INJECTION INTRAMUSCULAR; INTRAVENOUS at 22:17

## 2021-01-01 RX ADMIN — ONDANSETRON 8 MG: 4 TABLET, ORALLY DISINTEGRATING ORAL at 05:45

## 2021-01-01 RX ADMIN — OXYCODONE 10 MG: 5 TABLET ORAL at 18:18

## 2021-01-01 RX ADMIN — POTASSIUM CHLORIDE 10 MEQ: 7.45 INJECTION INTRAVENOUS at 04:02

## 2021-01-01 RX ADMIN — IOPAMIDOL 100 ML: 755 INJECTION, SOLUTION INTRAVENOUS at 08:04

## 2021-01-01 RX ADMIN — SODIUM CHLORIDE, POTASSIUM CHLORIDE, SODIUM LACTATE AND CALCIUM CHLORIDE 125 ML/HR: 600; 310; 30; 20 INJECTION, SOLUTION INTRAVENOUS at 04:02

## 2021-01-01 RX ADMIN — MEMANTINE 5 MG: 10 TABLET ORAL at 11:02

## 2021-01-01 RX ADMIN — HYDROMORPHONE HYDROCHLORIDE 0.5 MG: 1 INJECTION, SOLUTION INTRAMUSCULAR; INTRAVENOUS; SUBCUTANEOUS at 10:22

## 2021-01-01 RX ADMIN — LORAZEPAM 1 MG: 1 TABLET ORAL at 08:29

## 2021-01-01 RX ADMIN — POTASSIUM CHLORIDE 10 MEQ: 7.45 INJECTION INTRAVENOUS at 05:15

## 2021-01-01 RX ADMIN — DEXAMETHASONE SODIUM PHOSPHATE 4 MG: 4 INJECTION, SOLUTION INTRAMUSCULAR; INTRAVENOUS at 17:47

## 2021-01-01 RX ADMIN — IOPAMIDOL 60 ML: 755 INJECTION, SOLUTION INTRAVENOUS at 11:01

## 2021-01-01 RX ADMIN — ONDANSETRON 4 MG: 2 INJECTION INTRAMUSCULAR; INTRAVENOUS at 15:18

## 2021-01-01 RX ADMIN — ONDANSETRON 4 MG: 2 INJECTION INTRAMUSCULAR; INTRAVENOUS at 14:28

## 2021-01-01 RX ADMIN — HYDRALAZINE HYDROCHLORIDE 20 MG: 20 INJECTION INTRAMUSCULAR; INTRAVENOUS at 00:31

## 2021-01-01 RX ADMIN — HYDROMORPHONE HYDROCHLORIDE 0.5 MG: 1 INJECTION, SOLUTION INTRAMUSCULAR; INTRAVENOUS; SUBCUTANEOUS at 12:51

## 2021-01-01 RX ADMIN — POTASSIUM CHLORIDE 10 MEQ: 7.45 INJECTION INTRAVENOUS at 06:47

## 2021-01-01 RX ADMIN — Medication 1200 MG: at 11:18

## 2021-01-01 RX ADMIN — ONDANSETRON 4 MG: 2 INJECTION INTRAMUSCULAR; INTRAVENOUS at 21:03

## 2021-01-01 RX ADMIN — HYDROMORPHONE HYDROCHLORIDE 0.5 MG: 1 INJECTION, SOLUTION INTRAMUSCULAR; INTRAVENOUS; SUBCUTANEOUS at 13:33

## 2021-01-01 RX ADMIN — OXYCODONE 10 MG: 5 TABLET ORAL at 05:45

## 2021-01-01 RX ADMIN — PROCHLORPERAZINE MALEATE 5 MG: 5 TABLET ORAL at 12:04

## 2021-01-01 RX ADMIN — LORAZEPAM 1 MG: 1 TABLET ORAL at 12:20

## 2021-01-01 RX ADMIN — Medication 10 ML: at 21:44

## 2021-01-01 RX ADMIN — HYDRALAZINE HYDROCHLORIDE 20 MG: 20 INJECTION INTRAMUSCULAR; INTRAVENOUS at 10:35

## 2021-01-01 RX ADMIN — OXYCODONE 10 MG: 5 TABLET ORAL at 23:47

## 2021-01-01 RX ADMIN — DEXAMETHASONE SODIUM PHOSPHATE 4 MG: 4 INJECTION, SOLUTION INTRAMUSCULAR; INTRAVENOUS at 00:14

## 2021-01-01 RX ADMIN — ONDANSETRON 8 MG: 4 TABLET, ORALLY DISINTEGRATING ORAL at 13:30

## 2021-01-01 RX ADMIN — DEXAMETHASONE SODIUM PHOSPHATE 4 MG: 4 INJECTION, SOLUTION INTRAMUSCULAR; INTRAVENOUS at 18:18

## 2021-01-01 RX ADMIN — SODIUM CHLORIDE 25 ML/HR: 9 INJECTION, SOLUTION INTRAVENOUS at 12:34

## 2021-01-01 RX ADMIN — SODIUM CHLORIDE 100 ML/HR: 9 INJECTION, SOLUTION INTRAVENOUS at 12:53

## 2021-01-01 RX ADMIN — HYDROMORPHONE HYDROCHLORIDE 0.5 MG: 1 INJECTION, SOLUTION INTRAMUSCULAR; INTRAVENOUS; SUBCUTANEOUS at 06:29

## 2021-01-01 RX ADMIN — SODIUM CHLORIDE 100 ML/HR: 9 INJECTION, SOLUTION INTRAVENOUS at 11:30

## 2021-01-01 RX ADMIN — Medication 10 ML: at 21:40

## 2021-01-01 RX ADMIN — MEMANTINE 5 MG: 10 TABLET ORAL at 09:58

## 2021-01-01 RX ADMIN — Medication 10 ML: at 14:28

## 2021-01-01 RX ADMIN — HALOPERIDOL LACTATE 3 MG: 5 INJECTION, SOLUTION INTRAMUSCULAR at 18:30

## 2021-01-01 RX ADMIN — OXYCODONE 10 MG: 5 TABLET ORAL at 04:43

## 2021-01-01 RX ADMIN — Medication 10 ML: at 21:50

## 2021-01-01 RX ADMIN — LABETALOL HYDROCHLORIDE 200 MG: 200 TABLET, FILM COATED ORAL at 09:58

## 2021-01-01 RX ADMIN — SODIUM CHLORIDE 10 ML: 9 INJECTION INTRAMUSCULAR; INTRAVENOUS; SUBCUTANEOUS at 13:13

## 2021-01-01 RX ADMIN — Medication 10 ML: at 05:56

## 2021-01-01 RX ADMIN — MORPHINE SULFATE 2 MG: 2 INJECTION, SOLUTION INTRAMUSCULAR; INTRAVENOUS at 00:11

## 2021-01-01 RX ADMIN — ONDANSETRON 4 MG: 2 INJECTION INTRAMUSCULAR; INTRAVENOUS at 05:28

## 2021-01-01 RX ADMIN — DEXAMETHASONE SODIUM PHOSPHATE 4 MG: 4 INJECTION, SOLUTION INTRAMUSCULAR; INTRAVENOUS at 16:24

## 2021-01-01 RX ADMIN — ONDANSETRON 4 MG: 2 INJECTION INTRAMUSCULAR; INTRAVENOUS at 14:27

## 2021-01-01 RX ADMIN — DEXAMETHASONE SODIUM PHOSPHATE 4 MG: 4 INJECTION, SOLUTION INTRAMUSCULAR; INTRAVENOUS at 12:20

## 2021-01-01 RX ADMIN — HYDROMORPHONE HYDROCHLORIDE 0.5 MG: 1 INJECTION, SOLUTION INTRAMUSCULAR; INTRAVENOUS; SUBCUTANEOUS at 09:58

## 2021-01-01 RX ADMIN — Medication 10 ML: at 13:31

## 2021-01-01 RX ADMIN — DEXAMETHASONE SODIUM PHOSPHATE 4 MG: 4 INJECTION, SOLUTION INTRAMUSCULAR; INTRAVENOUS at 12:38

## 2021-01-01 RX ADMIN — IOPAMIDOL 100 ML: 755 INJECTION, SOLUTION INTRAVENOUS at 18:21

## 2021-01-01 RX ADMIN — DEXAMETHASONE SODIUM PHOSPHATE 4 MG: 4 INJECTION, SOLUTION INTRAMUSCULAR; INTRAVENOUS at 06:52

## 2021-01-01 RX ADMIN — Medication 10 ML: at 05:17

## 2021-01-01 RX ADMIN — SODIUM CHLORIDE 100 ML/HR: 9 INJECTION, SOLUTION INTRAVENOUS at 05:28

## 2021-01-01 RX ADMIN — HYDRALAZINE HYDROCHLORIDE 20 MG: 20 INJECTION INTRAMUSCULAR; INTRAVENOUS at 11:10

## 2021-01-01 RX ADMIN — GADOTERATE MEGLUMINE 17 ML: 376.9 INJECTION, SOLUTION INTRAVENOUS at 13:27

## 2021-01-01 RX ADMIN — HYDRALAZINE HYDROCHLORIDE 10 MG: 20 INJECTION INTRAMUSCULAR; INTRAVENOUS at 16:45

## 2021-01-01 RX ADMIN — DEXAMETHASONE 4 MG: 4 TABLET ORAL at 12:21

## 2021-01-01 RX ADMIN — FENTANYL CITRATE 25 MCG: 50 INJECTION INTRAMUSCULAR; INTRAVENOUS at 10:30

## 2021-01-01 RX ADMIN — Medication 10 ML: at 05:45

## 2021-01-01 RX ADMIN — DEXAMETHASONE 4 MG: 4 TABLET ORAL at 05:45

## 2021-01-01 RX ADMIN — OXYCODONE 10 MG: 5 TABLET ORAL at 17:22

## 2021-01-01 RX ADMIN — Medication 1200 MG: at 12:45

## 2021-01-01 RX ADMIN — HYDROMORPHONE HYDROCHLORIDE 0.5 MG: 1 INJECTION, SOLUTION INTRAMUSCULAR; INTRAVENOUS; SUBCUTANEOUS at 19:07

## 2021-01-01 RX ADMIN — MIDAZOLAM 1 MG: 1 INJECTION INTRAMUSCULAR; INTRAVENOUS at 10:07

## 2021-01-01 RX ADMIN — DEXAMETHASONE SODIUM PHOSPHATE 4 MG: 4 INJECTION, SOLUTION INTRAMUSCULAR; INTRAVENOUS at 17:45

## 2021-01-01 RX ADMIN — OXYCODONE 10 MG: 5 TABLET ORAL at 17:45

## 2021-01-01 RX ADMIN — DEXAMETHASONE 4 MG: 4 TABLET ORAL at 00:09

## 2021-01-01 RX ADMIN — HYDROMORPHONE HYDROCHLORIDE 0.5 MG: 1 INJECTION, SOLUTION INTRAMUSCULAR; INTRAVENOUS; SUBCUTANEOUS at 21:38

## 2021-01-01 RX ADMIN — DEXAMETHASONE 4 MG: 4 TABLET ORAL at 23:01

## 2021-01-01 RX ADMIN — FENTANYL CITRATE 25 MCG: 50 INJECTION INTRAMUSCULAR; INTRAVENOUS at 10:07

## 2021-01-01 RX ADMIN — OXYCODONE 10 MG: 5 TABLET ORAL at 05:16

## 2021-01-01 RX ADMIN — DEXAMETHASONE SODIUM PHOSPHATE 4 MG: 4 INJECTION, SOLUTION INTRAMUSCULAR; INTRAVENOUS at 10:22

## 2021-01-01 RX ADMIN — Medication 10 ML: at 16:46

## 2021-01-01 RX ADMIN — MEMANTINE HYDROCHLORIDE 5 MG: 10 TABLET ORAL at 09:59

## 2021-01-01 RX ADMIN — MORPHINE SULFATE 2 MG: 2 INJECTION, SOLUTION INTRAMUSCULAR; INTRAVENOUS at 09:59

## 2021-01-01 RX ADMIN — HYDRALAZINE HYDROCHLORIDE 20 MG: 20 INJECTION INTRAMUSCULAR; INTRAVENOUS at 12:05

## 2021-01-01 RX ADMIN — MEMANTINE 5 MG: 10 TABLET ORAL at 17:45

## 2021-01-01 RX ADMIN — MIDAZOLAM 1 MG: 1 INJECTION INTRAMUSCULAR; INTRAVENOUS at 09:58

## 2021-01-01 RX ADMIN — HALOPERIDOL 2 MG: 1 TABLET ORAL at 09:58

## 2021-01-01 RX ADMIN — ONDANSETRON 4 MG: 2 INJECTION INTRAMUSCULAR; INTRAVENOUS at 16:46

## 2021-01-01 RX ADMIN — SODIUM CHLORIDE 25 ML/HR: 900 INJECTION, SOLUTION INTRAVENOUS at 12:14

## 2021-01-01 RX ADMIN — DEXAMETHASONE SODIUM PHOSPHATE 4 MG: 4 INJECTION, SOLUTION INTRAMUSCULAR; INTRAVENOUS at 23:47

## 2021-01-01 RX ADMIN — HYDROCODONE BITARTRATE AND ACETAMINOPHEN 1 TABLET: 5; 325 TABLET ORAL at 03:30

## 2021-01-01 RX ADMIN — MEMANTINE HYDROCHLORIDE 5 MG: 10 TABLET ORAL at 18:30

## 2021-01-01 RX ADMIN — MIDAZOLAM 1 MG: 1 INJECTION INTRAMUSCULAR; INTRAVENOUS at 09:55

## 2021-01-01 RX ADMIN — HYDROMORPHONE HYDROCHLORIDE 0.5 MG: 1 INJECTION, SOLUTION INTRAMUSCULAR; INTRAVENOUS; SUBCUTANEOUS at 06:53

## 2021-01-01 RX ADMIN — LABETALOL HYDROCHLORIDE 200 MG: 200 TABLET, FILM COATED ORAL at 09:08

## 2021-01-01 RX ADMIN — FENTANYL CITRATE 25 MCG: 50 INJECTION INTRAMUSCULAR; INTRAVENOUS at 09:55

## 2021-01-01 RX ADMIN — SODIUM CHLORIDE 1000 ML: 9 INJECTION, SOLUTION INTRAVENOUS at 02:17

## 2021-01-01 RX ADMIN — SODIUM CHLORIDE 500 ML: 9 INJECTION, SOLUTION INTRAVENOUS at 22:17

## 2021-01-01 RX ADMIN — ONDANSETRON 4 MG: 2 INJECTION INTRAMUSCULAR; INTRAVENOUS at 21:38

## 2021-01-01 RX ADMIN — MEMANTINE HYDROCHLORIDE 5 MG: 10 TABLET ORAL at 09:08

## 2021-01-01 RX ADMIN — ONDANSETRON 4 MG: 2 INJECTION INTRAMUSCULAR; INTRAVENOUS at 21:49

## 2021-01-01 RX ADMIN — LIDOCAINE HYDROCHLORIDE 5 ML: 10 INJECTION, SOLUTION EPIDURAL; INFILTRATION; INTRACAUDAL; PERINEURAL at 12:01

## 2021-01-01 RX ADMIN — ONDANSETRON 4 MG: 2 INJECTION INTRAMUSCULAR; INTRAVENOUS at 04:18

## 2021-01-01 RX ADMIN — HYDRALAZINE HYDROCHLORIDE 20 MG: 20 INJECTION INTRAMUSCULAR; INTRAVENOUS at 19:03

## 2021-01-01 RX ADMIN — DEXAMETHASONE SODIUM PHOSPHATE 4 MG: 4 INJECTION, SOLUTION INTRAMUSCULAR; INTRAVENOUS at 05:28

## 2021-01-01 RX ADMIN — DEXAMETHASONE 4 MG: 4 TABLET ORAL at 18:12

## 2021-01-01 RX ADMIN — DEXAMETHASONE 4 MG: 4 TABLET ORAL at 06:03

## 2021-01-01 RX ADMIN — ONDANSETRON 4 MG: 2 INJECTION INTRAMUSCULAR; INTRAVENOUS at 05:07

## 2021-01-01 RX ADMIN — ONDANSETRON 4 MG: 2 INJECTION INTRAMUSCULAR; INTRAVENOUS at 21:39

## 2021-01-01 RX ADMIN — DEXAMETHASONE SODIUM PHOSPHATE 4 MG: 4 INJECTION, SOLUTION INTRAMUSCULAR; INTRAVENOUS at 23:05

## 2021-01-01 RX ADMIN — Medication 10 ML: at 21:03

## 2021-01-01 RX ADMIN — Medication 10 ML: at 05:28

## 2021-01-01 RX ADMIN — SODIUM CHLORIDE 100 ML/HR: 9 INJECTION, SOLUTION INTRAVENOUS at 05:15

## 2021-01-01 RX ADMIN — DEXAMETHASONE SODIUM PHOSPHATE 4 MG: 4 INJECTION, SOLUTION INTRAMUSCULAR; INTRAVENOUS at 21:03

## 2021-01-01 RX ADMIN — SODIUM CHLORIDE 25 ML/HR: 900 INJECTION, SOLUTION INTRAVENOUS at 12:48

## 2021-01-01 RX ADMIN — LABETALOL HYDROCHLORIDE 200 MG: 200 TABLET, FILM COATED ORAL at 18:18

## 2021-01-01 RX ADMIN — OXYCODONE 10 MG: 5 TABLET ORAL at 05:07

## 2021-01-01 RX ADMIN — Medication 1200 MG: at 12:21

## 2021-01-01 RX ADMIN — MORPHINE SULFATE 2 MG: 2 INJECTION, SOLUTION INTRAMUSCULAR; INTRAVENOUS at 06:08

## 2021-01-01 RX ADMIN — Medication 10 ML: at 06:03

## 2021-01-01 RX ADMIN — DEXAMETHASONE SODIUM PHOSPHATE 4 MG: 4 INJECTION, SOLUTION INTRAMUSCULAR; INTRAVENOUS at 05:16

## 2021-01-01 RX ADMIN — Medication 1200 MG: at 11:55

## 2021-01-01 RX ADMIN — Medication 10 ML: at 14:41

## 2021-01-01 RX ADMIN — DEXAMETHASONE 4 MG: 4 TABLET ORAL at 05:50

## 2021-01-01 RX ADMIN — Medication 500 UNITS: at 13:13

## 2021-01-01 RX ADMIN — ONDANSETRON 4 MG: 2 INJECTION INTRAMUSCULAR; INTRAVENOUS at 21:00

## 2021-01-01 RX ADMIN — Medication 1200 MG: at 12:56

## 2021-01-01 RX ADMIN — MORPHINE SULFATE 2 MG: 2 INJECTION, SOLUTION INTRAMUSCULAR; INTRAVENOUS at 15:27

## 2021-01-01 RX ADMIN — SODIUM CHLORIDE 100 ML/HR: 9 INJECTION, SOLUTION INTRAVENOUS at 09:03

## 2021-01-01 RX ADMIN — Medication 1200 MG: at 12:39

## 2021-01-01 RX ADMIN — ONDANSETRON 4 MG: 2 INJECTION INTRAMUSCULAR; INTRAVENOUS at 05:16

## 2021-01-01 RX ADMIN — ACETAMINOPHEN 650 MG: 325 TABLET ORAL at 05:33

## 2021-01-01 RX ADMIN — DEXAMETHASONE 4 MG: 4 TABLET ORAL at 18:30

## 2021-01-01 RX ADMIN — MIDAZOLAM 1 MG: 1 INJECTION INTRAMUSCULAR; INTRAVENOUS at 10:30

## 2021-01-01 RX ADMIN — HYDROMORPHONE HYDROCHLORIDE 0.5 MG: 1 INJECTION, SOLUTION INTRAMUSCULAR; INTRAVENOUS; SUBCUTANEOUS at 01:51

## 2021-01-01 RX ADMIN — HALOPERIDOL LACTATE 3 MG: 5 INJECTION, SOLUTION INTRAMUSCULAR at 14:28
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
## 2021-01-13 NOTE — PROGRESS NOTES
Pt in for labs and follow up visit today per protocol with Dr. Rajesh Valdez RN.  This is Z97E1 UF treatment. Regulo Rivas reports the following adverse events at this time: gr1 eye dryness, gr1 dermatitis radiation. Vital signs are stable.  Patient to go to infusion center after appointment to receive atezo/placebo.  Next appt is scheduled for 2/2/21.

## 2021-02-02 NOTE — PROGRESS NOTES
[facility] Progress Note Date: 2021 Name: Cm Adams MRN: [id number] : 1965 Ms. Peyman Penaloza Arrived ambulatory and in no distress for C5D1 of Clinical trial: NSABP B-59 Adjuvant Therapy:Atezolizumab/Placebo Regimen. Assessment was completed, no acute issues at this time, no new complaints voiced. Right chest wall port accessed without difficulty by Flip ferguson RN, labs drawn & sent for processing. Chemotherapy Flowsheet 2021 Cycle C5 Date 2021 Drug / Regimen -  
Pre Meds -  
Notes - Patient proceed to appointment with Dr. Chris Noel. Ms. [place] Downing vitals were reviewed. Visit Vitals /72 Pulse 80 Temp 96.8 °F (36 °C) Resp 18 Ht 5' 3\" (1.6 m) Wt 91.4 kg (201 lb 8 oz) SpO2 98% BMI 35.69 kg/m² Lab results were obtained and reviewed. Recent Results (from the past 12 hour(s)) METABOLIC PANEL, COMPREHENSIVE Collection Time: 21 10:23 AM  
Result Value Ref Range Sodium 140 136 - 145 mmol/L Potassium 3.6 3.5 - 5.1 mmol/L Chloride 107 97 - 108 mmol/L  
 CO2 28 21 - 32 mmol/L Anion gap 5 5 - 15 mmol/L Glucose 88 65 - 100 mg/dL BUN 14 6 - 20 MG/DL Creatinine 0.88 0.55 - 1.02 MG/DL  
 BUN/Creatinine ratio 16 12 - 20 GFR est AA >60 >60 ml/min/1.73m2 GFR est non-AA >60 >60 ml/min/1.73m2 Calcium 9.0 8.5 - 10.1 MG/DL Bilirubin, total 0.5 0.2 - 1.0 MG/DL  
 ALT (SGPT) 40 12 - 78 U/L  
 AST (SGOT) 26 15 - 37 U/L Alk. phosphatase 94 45 - 117 U/L Protein, total 7.5 6.4 - 8.2 g/dL Albumin 3.6 3.5 - 5.0 g/dL Globulin 3.9 2.0 - 4.0 g/dL A-G Ratio 0.9 (L) 1.1 - 2.2    
CBC WITH AUTOMATED DIFF Collection Time: 21 10:23 AM  
Result Value Ref Range WBC 4.3 3.6 - 11.0 K/uL  
 RBC 4.18 3.80 - 5.20 M/uL  
 HGB 12.2 11.5 - 16.0 g/dL HCT 38.0 35.0 - 47.0 % MCV 90.9 80.0 - 99.0 FL  
 MCH 29.2 26.0 - 34.0 PG  
 MCHC 32.1 30.0 - 36.5 g/dL  
 RDW 14.6 (H) 11.5 - 14.5 % PLATELET 981 190 - 752 K/uL MPV 9.3 8.9 - 12.9 FL  
 NRBC 0.0 0  WBC ABSOLUTE NRBC 0.00 0.00 - 0.01 K/uL NEUTROPHILS 76 (H) 32 - 75 % LYMPHOCYTES 11 (L) 12 - 49 % MONOCYTES 11 5 - 13 % EOSINOPHILS 2 0 - 7 % BASOPHILS 0 0 - 1 % IMMATURE GRANULOCYTES 0 0.0 - 0.5 % ABS. NEUTROPHILS 3.2 1.8 - 8.0 K/UL  
 ABS. LYMPHOCYTES 0.5 (L) 0.8 - 3.5 K/UL  
 ABS. MONOCYTES 0.5 0.0 - 1.0 K/UL  
 ABS. EOSINOPHILS 0.1 0.0 - 0.4 K/UL  
 ABS. BASOPHILS 0.0 0.0 - 0.1 K/UL  
 ABS. IMM. GRANS. 0.0 0.00 - 0.04 K/UL  
 DF SMEAR SCANNED    
 RBC COMMENTS NORMOCYTIC, NORMOCHROMIC    
TSH 3RD GENERATION Collection Time: 02/02/21 10:23 AM  
Result Value Ref Range TSH 2.14 0.36 - 3.74 uIU/mL Medications: 
Medications Administered INV NSABP B-59 atezolizumab / placebo 1,200 mg in 0.9% sodium chloride 250 mL, overfill volume 25 mL INVESTIGATIONAL IVPB Admin Date 
02/02/2021 Action Given Dose 
1,200 mg Rate 
590 mL/hr Route IntraVENous Administered By Ayaan Doss RN  
  
  
  
 
 
Ms. Edilia Bob tolerated treatment well and was discharged from [place] [address] in stable condition . Port de-accessed, flushed & heparinized per protocol. She is to return on 2/23/21 for her next appointment. George Sheikh RN February 2, 2021

## 2021-02-02 NOTE — PROGRESS NOTES
[facility] [place] [facility] [place] [facility] [facility], [address] W: [phone]  F: [phone] Reason for Visit:  
Selene Gottron is a 48 y.o. female who is seen in consultation at the request of Dr. Zohaib Keller for evaluation of therapy for breast cancer Rad onc:  Dr. Hayley Luis Treatment History: · 20 left breast 3:00, 13cmfn, core bx:  IMC, gr 2-3, 1.3 cm, ER negative, TX negative, HER 2 negative at IHC 0; ki67 68%; LN + by core, 6 mm, gr 2-3 · B-59 Carbo/Taxol +/- atezolizumab 2020-2020 · [facility] 2020: negative · B-59 DDAC 2020-2020 
· 10/8/2020 Bilateral mastectomy:  Right breast: benign, left breast: pCR, 0/10 LNs, ypT0 ypN0 cM0 · B59 atezolizumab/placebo 20- 
· XRT 2020-21 History of Present Illness:  
19 mammogram was negative, she felt the L breast mass herself, earlier in 2020. Interval history: In today for follow up. Complains of gr 1 hot flashes, gr 2 insomnia, gr 1 concentration, gr 1 anxiety. FH:  Mother with breast cancer at age 79; maternal aunt with breast cancer in her 45s; maternal aunt with breast cancer at age 61; no ovarian, prostate, or pancreas cancer Past Medical History:  
Diagnosis Date  ADHD  Anxiety and depression  Cancer ([facility] [address] LEFT BREAST CA  
 Ill-defined condition   
 chemotherapy  Limb alert care status NO STICKS OR BLOOD PRESSURE IN LEFT ARM  Sleep apnea MILD -NO CPAP Past Surgical History:  
Procedure Laterality Date  HX BREAST BIOPSY Left 4/15/2020 BREAST BIOPSY performed by Kelechi Rivera MD at [address] [place] [address] HX BREAST RECONSTRUCTION Bilateral 10/8/2020 BREAST RECONSTRUCTION performed by Hubert Bruce MD at \[facility]"" AMBULATORY OR  
 HX  SECTION  98, 80  
 HX GI    
 COLONOSCOPY  
 HX MASTECTOMY Bilateral 10/8/2020  LEFT BREAST MASTECTOMY WITH AXILLARY NODE DISSECTION AND RIGHT SIMPLE MASTECTOMY / IMMEDIATE BILATERAL BREAST RECONSTRUCTION WITH TISSUE EXPANDERS AND ALLODERM GRAFTS performed by Sabina Cannon MD at [facility] AMBULATORY OR  
 HX ORTHOPAEDIC    
 RIGHT ELBOW-FATTY MASS  
 HX VASCULAR ACCESS Right 04/2020 Social History Tobacco Use  Smoking status: Light Tobacco Smoker  Smokeless tobacco: Never Used  Tobacco comment: Quit April 2020 Substance Use Topics  Alcohol use: Not Currently Alcohol/week: 6.0 standard drinks Types: 6 Glasses of wine per week Comment: PER PT ON 10/1/2020 Family History Problem Relation Age of Onset  Cancer Mother Breast  
 Cancer Maternal Aunt Breast  
 Cancer Paternal Uncle  Cancer Maternal Aunt Breast  
 Cancer Paternal Uncle Current Outpatient Medications Medication Sig  
 docusate sodium (COLACE) 100 mg capsule Take 100 mg by mouth as needed for Constipation.  lisdexamfetamine dimesylate (VYVANSE PO) Take 30 mg by mouth daily.  CLONAZEPAM PO Take  by mouth as needed.  pyridoxine, vitamin B6, (VITAMIN B-6) 50 mg tablet Take 50 mg by mouth daily.  cholecalciferol, vitamin D3, (VITAMIN D3 PO) Take 5,000 Units by mouth daily.  lidocaine-prilocaine (EMLA) topical cream Apply  to affected area as needed for Pain. (Patient taking differently: Apply 1 Each to affected area as needed for Pain. Goes on port for when accessed) No current facility-administered medications for this visit. Facility-Administered Medications Ordered in Other Visits Medication Dose Route Frequency  INV NSABP B-59 atezolizumab / placebo 1,200 mg in 0.9% sodium chloride 250 mL, overfill volume 25 mL INVESTIGATIONAL IVPB  1,200 mg IntraVENous ONCE No Known Allergies Review of Systems: A complete review of systems was obtained, negative except as described above. Physical Exam:  
 
Visit Vitals /72 (BP 1 Location: Left upper arm, BP Patient Position: Sitting) Pulse 80 Temp 96.8 °F (36 °C) (Temporal) Resp 18 Ht 5' 3\" (1.6 m) Wt 201 lb (91.2 kg) SpO2 98% BMI 35.61 kg/m² ECOG PS: 0 General: No distress Eyes: Anicteric sclerae HENT: Atraumatic Neck: Supple Respiratory: Normal respiratory effort CV: No peripheral edema GI: nondistended Skin: No rashes, ecchymoses, or petechiae Psych: Alert, oriented, appropriate affect, normal judgment/insight Results:  
 
Lab Results Component Value Date/Time WBC 4.3 02/02/2021 10:23 AM  
 HGB 12.2 02/02/2021 10:23 AM  
 HCT 38.0 02/02/2021 10:23 AM  
 PLATELET 944 23/68/2940 10:23 AM  
 MCV 90.9 02/02/2021 10:23 AM  
 ABS. NEUTROPHILS PENDING 02/02/2021 10:23 AM  
 
Lab Results Component Value Date/Time Sodium 141 12/22/2020 10:27 AM  
 Potassium 3.5 12/22/2020 10:27 AM  
 Chloride 108 12/22/2020 10:27 AM  
 CO2 31 12/22/2020 10:27 AM  
 Glucose 84 12/22/2020 10:27 AM  
 BUN 12 12/22/2020 10:27 AM  
 Creatinine 0.80 12/22/2020 10:27 AM  
 GFR est AA >60 12/22/2020 10:27 AM  
 GFR est non-AA >60 12/22/2020 10:27 AM  
 Calcium 9.1 12/22/2020 10:27 AM  
 
Lab Results Component Value Date/Time Bilirubin, total 0.3 12/22/2020 10:27 AM  
 ALT (SGPT) 42 12/22/2020 10:27 AM  
 Alk. phosphatase 101 12/22/2020 10:27 AM  
 Protein, total 7.0 12/22/2020 10:27 AM  
 Albumin 3.4 (L) 12/22/2020 10:27 AM  
 Globulin 3.6 12/22/2020 10:27 AM  
 
 
3/31/20 
3D mammogram:  1.6 cm mass in L breast 
US L breast 
3:00 left breast mass 1.6 cm, multiple LN in L ax tail 4/14/2020 CT c/a/p: 
IMPRESSION: Left breast mass. No evidence for metastatic disease in the chest 
abdomen or pelvis. There are no target lesions for RECIST classification. 4/14/2020 Bone Scan: 
IMPRESSION: No evidence of bony metastatic disease. 5/4/2020 MRI breast: 
IMPRESSION: 
  
Right Breast: 1. BI-RADS Assessment Category 1: Negative. No evidence of breast carcinoma 
within the right breast. 
  
Left Breast: 1.  BI-RADS Assessment Category 6: Known biopsy proven malignancy- Appropriate 
action should be taken. Large area of malignant enhancement, occupying most of 
the middle and posterior third of the left breast upper outer and lower-outer 
quadrants, from 2:00 to 4:00. Malignancy is much larger than it had appeared 
mammographically. Greatest measurement is 7 x 4.2 x 3 cm. Contained within this 
large area of enhancement is the dominant 2.5 cm mass, which represents the 
mammographic and sonographic correlate. 2. BI-RADS Assessment Category 6: Known biopsy proven malignancy- Appropriate 
action should be taken. Pathologically proven level 1 left axillary 
lymphadenopathy, with numerous enlarged, malignant lymph nodes. There is also 
level 2 left axillary lymphadenopathy. 3. Retraction of the skin of the lateral left breast, which is thickened. However, no evidence of malignant skin invasion/involvement. 4. The malignant mass closely approximates the pectoral muscle, but there is no 
evidence of direct muscle invasion/involvement. 
  
RECOMMENDATIONS: 
Appropriate action is recommended. The patient is undergoing neoadjuvant 
chemotherapy. There is no evidence of contralateral disease. 7/30/2020 CTA chest at [facility]: Normal CTA of the chest with no pulmonary embolism or acute aortic abnormalities identified. Consider mild CHF/pulmonary edema Records reviewed and summarized above. Pathology report(s) reviewed above. Radiology report(s) reviewed above. Assessment/plan: 1. Left IMC, gr 2-3, triple negative, LN + by core:  cT1c cN1a cM0, unifocal, stage IIA both anatomic and prognostic Postmenopausal 
 
S/p neoadj chemo with B-59, pCR With multiple LN felt on exam, ordered CT c/a/p and bone scan for staging. CT and Bone scan on 4/14/2020 negative for mets. We explained to the patient that the goal of systemic adjuvant therapy is to improve the chances for cure and decrease the risk of relapse.  We explained why a patient can have microscopic cancer spread now even though physical examination, laboratory studies and imaging studies are negative for cancer. We explained that the same treatments used now as adjuvant or preventive treatments rarely if ever are curative in women who develop metastases. She has signed informed consent for B-59. TTE on 4/13/2020, EF 61%, TTE at [facility] on 7/30/2020, EF 60%. Continue B-59 on 11/11/20 with atezolizumab/placebo. XRT 12/9/2020-1/27/21. We will plan to see the patient in follow up at least once per every other cycle, or sooner if symptoms warrant. Labs with each cycle to monitor for toxicity 2. Emotional well being:  She has excellent support and is coping well with her disease. She started counseling with Demar Parker on 7/24/2020. Taking CBD gummies. 3. Genetic testing:   Discussed potential ramifications of a positive test including the potential need for bilateral mastectomies and bilateral oophorectomies and the risk then for her family members to also have a mutation. VUS discussed also. Testing performed 4/28/20 by blood, negative. 4. Breast pain:  Post surgical.  Reports discomfort due to tissue expanders. 5. Neuropathy:  Now resolved. 6. Joint pain:  Most likely due to arthritis, reports stiffness,  may try glucosamine. 7. Insomnia:  No relief with melatonin. Recommend unisom. This patient was seen in conjunction with Joslyn Lopez NP. I personally reviewed the history and all points in the assessment and plan with the patient, and performed key points on the exam.  
 
I appreciate the opportunity to participate in Ms. Fatmata Kurtz's care. Discussed covid 19 vaccine and encouraged to get I have personally seen and evaluated the patient in conjunction with Joslyn Lopez NP. I find the patient's history and physical exam are consistent with the NP's documentation. I agree with the above assessment and plan, which I have edited if needed.  
 
 
Signed By: Fatoumata Farrell MD   
 
No orders of the defined types were placed in this encounter.

## 2021-02-02 NOTE — PROGRESS NOTES
Geovanna Rome is a 48 y.o. female Follow up for the evaluation of breast cancer. 1. Have you been to the ER, urgent care clinic since your last visit? Hospitalized since your last visit? No 
 
2. Have you seen or consulted any other health care providers outside of the [address] [place] [address] since your last visit? Include any pap smears or colon screening.  No 
 Medications/Labs/EKG/Imaging Studies

## 2021-02-02 NOTE — PROGRESS NOTES
Pt in for labs and follow up visit today per protocol with Dr. Tammie Rice RN.  This is C13D1 of treatment. Иван Daily reports the following adverse events at this time: gr 1 hot flashes, gr 2 insomnia, gr 1 concentration, gr 1 anxiety. Vital signs are stable.  Patient to go to infusion center after appointment to receive atezo/placebo.  Next appt is scheduled for 2/23/21.

## 2021-02-23 NOTE — PROGRESS NOTES
[facility] Progress Note Date: 2021 Name: Bc Dorado MRN: [id number] : 1965 Ms. Clyde Gibson Arrived ambulatory and in no distress for C6D1 of Clinical Trial: NSABP B-59 Adjuvant Therapy: Atezolizumab Regimen. Assessment was completed, no acute issues at this time, no new complaints voiced. Right chest wall port accessed with 1-inch needle without difficulty, labs drawn & sent for processing. Chemotherapy Flowsheet 2021 Cycle C6D1 Date 2021 Drug / Regimen Clinicial Trial: NSABP B-59Adjuvant Therapy: Atezolizumab/Placebo Pre Meds -  
Notes -  
 
 
Ms. Kurtz's vitals were reviewed. Visit Vitals /88 Pulse 74 Temp 98.2 °F (36.8 °C) Resp 16 Ht 5' 3\" (1.6 m) Wt 91.1 kg (200 lb 12.8 oz) SpO2 99% BMI 35.57 kg/m² Medications: 
Medications Administered NSABP - B59 INV atezolizumab / placebo 1,200 mg in 0.9% sodium chloride 250 mL, overfill volume 25 mL INVESTIGATIONAL IVPB Admin Date 
2021 Action Given Dose 
1,200 mg Rate 
590 mL/hr Route IntraVENous Administered By 
Royce Gonzalez RN  
  
  
  
 
 
Ms. Clyde Gibson tolerated treatment well and was discharged from [place] [address] in stable condition. Port de-accessed, flushed & heparinized per protocol. She is to return on 3/16/21 for her next appointment. Victoria Carcamo RN 2021

## 2021-02-24 NOTE — PROGRESS NOTES
Pt in for labs and follow up visit today per protocol. This is C14D1 of treatment.  Patient reports the following adverse events at this time: gr 1 dry eyes, gr 1 joint pain. Vital signs are stable.  Patient to go to infusion center after appointment to receive atezo/placebo.  Next appt is scheduled for 3/16/21.

## 2021-03-16 NOTE — PROGRESS NOTES
[facility] Progress Note Date: 2021 Name: Bjorn Owen MRN: [id number] : 1965 Ms. Leah Ramirez Arrived ambulatory and in no distress for C7D1 of Clinical Trial: B-59 Adjuvant Therapy: Atezolizumab/Placebo Regimen. Assessment was completed, no acute issues at this time, no new complaints voiced. Right chest wall port accessed with 1\" mancera needle without difficulty, labs drawn & sent for processing. covid screening completed: 1. Do you have any symptoms of COVID-19? SOB, coughing, fever, or generally not feeling well NO 
 
2. Have you been exposed to COVID-19 recently? NO 
 
3. Have you had any recent contact with family/friend that has a pending COVID test? NO Chemotherapy Flowsheet 3/16/2021 Cycle C7D1 Date 3/16/2021 Drug / Regimen Clinical Trial: NSABP B-59 Adjuvant Therapy: Atezolizumab/Placebo Pre Meds -  
Notes given Patient proceed to appointment with Dr. Sarah Aguila. OK to treat documented by research team.  
 
Ms. Kurtz's vitals were reviewed. Patient Vitals for the past 24 hrs: 
 Temp Pulse Resp BP SpO2  
21 1340 97 °F (36.1 °C) 80 16 (!) 170/68   
21 1017 (!) 96.6 °F (35.9 °C) 76 16 128/61 100 % Lab results were obtained and reviewed. Recent Results (from the past 12 hour(s)) METABOLIC PANEL, COMPREHENSIVE Collection Time: 21 10:36 AM  
Result Value Ref Range Sodium 140 136 - 145 mmol/L Potassium 3.6 3.5 - 5.1 mmol/L Chloride 109 (H) 97 - 108 mmol/L  
 CO2 28 21 - 32 mmol/L Anion gap 3 (L) 5 - 15 mmol/L Glucose 76 65 - 100 mg/dL BUN 16 6 - 20 MG/DL Creatinine 0.81 0.55 - 1.02 MG/DL  
 BUN/Creatinine ratio 20 12 - 20 GFR est AA >60 >60 ml/min/1.73m2 GFR est non-AA >60 >60 ml/min/1.73m2 Calcium 9.2 8.5 - 10.1 MG/DL Bilirubin, total 0.4 0.2 - 1.0 MG/DL  
 ALT (SGPT) 33 12 - 78 U/L  
 AST (SGOT) 17 15 - 37 U/L Alk. phosphatase 98 45 - 117 U/L Protein, total 7.2 6.4 - 8.2 g/dL  Albumin 3.5 3.5 - 5.0 g/dL Globulin 3.7 2.0 - 4.0 g/dL A-G Ratio 0.9 (L) 1.1 - 2.2    
CBC WITH AUTOMATED DIFF Collection Time: 03/16/21 10:36 AM  
Result Value Ref Range WBC 5.4 3.6 - 11.0 K/uL  
 RBC 4.18 3.80 - 5.20 M/uL  
 HGB 12.3 11.5 - 16.0 g/dL HCT 39.9 35.0 - 47.0 % MCV 95.5 80.0 - 99.0 FL  
 MCH 29.4 26.0 - 34.0 PG  
 MCHC 30.8 30.0 - 36.5 g/dL  
 RDW 13.5 11.5 - 14.5 % PLATELET 978 212 - 608 K/uL MPV 9.6 8.9 - 12.9 FL  
 NRBC 0.0 0  WBC ABSOLUTE NRBC 0.00 0.00 - 0.01 K/uL NEUTROPHILS 73 32 - 75 % LYMPHOCYTES 13 12 - 49 % MONOCYTES 11 5 - 13 % EOSINOPHILS 2 0 - 7 % BASOPHILS 1 0 - 1 % IMMATURE GRANULOCYTES 0 0.0 - 0.5 % ABS. NEUTROPHILS 3.9 1.8 - 8.0 K/UL  
 ABS. LYMPHOCYTES 0.7 (L) 0.8 - 3.5 K/UL  
 ABS. MONOCYTES 0.6 0.0 - 1.0 K/UL  
 ABS. EOSINOPHILS 0.1 0.0 - 0.4 K/UL  
 ABS. BASOPHILS 0.1 0.0 - 0.1 K/UL  
 ABS. IMM. GRANS. 0.0 0.00 - 0.04 K/UL  
 DF SMEAR SCANNED    
 RBC COMMENTS NORMOCYTIC, NORMOCHROMIC Medications: 
Medications Administered 0.9% sodium chloride infusion Admin Date 
03/16/2021 Action New Bag Dose 25 mL/hr Rate 25 mL/hr Route IntraVENous Administered By 
[place], [place] NSABP - B59 INV atezolizumab / placebo 1,200 mg in 0.9% sodium chloride 250 mL, overfill volume 25 mL INVESTIGATIONAL IVPB Admin Date 
03/16/2021 Action Given Dose 
1,200 mg Rate 
590 mL/hr Route IntraVENous Administered By 
[place], [place] Ms. Jayleen Thomason tolerated treatment well and was discharged from [address] in stable condition at 1340. Port de-accessed, flushed & heparinized per protocol. She is to return on April 6  for her next appointment. CARLOS Caban March 16, 2021

## 2021-03-16 NOTE — PROGRESS NOTES
[facility] [place] [facility] 
[address] [facility], [address] [facility] [address] 
W: [phone]  F: [phone] 
 
 
Reason for Visit:  
Veronica Kurtz is a 55 y.o. female who is seen in consultation at the request of Dr. Acevedo for evaluation of therapy for breast cancer 
 
Rad onc:  Dr. Huerta 
 
Treatment History:  
· 20 left breast 3:00, 13cmfn, core bx:  IMC, gr 2-3, 1.3 cm, ER negative, AR negative, HER 2 negative at IHC 0; ki67 68%; LN + by core, 6 mm, gr 2-3 
· B-59 Carbo/Taxol +/- atezolizumab 2020-2020 
· Invitae 2020: negative 
· B-59 DDAC 2020-2020 
· 10/8/2020 Bilateral mastectomy:  Right breast: benign, left breast: pCR, 0/10 LNs, ypT0 ypN0 cM0 
· B59 atezolizumab/placebo 20-  
· XRT 2020-21 
 
History of Present Illness:  
19 mammogram was negative, she felt the L breast mass herself, earlier in 2020. 
 
Interval history: In today for follow up. Complains of gr 1 loss of appetite, gr 1 constipation, gr 1 fatigue, gr 2 hot flashes, gr 1 insomnia, gr 1 cognition/concentration. 
 
FH:  Mother with breast cancer at age 70; maternal aunt with breast cancer in her 40s; maternal aunt with breast cancer at age 60; no ovarian, prostate, or pancreas cancer 
 
Past Medical History:  
Diagnosis Date  
• ADHD   
• Anxiety and depression   
• Cancer (HCC)   
 LEFT BREAST CA  
• Ill-defined condition   
 chemotherapy  
• Limb alert care status   
 NO STICKS OR BLOOD PRESSURE IN LEFT ARM  
• Sleep apnea   
 MILD -NO CPAP  
  
Past Surgical History:  
Procedure Laterality Date  
• HX BREAST BIOPSY Left 4/15/2020  
 BREAST BIOPSY performed by Ann Macdonald MD at [facility] MAIN OR  
• HX BREAST RECONSTRUCTION Bilateral 10/8/2020  
 BREAST RECONSTRUCTION performed by Bill Cartagena MD at [facility] AMBULATORY OR  
• HX  SECTION  98, 99  
• HX GI    
 COLONOSCOPY  
• HX MASTECTOMY Bilateral 10/8/2020  
 LEFT BREAST MASTECTOMY WITH  AXILLARY NODE DISSECTION AND RIGHT SIMPLE MASTECTOMY / IMMEDIATE BILATERAL BREAST RECONSTRUCTION WITH TISSUE EXPANDERS AND ALLODERM GRAFTS performed by Belkys Turner MD at [facility] AMBULATORY OR  
 HX ORTHOPAEDIC    
 RIGHT ELBOW-FATTY MASS  
 HX VASCULAR ACCESS Right 04/2020 Social History Tobacco Use  Smoking status: Light Tobacco Smoker  Smokeless tobacco: Never Used  Tobacco comment: Quit April 2020 Substance Use Topics  Alcohol use: Not Currently Alcohol/week: 6.0 standard drinks Types: 6 Glasses of wine per week Comment: PER PT ON 10/1/2020 Family History Problem Relation Age of Onset  Cancer Mother Breast  
 Cancer Maternal Aunt Breast  
 Cancer Paternal Uncle  Cancer Maternal Aunt Breast  
 Cancer Paternal Uncle Current Outpatient Medications Medication Sig  
 docusate sodium (COLACE) 100 mg capsule Take 100 mg by mouth as needed for Constipation.  lisdexamfetamine dimesylate (VYVANSE PO) Take 30 mg by mouth daily.  CLONAZEPAM PO Take  by mouth as needed.  pyridoxine, vitamin B6, (VITAMIN B-6) 50 mg tablet Take 50 mg by mouth daily.  cholecalciferol, vitamin D3, (VITAMIN D3 PO) Take 5,000 Units by mouth daily.  lidocaine-prilocaine (EMLA) topical cream Apply  to affected area as needed for Pain. (Patient taking differently: Apply 1 Each to affected area as needed for Pain. Goes on port for when accessed) No current facility-administered medications for this visit. Facility-Administered Medications Ordered in Other Visits Medication Dose Route Frequency  NSABP - B59 INV atezolizumab / placebo 1,200 mg in 0.9% sodium chloride 250 mL, overfill volume 25 mL INVESTIGATIONAL IVPB  1,200 mg IntraVENous ONCE  
 0.9% sodium chloride infusion  25 mL/hr IntraVENous CONTINUOUS No Known Allergies Review of Systems: A complete review of systems was obtained, negative except as described above. Physical Exam:  
 
Visit Vitals /61 Pulse 76 Temp (!) 96.6 °F (35.9 °C) (Temporal) Resp 16 Ht 5' 3\" (1.6 m) Wt 197 lb (89.4 kg) SpO2 100% BMI 34.90 kg/m² ECOG PS: 0 General: No distress Eyes: Anicteric sclerae HENT: Atraumatic Neck: Supple Respiratory: Normal respiratory effort CV: No peripheral edema GI: nondistended Skin: No rashes, ecchymoses, or petechiae Psych: Alert, oriented, appropriate affect, normal judgment/insight Results:  
 
Lab Results Component Value Date/Time WBC 5.4 03/16/2021 10:36 AM  
 HGB 12.3 03/16/2021 10:36 AM  
 HCT 39.9 03/16/2021 10:36 AM  
 PLATELET 946 25/73/3879 10:36 AM  
 MCV 95.5 03/16/2021 10:36 AM  
 ABS. NEUTROPHILS PENDING 03/16/2021 10:36 AM  
 
Lab Results Component Value Date/Time Sodium 140 02/02/2021 10:23 AM  
 Potassium 3.6 02/02/2021 10:23 AM  
 Chloride 107 02/02/2021 10:23 AM  
 CO2 28 02/02/2021 10:23 AM  
 Glucose 88 02/02/2021 10:23 AM  
 BUN 14 02/02/2021 10:23 AM  
 Creatinine 0.88 02/02/2021 10:23 AM  
 GFR est AA >60 02/02/2021 10:23 AM  
 GFR est non-AA >60 02/02/2021 10:23 AM  
 Calcium 9.0 02/02/2021 10:23 AM  
 
Lab Results Component Value Date/Time Bilirubin, total 0.5 02/02/2021 10:23 AM  
 ALT (SGPT) 40 02/02/2021 10:23 AM  
 Alk. phosphatase 94 02/02/2021 10:23 AM  
 Protein, total 7.5 02/02/2021 10:23 AM  
 Albumin 3.6 02/02/2021 10:23 AM  
 Globulin 3.9 02/02/2021 10:23 AM  
 
 
3/31/20 
3D mammogram:  1.6 cm mass in L breast 
US L breast 
3:00 left breast mass 1.6 cm, multiple LN in L ax tail 4/14/2020 CT c/a/p: 
IMPRESSION: Left breast mass. No evidence for metastatic disease in the chest 
abdomen or pelvis. There are no target lesions for RECIST classification. 4/14/2020 Bone Scan: 
IMPRESSION: No evidence of bony metastatic disease. 5/4/2020 MRI breast: 
IMPRESSION: 
  
Right Breast: 1. BI-RADS Assessment Category 1: Negative.  No evidence of breast carcinoma 
within the right breast.   
Left Breast: 1. BI-RADS Assessment Category 6: Known biopsy proven malignancy- Appropriate 
action should be taken. Large area of malignant enhancement, occupying most of 
the middle and posterior third of the left breast upper outer and lower-outer 
quadrants, from 2:00 to 4:00. Malignancy is much larger than it had appeared 
mammographically. Greatest measurement is 7 x 4.2 x 3 cm. Contained within this 
large area of enhancement is the dominant 2.5 cm mass, which represents the 
mammographic and sonographic correlate. 2. BI-RADS Assessment Category 6: Known biopsy proven malignancy- Appropriate 
action should be taken. Pathologically proven level 1 left axillary 
lymphadenopathy, with numerous enlarged, malignant lymph nodes. There is also 
level 2 left axillary lymphadenopathy. 3. Retraction of the skin of the lateral left breast, which is thickened. However, no evidence of malignant skin invasion/involvement. 4. The malignant mass closely approximates the pectoral muscle, but there is no 
evidence of direct muscle invasion/involvement. 
  
RECOMMENDATIONS: 
Appropriate action is recommended. The patient is undergoing neoadjuvant 
chemotherapy. There is no evidence of contralateral disease. 7/30/2020 CTA chest at [address]: Normal CTA of the chest with no pulmonary embolism or acute aortic abnormalities identified. Consider mild CHF/pulmonary edema Records reviewed and summarized above. Pathology report(s) reviewed above. Radiology report(s) reviewed above. Assessment/plan: 1. Left IMC, gr 2-3, triple negative, LN + by core:  cT1c cN1a cM0, unifocal, stage IIA both anatomic and prognostic Postmenopausal 
 
S/p neoadj chemo with B-59, pCR With multiple LN felt on exam, ordered CT c/a/p and bone scan for staging. CT and Bone scan on 4/14/2020 negative for mets.   
 
We explained to the patient that the goal of systemic adjuvant therapy is to improve the chances for cure and decrease the risk of relapse. We explained why a patient can have microscopic cancer spread now even though physical examination, laboratory studies and imaging studies are negative for cancer. We explained that the same treatments used now as adjuvant or preventive treatments rarely if ever are curative in women who develop metastases. She has signed informed consent for B-59. TTE on 4/13/2020, EF 61%, TTE at [facility] on 7/30/2020, EF 60%, TTE on 10/21/20, EF 61%.; TTE on 4/19/21 scheduled Continued B-59 on 11/11/20 with atezolizumab/placebo. XRT 12/9/2020-1/27/21. Continue today, last one is in 6 weeks We will plan to see the patient in follow up at least once per every other cycle, or sooner if symptoms warrant. Labs with each cycle to monitor for toxicity 2. Emotional well being:  She has excellent support and is coping well with her disease. She started counseling with Sammi Small on 7/24/2020. Taking CBD gummies. 3. Genetic testing:   Discussed potential ramifications of a positive test including the potential need for bilateral mastectomies and bilateral oophorectomies and the risk then for her family members to also have a mutation. VUS discussed also. Testing performed 4/28/20 by blood, negative. 4. Breast pain:  Post surgical.  Reports discomfort due to tissue expanders. 5. Neuropathy:  Now resolved. 6. Joint pain:  Most likely due to arthritis, reports stiffness when sitting for long periods of time, she is considering trying glucosamine. 7. Insomnia:  No relief with melatonin. Previously recommended unisom. Reports she is sleeping better. This patient was seen in conjunction with Tyson Russell NP. I personally reviewed the history and all points in the assessment and plan with the patient, and performed key points on the exam. Triple negative breast cancer, on atezolizumab/placebo on b-59. Ok to proceed. RTC 6 weeks I appreciate the opportunity to participate in Ms.  [employer] Jer[employer]'s care. S/p covid 19 vaccine Signed By: Jareth Velásquez MD   
 
No orders of the defined types were placed in this encounter.

## 2021-03-16 NOTE — PROGRESS NOTES
Liudmila Joe is a 48 y.o. female Follow up for the evaluation of breast cancer. 1. Have you been to the ER, urgent care clinic since your last visit? Hospitalized since your last visit? No 
 
2. Have you seen or consulted any other health care providers outside of the [address] [place] [address] since your last visit? Include any pap smears or colon screening.  No

## 2021-03-16 NOTE — PROGRESS NOTES
Pt in for labs and follow up visit today per protocol. This is C15D1 of treatment.  Patient reports the following adverse events at this time: gr 1 loss of appetite, gr 1 constipation, gr 1 fatigue, gr 2 hot flashes, gr 1 insomnia, gr 1 cognition/concentration. Vital signs are stable.  Patient to go to infusion center after appointment to receive atezo/placebo.  Next appt is scheduled for 4/6/21.

## 2021-04-06 NOTE — PROGRESS NOTES
Pt in for follow up visit today per protocol. This is C16D1 of treatment.  Patient reports the following adverse events at this time: gr 1 dry eye,  gr 3 hypertension, gr 1 rash (left axillary). Vital signs are stable.  Patient to go to infusion center after appointment to receive atezo/placebo.  Next appt is scheduled for 4/27/21.

## 2021-04-06 NOTE — PROGRESS NOTES
[facility] VISIT NOTE Date: 2021 Name: Gena Luna MRN: [id number] : 1965 
 
1030 Ms. Heaven Bateman Arrived ambulatory and in no distress for C8D1 of Clinical Trial: NSABP B-59 Adjuvant Therapy: Atezolizumab/Placebo Regimen. Assessment was completed, no acute issues at this time, no new complaints voiced. Right chest wall port accessed with 1 inch without difficulty. 1. Do you have any symptoms of COVID-19? SOB, coughing, fever, or generally not feeling well NO 
 
2. Have you been exposed to COVID-19 recently? NO 
 
3. Have you had any recent contact with family/friend that has a pending COVID test? NO Chemotherapy Flowsheet 2021 Cycle C8 Date 2021 Drug / Regimen Clinical Trial: NSABP B-59 Adjuvant Therapy: Atezolizumab/Placebo Pre Meds -  
Notes given Vitals: 
Patient Vitals for the past 12 hrs: 
 Temp Pulse Resp BP SpO2  
21 1303  70 18 (!) 167/90 98 % 21 1102  70  (!) 164/90   
21 1046 (!) 96.6 °F (35.9 °C) 88 18 (!) 180/83 97 % Lab results were obtained and reviewed. No results found for this or any previous visit (from the past 12 hour(s)). Medications received: 
Medications Administered 0.9% sodium chloride infusion Admin Date 2021 Action New Bag Dose 25 mL/hr Rate 25 mL/hr Route IntraVENous Administered By Steven Martinez RN  
  
  
 NSABP - B59 INV atezolizumab / placebo 1,200 mg in 0.9% sodium chloride 250 mL, overfill volume 25 mL INVESTIGATIONAL IVPB Admin Date 2021 Action Given Dose 
1,200 mg Rate 
590 mL/hr Route IntraVENous Administered By Steven Martinez RN  
  
  
  
 
 
Ms. Heaven Bateman tolerated treatment well and was discharged from [place] [address] in stable condition at . Port de-accessed, flushed & heparinized per protocol. She is to return on  2021 at 1000 for her next appointment. Lesa Ho RN 2021 Future Appointments: 
Future Appointments Date Time Provider Dorota Salamancai 4/27/2021 10:00 AM SS INF1 CH4 <2H [facility] ST. BLOOD  
4/27/2021 10:15 AM Arnold Blanton MD ONCSF BS AMB  
5/4/2021 10:45 AM Alexus Chowdhury NP VBCS BS AMB  
5/26/2021  8:30 AM SS INF7 CH1 LAB [facility] ST. [place]  
5/26/2021  8:45 AM Arnold Blanton MD ON[facility] BS AMB

## 2021-04-27 NOTE — PROGRESS NOTES
[facility] [place] [facility] [place] [address] [address] [facility], [address] [facility] [address] [facility] W: [phone]  F: [phone] Reason for Visit:  
Aleja Toure is a 55 y.o. female who is seen in consultation at the request of Dr. Kerrie Nuno for evaluation of therapy for breast cancer Rad onc:  Dr. Shelbi Cee Treatment History: · 20 left breast 3:00, 13cmfn, core bx:  IMC, gr 2-3, 1.3 cm, ER negative, CO negative, HER 2 negative at IHC 0; ki67 68%; LN + by core, 6 mm, gr 2-3 · B-59 Carbo/Taxol +/- atezolizumab 2020-2020 · Invitae 2020: negative · B-59 DDAC 2020-2020 
· 10/8/2020 Bilateral mastectomy:  Right breast: benign, left breast: pCR, 0/10 LNs, ypT0 ypN0 cM0 · B59 atezolizumab/placebo 20- 21 · XRT 2020-21 History of Present Illness:  
19 mammogram was negative, she felt the L breast mass herself, earlier in 2020. Interval history: In today for follow up. Complains of gr 2 loss of appetite, gr 1 constipation, gr 1 fatigue, gr 2 hot flashes, gr 1 insomnia, gr 1 concentration, gr 1 sob. FH:  Mother with breast cancer at age 79; maternal aunt with breast cancer in her 45s; maternal aunt with breast cancer at age 61; no ovarian, prostate, or pancreas cancer Past Medical History:  
Diagnosis Date  ADHD  Anxiety and depression  Cancer ([facility] [address] LEFT BREAST CA  
 Ill-defined condition   
 chemotherapy  Limb alert care status NO STICKS OR BLOOD PRESSURE IN LEFT ARM  Sleep apnea MILD -NO CPAP Past Surgical History:  
Procedure Laterality Date  HX BREAST BIOPSY Left 4/15/2020 BREAST BIOPSY performed by Keshia Ramires MD at [address] [place] [address] HX BREAST RECONSTRUCTION Bilateral 10/8/2020 BREAST RECONSTRUCTION performed by Moira Bosworth, MD at [facility] AMBULATORY OR  
 HX  SECTION  98, 80  
 HX GI    
 COLONOSCOPY  
 HX MASTECTOMY Bilateral 10/8/2020  LEFT BREAST MASTECTOMY WITH AXILLARY NODE DISSECTION AND RIGHT SIMPLE MASTECTOMY / IMMEDIATE BILATERAL BREAST RECONSTRUCTION WITH TISSUE EXPANDERS AND ALLODERM GRAFTS performed by Cristela Ravi MD at [facility] AMBULATORY OR  
 HX ORTHOPAEDIC    
 RIGHT ELBOW-FATTY MASS  
 HX VASCULAR ACCESS Right 04/2020 Social History Tobacco Use  Smoking status: Light Tobacco Smoker  Smokeless tobacco: Never Used  Tobacco comment: Quit April 2020 Substance Use Topics  Alcohol use: Not Currently Alcohol/week: 6.0 standard drinks Types: 6 Glasses of wine per week Comment: PER PT ON 10/1/2020 Family History Problem Relation Age of Onset  Cancer Mother Breast  
 Cancer Maternal Aunt Breast  
 Cancer Paternal Uncle  Cancer Maternal Aunt Breast  
 Cancer Paternal Uncle Current Outpatient Medications Medication Sig  
 docusate sodium (COLACE) 100 mg capsule Take 100 mg by mouth as needed for Constipation.  lisdexamfetamine dimesylate (VYVANSE PO) Take 30 mg by mouth daily.  CLONAZEPAM PO Take  by mouth as needed.  pyridoxine, vitamin B6, (VITAMIN B-6) 50 mg tablet Take 50 mg by mouth daily.  cholecalciferol, vitamin D3, (VITAMIN D3 PO) Take 5,000 Units by mouth daily.  lidocaine-prilocaine (EMLA) topical cream Apply  to affected area as needed for Pain. (Patient taking differently: Apply 1 Each to affected area as needed for Pain. Goes on port for when accessed) No current facility-administered medications for this visit. Facility-Administered Medications Ordered in Other Visits Medication Dose Route Frequency  0.9% sodium chloride injection 10 mL  10 mL IntraVENous PRN  
 heparin (porcine) pf 500 Units  500 Units IntraVENous PRN  
 sodium chloride (NS) flush 5-10 mL  5-10 mL IntraVENous PRN  
 0.9% sodium chloride infusion  25 mL/hr IntraVENous PRN  
 NSABP - B59 INV atezolizumab / placebo 1,200 mg in 0.9% sodium chloride 250 mL, overfill volume 25 mL INVESTIGATIONAL IVPB        1,200 mg IntraVENous ONCE  
 0.9% sodium chloride infusion  25 mL/hr IntraVENous CONTINUOUS No Known Allergies Review of Systems: A complete review of systems was obtained, negative except as described above. Physical Exam:  
 
Visit Vitals /78 Pulse 66 Temp 97.8 °F (36.6 °C) (Temporal) Resp 18 Ht 5' 3\" (1.6 m) Wt 189 lb (85.7 kg) SpO2 98% BMI 33.48 kg/m² ECOG PS: 0 General: No distress Eyes: Anicteric sclerae HENT: Atraumatic Neck: Supple Respiratory: Normal respiratory effort CV: No peripheral edema GI: nondistended Skin: No rashes, ecchymoses, or petechiae Psych: Alert, oriented, appropriate affect, normal judgment/insight Results:  
 
Lab Results Component Value Date/Time WBC 5.0 04/27/2021 10:20 AM  
 HGB 12.7 04/27/2021 10:20 AM  
 HCT 39.7 04/27/2021 10:20 AM  
 PLATELET 412 10/68/3313 10:20 AM  
 MCV 93.4 04/27/2021 10:20 AM  
 ABS. NEUTROPHILS PENDING 04/27/2021 10:20 AM  
 
Lab Results Component Value Date/Time Sodium 140 03/16/2021 10:36 AM  
 Potassium 3.6 03/16/2021 10:36 AM  
 Chloride 109 (H) 03/16/2021 10:36 AM  
 CO2 28 03/16/2021 10:36 AM  
 Glucose 76 03/16/2021 10:36 AM  
 BUN 16 03/16/2021 10:36 AM  
 Creatinine 0.81 03/16/2021 10:36 AM  
 GFR est AA >60 03/16/2021 10:36 AM  
 GFR est non-AA >60 03/16/2021 10:36 AM  
 Calcium 9.2 03/16/2021 10:36 AM  
 
Lab Results Component Value Date/Time Bilirubin, total 0.4 03/16/2021 10:36 AM  
 ALT (SGPT) 33 03/16/2021 10:36 AM  
 Alk. phosphatase 98 03/16/2021 10:36 AM  
 Protein, total 7.2 03/16/2021 10:36 AM  
 Albumin 3.5 03/16/2021 10:36 AM  
 Globulin 3.7 03/16/2021 10:36 AM  
 
 
3/31/20 
3D mammogram:  1.6 cm mass in L breast 
US L breast 
3:00 left breast mass 1.6 cm, multiple LN in L ax tail 4/14/2020 CT c/a/p: 
IMPRESSION: Left breast mass. No evidence for metastatic disease in the chest 
abdomen or pelvis.  There are no target lesions for RECIST classification. 4/14/2020 Bone Scan: 
IMPRESSION: No evidence of bony metastatic disease. 5/4/2020 MRI breast: 
IMPRESSION: 
  
Right Breast: 1. BI-RADS Assessment Category 1: Negative. No evidence of breast carcinoma 
within the right breast. 
  
Left Breast: 1. BI-RADS Assessment Category 6: Known biopsy proven malignancy- Appropriate 
action should be taken. Large area of malignant enhancement, occupying most of 
the middle and posterior third of the left breast upper outer and lower-outer 
quadrants, from 2:00 to 4:00. Malignancy is much larger than it had appeared 
mammographically. Greatest measurement is 7 x 4.2 x 3 cm. Contained within this 
large area of enhancement is the dominant 2.5 cm mass, which represents the 
mammographic and sonographic correlate. 2. BI-RADS Assessment Category 6: Known biopsy proven malignancy- Appropriate 
action should be taken. Pathologically proven level 1 left axillary 
lymphadenopathy, with numerous enlarged, malignant lymph nodes. There is also 
level 2 left axillary lymphadenopathy. 3. Retraction of the skin of the lateral left breast, which is thickened. However, no evidence of malignant skin invasion/involvement. 4. The malignant mass closely approximates the pectoral muscle, but there is no 
evidence of direct muscle invasion/involvement. 
  
RECOMMENDATIONS: 
Appropriate action is recommended. The patient is undergoing neoadjuvant 
chemotherapy. There is no evidence of contralateral disease. 7/30/2020 CTA chest at [facility]: Normal CTA of the chest with no pulmonary embolism or acute aortic abnormalities identified. Consider mild CHF/pulmonary edema Records reviewed and summarized above. Pathology report(s) reviewed above. Radiology report(s) reviewed above. Assessment/plan: 1. Left IMC, gr 2-3, triple negative, LN + by core:  cT1c cN1a cM0, unifocal, stage IIA both anatomic and prognostic Postmenopausal 
 
S/p neoadj chemo with B-59, pCR 
 With multiple LN felt on exam, ordered CT c/a/p and bone scan for staging. CT and Bone scan on 4/14/2020 negative for mets. We explained to the patient that the goal of systemic adjuvant therapy is to improve the chances for cure and decrease the risk of relapse. We explained why a patient can have microscopic cancer spread now even though physical examination, laboratory studies and imaging studies are negative for cancer. We explained that the same treatments used now as adjuvant or preventive treatments rarely if ever are curative in women who develop metastases. She has signed informed consent for B-59. TTE on 4/13/2020, EF 61%, TTE at [facility] on 7/30/2020, EF 60%, TTE on 10/21/20, EF 61%. Continued B-59 on 11/11/20 with atezolizumab/placebo. XRT 12/9/2020-1/27/21. Last atezo/placebo today. Has appointment with Corwin Stevenson NP on 5/4/21. We will plan to see the patient in follow up at least once per every other cycle, or sooner if symptoms warrant. Labs with each cycle to monitor for toxicity 2. Emotional well being:  She has excellent support and is coping well with her disease. She started counseling with Brock Chandler on 7/24/2020. Taking CBD gummies. 3. Genetic testing:   Discussed potential ramifications of a positive test including the potential need for bilateral mastectomies and bilateral oophorectomies and the risk then for her family members to also have a mutation. VUS discussed also. Testing performed 4/28/20 by blood, negative. 4. Breast pain:  Post surgical.  Reports discomfort due to tissue expanders. 5. Neuropathy:  Now resolved. 6. Joint pain:  Most likely due to arthritis, reports stiffness when sitting for long periods of time, she is on glucosamine with improvement. 7. Insomnia:  No relief with melatonin. Previously recommended unisom. Reports she is sleeping better. S/p covid 19 vaccine This patient was seen in conjunction with Carol Valente NP.  I personally reviewed the history and all points in the assessment and plan with the patient, and performed key points on the exam. TN breast cancer, on atezolizumab/placebo on B-59, ok to proceed, RTC 5/26/21 I appreciate the opportunity to participate in Ms. Noah Kurtz's care. Signed By: Delores Savage MD   
 
No orders of the defined types were placed in this encounter.

## 2021-04-27 NOTE — PROGRESS NOTES
[facility] - Chemotherapy Progress Note    1000 Pt admit to [address] for C 9 Tecentriq ambulatory in stable condition. Assessment completed. No new concerns voiced. PAC with positive blood return. Chemotherapy Flowsheet 4/6/2021   Cycle C8   Date 4/6/2021   Drug / Regimen Clinical Trial: NSABP B-59 Adjuvant Therapy: Atezolizumab/Placebo   Pre Meds -   Notes given       Visit Vitals  /78   Pulse 66   Temp 97.8 °F (36.6 °C)   Resp 18   Ht 5' 3\" (1.6 m)   Wt 86 kg (189 lb 8 oz)   SpO2 98%   BMI 33.57 kg/m²     Pt declines pregnancy    1. Do you have any symptoms of COVID-19? SOB, coughing, fever, or generally not feeling well No  2. Have you been exposed to COVID-19 recently? No  3. Have you had any recent contact with family/friend that has a pending COVID test?No      Medications:  Medications Administered     0.9% sodium chloride infusion     Admin Date  04/27/2021 Action  New Bag Dose  25 mL/hr Rate  25 mL/hr Route  IntraVENous Administered By  Jennie Brand, KATHARINA          0.9% sodium chloride injection 10 mL     Admin Date  04/27/2021 Action  Given Dose  10 mL Route  IntraVENous Administered By  Jennie Brain, KATHARINA          heparin (porcine) pf 500 Units     Admin Date  04/27/2021 Action  Given Dose  500 Units Route  IntraVENous Administered By  Jennie Brand, RN          NSABP - B59 INV atezolizumab / placebo 1,200 mg in 0.9% sodium chloride 250 mL, overfill volume 25 mL INVESTIGATIONAL IVPB           Admin Date  04/27/2021 Action  Given Dose  1,200 mg Rate  590 mL/hr Route  IntraVENous Administered By  Jennie Brain, RN                1330 Pt tolerated treatment well. PACmaintained positive blood return throughout treatment, flushed with positive blood return at conclusion and throughout treatment. D/c home ambulatory in no distress. Pt aware of next appointment scheduled for 5/26/21 @ 0830.     Recent Results (from the past 12 hour(s))   CBC WITH AUTOMATED DIFF    Collection Time: 04/27/21 10:20 AM   Result Value Ref Range    WBC 5.0 3.6 - 11.0 K/uL    RBC 4.25 3.80 - 5.20 M/uL    HGB 12.7 11.5 - 16.0 g/dL    HCT 39.7 35.0 - 47.0 %    MCV 93.4 80.0 - 99.0 FL    MCH 29.9 26.0 - 34.0 PG    MCHC 32.0 30.0 - 36.5 g/dL    RDW 13.2 11.5 - 14.5 %    PLATELET 946 071 - 097 K/uL    MPV 9.8 8.9 - 12.9 FL    NRBC 0.0 0  WBC    ABSOLUTE NRBC 0.00 0.00 - 0.01 K/uL    NEUTROPHILS 77 (H) 32 - 75 %    LYMPHOCYTES 11 (L) 12 - 49 %    MONOCYTES 9 5 - 13 %    EOSINOPHILS 2 0 - 7 %    BASOPHILS 0 0 - 1 %    IMMATURE GRANULOCYTES 1 (H) 0.0 - 0.5 %    ABS. NEUTROPHILS 3.7 1.8 - 8.0 K/UL    ABS. LYMPHOCYTES 0.6 (L) 0.8 - 3.5 K/UL    ABS. MONOCYTES 0.5 0.0 - 1.0 K/UL    ABS. EOSINOPHILS 0.1 0.0 - 0.4 K/UL    ABS. BASOPHILS 0.0 0.0 - 0.1 K/UL    ABS. IMM. GRANS. 0.1 (H) 0.00 - 0.04 K/UL    DF SMEAR SCANNED      RBC COMMENTS NORMOCYTIC, NORMOCHROMIC     METABOLIC PANEL, COMPREHENSIVE    Collection Time: 04/27/21 10:20 AM   Result Value Ref Range    Sodium 139 136 - 145 mmol/L    Potassium 3.5 3.5 - 5.1 mmol/L    Chloride 107 97 - 108 mmol/L    CO2 28 21 - 32 mmol/L    Anion gap 4 (L) 5 - 15 mmol/L    Glucose 94 65 - 100 mg/dL    BUN 15 6 - 20 MG/DL    Creatinine 0.76 0.55 - 1.02 MG/DL    BUN/Creatinine ratio 20 12 - 20      GFR est AA >60 >60 ml/min/1.73m2    GFR est non-AA >60 >60 ml/min/1.73m2    Calcium 9.1 8.5 - 10.1 MG/DL    Bilirubin, total 0.2 0.2 - 1.0 MG/DL    ALT (SGPT) 29 12 - 78 U/L    AST (SGOT) 14 (L) 15 - 37 U/L    Alk.  phosphatase 102 45 - 117 U/L    Protein, total 7.5 6.4 - 8.2 g/dL    Albumin 3.8 3.5 - 5.0 g/dL    Globulin 3.7 2.0 - 4.0 g/dL    A-G Ratio 1.0 (L) 1.1 - 2.2     CORTISOL, AM    Collection Time: 04/27/21 10:20 AM   Result Value Ref Range    Cortisol, a.m. 11.1 4.30 - 22.45 ug/dL   TSH 3RD GENERATION    Collection Time: 04/27/21 10:20 AM   Result Value Ref Range    TSH 1.97 0.36 - 3.74 uIU/mL

## 2021-04-27 NOTE — PROGRESS NOTES
Barbara Erickaye is a 55 y.o. female Follow up for the evaluation of breast cancer. 1. Have you been to the ER, urgent care clinic since your last visit? Hospitalized since your last visit? No 
 
2. Have you seen or consulted any other health care providers outside of the [address] [place] [address] since your last visit? Include any pap smears or colon screening.  No

## 2021-04-28 NOTE — PROGRESS NOTES
Pt in for follow up visit today per protocol. This is C17D1 of treatment.  Patient reports the following adverse events at this time: gr 2 loss of appetite, gr 1 constipation, gr 1 fatigue, gr 2 hot flashes, gr 1 insomnia, gr 1 concentration, gr 1 sob.   Patient to go to infusion center after appointment to receive atezo/placebo.  Next appt is scheduled for 5/26/21.

## 2021-04-30 NOTE — PROGRESS NOTES
[facility] Lab Visit:     [id number] arrived ambulatory and in no distress, labs drawn one stick in Right AC per kk  per  . Departed [facility] ambulatory and in no distress. Visit Vitals  BP (!) 145/74   Pulse 68   Temp 97.9 °F (36.6 °C)   Resp 18   Wt 86.5 kg (190 lb 11.2 oz)   SpO2 99%   BMI 33.78 kg/m²       Labs available in CC once resulted.

## 2021-05-03 NOTE — PROGRESS NOTES
HISTORY OF PRESENT ILLNESS Jonathan Holguinece is a 55 y.o. female. HPI Established patient presents for follow-up to LEFT breast cancer. Denies pain or worrisome breast changes. Tissue expanders in place. Breast history - Referring - Dr. Tito Sellers - PCP · 20 left breast 3:00, 13cmfn, core bx:  IMC, gr 2-3, 1.3 cm, ER negative, TN negative, HER 2 negative at IHC 0; ki67 68%; LN + by core, 6 mm, gr 2-3 · B-59 Carbo/Taxol +/- atezolizumab 2020-2020 = Dr. Elizabeth Cartagena · [facility] 2020: negative · B-59 DDAC 2020-2020 
· 10/8/2020 Bilateral mastectomy:  Right breast: benign, left breast: pCR, 0/10 LNs - Dr. Corbin Baig and Dr. Arvind Mayes · ypT0 ypN0 cM0 · B59 atezolizumab/placebo 20- 21 - Dr. Elizabeth Cartagena · XRT 2020-21 - Dr. Dustin Sanchez 
  
  
FH: Mother with breast cancer at age 79; maternal aunt with breast cancer in her 45s; maternal aunt with breast cancer at age 61; no ovarian, prostate, or pancreas cancer [facility] 2020: negative OB History No obstetric history on file. Obstetric Comments Menarche 16, LMP 2019, # of children 2, age of 4st delivery 29, Hysterectomy/oophorectomy No/No, Breast bx Yes, history of breast feeding Yes, BCP No, Hormone therapy No 
  
  
  
 
 
 
Past Surgical History:  
Procedure Laterality Date  HX BREAST BIOPSY Left 4/15/2020 BREAST BIOPSY performed by Nuvia Gomez MD at [address] [place] [address] HX BREAST RECONSTRUCTION Bilateral 10/8/2020 BREAST RECONSTRUCTION performed by Betsy Melendez MD at [facility] OR  
 HX  SECTION  98, 80  
 HX GI    
 COLONOSCOPY  
 HX MASTECTOMY Bilateral 10/8/2020 LEFT BREAST MASTECTOMY WITH AXILLARY NODE DISSECTION AND RIGHT SIMPLE MASTECTOMY / IMMEDIATE BILATERAL BREAST RECONSTRUCTION WITH TISSUE EXPANDERS AND ALLODERM GRAFTS performed by Nuvia Gomez MD at [facility] OR  
 HX ORTHOPAEDIC    
 RIGHT ELBOW-FATTY MASS  
 HX VASCULAR ACCESS Right 2020 ROS 
 
Physical Exam 
Constitutional:   
   Appearance: Normal appearance. Chest:  
   Breasts:  
      Right: Absent. Left: Absent. Comments: Chest wall s/p bilateral mastectomy with tissue expanders in place Musculoskeletal: Normal range of motion. Comments: UE x 2 - some tightness on LEFT side Lymphadenopathy:  
   Upper Body:  
   Right upper body: No supraclavicular or axillary adenopathy. Left upper body: No supraclavicular or axillary adenopathy. Neurological:  
   Mental Status: She is alert. Psychiatric:     
   Attention and Perception: Attention normal.     
   Mood and Affect: Mood normal.     
   Speech: Speech normal.     
   Behavior: Behavior normal.  
 
 
 
Visit Vitals BP (!) 184/96 (BP 1 Location: Right arm, BP Patient Position: Sitting) Pulse 71 Ht 5' 3\" (1.6 m) Wt 190 lb (86.2 kg) BMI 33.66 kg/m² ASSESSMENT and PLAN Encounter Diagnoses Name Primary?  Breast cancer metastasized to axillary lymph node, left (HCC) Yes  S/P mastectomy, bilateral   
 S/P radiation therapy  History of breast cancer in female Normal exam with no evidence of local recurrence. Reviewed s/sx of recurrence. No routine breast imaging since s/p bilateral mastectomy. LEFT axilla - tightness. Will continue to monitor and refer to PT PRN. Has completed infusions with Dr. Raisa Salmon. Continues follow-up appointments with him. Port - Right chest wall - will talk to Dr. Pablo Kessler about removal. 
Continues care with Dr. Pablo Kessler - still has expanders in place. Sees Dr. Pablo Kessler in two weeks. RTC in 6 months or sooner PRN. She is comfortable with this plan. All questions answered and she stated understanding. Total time spent for this patient - 20 minutes.

## 2021-05-04 NOTE — PATIENT INSTRUCTIONS

## 2021-05-19 NOTE — PROGRESS NOTES
[facility]  [address] [facility], [address]  George Gain: [phone]  F: [phone]      Reason for Visit:   Jose A Mckeon is a 55 y.o. female who is seen in consultation at the request of Dr. Laura Patiño for evaluation of therapy for breast cancer    Rad onc:  Dr. Donna Ochoa    Treatment History:   · 20 left breast 3:00, 13cmfn, core bx:  IMC, gr 2-3, 1.3 cm, ER negative, OK negative, HER 2 negative at IHC 0; ki67 68%; LN + by core, 6 mm, gr 2-3  · B-59 Carbo/Taxol +/- atezolizumab 2020-2020  · [facility] 2020: negative  · B-59 DDAC 2020-2020  · 10/8/2020 Bilateral mastectomy:  Right breast: benign, left breast: pCR, 0/10 LNs, ypT0 ypN0 cM0  · B59 atezolizumab/placebo 20- 21  · XRT 2020-21    History of Present Illness:   20 mammogram was negative, she felt the L breast mass herself, earlier in 2020. Interval history: In today for follow up. Complains of gr 1 loss of appetite, gr 1 fatigue, gr 2 hot flashes, gr 1 insomnia, gr 1 loss of cognition, gr 1 sob    FH:   Mother with breast cancer at age 79; maternal aunt with breast cancer in her 45s; maternal aunt with breast cancer at age 61; no ovarian, prostate, or pancreas cancer    Past Medical History:   Diagnosis Date    ADHD     Anxiety and depression     Cancer ([facility] [address]     LEFT BREAST CA    Ill-defined condition     chemotherapy    Limb alert care status     NO STICKS OR BLOOD PRESSURE IN LEFT ARM    Sleep apnea     MILD -NO CPAP      Past Surgical History:   Procedure Laterality Date    HX BREAST BIOPSY Left 4/15/2020    BREAST BIOPSY performed by Judy Ravi MD at [facility] [address] Bilateral 10/8/2020    BREAST RECONSTRUCTION performed by Kevin Morse MD at [facility] OR    HX  SECTION  98, 80    HX GI      COLONOSCOPY    HX MASTECTOMY Bilateral 10/8/2020    LEFT BREAST MASTECTOMY WITH AXILLARY NODE DISSECTION AND RIGHT SIMPLE MASTECTOMY / IMMEDIATE BILATERAL BREAST RECONSTRUCTION WITH TISSUE EXPANDERS AND ALLODERM GRAFTS performed by Melissa Harper MD at [facility] AMBUL[facility]ORY OR    HX ORTHOPAEDIC      RIGHT ELBOW-FATTY MASS    HX VASCULAR ACCESS Right 04/2020      Social History     Tobacco Use    Smoking status: Light Tobacco Smoker    Smokeless tobacco: Never Used    Tobacco comment: Quit April 2020   Substance Use Topics    Alcohol use: Not Currently     Alcohol/week: 6.0 standard drinks     Types: 6 Glasses of wine per week     Comment: PER PT ON 10/1/2020      Family History   Problem Relation Age of Onset    Cancer Mother         Breast    Cancer Maternal Aunt         Breast    Cancer Paternal Uncle     Cancer Maternal Aunt         Breast    Cancer Paternal Uncle      Current Outpatient Medications   Medication Sig    docusate sodium (COLACE) 100 mg capsule Take 100 mg by mouth as needed for Constipation.  lisdexamfetamine dimesylate (VYVANSE PO) Take 30 mg by mouth daily.  CLONAZEPAM PO Take  by mouth as needed.  pyridoxine, vitamin B6, (VITAMIN B-6) 50 mg tablet Take 50 mg by mouth daily.  cholecalciferol, vitamin D3, (VITAMIN D3 PO) Take 5,000 Units by mouth daily.  lidocaine-prilocaine (EMLA) topical cream Apply  to affected area as needed for Pain. (Patient taking differently: Apply 1 Each to affected area as needed for Pain. Goes on port for when accessed)     No current facility-administered medications for this visit. No Known Allergies     Review of Systems: A complete review of systems was obtained, negative except as described above.     Physical Exam:     Visit Vitals  BP (!) 157/72   Pulse 68   Temp 97.2 °F (36.2 °C) (Temporal)   Resp 18   Ht 5' 3\" (1.6 m)   Wt 191 lb 12.8 oz (87 kg)   SpO2 98%   BMI 33.98 kg/m²     ECOG PS: 0  General: no distress  Eyes: anicteric sclerae  HENT: oropharynx clear  Neck: supple  Respiratory: normal respiratory effort  CV: no peripheral edema  GI: nontender  Skin: no rashes; no ecchymoses; no petechiae        Results:     Lab Results   Component Value Date/Time    WBC 5.0 05/26/2021 08:38 AM    HGB 12.5 05/26/2021 08:38 AM    HCT 40.6 05/26/2021 08:38 AM    PLATELET 902 22/17/1792 08:38 AM    MCV 96.7 05/26/2021 08:38 AM    ABS. NEUTROPHILS 3.5 05/26/2021 08:38 AM     Lab Results   Component Value Date/Time    Sodium 139 05/26/2021 08:38 AM    Potassium 3.4 (L) 05/26/2021 08:38 AM    Chloride 107 05/26/2021 08:38 AM    CO2 28 05/26/2021 08:38 AM    Glucose 60 (L) 05/26/2021 08:38 AM    BUN 12 05/26/2021 08:38 AM    Creatinine 0.76 05/26/2021 08:38 AM    GFR est AA >60 05/26/2021 08:38 AM    GFR est non-AA >60 05/26/2021 08:38 AM    Calcium 8.8 05/26/2021 08:38 AM     Lab Results   Component Value Date/Time    Bilirubin, total 0.3 05/26/2021 08:38 AM    ALT (SGPT) 36 05/26/2021 08:38 AM    Alk. phosphatase 102 05/26/2021 08:38 AM    Protein, total 7.3 05/26/2021 08:38 AM    Albumin 3.5 05/26/2021 08:38 AM    Globulin 3.8 05/26/2021 08:38 AM       3/31/20  3D mammogram:  1.6 cm mass in L breast  US L breast  3:00 left breast mass 1.6 cm, multiple LN in L ax tail    4/14/2020 CT c/a/p:  IMPRESSION: Left breast mass. No evidence for metastatic disease in the chest  abdomen or pelvis. There are no target lesions for RECIST classification. 4/14/2020 Bone Scan:  IMPRESSION: No evidence of bony metastatic disease. 5/4/2020 MRI breast:  IMPRESSION:     Right Breast:  1. BI-RADS Assessment Category 1: Negative. No evidence of breast carcinoma  within the right breast.     Left Breast:  1. BI-RADS Assessment Category 6: Known biopsy proven malignancy- Appropriate  action should be taken. Large area of malignant enhancement, occupying most of  the middle and posterior third of the left breast upper outer and lower-outer  quadrants, from 2:00 to 4:00. Malignancy is much larger than it had appeared  mammographically.  Greatest measurement is 7 x 4.2 x 3 cm. Contained within this  large area of enhancement is the dominant 2.5 cm mass, which represents the  mammographic and sonographic correlate. 2. BI-RADS Assessment Category 6: Known biopsy proven malignancy- Appropriate  action should be taken. Pathologically proven level 1 left axillary  lymphadenopathy, with numerous enlarged, malignant lymph nodes. There is also  level 2 left axillary lymphadenopathy. 3. Retraction of the skin of the lateral left breast, which is thickened. However, no evidence of malignant skin invasion/involvement. 4. The malignant mass closely approximates the pectoral muscle, but there is no  evidence of direct muscle invasion/involvement.     RECOMMENDATIONS:  Appropriate action is recommended. The patient is undergoing neoadjuvant  chemotherapy. There is no evidence of contralateral disease. 7/30/2020 CTA chest at [facility]: Normal CTA of the chest with no pulmonary embolism or acute aortic abnormalities identified. Consider mild CHF/pulmonary edema    Records reviewed and summarized above. Pathology report(s) reviewed above. Radiology report(s) reviewed above. Assessment/plan:   1. Left IMC, gr 2-3, triple negative, LN + by core:  cT1c cN1a cM0, unifocal, stage IIA both anatomic and prognostic  Postmenopausal    S/p neoadj chemo with B-59, pCR    With multiple LN felt on exam, ordered CT c/a/p and bone scan for staging. CT and Bone scan on 4/14/2020 negative for mets. We explained to the patient that the goal of systemic adjuvant therapy is to improve the chances for cure and decrease the risk of relapse. We explained why a patient can have microscopic cancer spread now even though physical examination, laboratory studies and imaging studies are negative for cancer. We explained that the same treatments used now as adjuvant or preventive treatments rarely if ever are curative in women who develop metastases. She has signed informed consent for B-59.      TTE on 4/13/2020, EF 61%, TTE at [facility] on 7/30/2020, EF 60%, TTE on 10/21/20, EF 61%. Continued B-59 on 11/11/20 with atezolizumab/placebo. XRT 12/9/2020-1/27/21. Had appointment with Galilea Dai NP on 5/4/21. Follow-up after early breast cancer was discussed. I recommend follow-up as defined by the American Society of Clinical Oncology and [facility]. This includes a visit to a health care professional every 3-6 months for 3 years, then every 6-12 months for 2 years, and then yearly      2. Emotional well being:  She has excellent support and is coping well with her disease. She started counseling with Masha Nunez on 7/24/2020. Taking CBD gummies. 3. Genetic testing:   Discussed potential ramifications of a positive test including the potential need for bilateral mastectomies and bilateral oophorectomies and the risk then for her family members to also have a mutation. VUS discussed also. Testing performed 4/28/20 by blood, negative. 4. Breast pain:  Post surgical.  Reports discomfort due to tissue expanders. 5. Joint pain:  Most likely due to arthritis, reports stiffness when sitting for long periods of time, she is on glucosamine with improvement. 6. Insomnia:  No relief with melatonin. Previously recommended unisom. Reports she is sleeping better. 7. Port flushes:  q 12 weeks until she has her plastic surgery and port removal    S/p covid 19 vaccine      I appreciate the opportunity to participate in Ms. Amparo Kurtz's care. Signed By: Estevan Wolf MD      No orders of the defined types were placed in this encounter.

## 2021-05-26 NOTE — PROGRESS NOTES
[place] [address] Visit Progress Note Patient admitted to [facility] for Labs ambulatory in stable condition. Patient requesting we not access her PAC today and draw her labs peripherally. Visit Vitals BP (!) 157/72 Pulse 68 Temp 97.2 °F (36.2 °C) Resp 18 SpO2 98% Labs drawn peripherally and sent for processing. Results pending in CC. Patient proceeded to appointment with Dr. Dino Pulliam. Patient tolerated treatment well. Patient discharged from [place] [address] ambulatory in no distress. Patient aware of next appointment. Future Appointments Date Time Provider Dorota Davenport 11/9/2021  9:00 AM Lois Gardner NP [facility] BS AMB

## 2021-05-26 NOTE — PROGRESS NOTES
Pt in for labs and follow up visit today per protocol with Dr. Kiara Jean Baptiste and RN. This is her 30 day post treatment visit. Patient reports the following adverse events at this time: gr 1 loss of appetite, gr 1 fatigue, gr 2 hot flashes, gr 1 insomnia, gr 1 loss of cognition, gr 1 sob. Vital signs are stable. Next appt is scheduled for 11/17/21.

## 2021-11-08 NOTE — PROGRESS NOTES
HISTORY OF PRESENT ILLNESS  Darin Real is a 55 y.o. female. HPI Established patient presents for follow-up to LEFT breast cancer. Denies pain or worrisome breast changes.          Breast history -   Referring - Dr. Fallon Jin - PCP  · 20 left breast 3:00, 13cmfn, core bx: Imer, gr 2-3, 1.3 cm, ER negative, UT negative, HER 2 negative at IHC 0; ki67 68%; LN + by core, 6 mm, gr 2-3  · B-59 Carbo/Taxol +/- atezolizumab 2020-2020 = Dr. Leticia Diego  · [facility] 2020: negative  · B-59 DDAC 2020-2020  · 10/8/2020 Bilateral mastectomy:  Right breast: benign, left breast: pCR, 0/10 LNs - Dr. Shannan Christiansen and Dr. Bruna Henson  · ypT0 ypN0 cM0  · B59 atezolizumab/placebo 20- 21 - Dr. Leticia iDego  · XRT 2020-21 - Dr. Suzy Aguilera  · Port removed and implants placed - Dr. Bruna Henson        FH:  Mother with breast cancer at age 79; maternal aunt with breast cancer in her 45s; maternal aunt with breast cancer at age 61; no ovarian, prostate, or pancreas cancer  [facility] 2020: negative      OB History    No obstetric history on file.       Obstetric Comments   Menarche 16, LMP 2019, # of children 2, age of 4st delivery 29, Hysterectomy/oophorectomy No/No, Breast bx Yes, history of breast feeding Yes, BCP No, Hormone therapy No                 Past Surgical History:   Procedure Laterality Date    HX BREAST BIOPSY Left 4/15/2020    BREAST BIOPSY [facility] by Kirstin Lee MD at [facility] 197 Bilateral 10/8/2020    BREAST RECONSTRUCTION performed by Jaqueline Scott MD at [facility] 57 HX  SECTION  98, 80    HX GI      COLONOSCOPY    HX MASTECTOMY Bilateral 10/8/2020    LEFT BREAST MASTECTOMY WITH AXILLARY NODE DISSECTION AND RIGHT SIMPLE MASTECTOMY / IMMEDIATE BILATERAL BREAST RECONSTRUCTION WITH TISSUE EXPANDERS AND ALLODERM GRAFTS performed by Kirstin Lee MD at [facility] AMBUL[facility] OR    HX ORTHOPAEDIC      RIGHT ELBOW-FATTY MASS    HX VASCULAR ACCESS Right 04/2020         [facility]    Physical Exam  Constitutional:       Appearance: Normal appearance. Chest:   Breasts:      Right: Absent. No axillary adenopathy or supraclavicular adenopathy. Left: Absent. No axillary adenopathy or supraclavicular adenopathy. Comments: Chest wall s/p bilateral mastectomy with implant reconstruction   Musculoskeletal:      Comments: FROM - UE x 2   Lymphadenopathy:      Upper Body:      Right upper body: No supraclavicular or axillary adenopathy. Left upper body: No supraclavicular or axillary adenopathy. Neurological:      Mental Status: She is alert. Psychiatric:         Attention and Perception: Attention normal.         Mood and Affect: Mood normal.         Speech: Speech normal.         Behavior: Behavior normal.         Visit Vitals  Ht 5' 3\" (1.6 m)   Wt 191 lb (86.6 kg)   BMI 33.83 kg/m²         ASSESSMENT and PLAN  Encounter Diagnoses   Name Primary?  Breast cancer metastasized to axillary lymph node, left (HCC) Yes    S/P mastectomy, bilateral     S/P radiation therapy     History of breast cancer in female         Normal exam with no evidence of local recurrence. Reviewed s/sx of recurrence. No routine breast imaging since s/p bilateral mastectomy. LEFT axilla - tightness is improving. Having headaches and some facial numbness - brain MRI per Dr. Lilian Loomis. Continues care with Dr. Baldo Nice - permament implants in place. Has no other surgeries planned. RTC in 6 months or sooner PRN. She is comfortable with this plan. All questions answered and she stated understanding. Total time spent for this patient - 20 minutes.

## 2021-11-17 NOTE — PROGRESS NOTES
Gene Camarillo is a 55 y.o. female Follow up for the evaluation of metastatic breast cancer. 1. Have you been to the ER, urgent care clinic since your last visit? Hospitalized since your last visit? No    2. Have you seen or consulted any other health care providers outside of the [address] [place] [address] since your last visit? Include any pap smears or colon screening.  No

## 2021-11-17 NOTE — PROGRESS NOTES
[facility] Lab Visit:    1454:  Pt arrived ambulatory and in no distress, labs drawn in [facility] without difficulty. Departed OPI ambulatory and in no distress. Visit Vitals  Pulse 95   Temp 97.7 °F (36.5 °C)   Resp 18   Ht 5' 3\" (1.6 m)   Wt 84.4 kg (186 lb)   SpO2 97%   BMI 32.95 kg/m²       Labs available in CC once resulted.

## 2021-11-17 NOTE — PROGRESS NOTES
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 Aditi Joya for evaluation of therapy for breast cancer    Rad onc:  Dr. Jeff Bey    Treatment History:   · 4/6/20 left breast 3:00, 13cmfn, core bx:  IMC, gr 2-3, 1.3 cm, ER negative, AK negative, HER 2 negative at IHC 0; ki67 68%; LN + by core, 6 mm, gr 2-3  · B-59 Carbo/Taxol +/- atezolizumab 4/28/2020-7/14/2020  · Invitae 4/28/2020: negative  · B-59 DDAC 7/21/2020-9/1/2020  · 10/8/2020 Bilateral mastectomy:  Right breast: benign, left breast: pCR, 0/10 LNs, ypT0 ypN0 cM0  · B59 atezolizumab/placebo 11/11/20- 4/27/21  · XRT 12/9/2020-1/27/21    History of Present Illness:   12/28/19 mammogram was negative, she felt the L breast mass herself, earlier in March 2020. Interval history:  Pt reported right facial numbness, leading to the brain MRI and staging studies we ordered showing recurrent metastatic disease    In today for follow up. Reports gr 2 loss of appetite, gr 1 constipation, gr 2-3 fatigue, gr 2 nausea, gr 2 hot flashes, gr 2-3 insomnia, gr 2 loss of cognition, gr 1-2 loss of concentration, 9-10 pain in neck, base of skull, back L sided ribs, hips. Gr 2 sob, gr 1 rash, gr 2 neuropathy, gr 2-3 headache, constant; 8/10 pain currently    FH:   Mother with breast cancer at age 79; maternal aunt with breast cancer in her 45s; maternal aunt with breast cancer at age 61; no ovarian, prostate, or pancreas cancer    Past Medical History:   Diagnosis Date    ADHD     Anxiety and depression     Cancer ([facility] [address]     LEFT BREAST CA    Ill-defined condition     chemotherapy    Limb alert care status     NO STICKS OR BLOOD PRESSURE IN LEFT ARM    Sleep apnea     MILD -NO CPAP      Past Surgical History:   Procedure Laterality Date    HX BREAST BIOPSY Left 4/15/2020 BREAST BIOPSY performed by Ashley Cid MD at [address] [place] [address]  HX BREAST RECONSTRUCTION Bilateral 10/8/2020    BREAST RECONSTRUCTION performed by Adela Bolton MD at [facility]    HX  SECTION  98, 80    HX GI      COLONOSCOPY    HX MASTECTOMY Bilateral 10/8/2020    LEFT BREAST MASTECTOMY WITH AXILLARY NODE DISSECTION AND RIGHT SIMPLE MASTECTOMY / IMMEDIATE BILATERAL BREAST RECONSTRUCTION WITH TISSUE EXPANDERS AND ALLODERM GRAFTS performed by Ashley Cid MD at [facility]    HX ORTHOPAEDIC      RIGHT ELBOW-FATTY MASS    HX VASCULAR ACCESS Right 2020      Social History     Tobacco Use    Smoking status: Light Tobacco Smoker    Smokeless tobacco: Never Used    Tobacco comment: Quit 2020   Substance Use Topics    Alcohol use: Not Currently     Alcohol/week: 6.0 standard drinks     Types: 6 Glasses of wine per week     Comment: PER PT ON 10/1/2020      Family History   Problem Relation Age of Onset    Cancer Mother         Breast    Cancer Maternal Aunt         Breast    Cancer Paternal Uncle     Cancer Maternal Aunt         Breast    Cancer Paternal Uncle      Current Outpatient Medications   Medication Sig    dexAMETHasone (DECADRON) 4 mg tablet Take 4 mg by mouth every six (6) hours.  DULoxetine (CYMBALTA) 30 mg capsule Take 1 Capsule by mouth daily.  docusate sodium (COLACE) 100 mg capsule Take 100 mg by mouth as needed for Constipation.  lisdexamfetamine dimesylate (VYVANSE PO) Take 30 mg by mouth daily.  CLONAZEPAM PO Take  by mouth as needed.  pyridoxine, vitamin B6, (VITAMIN B-6) 50 mg tablet Take 50 mg by mouth daily.  cholecalciferol, vitamin D3, (VITAMIN D3 PO) Take 5,000 Units by mouth daily.  lidocaine-prilocaine (EMLA) topical cream Apply  to affected area as needed for Pain. (Patient taking differently: Apply 1 Each to affected area as needed for Pain.  Goes on port for when accessed)     No current facility-administered medications for this visit. No Known Allergies     Review of Systems: A complete review of systems was obtained, negative except as described above. Physical Exam:     Visit Vitals  Pulse 95   Temp 97.7 °F (36.5 °C) (Temporal)   Resp 18   Ht 5' 3\" (1.6 m)   Wt 186 lb (84.4 kg)   SpO2 97%   BMI 32.95 kg/m²     ECOG PS: 0  General: no distress  Eyes: anicteric sclerae  HENT: oropharynx clear  Neck: supple  Respiratory: normal respiratory effort  CV: no peripheral edema  GI: nontender  Skin: no rashes; no ecchymoses; no petechiae        Results:     Lab Results   Component Value Date/Time    WBC 5.0 05/26/2021 08:38 AM    HGB 12.5 05/26/2021 08:38 AM    HCT 40.6 05/26/2021 08:38 AM    PLATELET 738 61/82/9429 08:38 AM    MCV 96.7 05/26/2021 08:38 AM    ABS. NEUTROPHILS 3.5 05/26/2021 08:38 AM     Lab Results   Component Value Date/Time    Sodium 139 05/26/2021 08:38 AM    Potassium 3.4 (L) 05/26/2021 08:38 AM    Chloride 107 05/26/2021 08:38 AM    CO2 28 05/26/2021 08:38 AM    Glucose 60 (L) 05/26/2021 08:38 AM    BUN 12 05/26/2021 08:38 AM    Creatinine 0.76 05/26/2021 08:38 AM    GFR est AA >60 05/26/2021 08:38 AM    GFR est non-AA >60 05/26/2021 08:38 AM    Calcium 8.8 05/26/2021 08:38 AM     Lab Results   Component Value Date/Time    Bilirubin, total 0.3 05/26/2021 08:38 AM    ALT (SGPT) 36 05/26/2021 08:38 AM    Alk. phosphatase 102 05/26/2021 08:38 AM    Protein, total 7.3 05/26/2021 08:38 AM    Albumin 3.5 05/26/2021 08:38 AM    Globulin 3.8 05/26/2021 08:38 AM       3/31/20  3D mammogram:  1.6 cm mass in L breast  US L breast  3:00 left breast mass 1.6 cm, multiple LN in L ax tail    4/14/2020 CT c/a/p:  IMPRESSION: Left breast mass. No evidence for metastatic disease in the chest  abdomen or pelvis. There are no target lesions for RECIST classification. 4/14/2020 Bone Scan:  IMPRESSION: No evidence of bony metastatic disease. 5/4/2020 MRI breast:  IMPRESSION:     Right Breast:  1.  BI-RADS Assessment Category 1: Negative. No evidence of breast carcinoma  within the right breast.     Left Breast:  1. BI-RADS Assessment Category 6: Known biopsy proven malignancy- Appropriate  action should be taken. Large area of malignant enhancement, occupying most of  the middle and posterior third of the left breast upper outer and lower-outer  quadrants, from 2:00 to 4:00. Malignancy is much larger than it had appeared  mammographically. Greatest measurement is 7 x 4.2 x 3 cm. Contained within this  large area of enhancement is the dominant 2.5 cm mass, which represents the  mammographic and sonographic correlate. 2. BI-RADS Assessment Category 6: Known biopsy proven malignancy- Appropriate  action should be taken. Pathologically proven level 1 left axillary  lymphadenopathy, with numerous enlarged, malignant lymph nodes. There is also  level 2 left axillary lymphadenopathy. 3. Retraction of the skin of the lateral left breast, which is thickened. However, no evidence of malignant skin invasion/involvement. 4. The malignant mass closely approximates the pectoral muscle, but there is no  evidence of direct muscle invasion/involvement.     RECOMMENDATIONS:  Appropriate action is recommended. The patient is undergoing neoadjuvant  chemotherapy. There is no evidence of contralateral disease. 7/30/2020 [facility]: Normal CTA of the chest with no pulmonary embolism or acute aortic abnormalities identified. Consider mild CHF/pulmonary edema    11/12/21 ct c/a/p  FINDINGS:      CHEST WALL: No mass or axillary lymphadenopathy. THYROID: No nodule. MEDIASTINUM: No mass or lymphadenopathy. HENNY: No mass or lymphadenopathy. THORACIC AORTA: No dissection or aneurysm. MAIN PULMONARY ARTERY: Normal in caliber. TRACHEA/BRONCHI: Patent. ESOPHAGUS: No wall thickening or dilatation. HEART: Normal in size. PLEURA: Small left pleural effusion. LUNGS: Left lower lobe atelectasis.  Pleural-based opacities are seen in the left  upper lobe, the largest of which measures 3.4 x 1.2 cm.     LIVER: There is a new 15 mm hypodense lesion in the right hepatic lobe. Small  cyst in the right hepatic lobe is again noted. BILIARY TREE: Gallbladder is within normal limits. CBD is not dilated. SPLEEN: within normal limits. PANCREAS: No mass or ductal dilatation. ADRENALS: Unremarkable. KIDNEYS: No mass, calculus, or hydronephrosis. STOMACH: Unremarkable. SMALL BOWEL: No dilatation or wall thickening. COLON: No dilatation or wall thickening. APPENDIX: Within normal limits. PERITONEUM: No ascites or pneumoperitoneum. RETROPERITONEUM: No lymphadenopathy or aortic aneurysm. REPRODUCTIVE ORGANS: Myomatous uterus is again demonstrated. URINARY BLADDER: Decompressed. BONES: Tiny sclerotic lesion right femoral neck is new. No additional convincing  lesions are identified. ABDOMINAL WALL: No mass or hernia. ADDITIONAL COMMENTS: N/A     IMPRESSION  New small hypodense hepatic lesion is concerning for metastatic disease. Nonspecific tiny sclerotic lesion right femoral neck near compared to the prior  exam, small metastatic focus should be considered. Small left pleural effusion  with underlying atelectasis. Pleural-based opacities in the left upper lobe are  most likely related to prior radiation therapy, however close follow-up is  recommended to evaluate for any interval improvement. 11/12/21 bone scan  FINDINGS: There is slight increase in activity right sixth seventh and eighth  ribs anteriorly and possibly left seventh rib anteriorly. There is vague  increased activity in the hips and there is spotty activity in the femurs. There  is a small focus of activity right tibia. There is increased linear activity in  the fibula. There is question of slight increased activity L1 vertebral body. .     . . Incidental renal imaging shows no abnormality.      IMPRESSION  1.  There is suspicion of vague focus of increasing bony activity as described  above which is nonspecific but may represent bony metastatic disease and  correlation with plain films of the pelvis, femurs and right tibia may be  helpful for further assessment    11/12/21 MRI brain  FINDINGS:  There is a left paracentral frontal enhancing mass measuring approximately 1.6 x  1.7 x 1.7 cm located immediately anterior to the genu the corpus callosum. There  is surrounding edema. Adjacent enhancement in the leptomeninges of the  interhemispheric fissure bilaterally. 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     The ventricular size and configuration are within normal limits. Normal signal demonstrated in the cerebral hemispheres, brain stem and  cerebellum. No abnormal areas of intracranial enhancement. No abnormal diffusion. No evidence of intracranial hemorrhage, acute infarct, mass or abnormal  extra-axial fluid collections. Normal flow-voids are present in the vertebral, basilar and carotid artery  systems. 3-D T1 postcontrast images suggests abnormal enhancement in the medial right  internal auditory canal. Not confirmed on standard axial thicker slice T1. Therefore possibly susceptibility artifact. Correlate clinically and with  imaging follow-up if clinically indicated. Diminished fat signal in the skull base and cervical spine marrow may be  treatment related. Mild diffuse dural enhancement.     IMPRESSION  1. Approximately 1.7 cm left frontal mass extending interhemispheric fissure  with adjacent leptomeningeal involvement. Associated edema. 2. Questionable focus of enhancement in the medial right internal auditory canal  versus susceptibility artifact. 3. Reduced fat signal in the marrow may be treatment related. Records reviewed and summarized above. Pathology report(s) reviewed above. Radiology report(s) reviewed above. Assessment/plan:   1.  Left IMC, gr 2-3, triple negative, LN + by core:  cT1c cN1a cM0, unifocal, stage IIA both anatomic and prognostic  Postmenopausal    S/p neoadj chemo with B-59, pCR    11/12/21 Recurrent breast cancer to brain, liver, possibly bone. Liver bx scheduled for 11/18/21    Will send her original [facility] for [facility]L1 [facility], [facility] If +, will unblind her from B-59 to see if she received atezolizumab. If +, chemo + pembro would be recommended. If negative, possibly socituzumab govitecan    XR of femurs, right tib/fib, hips ordered, ribs    Discussed that while this is treatable, it is not curable, goal of therapy is palliative. Discussed that the duration of therapy would be lifetime    TTE on 4/13/2020, EF 61%, TTE at [facility] on 7/30/2020, EF 60%, TTE on 10/21/20, EF 61%. TTE today    Will have Dr. Ta Perrin put port back in    2. Brain mets/possible LM disease:  Dr. Linwood Myles, discussed with him, setting her up for MRI c, t, L spine and LP. He is planning on [place] therapy on 12/1/21; on dex 4 mg PO q6h    3. Emotional well being:  She has excellent support and is coping well with her disease. She started counseling with Nma Lindsey on 7/24/2020. Taking CBD gummies. 4. Genetic testing:   Discussed potential ramifications of a positive test including the potential need for bilateral mastectomies and bilateral oophorectomies and the risk then for her family members to also have a mutation. VUS discussed also. Testing performed 4/28/20 by blood, negative. 5. Pain due to cancer:  No longer taking cymbalta; will refer to palliative care; taking vyvanse; oxycodone 5 mg q4h prn pain, #30 given, no refills,  reveiwed by staff, warned about constipation        S/p covid 19 vaccine      I appreciate the opportunity to participate in [facility]. [facility] SHARON Kurtz's care. Signed By: Fatoumata Farrell MD      No orders of the defined types were placed in this encounter.

## 2021-11-17 NOTE — PROGRESS NOTES
Pt in for follow up visit today per protocol with Dr. Chepe Kruger and RN. This is her Y2M6/18m post randomization visit. Patient reports the following adverse events at this time: gr 2 loss of appetite, gr 1 constipation, gr 2-3 fatigue, gr 2 nausea, gr 2 hot flashes, gr 2-3 insomnia, gr 2 loss of cognition, gr 1-2 loss of concentration, 9-10 pain in neck, base of skull, back L sided ribs, hips. Gr 2 sob, gr 1 rash, gr 2 neuropathy, gr 2-3 headache, constant; 8/10 pain currently. Per MD note, Pt reported right facial numbness, leading to the brain MRI and staging studies we ordered showing recurrent metastatic disease. 11/12/21 Recurrent breast cancer to brain, liver, possibly bone. Vital signs are stable. Next appt will be scheduled for 6 months.

## 2021-11-18 NOTE — PROGRESS NOTES
Patient arrives to radiology department for CT Liver Biopsy by Dr. Keyon Harris.  and NPO status confirmed. Patient states her sister-in-law/Ellie Ernst/[phone] will be driving her home but she is currently in [place]. Will need to give a call prior to being discharged. Patient states she also is supposed to have a lumbar puncture done at [facility] next week. Per Dr. Keyon Harris this can be performed today. Writer contact the office of Mushtaq Leal NP and spoke with nurse. Per nurse (who was in contact with Dr. Eliazar Morales) okay to proceed with lumbar puncture today. Will need to obtain cytology during LP. Nurse states she will arrange for input of order. Lumbar puncture order, scheduling was arranged and posted to proceed with procedure. Assessment and pre-procedural assessment completed. Allergies and medications reviewed. Consent obtained for CT Liver Biopsy and Lumbar Puncture by Dr. Keyon Harris. Procedure, sedation plan and medication education provided. Patient voices good understanding of all. Patient taken into CT scanner and prepped for procedure. Time Out: 1031  Start Time: 1032  Stop Time: 1059    Patient tolerated procedure well. She received 4 mg of Versed and 100 mcg of Fentanyl. Patient taken to have lumbar puncture performed at 1110. Patient monitored during LP post sedation. Patients ride called at 1140 and verified she was 50 minutes away. After LP, patient was taken into radiology recovery. Patient offered pepsi and crackers. Patients abdominal dressing was clean dry and intact upon discharge. LP dressing was also clean, dry and intact. Discharge instructions gone over with patient. Patient was discharged home in stable condition in wheelchair.

## 2021-11-18 NOTE — Clinical Note
Arrives *** to radiology department for *** by *** MD.  and NPO status confirmed. Assessment and pre-procedural assessment completed. Allergies and medications reviewed. Consent reviewed for correctness. Procedure, sedation plan and medication education provided. Patient voices good understanding of all.

## 2021-11-18 NOTE — DISCHARGE INSTRUCTIONS
Patient Education   Patient Education      Patient Education     Lumbar Puncture: After Your Visit  Your Care Instructions  A lumbar puncture (also called a spinal tap) is a test to check the fluid that surrounds and protects your spinal cord and brain. Your doctor may have done this test to look for an infection. In some cases, a lumbar puncture is done to release pressure from too much fluid or to look for diseases such as multiple sclerosis. The fluid that was taken is often sent to a lab for different tests. Your doctor may get some answers right away, but other answers take hours to days. Your doctor will call you with the results. You may feel tired or have a mild backache or a headache after the test. Some people have trouble sleeping for 1 or 2 days. Follow-up care is a key part of your treatment and safety. Be sure to make and go to all appointments, and call your doctor if you are having problems. Its also a good idea to know your test results and keep a list of the medicines you take. How can you care for yourself at home? · Drink plenty of liquids in the next few hours. This may prevent a headache or keep a headache from being severe. · Your doctor may tell you to lie flat in bed for 1 to 4 hours. This may prevent a headache. · Get plenty of rest.  · If your doctor prescribed antibiotics, take them as directed. Do not stop taking them just because you feel better. You need to take the full course of antibiotics. · Take anti-inflammatory medicines to reduce a headache or backache. These include ibuprofen (Advil, Motrin) and naproxen (Aleve). Read and follow all instructions on the label. When should you call for help? Call your doctor now or seek immediate medical care if:  · You have a fever with a stiff neck or a severe headache. · You have any drainage or bleeding from the site of the puncture. · You feel numb or lose strength below the puncture site.   Watch closely for changes in your health, and be sure to contact your doctor if:  · You do not get better as expected. Where can you learn more? Go to "[employer].be  Enter B775 in the search box to learn more about \"Lumbar Puncture: After Your Visit. \"   © 9069-3303 Healthwise, Incorporated. Care instructions adapted under license by [employer] (which disclaims liability or warranty for this information). This care instruction is for use with your licensed healthcare professional. If you have questions about a medical condition or this instruction, always ask your healthcare professional. [place] [address] any warranty or liability for your use of this information. Content Version: 0.4.280278; Last Revised: September 13, 2011                 Percutaneous Liver Biopsy: What to Expect at [facility]  A needle biopsy of the liver is a procedure to take a tiny sample (biopsy) of your liver tissue. The tissue sample is looked at under a microscope. Your doctor can look for infection or other liver problems. You may have some pain where the biopsy needle entered your skin (the puncture site). You may also have pain in your shoulder. This is called referred pain. It is caused by pain traveling along a nerve near the biopsy site. The referred pain usually lasts less than 12 hours. You may have a small amount of bleeding from the puncture site. You can probably go home if you have no problems after the test. You will need to take it easy at home for 1 or 2 days after the procedure. You will probably be able to return to work and most of your usual activities after that. This care sheet gives you a general idea about how long it will take for you to recover. But each person recovers at a different pace. Follow the steps below to get better as quickly as possible. How can you care for yourself at home? Activity    · Rest when you feel tired.  Getting enough sleep will help you recover.     · Try to walk each day. Start by walking a little more than you did the day before. Bit by bit, increase the amount you walk. Walking boosts blood flow and helps prevent pneumonia and constipation.     · Avoid exercises that use your belly muscles and strenuous activities, such as bicycle riding, jogging, weight lifting, or aerobic exercise, for 1 week or until your doctor says it is okay.     · Ask your doctor when you can drive again.     · You will probably need to take 1 or 2 days off from work. It depends on the type of work you do and how you feel.     · You will probably be able to shower the same day as the test, if your doctor says it is okay. Pat the puncture site dry. Do not take a bath for at least 2 days after the test, or until your doctor tells you it is okay. Diet    · You can eat your normal diet. If your stomach is upset, try bland, low-fat foods like plain rice, broiled chicken, toast, and yogurt.     · Drink plenty of fluids (unless your doctor tells you not to). Medicines    · Your doctor will tell you if and when you can restart your medicines. He or she will also give you instructions about taking any new medicines.     · If you take aspirin or some other blood thinner, ask your doctor if and when to start taking it again. Make sure that you understand exactly what your doctor wants you to do.     · Be safe with medicines. Take pain medicines exactly as directed. ? If the doctor gave you a prescription medicine for pain, take it as prescribed. ? If you are not taking a prescription pain medicine, take an over-the-counter medicine that your doctor recommends. Read and follow all instructions on the label.   ? Do not take aspirin, ibuprofen (Advil, Motrin), naproxen (Aleve), or other nonsteroidal anti-inflammatory drugs (NSAIDs) unless your doctor says it is okay.     · If you think your pain medicine is making you sick to your stomach:  ? Take your medicine after meals (unless your doctor has told you not to). ? Ask your doctor for a different kind of pain medicine. Care of the puncture site    · Keep a bandage over the puncture site for the first 1 or 2 days. Follow-up care is a key part of your treatment and safety. Be sure to make and go to all appointments, and call your doctor if you are having problems. It's also a good idea to know your test results and keep a list of the medicines you take. When should you call for help? Call 911 anytime you think you may need emergency care. For example, call if:    · You passed out (lost consciousness).     · You have severe trouble breathing.     · You have sudden chest pain and shortness of breath, or you cough up blood.     · You have severe pain in your chest, shoulder, or belly. Call your doctor now or seek immediate medical care if:    · You have new or worse shortness of breath.     · Bright red blood has soaked through the bandage over the puncture site.     · You have pain that does not get better after you take your pain medicine.     · You are sick to your stomach or cannot keep fluids down.     · You have a fever, chills, or body aches.     · You have signs of infection, such as:  ? Increased pain, swelling, warmth, or redness. ? Red streaks leading from the puncture site. ? Pus draining from the puncture site. ? A fever.     · You have new or worse pain at the puncture site.     · You have new or worse belly swelling or bloating.     · You have trouble passing urine or stool.     · Your stools are black and tarlike or have streaks of blood.     · You have pale-colored stools along with dark urine and itching. Watch closely for changes in your health, and be sure to contact your doctor if you have any problems. Where can you learn more? Go to http://www.gray.com/  Enter S602 in the search box to learn more about \"Percutaneous Liver Biopsy: What to Expect at Home. \"  Current as of: June 17, 2021               Content Version: 13.0  © 9712-9533 [employer]. Care instructions adapted under license by [employer] (which disclaims liability or warranty for this information). If you have questions about a medical condition or this instruction, always ask your healthcare professional. [address] any warranty or liability for your use of this information. Sedation for a Medical Procedure: Care Instructions  Your Care Instructions     For a minor procedure or surgery, you will get a sedative to help you relax. This drug will make you sleepy. It is usually given in a vein (by IV). It may be used with anesthesia. There are different types of anesthesia. You and your doctor or anesthesia specialist will work together to choose the best anesthesia for you. It is usually based on your health, the procedure, and your preference. · Local anesthesia is a shot given to numb a small part of the body. · Regional anesthesia is a shot that blocks pain to a larger area of the body. · General anesthesia affects the brain and the whole body. You get it through a small tube placed in a vein (IV). Or you may breathe it in. You are unconscious and will not feel pain. You may get monitored anesthesia care (MAC). This means that an anesthesia specialist will care for you during your surgery. He or she will make sure that you get only the level of anesthesia care you need to prevent pain for your specific case. If you had anesthesia, you may feel some pain and discomfort as it wears off. If you have pain, don't be afraid to say so. Pain medicine works better if you take it before the pain gets bad. Common side effects from sedation include:  · Feeling sleepy. (Your doctors and nurses will make sure you are not too sleepy to go home.)  · Nausea and vomiting. This usually does not last long. · Feeling tired. Follow-up care is a key part of your treatment and safety.  Be sure to make and go to all appointments, and call your doctor if you are having problems. It's also a good idea to know your test results and keep a list of the medicines you take. How can you care for yourself at home? Activity    · Don't do anything for 24 hours that requires attention to detail. This includes going to work or school, making important decisions, and signing any legal documents. It takes time for the medicine effects to completely wear off.     · For your safety, you should not drive or operate any machinery that could be dangerous until the medicine wears off and you can think clearly and react easily.     · When you get home, it is important to rest until the anesthesia has worn off. Some people will feel drowsy or dizzy for up to a few hours after leaving the hospital.     · Take your time and walk slowly. Sudden changes in position may also cause nausea.     · Rest when you feel tired. Getting enough sleep will help you recover. Diet    · You can eat your normal diet, unless your doctor gives you other instructions. If your stomach is upset, try clear liquids and bland, low-fat foods like plain toast or rice.     · Drink plenty of fluids (unless your doctor tells you not to).   · Don't drink alcohol for 24 hours. Medicines    · Be safe with medicines. Read and follow all instructions on the label. ? If the doctor gave you a prescription medicine for pain, take it as prescribed. ? If you are taking opioids for pain, it is very important to take them as prescribed. Opioids can easily be misused. Misuse can lead to opioid use disorder and even death. Because of this, it is best to get off them as soon as possible. As soon as you don't need them, talk to your doctor about how to safely stop taking them. Also talk with your doctor about how to safely store and get rid of opioids.   ? If you are not taking a prescription pain medicine, ask your doctor if you can take an over-the-counter medicine.     · If you think your pain medicine is making you sick to your stomach, you can try these things. ? Take your medicine after meals (unless your doctor has told you not to). ? Ask your doctor for a different pain medicine. When should you call for help? Call 911 anytime you think you may need emergency care. For example, call if:    · You have severe trouble breathing.     · You passed out (lost consciousness). Call your doctor now or seek immediate medical care if:    · You have trouble breathing.     · You have ongoing or worsening nausea or vomiting.     · You have a fever.     · You have a new or worse headache.     · The medicine is not wearing off and you can't think clearly. Watch closely for changes in your health, and be sure to contact your doctor if:    · You do not get better as expected. Where can you learn more? Go to http://www.gray.com/  Enter G817 in the search box to learn more about \"Sedation for a Medical Procedure: Care Instructions. \"  Current as of: February 11, 2021               Content Version: 13.0  © 2006-2021 Healthwise, Incorporated. Care instructions adapted under license by [employer] (which disclaims liability or warranty for this information). If you have questions about a medical condition or this instruction, always ask your healthcare professional. [address] any warranty or liability for your use of this information.

## 2021-11-19 NOTE — PATIENT INSTRUCTIONS
Dear Cindy Courser ,    It was a pleasure seeing you today via virtual visit. We will see you again in 2 weeks for a virtual visit. If labs or imaging tests have been ordered for you today, please call the office  at [phone] 48 hours after completion to obtain the results. Your described symptoms were: Fatigue: 9 Drowsiness: 5   Depression: 9 Pain: 9   Anxiety: 9 Nausea: 6   Anorexia: 6 Dyspnea: 7   [facility] Well-Bein Constipation: Yes   Other Problem (Comment): 7 (Numbness - mouth)       This is the plan we talked about:    1. Pain   -You have overlapping pain syndromes of nociceptive (soft tissue, bone) and neuropathic (shooting, stabbing) nature  -Your taking dexamethasone for the brain metastases which is helping to also treat the inflammatory component of your pain  -You tried Cymbalta for 6 weeks and it was ineffective so you stopped taking this  -We decided to defer at this time starting another medication typically prescribed for neuropathic pain (gabapentin, pregabalin) as these medications take 4 to 6 weeks before effect and may cause side-effects (sedation) which would interfere with your plan to continue working  -Opioids are indicated for your pain and need to be part of your \"tool box\"  -Continue dexamethasone 4-mg every 6 hours  -Continue oxycodone 5-mg: take 2 pills every 4 hours as needed  -Hold ibuprofen as dexamethasone is providing anti-inflammatory effect  -Your oxycodone dose may need to be increased or the dosing interval decreased for optimal pain relief  -Continue using your body pillow and wedge pillows if these allow you to be in more comfortable position when you sleep    2. Poor sleep  -Start lorazepam 0.5-mg: take 1 to 2 pills at bedtime as needed  -Stop clonazepam    3. Constipation  -Continue daily Miralax  -Stop benefiber  -Start senna 1 to 2 pills at bedtime    4.  Mood/Fatigue/ADHD  -You've recently been diagnosed with metastatic breast cancer, you're taking high dose steroids (anxiety expected side effect) and you're sleeping poorly  -You've recently restarted therapy for emotional support  -I prescribed lorazepam today to help with your poor sleep   -Continue Vyvanse     This is what you have shared with us about Advance Care Planning:      Primary Decision Maker: Chase Bernal - Daughter - [phone]    Secondary Decision Maker: Allyn Bailey - Father - [id number] [facility] [place] 11/19/2021   Patient's [address] is: Legal Next [facility] [address] Directive None   Patient Would Like to Complete Advance Directive -           The Palliative Medicine Team is here to support you and your family.        Sincerely,      Juventino Velásquez MD and the Palliative Medicine Team

## 2021-11-19 NOTE — TELEPHONE ENCOUNTER
11/19/21 11:52 AM Called patient regarding shenzhoufu. Reports she has only taken 2 doses of oxycodone. She does not want to take it during the day due to being at work. Advised she can alternate tylenol (not to exceed 2 grams/d with elevated liver enzymes) and oxycodone prn q4h. Since she just had a liver biopsy, advised she wait 48 hrs to take NSAIDs, but then can take ibuprofen 1-2 times per day. Discussed dose. Discussed application of lidocaine patches to upper back. Sent prescription but also advised these can be found over the counter. Advised acupunture and massage may also help manage her pain -provided number for [address]. Pt reports dyspnea is likely due to anxiety and poor pain control. Denies dizziness or pleuritic CP. Advised she call back if pain not improving with these interventions.

## 2021-11-19 NOTE — PROGRESS NOTES
Palliative Medicine Office Visit  Palliative Medicine Nurse Check In  [phone]    Patient Name: Bc Dorado  YOB: 1965      Date of Office Visit: 11/19/2021    Patient states: \"  \"    From Check In Sheet (scanned in Media):  Has a medical provider talked with you about cardiopulmonary resuscitation (CPR)? [x] Yes   [] No   [] Unable to obtain    Nurse reminder to complete or update ACP FlowSheet:    Is ACP on the Problem List?    [] Yes    [x] No  IF ACP Document is ON FILE; Nurse to place ACP on Problem List     Is there an ACP Note in Chart Review/Note? [] Yes    [x] No   If NO: ALERT PROVIDER          Primary Decision Maker: Chase Bernal - Daughter - [phone]    Secondary Decision Maker: Allyn Bailey - Father - [phone] 11/19/2021   Patient's Healthcare Decision Maker is: Legal Next of Kin   Confirm Advance Directive None   Patient Would Like to Complete Advance Directive -       Is there anything that we should know about you as a person in order to provide you the best care possible?        Functional status (describe):         Last BM: 11/17/2021     accessed (date): 11/19/2021    Bottle review (for opioid pain medication):  Medication 1:   Date filled:   Directions:   # filled:   # left:   # pills taking per day:  Last dose taken:    Medication 2:   Date filled:   Directions:   # filled:   # left:   # pills taking per day:  Last dose taken:    Medication 3:   Date filled:   Directions:   # filled:   # left:   # pills taking per day:  Last dose taken:    Medication 4:   Date filled:   Directions:   # filled:   # left:   # pills taking per day:  Last dose taken:

## 2021-11-19 NOTE — TELEPHONE ENCOUNTER
Palliative Medicine  Nursing Note  [phone]  Fax [phone]      Telephone Call  Patient Name: Racquel Santoro  YOB: 1965    11/19/2021         Advance Care Planning 4/6/2021   Patient's Healthcare Decision Maker is: Legal Next of Kin   Confirm Advance Directive None   Patient Would Like to Complete Advance Directive No     Call placed to Ms. Arnel Steinberg regarding Palliative services. She is willing to see Dr. Dallas Erazo this afternoon for a new patient appt. She shares that she is not functioning well and is not sleeping due to pain. She feels the Oxycodone 5mg is not helping her pain. She is hopeful Dr. Dallas Erazo can help. Instructed on [employer] use to electronically fill out Palliative paper work and send back.   Appt scheduled for 3:30pm.      Ronnell Coker RN  Palliative Medicine

## 2021-11-19 NOTE — PROGRESS NOTES
Palliative Medicine Outpatient Services  Walker: [phone])    Patient Name: Liudmila Joe  YOB: 1965    Date of Current Visit: 11/19/21  Location of Current Visit:    [] [facility] Office  [] [facility] Office  [] [address] [address] [facility] Office  [] Home  [x]Synchronous real-time A/V virtual visit    Date of Initial Visit: 11/19/21  Referral from: Isabella Heredia MD  Primary Care Physician: Wilbur Pack MD      SUMMARY:   Liudmila Joe is a 55y.o. year old with a  history of metastatic breast cancer to brain, liver, and ?bone who was referred to Palliative Medicine by Dr. Poncho Alonzo for symptom management and psychosocial support. She was diagnosed with triple negative breast cancer in 4/2020, treated with chemotherapy, followed by bilateral mastectomies and radiation therapy completed in 1/2021. She was diagnosed with recurrent disease in 11/2021. She's scheduled with Dr. Danielle Alcantara for gamma knife on 12/1/21 and MRI spine. The patients social history includes: she is . She has 2 adult children. She works as the  for [employer]. Palliative Medicine is addressing the following current patient/family concerns: symptoms associated with diagnosis of metastatic breast cancer. Initial Referral Intake note reviewed   PALLIATIVE DIAGNOSES:       ICD-10-CM ICD-9-CM    1. Neuropathic pain  M79.2 729.2    2. Neck pain  M54.2 723.1    3. Poor sleep  Z72.820 V69.4 LORazepam (ATIVAN) 0.5 mg tablet   4. Anxiety  F41.9 300.00 LORazepam (ATIVAN) 0.5 mg tablet   5. Depression, unspecified depression type  F32. A 311    6. Fatigue, unspecified type  R53.83 780.79    7. Drug-induced constipation  K59.03 564.09      E980.5    8. Metastatic breast cancer (HCC)  C50.919 174.9 LORazepam (ATIVAN) 0.5 mg tablet          PLAN:   Patient Instructions     Dear Liudmila Joe ,    It was a pleasure seeing you today via virtual visit. We will see you again in 2 weeks for a virtual visit.     If labs or imaging tests have been ordered for you today, please call the office  at [phone] 48 hours after completion to obtain the results. Your described symptoms were: Fatigue: 9 Drowsiness: 5   Depression: 9 Pain: 9   Anxiety: 9 Nausea: 6   Anorexia: 6 Dyspnea: 7   Best Well-Bein Constipation: Yes   Other Problem (Comment): 7 (Numbness - mouth)       This is the plan we talked about:    1. Pain   -You have overlapping pain syndromes of nociceptive (soft tissue, bone) and neuropathic (shooting, stabbing) nature  -Your taking dexamethasone for the brain metastases which is helping to also treat the inflammatory component of your pain  -You tried Cymbalta for 6 weeks and it was ineffective so you stopped taking this  -We decided to defer at this time starting another medication typically prescribed for neuropathic pain (gabapentin, pregabalin) as these medications take 4 to 6 weeks before effect and may cause side-effects (sedation) which would interfere with your plan to continue working  -Opioids are indicated for your pain and need to be part of your \"tool box\"  -Continue dexamethasone 4-mg every 6 hours  -Continue oxycodone 5-mg: take 2 pills every 4 hours as needed  -Hold ibuprofen as dexamethasone is providing anti-inflammatory effect  -Your oxycodone dose may need to be increased or the dosing interval decreased for optimal pain relief  -Continue using your body pillow and wedge pillows if these allow you to be in more comfortable position when you sleep    2. Poor sleep  -Start lorazepam 0.5-mg: take 1 to 2 pills at bedtime as needed  -Stop clonazepam    3. Constipation  -Continue daily Miralax  -Stop benefiber  -Start senna 1 to 2 pills at bedtime    4.  Mood/Fatigue/ADHD  -You've recently been diagnosed with metastatic breast cancer, you're taking high dose steroids (anxiety expected side effect) and you're sleeping poorly  -You've recently restarted therapy for emotional support  -I prescribed lorazepam today to help with your poor sleep   -Continue Vyvanse     This is what you have shared with us about Advance Care Planning:      Primary Decision Maker: Daxa Benoit - Daughter - [phone]    Secondary Decision Maker: Navid Dalal - Father - [address] [facility] Bib[address] Lizet 2021   Patient's [address] is: Legal Next of [address] Directive None   Patient Would Like to Complete Advance Directive -           The Palliative Medicine Team is here to support you and your family.        Sincerely,      Mirta Puentes MD and the Palliative Medicine Team       GOALS OF CARE / TREATMENT PREFERENCES:   [====Goals of Care====]  GOALS OF CARE:  Patient / health care proxy stated goals: See Patient Instructions / Summary    TREATMENT PREFERENCES:   Code Status:  [x] Attempt Resuscitation       [] Do Not Attempt Resuscitation    Advance Care Planning:  [x] The Pall [facility] Interdisciplinary Team has updated the ACP Navigator with Decision Maker and Patient Capacity      Primary Decision Maker: Daxa Benoit - Daughter - [phone]    Secondary Decision Maker: Navid Dalal - Father - [id number] [facility] A Planning 2021   Patient's Healthcare Decision Maker is: Legal Next of Kin   Confirm Advance Directive None   Patient Would Like to Complete Advance Directive -       Other:  (If patient appropriate for POST, consider using PALLPOST smart phrase here)    The palliative care team has discussed with patient / health care proxy about goals of care / treatment preferences for patient.  [====Goals of Care====]     PRESCRIPTIONS GIVEN:     Medications Ordered Today   Medications    LORazepam (ATIVAN) 0.5 mg tablet     Si to 2 tabs at bedtime as needed for sleep     Dispense:  60 Tablet     Refill:  0           FOLLOW UP:     Future Appointments   Date Time Provider Dorota Davenport   2021  8:15 AM Lex Martinez NP [facility] BS AMB   2022  8:30 AM Foreign Donaldson, NINA [facility] BS AMB           PHYSICIANS INVOLVED IN CARE:   Patient Care Team:  Annelise Malik MD as PCP - General (Family Medicine)  Chele Crowley MD (Breast Surgery)  Venus Lennox, MD as Physician (Plastic Surgery)  Mary Woodruff MD as Physician (Radiation Oncology)  Todd Corrigan MD as Physician (Palliative Medicine)       HISTORY:   Reviewed patient-completed ESAS and advance care planning form. Reviewed patient record in prescription monitoring program.    CHIEF COMPLAINT:   Chief Complaint   Patient presents with    Neck Pain       HPI/SUBJECTIVE:    The patient is: [x] Verbal / [] Nonverbal     She was diagnosed with breast cancer 2 years ago. She \"did everything\" - has chemo, then had bilateral mastectomies, then radiation therapy and she did OK with all of that. She has a family history of breast cancer so she wans't surprised when she was diagnosed. She worked through all of her treatment except for the last few weeks of chemo. She started feeling some back pain about 2 months ago. It came and went so she didn't think too much about it. Then she started experiencing body aches but nothing too bad. Then her chin started to feel numb and the numbness started to go up over her lower lip. That's when she started to worry and called Dr. Sky Matamoros who ordered the scans. She didn't have any pain when she was first diagnosed but she's having a lot of trouble with pain now (see below). Nothing seems to be helping a lot. She took Cymbalta for 6 weeks and that didn't help, it just changed her affect so she stopped taking it. She's tried CBD product which didn't help. She tried oxycodone, just started this week and didn't notice much difference. She's taken 1 pill at a time and noticed no difference. One night, she took a pill every 4 hours overnight and it didn't help much. She didn't feel confused and didn't feel her ability to think clearly had been affected.   But she hasn't taken it during the day because she's either had to work or had to drive to get another test done. She isn't sleeping well, just an hour or two at a time. She has clonazepam which isn't helping much. She feels very fatigued. She would feel better if she could just sleep. She's constipated. She's been taking benefiber and Miralax and feels like she needs to go but having trouble. Clinical Pain Assessment (nonverbal scale for nonverbal patients):   [++++ Clinical Pain Assessment++++]  [++++Pain Severity++++]: Pain: 9  [++++Pain Character++++]: aching base of skull, upper back ,shoulders; shooting, stabbing in legs  [++++Pain Duration++++]: since 2021  [++++Pain Effect++++]: interfering with ability to work and sleep  [++++Pain Factors++++]: unable to identify provoking factors  [++++Pain Frequency++++]: constant with varying intensity  [++++Pain Location++++]: base of skull, upper back, shoulders, spine; legs  [++++ Clinical Pain Assessment++++]       FUNCTIONAL ASSESSMENT:     Palliative Performance Scale (PPS):  PPS: 70       PSYCHOSOCIAL/SPIRITUAL SCREENING:     Any spiritual / Adventist concerns:  [] Yes /  [x] No    Caregiver Burnout:  [] Yes /  [x] No /  [] No Caregiver Present      Anticipatory grief assessment:   [x] Normal  / [] Maladaptive       ESAS Anxiety: Anxiety: 9    ESAS Depression: Depression: 9       REVIEW OF SYSTEMS:     The following systems were [x] reviewed / [] unable to be reviewed  Systems: constitutional, ears/nose/mouth/throat, respiratory, gastrointestinal, genitourinary, musculoskeletal, integumentary, neurologic, psychiatric, endocrine. Positive findings noted below.   Modified ESAS Completed by: provider   Fatigue: 9 Drowsiness: 5   Depression: 9 Pain: 9   Anxiety: 9 Nausea: 6   Anorexia: 6 Dyspnea: 7   Best Well-Bein Constipation: Yes   Other Problem (Comment): 7 (Numbness - mouth)          PHYSICAL EXAM:     Wt Readings from Last 3 Encounters: 11/17/21 186 lb (84.4 kg)   11/17/21 186 lb 1.1 oz (84.4 kg)   11/17/21 186 lb (84.4 kg)     There were no vitals taken for this visit. Last bowel movement: See Nursing Note    Constitutional    [x] Appears well-developed and well-nourished in no apparent distress    [] Abnormal:  Mental status  [x] Alert and awake  [x] Oriented to person/place/time  [x] Able to follow commands  [] Abnormal:   Eyes  [x] EOM normal   [x] Sclera normal   [x] No visible ocular discharge  [] Abnormal:   HENT  [x] Normocephalic, atraumatic  [x] Mouth/Throat: Moist mucous membranes   [x] External Ears normal  [] Abnormal:  Neck  [x] No visualized mass  [] Abnormal:  Pulmonary/Chest   [x] Respiratory effort normal  [x] No visualized signs of difficulty breathing or respiratory distress  [] Abnormal:  Musculoskeletal  [x] Normal gait with no signs of ataxia  [x] Normal range of motion of neck  [] Abnormal:  Neurological:   [x] No facial asymmetry (Cranial nerve 7 motor function)  [x] No gaze palsy  [] Abnormal:   Skin  [x] No significant exanthematous lesions or discoloration noted on facial skin  [] Abnormal:                                  Psychiatric  [x] Normal affect  [x] No hallucinations  [] Abnormal:    Other pertinent observable physical exam findings:    Due to this being a TeleHealth evaluation, many elements of the physical examination are unable to be assessed.              HISTORY:     Past Medical History:   Diagnosis Date    ADHD     Anxiety and depression     Cancer ([facility] [address]     LEFT BREAST CA    Ill-defined condition     chemotherapy    Limb alert care status     NO STICKS OR BLOOD PRESSURE IN LEFT ARM    Sleep apnea     MILD -NO CPAP      Past Surgical History:   Procedure Laterality Date    HX BREAST BIOPSY Left 4/15/2020    BREAST BIOPSY performed by Zohaib Brown MD at [address] [place] [address] HX BREAST RECONSTRUCTION Bilateral 10/8/2020    BREAST RECONSTRUCTION performed by Osito Bland [facility] at [facility] AMBULATORY OR  HX  SECTION  98, 99    HX GI      COLONOSCOPY    HX MASTECTOMY Bilateral 10/8/2020    LEFT BREAST MASTECTOMY WITH AXILLARY NODE DISSECTION AND RIGHT SIMPLE MASTECTOMY / IMMEDIATE BILATERAL BREAST RECONSTRUCTION WITH TISSUE EXPANDERS AND ALLODERM GRAFTS performed by Stanislav Cervantes MD at \[facility]"" AMBULATORY OR    HX ORTHOPAEDIC      RIGHT ELBOW-FATTY MASS    HX VASCULAR ACCESS Right 2020      Family History   Problem Relation Age of Onset    Cancer Mother         Breast    Cancer Maternal Aunt         Breast    Cancer Paternal Uncle     Cancer Maternal Aunt         Breast    Cancer Paternal Uncle       History reviewed, no pertinent family history. Social History     Tobacco Use    Smoking status: Light Tobacco Smoker    Smokeless tobacco: Never Used    Tobacco comment: Quit 2020   Substance Use Topics    Alcohol use: Not Currently     Alcohol/week: 6.0 standard drinks     Types: 6 Glasses of wine per week     Comment: PER PT ON 10/1/2020     No Known Allergies   Current Outpatient Medications   Medication Sig    LORazepam (ATIVAN) 0.5 mg tablet 1 to 2 tabs at bedtime as needed for sleep    oxyCODONE IR (ROXICODONE) 5 mg immediate release tablet Take 1 Tablet by mouth every four (4) hours as needed for Pain for up to 30 days. Max Daily Amount: 30 mg.    dexAMETHasone (DECADRON) 4 mg tablet Take 4 mg by mouth every six (6) hours.  lisdexamfetamine dimesylate (VYVANSE PO) Take 30 mg by mouth daily.  lidocaine (LIDODERM) 5 % 1 Patch by TransDERmal route every twelve (12) hours. Apply patch to the affected area for 12 hours a day and remove for 12 hours a day. (Patient not taking: Reported on 2021)    DULoxetine (CYMBALTA) 30 mg capsule Take 1 Capsule by mouth daily. (Patient not taking: Reported on 2021)    docusate sodium (COLACE) 100 mg capsule Take 100 mg by mouth as needed for Constipation.  (Patient not taking: Reported on 2021)     No current facility-administered medications for this visit. LAB DATA REVIEWED:     Lab Results   Component Value Date/Time    WBC 13.6 (H) 11/17/2021 08:41 AM    HGB 14.5 11/17/2021 08:41 AM    PLATELET 830 44/94/9990 08:41 AM     Lab Results   Component Value Date/Time    Sodium 136 11/17/2021 08:41 AM    Potassium 3.3 (L) 11/17/2021 08:41 AM    Chloride 101 11/17/2021 08:41 AM    CO2 28 11/17/2021 08:41 AM    BUN 24 (H) 11/17/2021 08:41 AM    Creatinine 0.91 11/17/2021 08:41 AM    Calcium 9.8 11/17/2021 08:41 AM      Lab Results   Component Value Date/Time    Alk.  phosphatase 714 (H) 11/17/2021 08:41 AM    Protein, total 8.2 11/17/2021 08:41 AM    Albumin 3.6 11/17/2021 08:41 AM    Globulin 4.6 (H) 11/17/2021 08:41 AM     Lab Results   Component Value Date/Time    INR 1.0 04/23/2020 10:21 AM    Prothrombin time 9.6 04/23/2020 10:21 AM      No results found for: IRON, FE, TIBC, IBCT, PSAT, FERR        CONTROLLED SUBSTANCES SAFETY ASSESSMENT (IF ON CONTROLLED SUBSTANCES):     Reviewed opioid safety handout:  [x] Yes   [] No  24 hour opioid dose >150mg morphine equivalent/day:  [] Yes   [x] No  Benzodiazepines:  [] Yes   [x] No  Sleep apnea:  [] Yes   [x] No  Urine Toxicology Testing within last 6 months:  [] Yes   [x] No  History of or new aberrant medication taking behaviors:  [] Yes   [x] No  Has Narcan been prescribed [] Yes   [x] No          Total time:   Counseling / coordination time:   > 50% counseling / coordination?:     Consent:  He and/or health care decision maker is aware that that he may receive a bill for this telehealth service, depending on his insurance coverage, and has provided verbal consent to proceed: YES    CPT Codes 13364-08890 for Established Patients may apply to this Telehealth Visit    Pursuant to the emergency declaration under the [address] and the [place] [address] waiver authority and the [employer] Act, this Virtual  Visit was conducted, with patient's consent, to reduce the patient's risk of exposure to COVID-19 and provide continuity of care for an established patient. Services were provided through a video synchronous discussion virtually to substitute for in-person clinic visit.

## 2021-11-22 NOTE — TELEPHONE ENCOUNTER
[address] at [place]  [phone]    11/22/2021--Shanta and I have been trying to contact [address] [place] [address] Pathology in regards to patient's PDL-1 on the Specimen [id number], but have not received any answer. 11/22/2021 at 1:47 pm--This user attempted to call [address] [place] [address] Pathology to see if/when patient's PDL-1 results are going to be back and I did not receive an answer. I left a detailed message stating to call our office back along with the office phone number. 11/22/2021--This user called and spoke with someone in the Pathology department at [address] [place] [address] inquiring when patient's PDL-1 results would be back, they stated that they had been trying to get a hold of our office due to they need a copy of patient's insurance information before they can process the testing. They stated that they just received the request on Thursday. I let them know that I would go ahead and fax over the information with the provided fax number ([phone]). Fax confirmation received also.

## 2021-11-22 NOTE — TELEPHONE ENCOUNTER
11/22/21 at 3:32 pm - Called the STAT scheduling line and verified the patient's ID x 2 with Aby Driver. Aby Driver stated that she could not reach the department at [facility] and they are unable to schedule without their approval.  Called the department at [facility] and their soonest available appointment is 11/30/21. Informed Aby Driver that the patient is symptomatic and having trouble breathing and NINA Leong recommends that this be done as soon as possible. Aby Driver called the department at [id number] [place] [address] and scheduled the patient for Wednesday 11/24/21 at 11:00 am at [facility] with an arrival time of 10:30 am for registration in MOB 1 and there are no special instructions except that the patient needs a . 11/22/21 at 4:00 pm - Called the patient and verified ID x 2. Informed the patient that she has been scheduled for a thoracentesis this Wednesday 11/24/21 at 11:00 am with an arrival time of 10:30 am in MOB 1 at the [facility] and that there are no special instructions except that she needs a  and asked if the patient needs the address. The patient verbalized understanding and stated that she does not need the address and denied any questions or concerns.

## 2021-11-23 NOTE — PROGRESS NOTES
[facility] [place] [facility]  [address] [facility], [address]  Vannesa Sender: [phone]  F: [phone]      Reason for Visit:   Fiona Banks is a 55 y.o. female who is seen in consultation at the request of Dr. Derik Major for evaluation of therapy for breast cancer    Rad onc:  Dr. Jackelyn Maldonado  Breast surg:  Dr. 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 Interval history:  Pt reported right facial numbness, leading to the brain MRI and staging studies we ordered showing recurrent metastatic disease    In today for follow up. Reports gr 2 loss of appetite, gr 2-3 constipation, no relief with miralax, gr 2 diarrhea, gr 2 nausea, gr 1 vomiting, gr 2 hot flashes, gr 3 insomnia, gr 1 loss of cognition and concentration, 9/10 pain in her neck and back, gr 1-2 sob, gr 1 itching, gr 1 headache    Was seen in [address] [place] [address] ED on 11/22/21 for SOB. See scans below    FH:   Mother with breast cancer at age 79; maternal aunt with breast cancer in her 45s; maternal aunt with breast cancer at age 61; no ovarian, prostate, or pancreas cancer    Past Medical History:   Diagnosis Date    ADHD     Anxiety and depression     Cancer ([facility] [address]     LEFT BREAST CA    Ill-defined condition chemotherapy    Limb alert care status     NO STICKS OR BLOOD PRESSURE IN LEFT ARM    Sleep apnea     MILD -NO CPAP      Past Surgical History:   Procedure Laterality Date    HX BREAST BIOPSY Left 4/15/2020    BREAST BIOPSY performed by Ashley Cid MD at [address] [place] [address] HX BREAST RECONSTRUCTION Bilateral 10/8/2020    BREAST RECONSTRUCTION performed by Adela Bolton MD at [facility] AMBULATORY OR    HX  SECTION  98, 80    HX GI      COLONOSCOPY    HX MASTECTOMY Bilateral 10/8/2020    LEFT BREAST MASTECTOMY WITH AXILLARY NODE DISSECTION AND RIGHT SIMPLE MASTECTOMY / IMMEDIATE BILATERAL BREAST RECONSTRUCTION WITH TISSUE EXPANDERS AND ALLODERM GRAFTS performed by Ashley Cid MD at [facility] AMBULATORY OR    HX ORTHOPAEDIC      RIGHT ELBOW-FATTY MASS    HX VASCULAR ACCESS Right 2020      Social History     Tobacco Use    Smoking status: Light Tobacco Smoker    Smokeless tobacco: Never Used    Tobacco comment: Quit 2020   Substance Use Topics    Alcohol use: Not Currently     Alcohol/week: 6.0 standard drinks     Types: 6 Glasses of wine per week     Comment: PER PT ON 10/1/2020      Family History   Problem Relation Age of Onset    Cancer Mother         Breast    Cancer Maternal Aunt         Breast    Cancer Paternal Uncle     Cancer Maternal Aunt         Breast    Cancer Paternal Uncle      Current Outpatient Medications   Medication Sig    LORazepam (ATIVAN) 0.5 mg tablet 1 to 2 tabs at bedtime as needed for sleep    oxyCODONE IR (ROXICODONE) 5 mg immediate release tablet Take 1 Tablet by mouth every four (4) hours as needed for Pain for up to 30 days. Max Daily Amount: 30 mg.    dexAMETHasone (DECADRON) 4 mg tablet Take 4 mg by mouth every six (6) hours.  docusate sodium (COLACE) 100 mg capsule Take 100 mg by mouth as needed for Constipation.  lisdexamfetamine dimesylate (VYVANSE PO) Take 30 mg by mouth daily.     lidocaine (LIDODERM) 5 % 1 Patch by TransDERmal route every twelve (12) hours. Apply patch to the affected area for 12 hours a day and remove for 12 hours a day. (Patient not taking: Reported on 11/23/2021)    DULoxetine (CYMBALTA) 30 mg capsule Take 1 Capsule by mouth daily. (Patient not taking: Reported on 11/23/2021)     No current facility-administered medications for this visit. No Known Allergies     Review of Systems: A complete review of systems was obtained, negative except as described above. Physical Exam:     Visit Vitals  BP (!) 128/99   Pulse 91   Temp 98.3 °F (36.8 °C) (Temporal)   Resp 18   Ht 5' 3\" (1.6 m)   Wt 179 lb (81.2 kg)   SpO2 98%   BMI 31.71 kg/m²     ECOG PS: 0  General: no distress; some expressive aphasia  Eyes: L eyelid ptosis  HENT: oropharynx clear  Neck: supple  Respiratory: normal respiratory effort  CV: no peripheral edema  GI: nontender  Skin: no rashes; no ecchymoses; no petechiae        Results:     Lab Results   Component Value Date/Time    WBC 13.6 (H) 11/17/2021 08:41 AM    HGB 14.5 11/17/2021 08:41 AM    HCT 44.9 11/17/2021 08:41 AM    PLATELET 678 06/18/3239 08:41 AM    MCV 89.4 11/17/2021 08:41 AM    ABS. NEUTROPHILS 10.1 (H) 11/17/2021 08:41 AM     Lab Results   Component Value Date/Time    Sodium 136 11/17/2021 08:41 AM    Potassium 3.3 (L) 11/17/2021 08:41 AM    Chloride 101 11/17/2021 08:41 AM    CO2 28 11/17/2021 08:41 AM    Glucose 112 (H) 11/17/2021 08:41 AM    BUN 24 (H) 11/17/2021 08:41 AM    Creatinine 0.91 11/17/2021 08:41 AM    GFR est AA >60 11/17/2021 08:41 AM    GFR est non-AA >60 11/17/2021 08:41 AM    Calcium 9.8 11/17/2021 08:41 AM     Lab Results   Component Value Date/Time    Bilirubin, total 0.3 11/17/2021 08:41 AM    ALT (SGPT) 84 (H) 11/17/2021 08:41 AM    Alk.  phosphatase 714 (H) 11/17/2021 08:41 AM    Protein, total 8.2 11/17/2021 08:41 AM    Albumin 3.6 11/17/2021 08:41 AM    Globulin 4.6 (H) 11/17/2021 08:41 AM       3/31/20  3D mammogram:  1.6 cm mass in L breast  US L breast  3:00 left breast mass 1.6 cm, multiple LN in L ax tail    4/14/2020 CT c/a/p:  IMPRESSION: Left breast mass. No evidence for metastatic disease in the chest  abdomen or pelvis. There are no target lesions for RECIST classification. 4/14/2020 Bone Scan:  IMPRESSION: No evidence of bony metastatic disease. 5/4/2020 MRI breast:  IMPRESSION:     Right Breast:  1. BI-RADS Assessment Category 1: Negative. No evidence of breast carcinoma  within the right breast.     Left Breast:  1. BI-RADS Assessment Category 6: Known biopsy proven malignancy- Appropriate  action should be taken. Large area of malignant enhancement, occupying most of  the middle and posterior third of the left breast upper outer and lower-outer  quadrants, from 2:00 to 4:00. Malignancy is much larger than it had appeared  mammographically. Greatest measurement is 7 x 4.2 x 3 cm. Contained within this  large area of enhancement is the dominant 2.5 cm mass, which represents the  mammographic and sonographic correlate. 2. BI-RADS Assessment Category 6: Known biopsy proven malignancy- Appropriate  action should be taken. Pathologically proven level 1 left axillary  lymphadenopathy, with numerous enlarged, malignant lymph nodes. There is also  level 2 left axillary lymphadenopathy. 3. Retraction of the skin of the lateral left breast, which is thickened. However, no evidence of malignant skin invasion/involvement. 4. The malignant mass closely approximates the pectoral muscle, but there is no  evidence of direct muscle invasion/involvement.     RECOMMENDATIONS:  Appropriate action is recommended. The patient is undergoing neoadjuvant  chemotherapy. There is no evidence of contralateral disease. 7/30/2020 CTA chest at [facility]: Normal CTA of the chest with no pulmonary embolism or acute aortic abnormalities identified. Consider mild CHF/pulmonary edema    11/12/21 ct c/a/p  FINDINGS:      CHEST WALL: No mass or axillary lymphadenopathy.   THYROID: No nodule. MEDIASTINUM: No mass or lymphadenopathy. HENNY: No mass or lymphadenopathy. THORACIC AORTA: No dissection or aneurysm. MAIN PULMONARY ARTERY: Normal in caliber. TRACHEA/BRONCHI: Patent. ESOPHAGUS: No wall thickening or dilatation. HEART: Normal in size. PLEURA: Small left pleural effusion. LUNGS: Left lower lobe atelectasis. Pleural-based opacities are seen in the left  upper lobe, the largest of which measures 3.4 x 1.2 cm.     LIVER: There is a new 15 mm hypodense lesion in the right hepatic lobe. Small  cyst in the right hepatic lobe is again noted. BILIARY TREE: Gallbladder is within normal limits. CBD is not dilated. SPLEEN: within normal limits. PANCREAS: No mass or ductal dilatation. ADRENALS: Unremarkable. KIDNEYS: No mass, calculus, or hydronephrosis. STOMACH: Unremarkable. SMALL BOWEL: No dilatation or wall thickening. COLON: No dilatation or wall thickening. APPENDIX: Within normal limits. PERITONEUM: No ascites or pneumoperitoneum. RETROPERITONEUM: No lymphadenopathy or aortic aneurysm. REPRODUCTIVE ORGANS: Myomatous uterus is again demonstrated. URINARY BLADDER: Decompressed. BONES: Tiny sclerotic lesion right femoral neck is new. No additional convincing  lesions are identified. ABDOMINAL WALL: No mass or hernia. ADDITIONAL COMMENTS: N/A     IMPRESSION  New small hypodense hepatic lesion is concerning for metastatic disease. Nonspecific tiny sclerotic lesion right femoral neck near compared to the prior  exam, small metastatic focus should be considered. Small left pleural effusion  with underlying atelectasis. Pleural-based opacities in the left upper lobe are  most likely related to prior radiation therapy, however close follow-up is  recommended to evaluate for any interval improvement. 11/12/21 bone scan  FINDINGS: There is slight increase in activity right sixth seventh and eighth  ribs anteriorly and possibly left seventh rib anteriorly.  There is vague  increased activity in the hips and there is spotty activity in the femurs. There  is a small focus of activity right tibia. There is increased linear activity in  the fibula. There is question of slight increased activity L1 vertebral body. .     . . Incidental renal imaging shows no abnormality.      IMPRESSION  1. There is suspicion of vague focus of increasing bony activity as described  above which is nonspecific but may represent bony metastatic disease and  correlation with plain films of the pelvis, femurs and right tibia may be  helpful for further assessment    11/12/21 MRI brain  FINDINGS:  There is a left paracentral frontal enhancing mass measuring approximately 1.6 x  1.7 x 1.7 cm located immediately anterior to the genu the corpus callosum. There  is surrounding edema. Adjacent enhancement in the leptomeninges of the  interhemispheric fissure bilaterally. 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     The ventricular size and configuration are within normal limits. Normal signal demonstrated in the cerebral hemispheres, brain stem and  cerebellum. No abnormal areas of intracranial enhancement. No abnormal diffusion. No evidence of intracranial hemorrhage, acute infarct, mass or abnormal  extra-axial fluid collections. Normal flow-voids are present in the vertebral, basilar and carotid artery  systems. 3-D T1 postcontrast images suggests abnormal enhancement in the medial right  internal auditory canal. Not confirmed on standard axial thicker slice T1. Therefore possibly susceptibility artifact. Correlate clinically and with  imaging follow-up if clinically indicated. Diminished fat signal in the skull base and cervical spine marrow may be  treatment related. Mild diffuse dural enhancement.     IMPRESSION  1.  Approximately 1.7 cm left frontal mass extending interhemispheric fissure  with adjacent leptomeningeal involvement. Associated edema. 2. Questionable focus of enhancement in the medial right internal auditory canal  versus susceptibility artifact. 3. Reduced fat signal in the marrow may be treatment related. 11/22/21 cr 1.03  ALT 51  AST 59  t bili 0.4    11/22/21 CTA chest  Moderate L pleural effusion, no PE  Pleural based atelectasis or scarring, producing a mass-like appearance in the ant subpleural TIMOTHY, most likely resulting from previous XRT to the L ant chest wall    11/22/21 MRI t-spine  Diffuse marrow signal abnormalities with low signal T1 and diffuse heterogeneous enhancement    Records reviewed and summarized above. Pathology report(s) reviewed above. Radiology report(s) reviewed above. Assessment/plan:   1. Left IMC, gr 2-3, triple negative, LN + by core:  cT1c cN1a cM0, unifocal, stage IIA both anatomic and prognostic  Postmenopausal    S/p neoadj chemo with B-59, pCR    11/12/21 Recurrent breast cancer to brain, LM, liver, possibly bone. Will send her original tumor for PD-L1 testing, keytruda, pending still. If +, will unblind her from B-59 to see if she received atezolizumab. If +, chemo + pembro would be recommended. If negative, possibly socituzumab govitecan. This decision is pending treatment for #2 below    Discussed that while this is treatable, it is not curable, goal of therapy is palliative. Discussed that the duration of therapy would be lifetime    TTE on 4/13/2020, EF 61%, TTE at [facility] on 7/30/2020, EF 60%, TTE on 10/21/20, EF 61%. 11/17/21 TTE EF 60%    See # 2. Discussed that I am extremely worried about her in the short term. Discussed no driving. 2. Brain mets/ LM disease:  Discussed with Dr. Sasha Thompson and Dr. Brittany Christianson. on dex 4 mg PO q6h. Planning to start WBXRT ASAP, as soon as tomorrow 11/24/21.   She is agreeable     Starting memantine 5 mg daily for 7 days, then 5 mg bid for 7 days then 10 mg in the am and 5 mg in the pm for 7 days, then 10 mg bid for 7 days, for 6 months, rx in    Dr. Timothy Sandoval on [address] placement on 11/29/21    discussed IT MTX.  IT MTX 10 mg twice weekly x 4 weeks; the weekly x 4 weeks, then monthly x at least 4. Median OS 3-6 months. Weeks if no therapy. Discussed this with her    Side effects include but not limited to severe nausea, vomiting, diarrhea, aseptic meningitis, encephalopathy, transverse myelopathy, arachnoiditis, and myelosuppression, headache, infection. Would give through an [place]. Premeds:  Dex 8 mg, zofran 16 mg, ativan 1 mg. Give leucovorin PO 10 mg bid for 3 days starting day 1 (can give continuously for first month).       For future:  Intraventricular topotecan 0.4 mg twice weekly x 6 weeks, then weekly x 6 then twice monthly x 4 months, then monthly      May decide to give topotecan first since may give with systemic chemo. Will discuss with colleagues. Has MRI c and L spine scheduled for tomorrow at [facility], may need to reschedule, she will let me know    3. L pleural effusion:  IR thoracentesis on 11/24/21, cytology ordered to be sent    4. Emotional well being:  She has excellent support and is coping well with her disease. She started counseling with Geraldine Rachel on 7/24/2020. Taking CBD gummies. Taking ativan 0.5 mg tablet. She has a son and daughter, daughter lives with her, per patient, suffers from depression. Will get SW involved    5. Genetic testing:   Discussed potential ramifications of a positive test including the potential need for bilateral mastectomies and bilateral oophorectomies and the risk then for her family members to also have a mutation. VUS discussed also. Testing performed 4/28/20 by blood, negative. 6. Pain due to cancer:  No longer taking cymbalta; have referred to palliative care, Dr. Justin Schulte is involved. taking vyvanse; oxycodone 10 mg q4h , but only taking at night, fentanyl patch ordered, but pharmacy did not have yet, warned about constipation.   She will try to  fentanyl today    7. Constipation:  Trying fiber, miralax, senna; recommend mag citrate until relieved    8. Nausea:  rx compazine and zofran ODT    > 40 minutes were spent with this patient with > 50% of that time spent in face to face counseling. S/p covid 19 vaccine      I appreciate the opportunity to participate in Ms. Brayden Kurtz's care. Signed By: Sundeep Rice MD      No orders of the defined types were placed in this encounter.

## 2021-11-23 NOTE — PROGRESS NOTES
Naveen Grimaldo is a 55 y.o. female Follow up for the evaluation of metastatic breast cancer. 1. Have you been to the ER, urgent care clinic since your last visit? Hospitalized since your last visit? No    2. Have you seen or consulted any other health care providers outside of the [address] [place] [address] since your last visit? Include any pap smears or colon screening.  No

## 2021-11-24 NOTE — DISCHARGE INSTRUCTIONS
[facility].  Special Procedures/Radiology Department    Radiologist: Jewels Ervin    Date: November 24, 2021      Thoracentesis Discharge Instructions    You may have an aching pain in the puncture site tonight. Take Tylenol, as directed on the label, for pain or discomfort. Avoid ibuprofen (Advil, Motrin) and aspirin products for the next 48 hours as these drugs may cause you to bleed. Resume your previous diet and follow the medication reconciliation form. Rest for the next 24 hours. If you experience shortness of breath (other than your normal breathing pattern) difficulty breathing, or have severe chest pain, call 911 and go to the nearest Emergency Room.     Be sure to follow up with your referring physician as soon as possible    Other:

## 2021-11-25 NOTE — PROGRESS NOTES
Patients daughter called stating that her mother started RT started   On fentanyl and oxycodone   Pain is severe   She takes decadron 4 mg every 6 hours  She just took 5 mg of oxycodone 15 mins ago.      I told them to give another 15 mins and if pain is improved then give another 5 mg in 3 hours    If pain no better then go to the ER  Also asked them to call palliative care

## 2021-11-26 NOTE — ED PROVIDER NOTES
61-year-old female presenting ER with report of continued and worsened headache as well as having neck pain and body pains. Patient has history of breast cancer with recurrence with mets to the brain and liver. Patient currently undergoing radiation therapy for the mets in the brain. Not on chemo at this time. Being followed by palliative care. Patient was previously taking oxycodone which makes her sick and increased from 5 to 10 mg and has a fentanyl patch however this is not improving patient's symptoms. Denies any numbness any weakness just generalized fatigue. Patient has some shortness of breath which has been ongoing and has known pleural effusions with recent tap showing signs of cancer. Patient followed by Dr. Antwan Quezada, hematology/oncology. No fevers or chills.   Patient is currently on Decadron           Past Medical History:   Diagnosis Date    ADHD     Anxiety and depression     Cancer ([facility] [address]     LEFT BREAST CA    Ill-defined condition     chemotherapy    Limb alert care status     NO STICKS OR BLOOD PRESSURE IN LEFT ARM    Sleep apnea     MILD -NO CPAP       Past Surgical History:   Procedure Laterality Date    HX BREAST BIOPSY Left 4/15/2020    BREAST BIOPSY performed by [facility] at [facility] MAIN OR    HX BREAST RECONSTRUCTION Bilateral 10/8/2020    BREAST RECONSTRUCTION performed by Lizett Marie MD at [facility] OR    HX  SECTION  98, 80    HX GI      COLONOSCOPY    HX MASTECTOMY Bilateral 10/8/2020    LEFT BREAST MASTECTOMY WITH AXILLARY NODE DISSECTION AND RIGHT SIMPLE MASTECTOMY / IMMEDIATE BILATERAL BREAST RECONSTRUCTION WITH TISSUE EXPANDERS AND ALLODERM GRAFTS performed by Lonnie Patrick MD at [facility] OR    HX ORTHOPAEDIC      RIGHT ELBOW-FATTY MASS    HX VASCULAR ACCESS Right 2020         Family History:   Problem Relation Age of Onset    Cancer Mother         Breast    Cancer Maternal Aunt         Breast    Cancer Paternal April [place] Cancer Maternal Aunt         Breast    Cancer Paternal Uncle        Social History     Socioeconomic History    Marital status:      Spouse name: Not on file    Number of children: Not on file    Years of education: Not on file    Highest education level: Not on file   Occupational History    Not on file   Tobacco Use    Smoking status: Light Tobacco Smoker    Smokeless tobacco: Never Used    Tobacco comment: Quit April 2020   Substance and Sexual Activity    Alcohol use: Not Currently     Alcohol/week: 6.0 standard drinks     Types: 6 Glasses of wine per week     Comment: PER PT ON 10/1/2020    Drug use: Never    Sexual activity: Not on file   Other Topics Concern    Not on file   Social History Narrative    Not on file     Social Determinants of Health     Financial Resource Strain:     Difficulty of Paying Living Expenses: Not on file   Food Insecurity:     Worried About Running Out of Food in the Last Year: Not on file    Sasha of Food in the Last Year: Not on file   Transportation Needs:     Lack of Transportation (Medical): Not on file    Lack of Transportation (Non-Medical):  Not on file   Physical Activity:     Days of Exercise per Week: Not on file    Minutes of Exercise per Session: Not on file   Stress:     Feeling of Stress : Not on file   Social Connections:     Frequency of Communication with Friends and Family: Not on file    Frequency of Social Gatherings with Friends and Family: Not on file    Attends [employer] Services: Not on file    Active Member of Clubs or Organizations: Not on file    Attends Club or Organization Meetings: Not on file    Marital Status: Not on file   Intimate Partner Violence:     Fear of Current or Ex-Partner: Not on file    Emotionally Abused: Not on file    Physically Abused: Not on file    Sexually Abused: Not on file   Housing Stability:     Unable to Pay for Housing in the Last Year: Not on file    Number of [place] in the Last Year: Not on file    Unstable Housing in the Last Year: Not on file         ALLERGIES: Patient has no known allergies. Review of Systems   Constitutional: Positive for activity change, appetite change and fatigue. Negative for chills and fever. HENT: Negative for congestion and sore throat. Eyes: Negative for pain. Respiratory: Negative for shortness of breath. Cardiovascular: Negative for chest pain. Gastrointestinal: Positive for nausea. Negative for abdominal pain, diarrhea and vomiting. Genitourinary: Negative for dysuria and flank pain. Musculoskeletal: Positive for myalgias and neck pain. Negative for back pain. Skin: Negative for rash. Neurological: Positive for headaches. Negative for dizziness. Vitals:    11/26/21 0140   BP: 131/84   Pulse: 69   Resp: 16   Temp: 97.5 °F (36.4 °C)   SpO2: 99%   Weight: 81.2 kg (179 lb)   Height: 5' 3\" (1.6 m)            Physical Exam  Constitutional:       Appearance: Normal appearance. She is well-developed. HENT:      Head: Normocephalic. Mouth/Throat:      Pharynx: Oropharynx is clear. Eyes:      Extraocular Movements: Extraocular movements intact. Conjunctiva/sclera: Conjunctivae normal.      Pupils: Pupils are equal, round, and reactive to light. Cardiovascular:      Rate and Rhythm: Normal rate and regular rhythm. Pulmonary:      Effort: Pulmonary effort is normal. No respiratory distress. Breath sounds: Examination of the right-lower field reveals decreased breath sounds. Examination of the left-lower field reveals decreased breath sounds. Decreased breath sounds present. No wheezing, rhonchi or rales. Abdominal:      General: Bowel sounds are normal.      Palpations: Abdomen is soft. Tenderness: There is no abdominal tenderness. Musculoskeletal:         General: Normal range of motion. Cervical back: Normal range of motion and neck supple. Skin:     General: Skin is warm.       Capillary Refill: Capillary refill takes less than 2 seconds. Findings: No rash. Neurological:      General: No focal deficit present. Mental Status: She is alert and oriented to person, place, and time. Cranial Nerves: No cranial nerve deficit. Sensory: No sensory deficit. Motor: No weakness. Comments: No gross motor or sensory deficits          MDM  Number of Diagnoses or Management Options  Metastatic cancer to brain (HCC)  Nausea and vomiting, intractability of vomiting not specified, unspecified vomiting type  Nonintractable headache, unspecified chronicity pattern, unspecified headache type  Other chronic pain  Diagnosis management comments: Patient presenting with headache as well as other body pains she reports is her cancer pain that has not improved with her current regiment. No significant abnormalities on CT with stable lesion mild enlargement of the ventricles. Patient having no focal logic deficits no urinary incontinence. Pain improved with treatment in ER. Patient was not a tolerating oxycodone and her nausea meds at home. After changing these medications to Norco and Phenergan patient reports these improved her symptoms and tolerated them well. Advised that she needs speak with her palliative care team about these medications. We will also add on gabapentin. Discussed using caution with adding on gabapentin to her other meds that she is on several other medications which can be sedating. Amount and/or Complexity of Data Reviewed  Clinical lab tests: reviewed  Tests in the radiology section of CPT®: reviewed  Decide to obtain previous medical records or to obtain history from someone other than the patient: yes      ED Course as of 11/26/21 0321   Fri Nov 26, 2021   0305 Reports that oxycodone continues to make her nauseous. And Zofran is not helping. Will change pain medication to hydrocodone and try Phenergan at home. We will also try gabapentin.   Advised patient needs to follow-up with her palliative care doctor/team for continued treatment. [ZD]   0306 CBC WITH AUTOMATED DIFF(!):    WBC 16.1(!)   RBC 4.84   HGB 14.0   HCT 42.9   MCV 88.6   MCH 28.9   MCHC 32.6   RDW 13.9   PLATELET 830   MPV 33.7   NRBC 0.0   ABSOLUTE NRBC 0.00   NEUTROPHILS PENDING   LYMPHOCYTES PENDING   MONOCYTES PENDING   EOSINOPHILS PENDING   BASOPHILS PENDING   IMMATURE GRANULOCYTES PENDING   ABS. NEUTROPHILS PENDING   ABS. LYMPHOCYTES PENDING   ABS. MONOCYTES PENDING   ABS. EOSINOPHILS PENDING   ABS. BASOPHILS PENDING   ABS. IMM. GRANS. PENDING   DF PENDING [ZD]      ED Course User Index  [ZD] Ashley Handy MD       Procedures        Recent Results (from the past 24 hour(s))   CBC WITH AUTOMATED DIFF    Collection Time: 11/26/21  2:15 AM   Result Value Ref Range    WBC 16.1 (H) 3.6 - 11.0 K/uL    RBC 4.84 3.80 - 5.20 M/uL    HGB 14.0 11.5 - 16.0 g/dL    HCT 42.9 35.0 - 47.0 %    MCV 88.6 80.0 - 99.0 FL    MCH 28.9 26.0 - 34.0 PG    MCHC 32.6 30.0 - 36.5 g/dL    RDW 13.9 11.5 - 14.5 %    PLATELET 806 455 - 097 K/uL    MPV 10.3 8.9 - 12.9 FL    NRBC 0.0 0  WBC    ABSOLUTE NRBC 0.00 0.00 - 0.01 K/uL    NEUTROPHILS 88 (H) 32 - 75 %    BAND NEUTROPHILS 2 0 - 6 %    LYMPHOCYTES 8 (L) 12 - 49 %    MONOCYTES 1 (L) 5 - 13 %    EOSINOPHILS 0 0 - 7 %    BASOPHILS 0 0 - 1 %    MYELOCYTES 1 (H) 0 %    IMMATURE GRANULOCYTES 0 %    ABS. NEUTROPHILS 14.5 (H) 1.8 - 8.0 K/UL    ABS. LYMPHOCYTES 1.3 0.8 - 3.5 K/UL    ABS. MONOCYTES 0.2 0.0 - 1.0 K/UL    ABS. EOSINOPHILS 0.0 0.0 - 0.4 K/UL    ABS. BASOPHILS 0.0 0.0 - 0.1 K/UL    ABS. IMM.  GRANS. 0.0 K/UL    DF MANUAL      RBC COMMENTS NORMOCYTIC, NORMOCHROMIC     METABOLIC PANEL, COMPREHENSIVE    Collection Time: 11/26/21  2:15 AM   Result Value Ref Range    Sodium 130 (L) 136 - 145 mmol/L    Potassium 3.5 3.5 - 5.1 mmol/L    Chloride 93 (L) 97 - 108 mmol/L    CO2 32 21 - 32 mmol/L    Anion gap 5 5 - 15 mmol/L    Glucose 126 (H) 65 - 100 mg/dL    BUN 23 (H) 6 - 20 MG/DL    Creatinine 1.05 (H) 0.55 - 1.02 MG/DL    BUN/Creatinine ratio 22 (H) 12 - 20      GFR est AA >60 >60 ml/min/1.73m2    GFR est non-AA 54 (L) >60 ml/min/1.73m2    Calcium 9.3 8.5 - 10.1 MG/DL    Bilirubin, total 0.4 0.2 - 1.0 MG/DL    ALT (SGPT) 81 (H) 12 - 78 U/L    AST (SGOT) 69 (H) 15 - 37 U/L    Alk. phosphatase 712 (H) 45 - 117 U/L    Protein, total 7.5 6.4 - 8.2 g/dL    Albumin 3.0 (L) 3.5 - 5.0 g/dL    Globulin 4.5 (H) 2.0 - 4.0 g/dL    A-G Ratio 0.7 (L) 1.1 - 2.2     SAMPLES BEING HELD    Collection Time: 11/26/21  2:15 AM   Result Value Ref Range    SAMPLES BEING HELD 1SST,1RED     COMMENT        Add-on orders for these samples will be processed based on acceptable specimen integrity and analyte stability, which may vary by analyte. CT HEAD WO CONT    Result Date: 11/26/2021  EXAM: CT HEAD WO CONT INDICATION: headache, hx of brain ca COMPARISON: Correlation MRI November 12 CONTRAST: None. TECHNIQUE: Unenhanced CT of the head was performed using 5 mm images. Brain and bone windows were generated. Coronal and sagittal reformats. CT dose reduction was achieved through use of a standardized protocol tailored for this examination and automatic exposure control for dose modulation. FINDINGS: Left frontal edema once again seen. Increase in size of the ventricles since the prior examination. No extra-axial fluid collection or shift of the midline. 1. Increased size of the ventricular system since MRI examination November 12. 2. Stable left frontal lesion.

## 2021-11-26 NOTE — ED NOTES
Bedside and Verbal shift change report given to KATHARINA Huggins (oncoming nurse) by Arminda Booth RN (offgoing nurse). Report included the following information SBAR, ED Summary, Intake/Output, MAR and Recent Results.

## 2021-11-26 NOTE — ED TRIAGE NOTES
Patient reports headache, neck pain radiating to the shoulders x 4 weeks. Patient reports Hx of brain cancer, liver cancer. Patient takes fentanyl  patches and oxycodone with no relive. Patient reports started radiation yesterday. Per daughter patient is developing forgetfulness for the last few weeks. Eye drooping noted to the left eye started 4 weeks ago.

## 2021-11-26 NOTE — TELEPHONE ENCOUNTER
Palliative Medicine  Nursing Note  [phone]  Fax [phone]      Telephone Call  Patient Name: Gisela Rendon  YOB: 1965    11/26/2021        Primary Decision Maker: Catalina Rosenbaum - Daughter - [phone]    Secondary Decision Maker: Chuck Marie - Father - [address] 11/19/2021   Patient's Healthcare Decision Maker is: Legal Next of Kin   Confirm Advance Directive None   Patient Would Like to Complete Advance Directive -       Call returned to Ms. Catina Muniz, patient's daughter. She shared that her mother went to the ED last night due to significant pain to her, \"whole head and neck\" which was unrelieved by continued doses of Oxycodone 5mg, Tylenol, and advil, and a 12mcg Fentanyl patch- prescribed by Neuro PA Erika Coleman on 11/24/21. She has had nausea and vomiting issues that she correlates to her Oxycodone use. Her daughter stated that within 15 min of taking the Oxycodone her mother becomes nauseated and will throw up. Patient feels the Fentanyl did not help with the pain and made her dizzy, so she removed it earlier today and does not want to restart it. The ED wrote prescriptions for Gabapentin 100mg 3 times daily #30, Promethazine 25mg every 6 hours #12, and Hydrocodone 5/325mg 1 every 6 hours #12. They picked up the Gabapentin and Promethazine, but the Norco was not ready. She had a Head CT in ED which revealed an increase in size of the ventricles since the prior MRI on 11/12/21. Jenny Dobbs shared that it appears her mother's pain is not as bad today, she is resting at present, however her mother roused during the conversation and rates it at 10/10 to her whole head and neck. She is unable to rest long and paces in the house feeling anxious due to pain and nausea. She feels the Gabapentin is making her dizzy.   Discussed that both Gabapentin and promethazine can cause drowsiness, along with Opioids and Lorazepam. Instructed Anahi to monitor for increased sedation and confusion. She shared that her mother wants a medication to help with her anxiety. She has started counseling with [facility] this past week. She shared that her mother's memory is worse and she will repeat herself frequently or becomes confused. Her balance is getting worse and she fell today. Nathalie Ac is going to  a 4 prong cane for her. She stated there will be family to stay with her 24/7. Discussed that Dr. Kishore Osman has prescribed Lorazepam 0.5mg 1-2 tab at bedtime to help with sleep. Patient did say that she has taken it and it makes her sleepy. Shared that she could take 1 in the morning and 1 at bedtime to help with sleep and rest (max 2 daily), and it can aide in anxiety and nausea. Confirmed that Ms. Margarito Almanza has a procedure on 11/29/21 for a right frontal Ommaya reservoir. She also has daily whole brain radiation starting back on Monday 11/29/21. Call placed to pharmacy to inquire about Norco which only needed confirmation that it was prescribed by Dr. Reilly Mills from [facility]; this is documented in Epic. Encouraged her to call Palliative on-call for any changes in her symptoms or for any questions. Shared that the [facility] will be available for  today. She verbalized understanding.         Martinez Joer, RN  Palliative Medicine

## 2021-11-28 PROBLEM — R42 DIZZINESS: Status: ACTIVE | Noted: 2021-01-01

## 2021-11-28 NOTE — H&P
[facility] [place] [facility]  HISTORY AND PHYSICAL    Name:  Shanda Galloway  MR#:  [id number]  :  1965  ACCOUNT #:  [de-identified]  ADMIT DATE:  2021      The patient was seen, evaluated, and admitted by me on 2021. PRIMARY CARE PHYSICIAN:  Rober Quiles MD    SOURCE OF INFORMATION:  Patient, the patient's daughter who was present at the bedside, and review of ED electronic medical record. CHIEF COMPLAINT:  Fall. HISTORY OF PRESENT ILLNESS:  This is a 52-year-old woman with a past medical history significant for left breast cancer with metastasis to brain and liver, anxiety/depression, obstructive sleep apnea, attention-deficit hyperactivity disorder, who was in her usual state of health until couple of days ago when it was reported that the patient started falling at home. The patient also has dizziness. The dizziness is worse with ambulation, described as room is spinning around her. On the day of presentation at the emergency room, the patient fell 4 times at home before coming to the emergency room and when the patient arrived at the emergency room, she had another fall before the CT scan of the head was obtained. The patient had a CT scan of the head done in the emergency room which did not show any change. CT scan of the cervical spine was also done which did not show any acute findings. No record of prior admission to this hospital but the electronic medical record revealed that the patient is awaiting Ommaya reservoir placement by Dr. Eze Shay, neurosurgeon at [facility] tomorrow. Because of the frequent falls and dizziness, the patient was referred to the hospitalist service for evaluation for admission. There was no history of fever, rigors, or chills. It was also reported that the patient has constipation. She has not had a bowel movement for 5 days. Palliative Care pain management was consulted.   Milk of magnesium was advised which the patient took before coming to the emergency room and the patient did have a couple of bowel movements while in the emergency room. PAST MEDICAL HISTORY:  Left breast cancer with metastasis to brain and liver, attention-deficit hyperactivity disorder, obstructive sleep apnea, and anxiety/depression. ALLERGIES:  NO KNOWN DRUG ALLERGIES. MEDICATIONS:  Decadron 4 mg every 6 hours, Colace 100 mg as needed for constipation, Neurontin 100 mg 1 capsule 3 times daily, Norco 5/325 one tablet every 6 hours as needed for pain, Lidoderm patch 5% apply to skin for 12 hours, Ativan 0.5 mg 1-2 tablets as needed for sleep, Namenda dosage as directed, Zofran 8 mg every 8 hours as needed for nausea and vomiting, Compazine 10 mg every 6 hours as needed for nausea, and Phenergan 25 mg every 6 hours as needed for nausea. FAMILY HISTORY:  This was reviewed. Her mother had breast cancer. PAST SURGICAL HISTORY:  This is significant for bilateral mastectomy with breast reconstruction, vascular access placement and removal, and  section. SOCIAL HISTORY:  No history of alcohol or tobacco abuse. REVIEW OF SYSTEMS:  HEAD, EYES, EARS, NOSE, AND THROAT:  This is positive for dizziness. No headache, no blurring of vision, no photophobia. RESPIRATORY:  No cough, no shortness of breath, no hemoptysis. CARDIOVASCULAR:  No chest pain, no orthopnea, no palpitation. GASTROINTESTINAL:  No nausea or vomiting. No diarrhea. No constipation. GENITOURINARY:  No dysuria, no urgency, and no frequency. All other systems are reviewed and they are negative. PHYSICAL EXAMINATION:  GENERAL APPEARANCE:  The patient appeared ill, in moderate distress. VITAL SIGNS:  On arrival at the emergency room, temperature 97.8, pulse 109, respiratory rate 19, blood pressure 148/96, and oxygen saturation 97%. HEAD:  Normocephalic, atraumatic. EYES:  Normal eye movement. No redness, no drainage, no discharge.   EARS:  Normal external ears with no obvious drainage. NOSE:  No deformity and no drainage. MOUTH AND THROAT:  No visible oral lesion. Dry oral mucosa. NECK:  Neck is supple. No JVD, no thyromegaly. CHEST:  Clear breath sounds. No wheezing, no crackles. HEART:  Normal S1 and S2, regular. No clinically appreciable murmur. ABDOMEN:  Soft, nontender. Normal bowel sounds. CENTRAL NERVOUS SYSTEM:  Alert and oriented x3. No gross focal neurological deficit. EXTREMITIES:  No edema. Pulses 2+ bilaterally. MUSCULOSKELETAL:  No obvious joint deformity or swelling. SKIN:  No active skin lesions seen in the exposed parts of the body. PSYCHIATRIC:  Unable to assess mood and affect. LYMPHATIC:  No cervical lymphadenopathy. DIAGNOSTIC DATA:  CT scan of the head without contrast, no change. CT scan of the cervical spine, no acute findings. LABORATORY DATA:  Chemistry, sodium 127, potassium 3.0, chloride 91, CO2 of 31, glucose 135, BUN 20, creatinine 0.85, calcium 9.2. Hematology, WBC 14.9, hemoglobin 14.1, hematocrit 42.4, and platelets 452. ASSESSMENT:  1.  Dizziness. 2.  Frequent falls. 3.  Left breast cancer with metastasis to brain and liver. 4.  Elevated blood pressure. 5.  Leukocytosis. 6.  Hyponatremia. 7.  Hypokalemia. 8.  Hyperglycemia. 9.  Anxiety/depression. 10.  Obstructive sleep apnea. 11.  Attention-deficit hyperactivity disorder. 12.  Thrombocytopenia. PLAN:  1. Dizziness. We will admit the patient for further evaluation and treatment. We will obtain MRI of the brain with and without contrast to evaluate the patient for posterior circulation stroke as a possible cause of the dizziness. The dizziness will also be as a result of the metastasis to the brain. We will await MRI result. This patient is awaiting Ommaya reservoir placement by Dr. Alanis Knox at [facility] [place] tomorrow.   Called the neurosurgeon on call at [facility] [place] and discussed the patient's presentation and findings with him as to whether the patient is to be transferred to [facility] since the patient is awaiting a procedure by the neurosurgeon tomorrow. The neurosurgeon on call initially stated that he did not know anything about the patient, the conversation was not useful. The patient will be admitted to [facility] for now and the [facility] hospitalist will call Dr. Leticia Ji during the day to discuss possible transfer with him if needed. 2.  Frequent falls. This may be as a result of the patient's brain metastasis. CT scan of the head did not show any change. CT scan of the cervical spine negative for fracture. We will obtain CT scan of the chest, abdomen, and pelvis to evaluate the patient for fracture and intra-abdominal injury. The patient will require PT, OT evaluation and treatment once the patient is medically stable. 3.  Left breast cancer with metastasis to brain and liver. We will await MRI of the brain result. The patient is status post radiation and chemotherapy. 4.  Elevated blood pressure. The patient has no history of hypertension. We will monitor the patient's blood pressure closely. If average blood pressure reading remains elevated, we will initiate antihypertensive medication. 5.  Leukocytosis. This is most likely steroid induced. The patient is afebrile and not toxic. We will check urinalysis. We will await the result of CT scan of the chest to evaluate the patient for infection and also result of CT scan of the abdomen to evaluate the patient for infection. We will check lactic acid level. 6.  Hyponatremia. This hyponatremia may be due to SIADH, it could also be due to volume depletion. We will carry out fluid therapy. The patient may require Nephrology consult to assist in further evaluation and treatment of hyponatremia. 7.  Hypokalemia. We will replace potassium and repeat potassium level. We will also check magnesium level. 8.  Hyperglycemia. This is also most likely steroid induced.   The patient has no history of diabetes. We will check hemoglobin A1c level. 9.  Anxiety/depression. We will continue with Ativan as needed. 10.  Obstructive sleep apnea. This is mild. The patient did not require CPAP. We will continue supportive treatment. 11.  Attention-deficit hyperactivity disorder. The patient's Adderall was recently discontinued. We will continue supportive treatment. According to the patient's daughter, this was discontinued when the patient started chemotherapy. 12.  Thrombocytopenia. This is mild. The patient is asymptomatic. We will continue to monitor the patient's platelet count. 13.  Other issues:  Code status, the patient is a full code. We will request SCD for DVT prophylaxis. FUNCTIONAL STATUS PRIOR TO ADMISSION:  The patient came from home. The patient is ambulatory with assistance from her daughter. COVID PRECAUTION:  The patient was wearing a face mask. I was wearing a facemask and gloves for this patient's encounter.         Alberto Glies MD      [id number]  D:  11/28/2021 5:30  T:  11/28/2021 6:45  JOB #:  [id number]  CC:  Enrique Rice MD

## 2021-11-28 NOTE — ED NOTES
2335 Bedside shift change report given to  Alon Espinosa (oncoming nurse) by Flavio Guerrero RN (offgoing nurse).  Report included the following information SBAR, Kardex, Intake/Output, MAR, Recent Results and Cardiac Rhythm SR    Patient fall prior to report, patient in bed    CT made aware patient ready for CT

## 2021-11-28 NOTE — ED NOTES
Bedside shift change report given to [address] [place] [address]   (oncoming nurse) by Sheng Larson RN (offgoing nurse). Report included the following information SBAR, Kardex, Intake/Output, MAR, Recent Results and Cardiac Rhythm . SR

## 2021-11-28 NOTE — ED PROVIDER NOTES
Patient is a 55-year-old woman with metastatic breast cancer who comes into the emergency department with profound weakness. She has been unable to ambulate independently and has fallen multiple times today; she struck her head once with 1 of these barriers. She has not had any fevers, chills, shortness of breath. She has had profound diarrhea and poor p.o. intake. Dizziness and weakness is worse when she stands and improves when she lays down. She describes her dizziness as lightheadedness. The history is provided by the patient.         Past Medical History:   Diagnosis Date    ADHD     Anxiety and depression     Cancer ([facility] [address]     LEFT BREAST CA    Ill-defined condition     chemotherapy    Limb alert care status     NO STICKS OR BLOOD PRESSURE IN LEFT ARM    Sleep apnea     MILD -NO CPAP       Past Surgical History:   Procedure Laterality Date    HX BREAST BIOPSY Left 4/15/2020    BREAST BIOPSY performed by [facility]ely [facility] at [facility] MA[facility]    HX BREAST RECONSTRUCTION Bilateral 10/8/2020    BREAST RECONSTRUCTION performed by Pepe Cifuentes MD at [facility] AMBUL[facility] OR    HX  SECTION  98, 80    HX GI      COLONOSCOPY    HX MASTECTOMY Bilateral 10/8/2020    LEFT BREAST MASTECTOMY WITH AXILLARY NODE DISSECTION AND RIGHT SIMPLE MASTECTOMY / IMMEDIATE BILATERAL BREAST RECONSTRUCTION WITH TISSUE EXPANDERS AND ALLODERM GRAFTS performed by Fely Long MD at [facility] AM[facility]UL[facility] OR    HX ORTHOPAEDIC      RIGHT ELBOW-FATTY MASS    HX VASCULAR ACCESS Right 2020         Family History:   Problem Relation Age of Onset    Cancer Mother         Breast    Cancer Maternal Aunt         Breast    Cancer Paternal Uncle     Cancer Maternal Aunt         Breast    Cancer Paternal Uncle        Social History     Socioeconomic History    Marital status:      Spouse name: Not on file    Number of children: Not on file    Years of education: Not on file    Highest education level: Not on file Occupational History    Not on file   Tobacco Use    Smoking status: Light Tobacco Smoker    Smokeless tobacco: Never Used    Tobacco comment: Quit April 2020   Substance and Sexual Activity    Alcohol use: Not Currently     Alcohol/week: 6.0 standard drinks     Types: 6 Glasses of wine per week     Comment: PER PT ON 10/1/2020    Drug use: Never    Sexual activity: Not on file   Other Topics Concern    Not on file   Social History Narrative    Not on file     Social Determinants of Health     Financial Resource Strain:     Difficulty of Paying Living Expenses: Not on file   Food Insecurity:     Worried About Running Out of Food in the Last Year: Not on file    Sasha of Food in the Last Year: Not on file   Transportation Needs:     Lack of Transportation (Medical): Not on file    Lack of Transportation (Non-Medical): Not on file   Physical Activity:     Days of Exercise per Week: Not on file    Minutes of Exercise per Session: Not on file   Stress:     Feeling of Stress : Not on file   Social Connections:     Frequency of Communication with Friends and Family: Not on file    Frequency of Social Gatherings with Friends and Family: Not on file    Attends Zoroastrianism Services: Not on file    Active Member of [address] [place] [address] [employer] or Organizations: Not on file    Attends Club or Organization Meetings: Not on file    Marital Status: Not on file   Intimate Partner Violence:     Fear of Current or Ex-Partner: Not on file    Emotionally Abused: Not on file    Physically Abused: Not on file    Sexually Abused: Not on file   Housing Stability:     Unable to Pay for Housing in the Last Year: Not on file    Number of [place] in the Last Year: Not on file    Unstable Housing in the Last Year: Not on file         ALLERGIES: Patient has no known allergies. Review of Systems   Constitutional: Positive for appetite change and fatigue. Negative for chills. Respiratory: Negative for shortness of breath. Cardiovascular: Negative for chest pain. Gastrointestinal: Positive for diarrhea. Negative for constipation. Neurological: Positive for dizziness, weakness and light-headedness. All other systems reviewed and are negative. Vitals:    21 2110 21 2140 21 2340 21 2341   BP: (!) 148/96 (!) 166/111 (!) 155/93 (!) 155/93   Pulse: (!) 109 90 84 81   Resp: 19  15    Temp: 97.8 °F (36.6 °C)  98.9 °F (37.2 °C) 97.9 °F (36.6 °C)   SpO2: 97% 97% 96% 97%            Physical Exam  Vitals and nursing note reviewed. Constitutional:       Appearance: She is well-developed. HENT:      Head: Normocephalic and atraumatic. Eyes:      General: No scleral icterus. Cardiovascular:      Rate and Rhythm: Normal rate and regular rhythm. Pulmonary:      Effort: Pulmonary effort is normal.      Breath sounds: Normal breath sounds. Abdominal:      General: There is no distension. Musculoskeletal:      Cervical back: Normal range of motion. Skin:     General: Skin is warm and dry. Findings: No erythema or rash. Neurological:      General: No focal deficit present. Mental Status: She is alert and oriented to person, place, and time. Motor: Weakness (diffuse and profound, unable to sit up unassisted) present. Psychiatric:         Mood and Affect: Mood normal.         Behavior: Behavior normal.          MDM       Procedures          MEDICAL DECISION MAKIN y.o. female presents with Fall    Differential diagnosis includes but not limited to:  arrhythmia, STEMI, seizure, meningitis, stroke, sepsis, subarachnoid hemorrhage, intracranial bleeding, encephalitis, orthostatic dizziness        LABORATORY TESTS:  Labs Reviewed   CBC WITH AUTOMATED DIFF - Abnormal; Notable for the following components:       Result Value    WBC 14.9 (*)     PLATELET 917 (*)     NRBC 0.2 (*)     ABSOLUTE NRBC 0.03 (*)     NEUTROPHILS 86 (*)     LYMPHOCYTES 7 (*)     ABS.  NEUTROPHILS 13.2 (*)     All other components within normal limits   METABOLIC PANEL, BASIC - Abnormal; Notable for the following components:    Sodium 127 (*)     Potassium 3.0 (*)     Chloride 91 (*)     Glucose 135 (*)     BUN/Creatinine ratio 24 (*)     All other components within normal limits       IMAGING RESULTS:  CT HEAD WO CONT   Final Result   No change. CT SPINE CERV WO CONT   Final Result   No acute Findings. MEDICATIONS GIVEN:  Medications   sodium chloride 0.9 % bolus infusion 500 mL (0 mL IntraVENous IV Completed 11/27/21 2326)   ondansetron (ZOFRAN) injection 4 mg (4 mg IntraVENous Given 11/27/21 2217)       PROGRESS NOTE:   1:22 AM patient has been unable to stand without dizziness despite hydration. She fell while transferring from the bedside commode to the bed and suffered an abrasion to her right hand. Patient is being admitted to the hospital.  The results of their tests and reasons for their admission have been discussed with them and/or available family. They convey agreement and understanding for the need to be admitted and for their admission diagnosis. Consultation will be made now with the inpatient physician for hospitalization. CONSULTS:  Hospitalist Consult: [address] for Admission  1:23 AM    ED Room Number: ER08/08  Patient Name and age:  Breana Moore 55 y.o.  female  Working Diagnosis:   1. Weakness    2. Frequent falls    3. Dizziness        COVID-19 Suspicion:  no  Sepsis present:  no  Reassessment needed: no  Code Status:  Full Code  Readmission: no  Isolation Requirements:  no  Recommended Level of Care:  telemetry  Department:[facility] ED - [phone]        PLAN:  - Admit to hospitalist    Total critical care time spent exclusive of procedures:  35 minutes    Huan Stokes MD          Please note that this dictation was completed with [employer], the [employer] voice recognition software.   Quite often unanticipated grammatical, syntax, homophones, and other interpretive errors are inadvertently transcribed by the computer software. Please disregard these errors. Please excuse any errors that have escaped final proofreading.

## 2021-11-28 NOTE — ED TRIAGE NOTES
Patient arrives with complaints of frequent falling ( 4 times today ) 3rd fall patient fell face first , 4th fall hit her upper back and family reports abrasion      History of breast ca with brain and liver mets , last radiation on Wednesday     Taking hydrocodone for pain

## 2021-11-28 NOTE — CONSULTS
[facility] [place] [facility] [place] [address] [facility]: [phone]  F: [phone]      Reason for evaluation   Sue Contreras is a 55 y.o. female who is seen in consultation at the request of Dr. Mary Gaffney for evaluation of stage IV breast cancer, uncontrolled pain    History of Present Illness:   Patient is a 55 y.o. 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 MRI brain 11/28/2021 showed Interval worsening of leptomeningeal metastatic disease since 11/12/2021 predominantly involving the brainstem and multiple cranial nerves, with  associated new mild communicating hydrocephalus and periventricular edema with No significant change in size of left parasagittal frontal enhancing lesion  measuring 1.7 x 1.6 cm. She was scheduled to have an Ommaya placed 11/29 and start IT methotrexate. Mother at bedside. [place] is somnolent but states she has some pain and nausea, she is week. She has no fevers      FH: Mother with breast cancer at age 79; maternal aunt with breast cancer in her 45s; maternal aunt with breast cancer at age 61; no ovarian, prostate, or pancreas cancer        Treatment History:   · 4/6/20 left breast 3:00, 13cmfn, core bx:  IMC, gr 2-3, 1.3 cm, ER negative, RI negative, HER 2 negative at IHC 0; ki67 68%; LN + by core, 6 mm, gr 2-3  · B-59 Carbo/Taxol +/- atezolizumab 4/28/2020-7/14/2020  · Invitae 4/28/2020: negative  · B-59 DDAC 7/21/2020-9/1/2020  · 10/8/2020 Bilateral mastectomy:  Right breast: benign, left breast: pCR, 0/10 LNs, ypT0 ypN0 cM0  · B59 atezolizumab/placebo 11/11/20- 4/27/21  · XRT 12/9/2020-1/27/21  · 11/18/21 liver core bx: Malignant epitheliolid neoplasm  · 11/18/21 CSF:  Markedly atypical but degenerated cells present;  Findings are suspicious for malignancy      Past Medical History:   Diagnosis Date    ADHD     Anxiety and depression     Cancer ([facility] [address]     LEFT BREAST CA    Ill-defined condition     chemotherapy    Limb alert care status     NO STICKS OR BLOOD PRESSURE IN LEFT ARM    Sleep apnea     MILD -NO CPAP      Past Surgical History:   Procedure Laterality Date    HX BREAST BIOPSY Left 4/15/2020    BREAST BIOPSY performed by Fely Long MD at [address] HX BREAST RECONSTRUCTION Bilateral 10/8/2020    BREAST RECONSTRUCTION performed by Pepe Cifuentes MD at [facility]    HX  SECTION  98, 80    HX GI      COLONOSCOPY    HX MASTECTOMY Bilateral 10/8/2020    LEFT BREAST MASTECTOMY WITH AXILLARY NODE DISSECTION AND RIGHT SIMPLE MASTECTOMY / IMMEDIATE BILATERAL BREAST RECONSTRUCTION WITH TISSUE EXPANDERS AND ALLODERM GRAFTS performed by Fely Long MD at [facility]    HX ORTHOPAEDIC      RIGHT ELBOW-FATTY MASS    HX VASCULAR ACCESS Right 2020      Social History     Tobacco Use    Smoking status: Light Tobacco Smoker    Smokeless tobacco: Never Used    Tobacco comment: Quit 2020   Substance Use Topics    Alcohol use: Not Currently     Alcohol/week: 6.0 standard drinks     Types: 6 Glasses of wine per week     Comment: PER PT ON 10/1/2020      Family History   Problem Relation Age of Onset    Cancer Mother         Breast    Cancer Maternal Aunt         Breast    Cancer Paternal Uncle     Cancer Maternal Aunt         Breast    Cancer Paternal Uncle      Current Facility-Administered Medications   Medication Dose Route Frequency    dexAMETHasone (DECADRON) tablet 4 mg  4 mg Oral Q6H    sodium chloride (NS) flush 5-40 mL  5-40 mL IntraVENous Q8H    sodium chloride (NS) flush 5-40 mL  5-40 mL IntraVENous PRN    acetaminophen (TYLENOL) tablet 650 mg  650 mg Oral Q6H PRN    Or    acetaminophen (TYLENOL) suppository 650 mg  650 mg Rectal Q6H PRN    polyethylene glycol (MIRALAX) packet 17 g  17 g Oral DAILY PRN    ondansetron (ZOFRAN ODT) tablet 4 mg  4 mg Oral Q8H PRN    Or    ondansetron (ZOFRAN) injection 4 mg  4 mg IntraVENous Q6H PRN    lactated Ringers infusion  125 mL/hr IntraVENous CONTINUOUS    morphine injection 2 mg  2 mg IntraVENous Q4H PRN    oxyCODONE IR (ROXICODONE) tablet 5 mg  5 mg Oral Q4H PRN    LORazepam (ATIVAN) tablet 1 mg  1 mg Oral DAILY PRN    hydrALAZINE (APRESOLINE) 20 mg/mL injection 20 mg  20 mg IntraVENous ONCE     Current Outpatient Medications   Medication Sig    gabapentin (NEURONTIN) 100 mg capsule Take 1 Capsule by mouth three (3) times daily. Max Daily Amount: 300 mg.  promethazine (PHENERGAN) 25 mg tablet Take 1 Tablet by mouth every six (6) hours as needed for Nausea.  HYDROcodone-acetaminophen (Norco) 5-325 mg per tablet Take 1 Tablet by mouth every six (6) hours as needed for Pain for up to 3 days. Max Daily Amount: 4 Tablets.  memantine (NAMENDA) 5 mg tablet Week one:  5 mg daily; week 2: 5 mg bid; week 3:  10 mg in am and 5 mg pm; week 4:  10 mg bid    ondansetron (ZOFRAN ODT) 8 mg disintegrating tablet Take 1 Tablet by mouth every eight (8) hours as needed for Nausea or Vomiting. For 2 days following chemo    prochlorperazine (COMPAZINE) 10 mg tablet Take 1 Tablet by mouth every six (6) hours as needed for Nausea.  lidocaine (LIDODERM) 5 % 1 Patch by TransDERmal route every twelve (12) hours. Apply patch to the affected area for 12 hours a day and remove for 12 hours a day. (Patient not taking: Reported on 11/23/2021)    LORazepam (ATIVAN) 0.5 mg tablet 1 to 2 tabs at bedtime as needed for sleep    dexAMETHasone (DECADRON) 4 mg tablet Take 4 mg by mouth every six (6) hours.  docusate sodium (COLACE) 100 mg capsule Take 100 mg by mouth as needed for Constipation.  lisdexamfetamine dimesylate (VYVANSE PO) Take 30 mg by mouth daily. No Known Allergies     Review of Systems: A complete review of systems was obtained, negative except as described above.     Physical Exam:     Visit Vitals  BP (!) 154/92   Pulse 88   Temp 99 °F (37.2 °C)   Resp 18   SpO2 98%     ECOG PS: 3  General: no distress; some expressive aphasia, somnolent  Eyes: L eyelid ptosis  HENT: oropharynx clear  Neck: supple  Respiratory: normal respiratory effort  CV: no peripheral edema  GI: nontender  Skin: no rashes; no ecchymoses; no petechiae        Results:     Lab Results   Component Value Date/Time    WBC 14.7 (H) 11/28/2021 03:53 AM    HGB 14.2 11/28/2021 03:53 AM    HCT 42.5 11/28/2021 03:53 AM    PLATELET 606 (L) 08/30/9485 03:53 AM    MCV 86.6 11/28/2021 03:53 AM    ABS. NEUTROPHILS 12.2 (H) 11/28/2021 03:53 AM     Lab Results   Component Value Date/Time    Sodium 127 (L) 11/28/2021 03:53 AM    Potassium 3.5 11/28/2021 03:53 AM    Chloride 91 (L) 11/28/2021 03:53 AM    CO2 28 11/28/2021 03:53 AM    Glucose 122 (H) 11/28/2021 03:53 AM    BUN 19 11/28/2021 03:53 AM    Creatinine 0.78 11/28/2021 03:53 AM    GFR est AA >60 11/28/2021 03:53 AM    GFR est non-AA >60 11/28/2021 03:53 AM    Calcium 9.3 11/28/2021 03:53 AM     Lab Results   Component Value Date/Time    Bilirubin, total 0.5 11/28/2021 03:53 AM    ALT (SGPT) 78 11/28/2021 03:53 AM    Alk. phosphatase 726 (H) 11/28/2021 03:53 AM    Protein, total 7.4 11/28/2021 03:53 AM    Albumin 3.0 (L) 11/28/2021 03:53 AM    Globulin 4.4 (H) 11/28/2021 03:53 AM       3/31/20  3D mammogram:  1.6 cm mass in L breast  US L breast  3:00 left breast mass 1.6 cm, multiple LN in L ax tail    4/14/2020 CT c/a/p:  IMPRESSION: Left breast mass. No evidence for metastatic disease in the chest  abdomen or pelvis. There are no target lesions for RECIST classification. 4/14/2020 Bone Scan:  IMPRESSION: No evidence of bony metastatic disease. 5/4/2020 MRI breast:  IMPRESSION:     Right Breast:  1. BI-RADS Assessment Category 1: Negative. No evidence of breast carcinoma  within the right breast.     Left Breast:  1. BI-RADS Assessment Category 6: Known biopsy proven malignancy- Appropriate  action should be taken.  Large area of malignant enhancement, occupying most of  the middle and posterior third of the left breast upper outer and lower-outer  quadrants, from 2:00 to 4:00. Malignancy is much larger than it had appeared  mammographically. Greatest measurement is 7 x 4.2 x 3 cm. Contained within this  large area of enhancement is the dominant 2.5 cm mass, which represents the  mammographic and sonographic correlate. 2. BI-RADS Assessment Category 6: Known biopsy proven malignancy- Appropriate  action should be taken. Pathologically proven level 1 left axillary  lymphadenopathy, with numerous enlarged, malignant lymph nodes. There is also  level 2 left axillary lymphadenopathy. 3. Retraction of the skin of the lateral left breast, which is thickened. However, no evidence of malignant skin invasion/involvement. 4. The malignant mass closely approximates the pectoral muscle, but there is no  evidence of direct muscle invasion/involvement.     RECOMMENDATIONS:  Appropriate action is recommended. The patient is undergoing neoadjuvant  chemotherapy. There is no evidence of contralateral disease. 7/30/2020 [facility] chest at [facility]: Normal CTA of the chest with no pulmonary embolism or acute aortic abnormalities identified. Consider mild CHF/pulmonary edema    11/12/21 ct c/a/p  FINDINGS:      CHEST WALL: No mass or axillary lymphadenopathy. THYROID: No nodule. MEDIASTINUM: No mass or lymphadenopathy. HENNY: No mass or lymphadenopathy. THORACIC AORTA: No dissection or aneurysm. MAIN PULMONARY ARTERY: Normal in caliber. TRACHEA/BRONCHI: Patent. ESOPHAGUS: No wall thickening or dilatation. HEART: Normal in size. PLEURA: Small left pleural effusion. LUNGS: Left lower lobe atelectasis. Pleural-based opacities are seen in the left  upper lobe, the largest of which measures 3.4 x 1.2 cm.     LIVER: There is a new 15 mm hypodense lesion in the right hepatic lobe. Small  cyst in the right hepatic lobe is again noted.   BILIARY TREE: Gallbladder is within normal limits. CBD is not dilated. SPLEEN: within normal limits. PANCREAS: No mass or ductal dilatation. ADRENALS: Unremarkable. KIDNEYS: No mass, calculus, or hydronephrosis. STOMACH: Unremarkable. SMALL BOWEL: No dilatation or wall thickening. COLON: No dilatation or wall thickening. APPENDIX: Within normal limits. PERITONEUM: No ascites or pneumoperitoneum. RETROPERITONEUM: No lymphadenopathy or aortic aneurysm. REPRODUCTIVE ORGANS: Myomatous uterus is again demonstrated. URINARY BLADDER: Decompressed. BONES: Tiny sclerotic lesion right femoral neck is new. No additional convincing  lesions are identified. ABDOMINAL WALL: No mass or hernia. ADDITIONAL COMMENTS: N/A     IMPRESSION  New small hypodense hepatic lesion is concerning for metastatic disease. Nonspecific tiny sclerotic lesion right femoral neck near compared to the prior  exam, small metastatic focus should be considered. Small left pleural effusion  with underlying atelectasis. Pleural-based opacities in the left upper lobe are  most likely related to prior radiation therapy, however close follow-up is  recommended to evaluate for any interval improvement. 11/12/21 bone scan  FINDINGS: There is slight increase in activity right sixth seventh and eighth  ribs anteriorly and possibly left seventh rib anteriorly. There is vague  increased activity in the hips and there is spotty activity in the femurs. There  is a small focus of activity right tibia. There is increased linear activity in  the fibula. There is question of slight increased activity L1 vertebral body. .     . . Incidental renal imaging shows no abnormality.      IMPRESSION  1.  There is suspicion of vague focus of increasing bony activity as described  above which is nonspecific but may represent bony metastatic disease and  correlation with plain films of the pelvis, femurs and right tibia may be  helpful for further assessment    11/12/21 MRI brain  FINDINGS:  There is a left paracentral frontal enhancing mass measuring approximately 1.6 x  1.7 x 1.7 cm located immediately anterior to the genu the corpus callosum. There  is surrounding edema. Adjacent enhancement in the leptomeninges of the  interhemispheric fissure bilaterally. 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     The ventricular size and configuration are within normal limits. Normal signal demonstrated in the cerebral hemispheres, brain stem and  cerebellum. No abnormal areas of intracranial enhancement. No abnormal diffusion. No evidence of intracranial hemorrhage, acute infarct, mass or abnormal  extra-axial fluid collections. Normal flow-voids are present in the vertebral, basilar and carotid artery  systems. 3-D T1 postcontrast images suggests abnormal enhancement in the medial right  internal auditory canal. Not confirmed on standard axial thicker slice T1. Therefore possibly susceptibility artifact. Correlate clinically and with  imaging follow-up if clinically indicated. Diminished fat signal in the skull base and cervical spine marrow may be  treatment related. Mild diffuse dural enhancement.     IMPRESSION  1. Approximately 1.7 cm left frontal mass extending interhemispheric fissure  with adjacent leptomeningeal involvement. Associated edema. 2. Questionable focus of enhancement in the medial right internal auditory canal  versus susceptibility artifact. 3. Reduced fat signal in the marrow may be treatment related.     11/22/21 cr 1.03  ALT 51  AST 59  t bili 0.4    11/22/21 CTA chest  Moderate L pleural effusion, no PE  Pleural based atelectasis or scarring, producing a mass-like appearance in the ant subpleural TIMOTHY, most likely resulting from previous XRT to the L ant chest wall    11/22/21 MRI t-spine  Diffuse marrow signal abnormalities with low signal T1 and diffuse heterogeneous enhancement      MRI brain 11/28/2021  IMPRESSION     1. Interval worsening of leptomeningeal metastatic disease since 11/12/2021  predominantly involving the brainstem and multiple cranial nerves as above, with  associated new mild communicating hydrocephalus and periventricular edema. 2. No significant change in size of left frontal parasagittal metastasis, though  now necrotic in appearance. Stable adjacent leptomeningeal metastatic disease in  the anterior interhemispheric fissure. Unchanged edema in the left frontal lobe  without midline shift or herniation. 3. Redemonstrated diffusely abnormal marrow signal, which may represent some  combination of osseous metastatic disease and posttreatment changes  Records reviewed and summarized above. Pathology report(s) reviewed above. Radiology report(s) reviewed above. Assessment/plan:   1. Left IMC, gr 2-3, triple negative    11/12/21 Recurrent breast cancer to brain, LM, liver, possibly bone. PD-L1 testing, Yohan La, pending still. If +, chemo + pembro would be recommended. If negative, possibly socituzumab govitecan. This decision is pending treatment for #2 below    Guarded prognosis  CT 11/28 pending     2. Brain mets/ LM disease: On dex 4 mg PO q6h. Resume WBRT  Will consult Rad Onc  This was to start 11/24 but cant find information to confirm that it was      Continue emantine 5 mg daily for 7 days ( started 11/23), then 5 mg bid for 7 days then 10 mg in the am and 5 mg in the pm for 7 days, then 10 mg bid for 7 days, for 6 months, rx in    Dr. Edi Rai on [address] placement on 11/29/21 for IT MTX- this will have to be rescheduled pending discussions about goals      3. L pleural effusion:  IR thoracentesis on 11/24/21, cytology pending    4. Pain due to cancer:  Consult palliative care  Taking vyvanse; oxycodone 10 mg q4h ,fentanyl patch   Admit for pain control    7. Constipation:   miralax, senna    8.  Nausea:  Zofran ODT, phenergan    9) Abnormal LFTs   Due to liver metastasis  CT penidng    PLAN  · Admit for pain control  · Consult palliative care  · Consult a Rad Onc  · Continue Oxycodone 10 mg every 4 hours prn pain, fentanyl patch as ordered by Palliative care  · Dexamethasone 4 mg every 6 hours  · Zofran every 8 hours scheduled  · Phenergan as ordered by palliative care prn nausea    I appreciate the opportunity to participate in Ms. Nohemi Kurtz's care.             Signed By: Nikki Mccollum MD

## 2021-11-28 NOTE — PROGRESS NOTES
[facility]: MED RECONCILIATION    Information obtained from: Patient's daughter, medication bottles at bedside. Per daughter, everything patient currently takes is at bedside. Comments:   Memantine - new start, see RX instruction (Week one:  5 mg daily; week 2: 5 mg bid; week 3:  10 mg in am and 5 mg pm; week 4:  10 mg bid)  Potassium chloride 20 meq twice daily (Rx was written 11/22 for 7 days supply with no refill)  Fentanyl patch 12 mcg every 72 hrs - Last placed on 11/26 per daughter  Vyvanse is currently on pause per daughter. She does not know a specific reason. Allergies: Patient has no known allergies. Prior to Admission Medications:     Medication Documentation Review Audit       Reviewed by Anibal Silverman PHARMD (Pharmacist) on 11/28/21 at 1534      Medication Sig Documenting Provider Last Dose Status Taking?   dexAMETHasone (DECADRON) 4 mg tablet Take 4 mg by mouth every six (6) hours. Miguel PATE NP  Active Yes   fentaNYL (DURAGESIC) 12 mcg/hr patch 1 Patch by TransDERmal route every seventy-two (72) hours. Indications: severe chronic pain with opioid tolerance Provider, Historical  Active Yes   gabapentin (NEURONTIN) 100 mg capsule Take 1 Capsule by mouth three (3) times daily. Max Daily Amount: 300 mg. Yasmany Cee MD  Active Yes   HYDROcodone-acetaminophen (Norco) 5-325 mg per tablet Take 1 Tablet by mouth every six (6) hours as needed for Pain for up to 3 days. Max Daily Amount: 4 Tablets. Yasmany Cee MD  Active Yes           memantine [facility]) 5 mg tablet Week one:  5 mg daily; week 2: 5 mg bid; week 3:  10 mg in am and 5 mg pm; week 4:  10 mg bid Willy Reynolds MD  Active Yes   potassium chloride SR (K-TAB) 20 mEq tablet Take 20 mEq by mouth two (2) times a day. Provider, Historical  Active Yes   promethazine (PHENERGAN) 25 mg tablet Take 1 Tablet by mouth every six (6) hours as needed for Nausea.  Yasmany Cee MD  Active Yes                  Crystal Carpenter, PHARMD   Contact: [phone]

## 2021-11-28 NOTE — ED NOTES
Post Fall Documentation      Berta Drew witnessed/unwitnessed fall occurred on 11/27/21(Date) at 2334 (Time). The answers to the following questions summarize the fall: In the patient's own words,:  · What were you attempting to do when you fell? \"get back in bed from bedside\"  · Do you know why you fell? No  · Do you have any pain/discomfort or any other complaints? no  · Which part of your body made contact with the floor or other object? Right arm and hand    Nurse:  Mary Milder Was this an assisted fall? no   Was fall witnessed? Yes     By Whom? Daughter   Theodoro Milder If witnessed, what part of the body made contact with the floor or other object? Right arm with floor   Patients mental status after the fall/when found: Alert and oriented   Any apparent injury:  Abrasion(s)   Immediate interventions for injury/suspected injury? No interventions needed   Patient assisted back to bed? Assist X2   Name of provider notified and time, any comments? Dr. Celine Osman [phone]        Name of family member notified and time: In room at time      Immediate VS and physical assessment documented in flow sheets. Neuro assessment every hour x 4 (for potential head injury or unwitnessed fall) documented in flow sheets.       Rajendra Estes RN

## 2021-11-28 NOTE — ROUTINE PROCESS
TRANSFER - IN REPORT:    Verbal report received from Elmer Body (name) on Jamison Morse  being received from ED (unit) for routine progression of care      Report consisted of patients Situation, Background, Assessment and   Recommendations(SBAR). Information from the following report(s) SBAR, Kardex, ED Summary, Intake/Output, MAR, Recent Results, Med Rec Status and Cardiac Rhythm NSR was reviewed with the receiving nurse. Opportunity for questions and clarification was provided. Assessment completed upon patients arrival to unit and care assumed.

## 2021-11-28 NOTE — PROGRESS NOTES
Dr. Parker Clear aware of /103. MD ordered hydralazine 10mg IV once. Order placed in [facility]. 1710: MD made aware of /103 post hydralazine. No new orders at this time. 1740: Order for sitter received. Patient is very impulsive and does not call out appropriately.

## 2021-11-28 NOTE — ED NOTES
Dr Moshe Morrison to bedside for eval post fall     Vital signs obtained     Patient denies pain at this time post fall

## 2021-11-28 NOTE — PROGRESS NOTES
Spiritual Care Assessment/Progress Note  [address]      NAME: Gaurang Borges      MRN: [id number]  AGE: 55 y.o. SEX: female  Anabaptist Affiliation: No preference   Language: English     11/28/2021     Total Time (in minutes): 15     Spiritual Assessment begun in [facility] EM[facility]Y DEPT through conversation with:         []Patient        [] Family    [] Friend(s)        Reason for Consult: Palliative Care, Initial/Spiritual Assessment     Spiritual beliefs: (Please include comment if needed)     [] Identifies with a griselda tradition:         [] Supported by a griselda community:            [] Claims no spiritual orientation:           [] Seeking spiritual identity:                [] Adheres to an individual form of spirituality:           [x] Not able to assess:                           Identified resources for coping:      [] Prayer                               [] Music                  [] Guided Imagery     [] Family/friends                 [] Pet visits     [] Devotional reading                         [x] Unknown     [] Other:                                              Interventions offered during this visit: (See comments for more details)    Patient Interventions: Initial visit           Plan of Care:     [] Support spiritual and/or cultural needs    [] Support AMD and/or advance care planning process      [] Support grieving process   [] Coordinate Rites and/or Rituals    [] Coordination with community clergy   [] No spiritual needs identified at this time   [] Detailed Plan of Care below (See Comments)  [] Make referral to Music Therapy  [] Make referral to Pet Therapy     [] Make referral to Addiction services  [] Make referral to [facility]  [] Make referral to Spiritual Care Partner  [] No future visits requested        [x] Contact Spiritual Care for further referrals     Attempted visit for palliative initial spiritual assessment. Unable to visit patient at this time.  Pt was sleeping and did not awake. No family present. Pt's chart was consulted.   Chaplain Rivas MDiv, MS, [facility]

## 2021-11-29 PROBLEM — R03.0 ELEVATED BP WITHOUT DIAGNOSIS OF HYPERTENSION: Status: ACTIVE | Noted: 2021-01-01

## 2021-11-29 PROBLEM — E87.1 HYPONATREMIA: Status: ACTIVE | Noted: 2021-01-01

## 2021-11-29 PROBLEM — J90 PLEURAL EFFUSION, LEFT: Status: ACTIVE | Noted: 2021-01-01

## 2021-11-29 PROBLEM — R41.0 DELIRIUM: Status: ACTIVE | Noted: 2021-01-01

## 2021-11-29 PROBLEM — G89.3 CANCER RELATED PAIN: Status: ACTIVE | Noted: 2021-01-01

## 2021-11-29 PROBLEM — C79.31 BRAIN METASTASIS (HCC): Status: ACTIVE | Noted: 2021-01-01

## 2021-11-29 NOTE — PROGRESS NOTES
Palliative Medicine Social Work Note      EULALIA made visit to pt along with Palliative Medicine Physician Dr. Alee Sharif. Sitter has been at bedside with pt since earlier this morning, reports family has not yet been present. Pt was awake in bed but drowsy, affect was flat, pt was aphasic, unable to describe symptoms or otherwise engage in conversation. Sitter reports pt has been crying out in pain, did not eat breakfast or lunch. Palliative Physician returned to room later after arrival of family; please see note for details. EULALIA will follow for support. Thank you for including Palliative Medicine in the care of Ms. Weston Floyd.      [address] [place], [place], [facility]  Palliative Medicine:  [phone])

## 2021-11-29 NOTE — PROGRESS NOTES
Vascular access team:  Order for midline for IV access acknowledged. Will review. 0930  Ultrasound guided IV placed, 8 cm endurance, wrapped in coban to prevent pulling, sitter at bedside.

## 2021-11-29 NOTE — PROGRESS NOTES
[facility] [place] [facility]  [facility] [facility], [address] [place] [address]  [phone]      Medical Progress Note      NAME:         Mark Ivy   :        1965  MRM:        [id number]    Date of service: 2021      Subjective: Patient has been seen and examined as a follow up for multiple medical issues. Chart, labs, diagnostics reviewed. Has pain, and headaches. Not a good historian. No fever or chills. BP initially elevated but much better now    Objective:    Vital Signs:    Visit Vitals  /81 (BP 1 Location: Left leg, BP Patient Position: Lying)   Pulse 94   Temp 97.8 °F (36.6 °C)   Resp 18   SpO2 97%          Intake/Output Summary (Last 24 hours) at 2021 1617  Last data filed at 2021 0148  Gross per 24 hour   Intake 1808.33 ml   Output 200 ml   Net 1608.33 ml        Physical Examination:    General:   Weak and ill looking, uncomfortable but not in distress   Eyes:   pink conjunctivae, PERRLA with no discharge. ENT:   no ottorrhea or rhinorrhea with dry mucous membranes  Neck: no masses, thyroid non-tender and trachea central.  Pulm: clear breath sounds without crackles or wheezes  Card:  no JVD or murmurs, has regular and normal S1, S2 without thrills, bruits or peripheral edema  Abd:  Soft, non-tender, non-distended, normoactive bowel sounds   Musc:  No cyanosis, clubbing, atrophy or deformities. Skin:  No rashes, bruising or ulcers. Neuro: Awake and alert.  Generally a non focal exam. Follows commands appropriately  Psych:  Has a fair insight to her illness     Current Facility-Administered Medications   Medication Dose Route Frequency    prochlorperazine (COMPAZINE) tablet 5 mg  5 mg Oral Q6H PRN    [START ON 2021] memantine (NAMENDA) tablet 5 mg  5 mg Oral DAILY    haloperidoL (HALDOL) tablet 2 mg  2 mg Oral Q4H PRN    HYDROmorphone (DILAUDID) syringe 0.5 mg  0.5 mg IntraVENous Q4H PRN    dexAMETHasone (DECADRON) tablet 4 mg  4 mg Oral Q6H    sodium chloride (NS) flush 5-40 mL  5-40 mL IntraVENous Q8H    sodium chloride (NS) flush 5-40 mL  5-40 mL IntraVENous PRN    acetaminophen (TYLENOL) tablet 650 mg  650 mg Oral Q6H PRN    Or    acetaminophen (TYLENOL) suppository 650 mg  650 mg Rectal Q6H PRN    polyethylene glycol (MIRALAX) packet 17 g  17 g Oral DAILY PRN    lactated Ringers infusion  125 mL/hr IntraVENous CONTINUOUS    [Held by provider] LORazepam (ATIVAN) tablet 1 mg  1 mg Oral DAILY PRN    ondansetron (ZOFRAN ODT) tablet 8 mg  8 mg Oral Q8H    Or    ondansetron (ZOFRAN) injection 4 mg  4 mg IntraVENous Q8H    oxyCODONE IR (ROXICODONE) tablet 10 mg  10 mg Oral Q4H PRN        Laboratory data and review:    Recent Labs     11/28/21  0353 11/27/21  2224   WBC 14.7* 14.9*   HGB 14.2 14.1   HCT 42.5 42.4   * 127*     Recent Labs     11/28/21  0353 11/27/21  2224   * 127*   K 3.5 3.0*   CL 91* 91*   CO2 28 31   * 135*   BUN 19 20   CREA 0.78 0.85   CA 9.3 9.2   MG 3.1*  --    PHOS 1.6*  --    ALB 3.0*  --    ALT 78  --      No components found for: [place]    Diagnostics: Imaging studies have been reviewed    Assessment and Plan:    Delirium (11/29/2021) / Dizziness (11/28/2021) POA: 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 MRi brain reviewed. Given her progression of disease, will consult palliative care for symptoms management. Supportive care    Recurrent breast cancer ([facility] [address] (10/8/2020) POA: has stage 4 disease with mets to the liver and brain. Consult Oncology. Supportive care    Brain metastasis [facility]) (11/29/2021) POA: has worsening leptomeningeal disease on MRi. Continue Dexamethasone. Continue WBRT and she will need an ommaya placement for further managements. Oncology following     Pleural effusion, left (11/29/2021) POA: likely malignant. CTA neg for PE. She has IR thoracentesis.  Cytology pending    Cancer related pain (11/29/2021) POA: continue pain medications as per palliative care recommendations    Elevated BP without diagnosis of hypertension (11/29/2021) POA: IV Hydralazine PRN    Hyponatremia (11/29/2021) POA: mild.  Monitor    Total time spent for the patient's care: [address] discussed with: Patient, Care Manager, Nursing Staff and Consultant/Specialist    Discussed:  Care Plan and D/C Planning    Prophylaxis:  SCD's    Anticipated Disposition:  Home w/Family           ___________________________________________________    Attending Physician:   Tammie Castelan MD

## 2021-11-29 NOTE — PROGRESS NOTES
[facility] [place] at [address], [address] [facility]: [phone]  F: [phone]      Reason for evaluation   Sue Contreras is a 55 y.o. female who is seen in consultation at the request of Dr. Mary Gaffney for evaluation of stage IV breast cancer, uncontrolled pain    History of Present Illness:   Patient is a 55 y.o. 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 MRI brain 11/28/2021 showed Interval worsening of leptomeningeal metastatic disease since 11/12/2021 predominantly involving the brainstem and multiple cranial nerves, with  associated new mild communicating hydrocephalus and periventricular edema with No significant change in size of left parasagittal frontal enhancing lesion  measuring 1.7 x 1.6 cm. She was scheduled to have an Ommaya placed 11/29 and start IT methotrexate. Mother at bedside. Riaz Bunn is somnolent but states she has some pain and nausea, she is week. She has no fevers      FH: Mother with breast cancer at age 79; maternal aunt with breast cancer in her 45s; maternal aunt with breast cancer at age 61; no ovarian, prostate, or pancreas cancer      Treatment History:   · 4/6/20 left breast 3:00, 13cmfn, core bx:  IMC, gr 2-3, 1.3 cm, ER negative, [place] negative, HER 2 negative at IHC 0; ki67 68%; LN + by core, 6 mm, gr 2-3  · B-59 Carbo/Taxol +/- atezolizumab 4/28/2020-7/14/2020  · Invitae 4/28/2020: negative  · B-59 DDAC 7/21/2020-9/1/2020  · 10/8/2020 Bilateral mastectomy:  Right breast: benign, left breast: pCR, 0/10 LNs, ypT0 ypN0 cM0  · B59 atezolizumab/placebo 11/11/20- 4/27/21  · XRT 12/9/2020-1/27/21  · 11/18/21 liver core bx: Malignant epitheliolid neoplasm  · 11/18/21 CSF:  Markedly atypical but degenerated cells present;  Findings are suspicious for malignancy        Interval History:     Able to state in the hospital but falling back asleep during interview. Sitter at bedside reports was having pain to neck and head area. Just received pain medication. Addendum: called and spoke with pt's daughter, Alee Barry (20 yo). She would like to be present when Dr Juliano Donato rounds. States pt's son (25 yo)  is in [place] in [place] [address]. He would also like to be on the phone for discussion. States her mother's boss has been very active in her care and has been attending her doctor's appt.      Past Medical History:   Diagnosis Date    ADHD     Anxiety and depression     Cancer ([facility] [address]     LEFT BREAST CA    Ill-defined condition     chemotherapy    Limb alert care status     NO STICKS OR BLOOD PRESSURE IN LEFT ARM    Sleep apnea     MILD -NO CPAP      Past Surgical History:   Procedure Laterality Date    HX BREAST BIOPSY Left 4/15/2020    BREAST BIOPSY performed by [facility]ne [facility] at [facility] MAIN [facility]    HX BREAST RECONSTRUCTION Bilateral 10/8/2020    BREAST RECONSTRUCTION performed by Dao Sánchez MD at [facility] AMBULATORY OR    HX  SECTION  98, 80    HX GI      COLONOSCOPY    HX MASTECTOMY Bilateral 10/8/2020    LEFT BREAST MASTECTOMY WITH AXILLARY NODE DISSECTION AND RIGHT SIMPLE MASTECTOMY / IMMEDIATE BILATERAL BREAST RECONSTRUCTION WITH TISSUE EXPANDERS AND ALLODERM GRAFTS performed by Junior Dorene MD at [facility] AMBUL[facility] OR    HX ORTHOPAEDIC      RIGHT ELBOW-FATTY MASS    HX VASCULAR ACCESS Right 2020      Social History     Tobacco Use    Smoking status: Light Tobacco Smoker    Smokeless tobacco: Never Used    Tobacco comment: Quit 2020   Substance Use Topics    Alcohol use: Not Currently     Alcohol/week: 6.0 standard drinks     Types: 6 Glasses of wine per week     Comment: PER PT ON 10/1/2020      Family History   Problem Relation Age of Onset    Cancer Mother         Breast    Cancer Maternal Aunt         Breast    Cancer Paternal Uncle     Cancer Maternal Aunt         Breast    Cancer Paternal Uncle      Current Facility-Administered Medications   Medication Dose Route Frequency    LORazepam (ATIVAN) injection 1 mg  1 mg IntraVENous ONCE    prochlorperazine (COMPAZINE) tablet 5 mg  5 mg Oral Q6H PRN    dexAMETHasone (DECADRON) tablet 4 mg  4 mg Oral Q6H    sodium chloride (NS) flush 5-40 mL  5-40 mL IntraVENous Q8H    sodium chloride (NS) flush 5-40 mL  5-40 mL IntraVENous PRN    acetaminophen (TYLENOL) tablet 650 mg  650 mg Oral Q6H PRN    Or    acetaminophen (TYLENOL) suppository 650 mg  650 mg Rectal Q6H PRN    polyethylene glycol (MIRALAX) packet 17 g  17 g Oral DAILY PRN    lactated Ringers infusion  125 mL/hr IntraVENous CONTINUOUS    morphine injection 2 mg  2 mg IntraVENous Q4H PRN    LORazepam (ATIVAN) tablet 1 mg  1 mg Oral DAILY PRN    ondansetron (ZOFRAN ODT) tablet 8 mg  8 mg Oral Q8H    Or    ondansetron (ZOFRAN) injection 4 mg  4 mg IntraVENous Q8H    oxyCODONE IR (ROXICODONE) tablet 10 mg  10 mg Oral Q4H PRN    [START ON 11/30/2021] memantine (NAMENDA) tablet 5 mg  5 mg Oral BID      No Known Allergies     Review of Systems: A complete review of systems was obtained, negative except as described above. Physical Exam:     Visit Vitals  BP (!) 157/101 (BP 1 Location: Right upper arm, BP Patient Position: At rest)   Pulse 77   Temp 97.6 °F (36.4 °C)   Resp 16   SpO2 96%     ECOG PS: 3  General: no distress; some expressive aphasia, somnolent  Eyes: L eyelid ptosis  HENT: oropharynx clear  Neck: supple  Respiratory: normal respiratory effort  CV: no peripheral edema  GI: nontender  Skin: no rashes; no ecchymoses; no petechiae        Results:     Lab Results   Component Value Date/Time    WBC 14.7 (H) 11/28/2021 03:53 AM    HGB 14.2 11/28/2021 03:53 AM    HCT 42.5 11/28/2021 03:53 AM    PLATELET 017 (L) 41/98/3774 03:53 AM    MCV 86.6 11/28/2021 03:53 AM    ABS.  NEUTROPHILS 12.2 (H) 11/28/2021 03:53 AM     Lab Results   Component Value Date/Time    Sodium 127 (L) 11/28/2021 03:53 AM    Potassium 3.5 11/28/2021 03:53 AM    Chloride 91 (L) 11/28/2021 03:53 AM    CO2 28 11/28/2021 03:53 AM    Glucose 122 (H) 11/28/2021 03:53 AM    BUN 19 11/28/2021 03:53 AM    Creatinine 0.78 11/28/2021 03:53 AM    GFR est AA >60 11/28/2021 03:53 AM    GFR est non-AA >60 11/28/2021 03:53 AM    Calcium 9.3 11/28/2021 03:53 AM     Lab Results   Component Value Date/Time    Bilirubin, total 0.5 11/28/2021 03:53 AM    ALT (SGPT) 78 11/28/2021 03:53 AM    Alk. phosphatase 726 (H) 11/28/2021 03:53 AM    Protein, total 7.4 11/28/2021 03:53 AM    Albumin 3.0 (L) 11/28/2021 03:53 AM    Globulin 4.4 (H) 11/28/2021 03:53 AM       3/31/20  3D mammogram:  1.6 cm mass in L breast  US L breast  3:00 left breast mass 1.6 cm, multiple LN in L ax tail    4/14/2020 CT c/a/p:  IMPRESSION: Left breast mass. No evidence for metastatic disease in the chest  abdomen or pelvis. There are no target lesions for RECIST classification. 4/14/2020 Bone Scan:  IMPRESSION: No evidence of bony metastatic disease. 5/4/2020 MRI breast:  IMPRESSION:     Right Breast:  1. BI-RADS Assessment Category 1: Negative. No evidence of breast carcinoma  within the right breast.     Left Breast:  1. BI-RADS Assessment Category 6: Known biopsy proven malignancy- Appropriate  action should be taken. Large area of malignant enhancement, occupying most of  the middle and posterior third of the left breast upper outer and lower-outer  quadrants, from 2:00 to 4:00. Malignancy is much larger than it had appeared  mammographically. Greatest measurement is 7 x 4.2 x 3 cm. Contained within this  large area of enhancement is the dominant 2.5 cm mass, which represents the  mammographic and sonographic correlate. 2. BI-RADS Assessment Category 6: Known biopsy proven malignancy- Appropriate  action should be taken. Pathologically proven level 1 left axillary  lymphadenopathy, with numerous enlarged, malignant lymph nodes. There is also  level 2 left axillary lymphadenopathy.   3. Retraction of the skin of the lateral left breast, which is thickened. However, no evidence of malignant skin invasion/involvement. 4. The malignant mass closely approximates the pectoral muscle, but there is no  evidence of direct muscle invasion/involvement.     RECOMMENDATIONS:  Appropriate action is recommended. The patient is undergoing neoadjuvant  chemotherapy. There is no evidence of contralateral disease. 7/30/2020 CTA chest at [facility]: Normal CTA of the chest with no pulmonary embolism or acute aortic abnormalities identified. Consider mild CHF/pulmonary edema    11/12/21 ct c/a/p  FINDINGS:      CHEST WALL: No mass or axillary lymphadenopathy. THYROID: No nodule. MEDIASTINUM: No mass or lymphadenopathy. HENNY: No mass or lymphadenopathy. THORACIC AORTA: No dissection or aneurysm. MAIN PULMONARY ARTERY: Normal in caliber. TRACHEA/BRONCHI: Patent. ESOPHAGUS: No wall thickening or dilatation. HEART: Normal in size. PLEURA: Small left pleural effusion. LUNGS: Left lower lobe atelectasis. Pleural-based opacities are seen in the left  upper lobe, the largest of which measures 3.4 x 1.2 cm.     LIVER: There is a new 15 mm hypodense lesion in the right hepatic lobe. Small  cyst in the right hepatic lobe is again noted. BILIARY TREE: Gallbladder is within normal limits. CBD is not dilated. SPLEEN: within normal limits. PANCREAS: No mass or ductal dilatation. ADRENALS: Unremarkable. KIDNEYS: No mass, calculus, or hydronephrosis. STOMACH: Unremarkable. SMALL BOWEL: No dilatation or wall thickening. COLON: No dilatation or wall thickening. APPENDIX: Within normal limits. PERITONEUM: No ascites or pneumoperitoneum. RETROPERITONEUM: No lymphadenopathy or aortic aneurysm. REPRODUCTIVE ORGANS: Myomatous uterus is again demonstrated. URINARY BLADDER: Decompressed. BONES: Tiny sclerotic lesion right femoral neck is new. No additional convincing  lesions are identified.   ABDOMINAL WALL: No mass or hernia. ADDITIONAL COMMENTS: N/A     IMPRESSION  New small hypodense hepatic lesion is concerning for metastatic disease. Nonspecific tiny sclerotic lesion right femoral neck near compared to the prior  exam, small metastatic focus should be considered. Small left pleural effusion  with underlying atelectasis. Pleural-based opacities in the left upper lobe are  most likely related to prior radiation therapy, however close follow-up is  recommended to evaluate for any interval improvement. 11/12/21 bone scan  FINDINGS: There is slight increase in activity right sixth seventh and eighth  ribs anteriorly and possibly left seventh rib anteriorly. There is vague  increased activity in the hips and there is spotty activity in the femurs. There  is a small focus of activity right tibia. There is increased linear activity in  the fibula. There is question of slight increased activity L1 vertebral body. .     . . Incidental renal imaging shows no abnormality.      IMPRESSION  1. There is suspicion of vague focus of increasing bony activity as described  above which is nonspecific but may represent bony metastatic disease and  correlation with plain films of the pelvis, femurs and right tibia may be  helpful for further assessment    11/12/21 MRI brain  FINDINGS:  There is a left paracentral frontal enhancing mass measuring approximately 1.6 x  1.7 x 1.7 cm located immediately anterior to the genu the corpus callosum. There  is surrounding edema. Adjacent enhancement in the leptomeninges of the  interhemispheric fissure bilaterally. 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     The ventricular size and configuration are within normal limits. Normal signal demonstrated in the cerebral hemispheres, brain stem and  cerebellum. No abnormal areas of intracranial enhancement. No abnormal diffusion.    No evidence of intracranial hemorrhage, acute infarct, mass or abnormal  extra-axial fluid collections. Normal flow-voids are present in the vertebral, basilar and carotid artery  systems. 3-D T1 postcontrast images suggests abnormal enhancement in the medial right  internal auditory canal. Not confirmed on standard axial thicker slice T1. Therefore possibly susceptibility artifact. Correlate clinically and with  imaging follow-up if clinically indicated. Diminished fat signal in the skull base and cervical spine marrow may be  treatment related. Mild diffuse dural enhancement.     IMPRESSION  1. Approximately 1.7 cm left frontal mass extending interhemispheric fissure  with adjacent leptomeningeal involvement. Associated edema. 2. Questionable focus of enhancement in the medial right internal auditory canal  versus susceptibility artifact. 3. Reduced fat signal in the marrow may be treatment related. 11/22/21 cr 1.03  ALT 51  AST 59  t bili 0.4    11/22/21 CTA chest  Moderate L pleural effusion, no PE  Pleural based atelectasis or scarring, producing a mass-like appearance in the ant subpleural TIMOTHY, most likely resulting from previous XRT to the L ant chest wall    11/22/21 MRI t-spine  Diffuse marrow signal abnormalities with low signal T1 and diffuse heterogeneous enhancement      MRI brain 11/28/2021  IMPRESSION     1. Interval worsening of leptomeningeal metastatic disease since 11/12/2021  predominantly involving the brainstem and multiple cranial nerves as above, with  associated new mild communicating hydrocephalus and periventricular edema. 2. No significant change in size of left frontal parasagittal metastasis, though  now necrotic in appearance. Stable adjacent leptomeningeal metastatic disease in  the anterior interhemispheric fissure. Unchanged edema in the left frontal lobe  without midline shift or herniation.   3. Redemonstrated diffusely abnormal marrow signal, which may represent some  combination of osseous metastatic disease and posttreatment changes  Records reviewed and summarized above. Pathology report(s) reviewed above. Radiology report(s) reviewed above. 11/28/21 CT A/P  IMPRESSION  1. Fluid-filled nondilated colon, consistent with gastroenteritis/diarrhea. 2. Unchanged small liver lesion concerning for metastatic disease. Assessment/Plan:   1. Left IMC, gr 2-3, triple negative    11/12/21 Recurrent breast cancer to brain, LM, liver, possibly bone. PD-L1 testing, [place], pending still. If +, chemo + pembro would be recommended. If negative, possibly socituzumab govitecan. This decision is pending treatment for #2 below    Guarded prognosis  CT 11/28 pending     2. Brain mets/ LM disease: On dex 4 mg PO q6h. Resume WBRT; initiated on 11/24; discussed with Rad Onc; plan to resume today; tentatively scheduled to complete daily WBRT on 12/9  Discussed with nursing  --continue Dex 4 mg every 6 hours  --emantine 5 mg daily for 7 days ( started 11/23) for now;, will adjust medication to possibly then 5 mg bid for 7 days then 10 mg in the am and 5 mg in the pm for 7 days, then 10 mg bid for 7 days, for 6 months, rx in  --will need to re-schedule appt with Dr. Joseph Cartwright for  [address] placement; was scheduled for  11/29/21 for IT MTX- this will have to be rescheduled pending discussions about goals      3. L pleural effusion:  IR thoracentesis on 11/24/21, cytology pending    4. Pain due to cancer:    Taking vyvanse; oxycodone 10 mg q4h ,fentanyl patch   Admitted for pain control  -palliative team consulted    7. Constipation:   miralax, senna    8.  Nausea:  Zofran ODT, phenergan    9) Abnormal LFTs   Due to liver metastasis  CT A/P  as above      Plan reviewed with Dr Marleny Boucher          Signed By: Katerina Rosado NP

## 2021-11-29 NOTE — PROGRESS NOTES
[facility] transport made for pt to come back over to Radiation Oncology to try and receive her whole brain treatment.  at 3 pm for 4 pm appointment.

## 2021-11-29 NOTE — PROGRESS NOTES
11/29/21  10:16 AM    Care Management Initial Evaluation:    CM reviewed EMR and attempted to meet with patient in room to complete the initial evaluation. Patient was sleeping soundly, CM called patients daughter and completed the assessment with her Trav Bach)    Reason for Admission:  Patient was admitted for dizziness    PMH: Left breast cancer with metastasis to brain and liver, attention-deficit hyperactivity disorder, obstructive sleep apnea, and anxiety/depression                   RUR Score:   12%  Value Based Purchasing: No  Bundle: No    PAYOR: [address] for utilizing home health:  No home health history    PCP: First and Last name:  Matias Zaman MD     Name of Practice:    Are you a current patient: Yes/No: Yes   Approximate date of last visit: Patients daughter unsure of last appointment   Can you participate in a virtual visit with your PCP:                     Current Advanced Directive/Advance Care Plan: Full Code      Healthcare Decision Maker:   Click here to complete Anpro21 including selection of the Healthcare Decision Maker Relationship (ie \"Primary\")             Primary Decision MakerChip Zehra - Daughter - [phone]    Secondary Decision Maker: Beverly Monsalve - Father - [phone]                  Transition of Care Plan:                    Home: Resides on the second floor apartment with two sets of stairs to enter her home-- her daughter resides with her. DME: None  RX: [facility] on [address]    1). Patient admitted for medical management  2). Hemo/oncology consulted- radiation scheduled for today  3). Palliative consulted  4). Family will transport at dc  5). CM will follow for dc needs    TAYLOR Haddad  Care Manager    Care Management Interventions  PCP Verified by CM:  Yes (Unsure of date of last visit)  Transition of Care Consult (CM Consult): Discharge Planning  Discharge Durable Medical Equipment: No  Physical Therapy Consult: No  Occupational Therapy Consult: No  Speech Therapy Consult: No  Support Systems: Child(hugh)  Confirm Follow Up Transport: Family  Discharge Location  Discharge Placement: Unable to determine at this time

## 2021-11-29 NOTE — PROGRESS NOTES
1830 - Patient's /103, HR 74. Notified MD, orders received. 1903 - Gave PRN hydralazine    1932 - /97, .     1935  Bedside shift change report given to [id number] Rolando Aguilar (oncoming nurse) by Enmanuel Dunne (offgoing nurse). Report included the following information SBAR, Kardex, ED Summary, Intake/Output, MAR, Recent Results and Med Rec Status.

## 2021-11-29 NOTE — PROGRESS NOTES
0300: Patient's BP is 177/102. Pt is very restless and agitated. 1216: RN notified MD.  Josiah: MD also notified of patient's restlessness and agitation   0345:MD order 1 mg  Ativan. RN pulled Ativan to administer but patient IV infiltrated. Three IV attempts by  nursing to no avail. Ativan returned to [facility] . Nursing Supervisor called ICU for ultrasound guided IV .  0443: Pt endorse pain and Roxicodone 10 mg as needed was given . 0515: RN still waiting on ICU for IV placement. Will continue to monitor  0550: No response from ICU.   0555: MD contacted for orders to place PICC line. 3070: MD question need for line. 2330: RN reiterated policy after numerous unsuccessful IV sticks  0630: MD order midline cath insertion. 9481:  Will notify incoming RN

## 2021-11-29 NOTE — CONSULTS
Palliative Medicine Consult  Paulo: [phone]    Patient Name: Jamison Morse  YOB: 1965    Date of Initial Consult: 11/29/21  Reason for Consult: overwhelming symptoms  Requesting Provider: Dr. Oz Cope  Primary Care Physician: Jeanette Matute MD     SUMMARY:   Jamison Morse is a 55 y.o. with recurrent stage IV breast cancer metastatic to brain, liver, who was admitted on 11/27/2021with a diagnosis of dizziness after experiencing 4 falls within 24 hrs at home. Current medical issues leading to Palliative Medicine involvement include: advanced recurrent metastatic disease. Daughter Lupe Portillo resides with patient. She notes that her mother was increasingly fatigued and with loss of appetite, but was largely herself and talking up until Wed the 24th. She describes her mother as on a rapid decline since Thanksgiving Day with increased confusion, persistent reporting of pain in her head, neck and legs and multiple falls. SH:  . Two children; Lupe Portillo (local- age 21), son ([employer]). Mother and Father living. 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 PALLIATIVE DIAGNOSES:   1. Mixed state delirium  2. Cancer associated pain  3. Leptomeningeal disease  4. Stage IV recurrent breast cancer with mets to liver, brain, possibly bone       PLAN:   1. Delirium- multiple etiologies likely but suspect LM disease as most probable cause as symptoms began prior to hospital admission. PRN meds rcvd in 24 hrs include Ativan 1 mg po x 1, oxycodone 30 mg, Morphine 8 mg IV, Zofran 20 mg.  1. Recommend discontinue Ativan for now; may need if approach to care is that of comfort later on but for now trying to preserve mentation / alertness if possible  2. Start Haldol. Give 3 mg IV x 1 right now and then 2 mg q4h prn orally if possible. 3. Manage underlying pain by rotating from IV morphine (if underlying liver dysfunction this is not best choice) to IV Dilaudid 0.5 mg q4h prn.   Can escalate to q3h overnight if needed. 2. Pain- likely present but pain reporting is erratic today pointing toward delirium as primary issue. Will use scheduled oxycodone as is and prn Dilaudid as noted above. 3. Psychosocial- On 2nd visit today I met her daughter Bowen Rascon at bedside and a cousin. Her mother was also outside the room on the phone. Bowen Rascon shared that her brother is driving down from [facility] today. Will follow along for support. Bowen Rascon is processing her mother's new rapid decline and appropriately tearful today. Anticipate need for ongoing support as her course unfolds. 4. Thank you for the opportunity to participate in the care of Radha Aguilar    5. Communicated plan of care with: Palliative IDTMarci [facility] Team     GOALS OF CARE / TREATMENT PREFERENCES:     GOALS OF CARE:  Patient/Health Care Proxy Stated Goals: Other (comment) (unclear at this time; not discussed with family)    TREATMENT PREFERENCES:   Code Status: Full Code    Advance Care Planning:  [x] [facility] Interdisciplinary Team has updated the ACP Navigator with [address] and Patient Capacity      Primary Decision Maker: Lucia Sweeney - Daughter - [phone]    Secondary Decision Maker: Sumi Goldstein - Father - [id number] Planning 11/19/2021   Patient's Healthcare Decision Maker is: Legal Next of Kin   Confirm Advance Directive None   Patient Would Like to Complete Advance Directive -       Medical Interventions: Full interventions       Other:    As far as possible, the palliative care team has discussed with patient / health care proxy about goals of care / treatment preferences for patient.      HISTORY:     History obtained from:  EMR, daughter, RN    CHIEF COMPLAINT: confusion, restlessness    HPI/SUBJECTIVE:    The patient is:   [] Verbal and participatory  [x] Non-participatory due to:  Delirium; confusion     Clinical Pain Assessment (nonverbal scale for severity on nonverbal patients):   Clinical Pain Assessment  Severity: 4  Location: all over / difficulty with pain reporting  Character: pt unable to report  Duration: pt unable to report  Effect: pt unable to report  Factors: pt unable to report  Frequency: pt unable to report     Activity (Movement): Restless, excessive activity and/or withdrawal reflexes    Duration: for how long has pt been experiencing pain (e.g., 2 days, 1 month, years)  Frequency: how often pain is an issue (e.g., several times per day, once every few days, constant)     FUNCTIONAL ASSESSMENT:     Palliative Performance Scale (PPS):  PPS: 20       PSYCHOSOCIAL/SPIRITUAL SCREENING:     Palliative IDT has assessed this patient for cultural preferences / practices and a referral made as appropriate to needs (Cultural Services, Patient Advocacy, Ethics, etc.)    Any spiritual / Advent concerns:  [] Yes /  [x] No    Caregiver Burnout:  [] Yes /  [x] No /  [] No Caregiver Present      Anticipatory grief assessment:   [x] Normal  / [] Maladaptive       ESAS Anxiety:      ESAS Depression:          REVIEW OF SYSTEMS:     Positive and pertinent negative findings in ROS are noted above in HPI. The following systems were [x] reviewed / [] unable to be reviewed as noted in HPI  Other findings are noted below. Systems: constitutional, ears/nose/mouth/throat, respiratory, gastrointestinal, genitourinary, musculoskeletal, integumentary, neurologic, psychiatric, endocrine. Positive findings noted below. Modified ESAS Completed by: provider           Pain: 4         Anorexia: 10             Stool Occurrence(s): 0        PHYSICAL EXAM:     From RN flowsheet:  Wt Readings from Last 3 Encounters:   11/26/21 179 lb (81.2 kg)   11/24/21 179 lb (81.2 kg)   11/23/21 179 lb (81.2 kg)     Blood pressure 106/61, pulse (!) 110, temperature 97.3 °F (36.3 °C), resp. rate 22, SpO2 94 %.     Pain Scale 1: Numeric (0 - 10)  Pain Intensity 1: 10  Pain Onset 1: acute  Pain Location 1: Head  Pain Orientation 1: Right, Posterior  Pain Description 1: Throbbing  Pain Intervention(s) 1: Medication (see MAR)  Last bowel movement, if known:     Constitutional: age appropriate young woman; appears uncomfortable  Eyes: pupils equal, anicteric  ENMT: no nasal discharge, moist mucous membranes  Cardiovascular: regular rhythm, distal pulses intact  Respiratory: breathing not labored on RA, symmetric  Gastrointestinal: soft non-tender, +bowel sounds  Musculoskeletal: no deformity, no tenderness to palpation  Skin: warm, dry  Neurologic: alert, no command following (but suspect this is intermittent), speaking incoherently, restless and requiring frequent redirection, calling out at times  Psychiatric:  Restless, appears distressed       HISTORY:     Principal Problem:    Dizziness (2021)      Past Medical History:   Diagnosis Date    ADHD     Anxiety and depression     Cancer ([facility] [address]     LEFT BREAST CA    Ill-defined condition     chemotherapy    Limb alert care status     NO STICKS OR BLOOD PRESSURE IN LEFT ARM    Sleep apnea     MILD -NO CPAP      Past Surgical History:   Procedure Laterality Date    HX BREAST BIOPSY Left 4/15/2020    BREAST BIOPSY performed by [facility] at [facility]    HX BREAST RECONSTRUCTION Bilateral 10/8/2020    BREAST RECONSTRUCTION performed by Edita Roberto MD at [facility] OR    HX  SECTION  98, 99    HX GI      COLONOSCOPY    HX MASTECTOMY Bilateral 10/8/2020    LEFT BREAST MASTECTOMY WITH AXILLARY NODE DISSECTION AND RIGHT SIMPLE MASTECTOMY / IMMEDIATE BILATERAL BREAST RECONSTRUCTION WITH TISSUE EXPANDERS AND ALLODERM GRAFTS performed by Deborah Rahman MD at [facility] OR    HX ORTHOPAEDIC      RIGHT ELBOW-FATTY MASS    HX VASCULAR ACCESS Right 2020      Family History   Problem Relation Age of Onset    Cancer Mother         Breast    Cancer Maternal Aunt         Breast    Cancer Paternal Uncle     Cancer Maternal Aunt         Breast    Cancer Paternal Uncle       History reviewed, no pertinent family history.   Social History     Tobacco Use    Smoking status: Light Tobacco Smoker    Smokeless tobacco: Never Used    Tobacco comment: Quit April 2020   Substance Use Topics    Alcohol use: Not Currently     Alcohol/week: 6.0 standard drinks     Types: 6 Glasses of wine per week     Comment: PER PT ON 10/1/2020     No Known Allergies   Current Facility-Administered Medications   Medication Dose Route Frequency    LORazepam (ATIVAN) injection 1 mg  1 mg IntraVENous ONCE    prochlorperazine (COMPAZINE) tablet 5 mg  5 mg Oral Q6H PRN    [START ON 11/30/2021] memantine (NAMENDA) tablet 5 mg  5 mg Oral DAILY    haloperidoL (HALDOL) tablet 2 mg  2 mg Oral Q4H PRN    HYDROmorphone (DILAUDID) syringe 0.5 mg  0.5 mg IntraVENous Q4H PRN    dexAMETHasone (DECADRON) tablet 4 mg  4 mg Oral Q6H    sodium chloride (NS) flush 5-40 mL  5-40 mL IntraVENous Q8H    sodium chloride (NS) flush 5-40 mL  5-40 mL IntraVENous PRN    acetaminophen (TYLENOL) tablet 650 mg  650 mg Oral Q6H PRN    Or    acetaminophen (TYLENOL) suppository 650 mg  650 mg Rectal Q6H PRN    polyethylene glycol (MIRALAX) packet 17 g  17 g Oral DAILY PRN    lactated Ringers infusion  125 mL/hr IntraVENous CONTINUOUS    [Held by provider] LORazepam (ATIVAN) tablet 1 mg  1 mg Oral DAILY PRN    ondansetron (ZOFRAN ODT) tablet 8 mg  8 mg Oral Q8H    Or    ondansetron (ZOFRAN) injection 4 mg  4 mg IntraVENous Q8H    oxyCODONE IR (ROXICODONE) tablet 10 mg  10 mg Oral Q4H PRN          LAB AND IMAGING FINDINGS:     Lab Results   Component Value Date/Time    WBC 14.7 (H) 11/28/2021 03:53 AM    HGB 14.2 11/28/2021 03:53 AM    PLATELET 557 (L) 23/40/8294 03:53 AM     Lab Results   Component Value Date/Time    Sodium 127 (L) 11/28/2021 03:53 AM    Potassium 3.5 11/28/2021 03:53 AM    Chloride 91 (L) 11/28/2021 03:53 AM    CO2 28 11/28/2021 03:53 AM    BUN 19 11/28/2021 03:53 AM    Creatinine 0.78 11/28/2021 03:53 AM    Calcium 9.3 11/28/2021 03:53 AM    Magnesium 3.1 (H) 11/28/2021 03:53 AM    Phosphorus 1.6 (L) 11/28/2021 03:53 AM      Lab Results   Component Value Date/Time    Alk. phosphatase 726 (H) 11/28/2021 03:53 AM    Protein, total 7.4 11/28/2021 03:53 AM    Albumin 3.0 (L) 11/28/2021 03:53 AM    Globulin 4.4 (H) 11/28/2021 03:53 AM     Lab Results   Component Value Date/Time    INR 1.0 04/23/2020 10:21 AM    Prothrombin time 9.6 04/23/2020 10:21 AM      No results found for: IRON, FE, TIBC, IBCT, PSAT, FERR   No results found for: PH, PCO2, PO2  No components found for: GLPOC   No results found for: CPK, CKMB             Total time:   Counseling / coordination time, spent as noted above:   > 50% counseling / coordination?:     Prolonged service was provided for  []30 min   []75 min in face to face time in the presence of the patient, spent as noted above. Time Start:   Time End:   Note: this can only be billed with 29560 (initial) or 13816 (follow up). If multiple start / stop times, list each separately.

## 2021-11-30 NOTE — PROGRESS NOTES
Bedside and Verbal shift change report given to Pam Pham (oncoming nurse) by Risa Lorenz (offgoing nurse). Report included the following information SBAR, Procedure Summary, Intake/Output, MAR and Recent Results.

## 2021-11-30 NOTE — PROGRESS NOTES
[facility] at [address], [address] [facility] [address] [facility] [place]: [phone]  F: [phone]      Reason for evaluation   Alex Cespedes is a 55 y.o. female who is seen in consultation at the request of Dr. Nelli Richards for evaluation of stage IV breast cancer, uncontrolled pain    History of Present Illness:   Patient is a 55 y.o. 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 MRI brain 11/28/2021 showed Interval worsening of leptomeningeal metastatic disease since 11/12/2021 predominantly involving the brainstem and multiple cranial nerves, with  associated new mild communicating hydrocephalus and periventricular edema with No significant change in size of left parasagittal frontal enhancing lesion  measuring 1.7 x 1.6 cm. She was scheduled to have an Ommaya placed 11/29 and start IT methotrexate. Mother at bedside. Ely Zuluaga is somnolent but states she has some pain and nausea, she is week. She has no fevers      FH: Mother with breast cancer at age 79; maternal aunt with breast cancer in her 45s; maternal aunt with breast cancer at age 61; no ovarian, prostate, or pancreas cancer      Treatment History:   · 4/6/20 left breast 3:00, 13cmfn, core bx:  IMC, gr 2-3, 1.3 cm, ER negative, HI negative, HER 2 negative at IHC 0; ki67 68%; LN + by core, 6 mm, gr 2-3  · B-59 Carbo/Taxol +/- atezolizumab 4/28/2020-7/14/2020  · Invitae 4/28/2020: negative  · B-59 DDAC 7/21/2020-9/1/2020  · 10/8/2020 Bilateral mastectomy:  Right breast: benign, left breast: pCR, 0/10 LNs, ypT0 ypN0 cM0  · B59 atezolizumab/placebo 11/11/20- 4/27/21  · XRT 12/9/2020-1/27/21  · 11/18/21 liver core bx: Malignant epitheliolid neoplasm  · 11/18/21 CSF:  Markedly atypical but degenerated cells present; Findings are suspicious for malignancy        Interval History:     Pt spontaneously opens eyes not verbalizing.  Smiles and then quickly closes eyes. Appears comfortable. Daughter, Sandra Rachel and son at bedside. Teary during conversations. Concerned about mother holding fluid in her mouth and then coughing after swallowing. States has not eaten in several days. Daughter met with Dr Dirk Ochoa last night and plan to to have family meeting again this evening for update.       Sitter at bedside      Past Medical History:   Diagnosis Date    ADHD     Anxiety and depression     Cancer ([address]     LEFT BREAST CA    Ill-defined condition     chemotherapy    Limb alert care status     NO STICKS OR BLOOD PRESSURE IN LEFT ARM    Sleep apnea     MILD -NO CPAP      Past Surgical History:   Procedure Laterality Date    HX BREAST BIOPSY Left 4/15/2020    BREAST BIOPSY [facility] by [facility] at [facility]    HX BREAST RECONSTRUCTION Bilateral 10/8/2020    BREAST RECONSTRUCTION performed by Ruben Love MD at [facility] OR    HX  SECTION  98, 80    HX GI      COLONOSCOPY    HX MASTECTOMY Bilateral 10/8/2020    LEFT BREAST MASTECTOMY WITH AXILLARY NODE DISSECTION AND RIGHT SIMPLE MASTECTOMY / IMMEDIATE BILATERAL BREAST RECONSTRUCTION WITH TISSUE EXPANDERS AND ALLODERM GRAFTS performed by Jaquelin Khalil MD at [facility] OR    HX ORTHOPAEDIC      RIGHT ELBOW-FATTY MASS    HX VASCULAR ACCESS Right 2020      Social History     Tobacco Use    Smoking status: Light Tobacco Smoker    Smokeless tobacco: Never Used    Tobacco comment: Quit 2020   Substance Use Topics    Alcohol use: Not Currently     Alcohol/week: 6.0 standard drinks     Types: 6 Glasses of wine per week     Comment: PER PT ON 10/1/2020      Family History   Problem Relation Age of Onset    Cancer Mother         Breast    Cancer Maternal Aunt         Breast    Cancer Paternal Uncle     Cancer Maternal Aunt         Breast    Cancer Paternal Uncle      Current Facility-Administered Medications   Medication Dose Route Frequency    dexamethasone (DECADRON) 4 mg/mL injection 4 mg  4 mg IntraVENous Q6H    0.9% sodium chloride infusion  100 mL/hr IntraVENous CONTINUOUS    prochlorperazine (COMPAZINE) tablet 5 mg  5 mg Oral Q6H PRN    memantine (NAMENDA) tablet 5 mg  5 mg Oral DAILY    haloperidoL (HALDOL) tablet 2 mg  2 mg Oral Q4H PRN    HYDROmorphone (DILAUDID) syringe 0.5 mg  0.5 mg IntraVENous Q4H PRN    haloperidol lactate (HALDOL) injection 3 mg  3 mg IntraVENous Q4H PRN    sodium chloride (NS) flush 5-40 mL  5-40 mL IntraVENous Q8H    sodium chloride (NS) flush 5-40 mL  5-40 mL IntraVENous PRN    acetaminophen (TYLENOL) tablet 650 mg  650 mg Oral Q6H PRN    Or    acetaminophen (TYLENOL) suppository 650 mg  650 mg Rectal Q6H PRN    polyethylene glycol (MIRALAX) packet 17 g  17 g Oral DAILY PRN    [Held by provider] LORazepam (ATIVAN) tablet 1 mg  1 mg Oral DAILY PRN    ondansetron (ZOFRAN ODT) tablet 8 mg  8 mg Oral Q8H    Or    ondansetron (ZOFRAN) injection 4 mg  4 mg IntraVENous Q8H    oxyCODONE IR (ROXICODONE) tablet 10 mg  10 mg Oral Q4H PRN      No Known Allergies     Review of Systems: A complete review of systems was obtained, negative except as described above. Physical Exam:     Visit Vitals  BP (!) 158/94 (BP 1 Location: Left upper arm, BP Patient Position: Semi fowlers; At rest)   Pulse 85   Temp 97.6 °F (36.4 °C)   Resp 18   SpO2 98%     ECOG PS: 3  General: no distress; some expressive aphasia, somnolent  Eyes: L eyelid ptosis  HENT: oropharynx clear  Neck: supple  Respiratory: normal respiratory effort  CV: no peripheral edema  GI: nontender  Skin: no rashes; no ecchymoses; no petechiae        Results:     Lab Results   Component Value Date/Time    WBC 14.7 (H) 11/28/2021 03:53 AM    HGB 14.2 11/28/2021 03:53 AM    HCT 42.5 11/28/2021 03:53 AM    PLATELET 993 (L) 19/43/2763 03:53 AM    MCV 86.6 11/28/2021 03:53 AM    ABS.  NEUTROPHILS 12.2 (H) 11/28/2021 03:53 AM     Lab Results   Component Value Date/Time Sodium 130 (L) 11/30/2021 03:03 AM    Potassium 3.5 11/30/2021 03:03 AM    Chloride 93 (L) 11/30/2021 03:03 AM    CO2 26 11/30/2021 03:03 AM    Glucose 125 (H) 11/30/2021 03:03 AM    BUN 26 (H) 11/30/2021 03:03 AM    Creatinine 0.82 11/30/2021 03:03 AM    GFR est AA >60 11/30/2021 03:03 AM    GFR est non-AA >60 11/30/2021 03:03 AM    Calcium 9.2 11/30/2021 03:03 AM     Lab Results   Component Value Date/Time    Bilirubin, total 0.5 11/28/2021 03:53 AM    ALT (SGPT) 78 11/28/2021 03:53 AM    Alk. phosphatase 726 (H) 11/28/2021 03:53 AM    Protein, total 7.4 11/28/2021 03:53 AM    Albumin 3.0 (L) 11/28/2021 03:53 AM    Globulin 4.4 (H) 11/28/2021 03:53 AM       3/31/20  3D mammogram:  1.6 cm mass in L breast  US L breast  3:00 left breast mass 1.6 cm, multiple LN in L ax tail    4/14/2020 CT c/a/p:  IMPRESSION: Left breast mass. No evidence for metastatic disease in the chest  abdomen or pelvis. There are no target lesions for RECIST classification. 4/14/2020 Bone Scan:  IMPRESSION: No evidence of bony metastatic disease. 5/4/2020 MRI breast:  IMPRESSION:     Right Breast:  1. BI-RADS Assessment Category 1: Negative. No evidence of breast carcinoma  within the right breast.     Left Breast:  1. BI-RADS Assessment Category 6: Known biopsy proven malignancy- Appropriate  action should be taken. Large area of malignant enhancement, occupying most of  the middle and posterior third of the left breast upper outer and lower-outer  quadrants, from 2:00 to 4:00. Malignancy is much larger than it had appeared  mammographically. Greatest measurement is 7 x 4.2 x 3 cm. Contained within this  large area of enhancement is the dominant 2.5 cm mass, which represents the  mammographic and sonographic correlate. 2. BI-RADS Assessment Category 6: Known biopsy proven malignancy- Appropriate  action should be taken. Pathologically proven level 1 left axillary  lymphadenopathy, with numerous enlarged, malignant lymph nodes.  There is also  level 2 left axillary lymphadenopathy. 3. Retraction of the skin of the lateral left breast, which is thickened. However, no evidence of malignant skin invasion/involvement. 4. The malignant mass closely approximates the pectoral muscle, but there is no  evidence of direct muscle invasion/involvement.     RECOMMENDATIONS:  Appropriate action is recommended. The patient is undergoing neoadjuvant  chemotherapy. There is no evidence of contralateral disease. 7/30/2020 [facility] at [facility]: Normal CTA of the chest with no pulmonary embolism or acute aortic abnormalities identified. Consider mild CHF/pulmonary edema    11/12/21 ct c/a/p  FINDINGS:      CHEST WALL: No mass or axillary lymphadenopathy. THYROID: No nodule. MEDIASTINUM: No mass or lymphadenopathy. HENNY: No mass or lymphadenopathy. THORACIC AORTA: No dissection or aneurysm. MAIN PULMONARY ARTERY: Normal in caliber. TRACHEA/BRONCHI: Patent. ESOPHAGUS: No wall thickening or dilatation. HEART: Normal in size. PLEURA: Small left pleural effusion. LUNGS: Left lower lobe atelectasis. Pleural-based opacities are seen in the left  upper lobe, the largest of which measures 3.4 x 1.2 cm.     LIVER: There is a new 15 mm hypodense lesion in the right hepatic lobe. Small  cyst in the right hepatic lobe is again noted. BILIARY TREE: Gallbladder is within normal limits. CBD is not dilated. SPLEEN: within normal limits. PANCREAS: No mass or ductal dilatation. ADRENALS: Unremarkable. KIDNEYS: No mass, calculus, or hydronephrosis. STOMACH: Unremarkable. SMALL BOWEL: No dilatation or wall thickening. COLON: No dilatation or wall thickening. APPENDIX: Within normal limits. PERITONEUM: No ascites or pneumoperitoneum. RETROPERITONEUM: No lymphadenopathy or aortic aneurysm. REPRODUCTIVE ORGANS: Myomatous uterus is again demonstrated. URINARY BLADDER: Decompressed. BONES: Tiny sclerotic lesion right femoral neck is new.  No additional convincing  lesions are identified. ABDOMINAL WALL: No mass or hernia. ADDITIONAL COMMENTS: N/A     IMPRESSION  New small hypodense hepatic lesion is concerning for metastatic disease. Nonspecific tiny sclerotic lesion right femoral neck near compared to the prior  exam, small metastatic focus should be considered. Small left pleural effusion  with underlying atelectasis. Pleural-based opacities in the left upper lobe are  most likely related to prior radiation therapy, however close follow-up is  recommended to evaluate for any interval improvement. 11/12/21 bone scan  FINDINGS: There is slight increase in activity right sixth seventh and eighth  ribs anteriorly and possibly left seventh rib anteriorly. There is vague  increased activity in the hips and there is spotty activity in the femurs. There  is a small focus of activity right tibia. There is increased linear activity in  the fibula. There is question of slight increased activity L1 vertebral body. .     . . Incidental renal imaging shows no abnormality.      IMPRESSION  1. There is suspicion of vague focus of increasing bony activity as described  above which is nonspecific but may represent bony metastatic disease and  correlation with plain films of the pelvis, femurs and right tibia may be  helpful for further assessment    11/12/21 MRI brain  FINDINGS:  There is a left paracentral frontal enhancing mass measuring approximately 1.6 x  1.7 x 1.7 cm located immediately anterior to the genu the corpus callosum. There  is surrounding edema. Adjacent enhancement in the leptomeninges of the  interhemispheric fissure bilaterally. 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     The ventricular size and configuration are within normal limits. Normal signal demonstrated in the cerebral hemispheres, brain stem and  cerebellum. No abnormal areas of intracranial enhancement.   No abnormal diffusion. No evidence of intracranial hemorrhage, acute infarct, mass or abnormal  extra-axial fluid collections. Normal flow-voids are present in the vertebral, basilar and carotid artery  systems. 3-D T1 postcontrast images suggests abnormal enhancement in the medial right  internal auditory canal. Not confirmed on standard axial thicker slice T1. Therefore possibly susceptibility artifact. Correlate clinically and with  imaging follow-up if clinically indicated. Diminished fat signal in the skull base and cervical spine marrow may be  treatment related. Mild diffuse dural enhancement.     IMPRESSION  1. Approximately 1.7 cm left frontal mass extending interhemispheric fissure  with adjacent leptomeningeal involvement. Associated edema. 2. Questionable focus of enhancement in the medial right internal auditory canal  versus susceptibility artifact. 3. Reduced fat signal in the marrow may be treatment related. 11/22/21 cr 1.03  ALT 51  AST 59  t bili 0.4    11/22/21 CTA chest  Moderate L pleural effusion, no PE  Pleural based atelectasis or scarring, producing a mass-like appearance in the ant subpleural TIMOTHY, most likely resulting from previous XRT to the L ant chest wall    11/22/21 MRI t-spine  Diffuse marrow signal abnormalities with low signal T1 and diffuse heterogeneous enhancement      MRI brain 11/28/2021  IMPRESSION     1. Interval worsening of leptomeningeal metastatic disease since 11/12/2021  predominantly involving the brainstem and multiple cranial nerves as above, with  associated new mild communicating hydrocephalus and periventricular edema. 2. No significant change in size of left frontal parasagittal metastasis, though  now necrotic in appearance. Stable adjacent leptomeningeal metastatic disease in  the anterior interhemispheric fissure. Unchanged edema in the left frontal lobe  without midline shift or herniation.   3. Redemonstrated diffusely abnormal marrow signal, which may represent some  combination of osseous metastatic disease and posttreatment changes  Records reviewed and summarized above. Pathology report(s) reviewed above. Radiology report(s) reviewed above. 11/28/21 CT A/P  IMPRESSION  1. Fluid-filled nondilated colon, consistent with gastroenteritis/diarrhea. 2. Unchanged small liver lesion concerning for metastatic disease. Assessment/Plan:   1. Left IMC, gr 2-3, triple negative    11/12/21 Recurrent breast cancer to brain, LM, liver, possibly bone. PD-L1 testing, Billye Punch, pending still. If +, chemo + pembro would be recommended. If negative, possibly socituzumab govitecan. This decision is pending treatment for #2 below  High concern with rapid progression of disease. Plan if to see if improvement with XRT and if not considering changing to IT chemo  vs hospice. Dr Katerina Carlos has reviewed with daughter. 2. Brain mets/ LM disease:    Resumed  WBRT; initiated on 11/24; discussed with Rad Onc; tentatively scheduled to complete daily WBRT on 12/9  --continue Dex 4 mg every 6 hours; changed to IV today  --emantine 5 mg daily for 7 days ( started 11/23) for now;, will adjust medication to possibly then 5 mg bid for 7 days then 10 mg in the am and 5 mg in the pm for 7 days, then 10 mg bid for 7 days, for 6 months, rx in  --re-scheduled appt with Dr. Yoan Bell for  [address] placement; was scheduled for 12/2       3. L pleural effusion:  IR thoracentesis on 11/24/21, cytology positive for malignancy; poorly differentiated adenocarcinoma. 4. Pain due to cancer:    -palliative team following; appreciate recs    7. Constipation:   miralax, senna    8.  Nausea:  Zofran ODT, phenergan    9) Abnormal LFTs   Due to liver metastasis  CT A/P  as above      Plan reviewed with Dr Katerina Carlos    Signed By: Meredith Barrera NP

## 2021-11-30 NOTE — CONSULTS
Palliative Medicine Consult  Paulo: [phone] ([phone])    Patient Name: Emmanuelle Andrews  YOB: 1965    Date of Initial Consult: 11/29/21  Reason for Consult: overwhelming symptoms  Requesting Provider: Dr. Vazquez Ridlorie  Primary Care Physician: Zahra Munoz MD     SUMMARY:   Emmanuelle Andrews is a 55 y.o. with recurrent stage IV breast cancer metastatic to brain, liver, who was admitted on 11/27/2021with a diagnosis of dizziness after experiencing 4 falls within 24 hrs at home. Current medical issues leading to Palliative Medicine involvement include: advanced recurrent metastatic disease. Daughter Fani Banerjee resides with patient. She notes that her mother was increasingly fatigued and with loss of appetite, but was largely herself and talking up until Wed the 24th. She describes her mother as on a rapid decline since Thanksgiving Day with increased confusion, persistent reporting of pain in her head, neck and legs and multiple falls. SH:  . Two children; Fani Banerjee (local- age 21), son ([employer]). Mother and Father living. 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 PALLIATIVE DIAGNOSES:   1. Mixed state delirium  2. Cancer associated pain  3. Leptomeningeal disease  4. Stage IV recurrent breast cancer with mets to liver, brain, possibly bone       PLAN:   5. Delirium- multiple etiologies likely but suspect LM disease as most probable cause as symptoms began prior to hospital admission. 1. Resting comfortably this morning; minimally interactive. Virtually no PO intake as a result. 2. Restlessness responded well to Haldol 3 mg IV x 1 prior to XRT yesterday. Did not require repeat dose. Both IV and po dose available ongoing. 3. Continue to manage underlying pain with Dilaudid. 4. Will hold Ativan as this may worsen agitation. 2. Pain- pt comfortable this morning; not expressing any signs/sx of pain.   Rcvd Dilaudid 0.5 mg IV x 3 and oxy 10 mg po x 2 in past 24 hrs for a total MEDD of 45. Will continue po oxycodone as first line and iV Dilaudid as 2nd.   3. Psychosocial- patient has two young adult children- dtr Maranda ([facility]) and son Mauri [facility]). Lives at home with daughter. Her parents are also alive. Team is continuing to learn more about family dynamics. 4. Need to plan ahead for family meeting in next 1-2 days as her care needs have clearly escalated and need to sort out how to care for her moving forward, even if she is on a cancer treatment plan. Unless she makes a remarkable turnaround from radiation anticipate need to utilize Hospice support for an end of life care plan. Family will benefit from the support. 5. Thank you for the opportunity to participate in the care of Ingrid Banegas    6. Communicated plan of care with: Palliative [facility] M[facility]EL [facility] Team     GOALS OF CARE / TREATMENT PREFERENCES:     GOALS OF CARE:  Patient/Health Care Proxy Stated Goals: Other (comment) (unclear at this time; not discussed with family)    TREATMENT PREFERENCES:   Code Status: Full Code    Advance Care Planning:  [x] [facility] Interdisciplinary Team has updated the ACP Navigator with [address] and Patient Capacity      Primary Decision Maker: Kathy Lea - Daughter - [phone]    Secondary Decision Maker: Maria Luz Martinez - Father - [phone] [facility] Planning 11/19/2021   Patient's Healthcare Decision Maker is: Legal Next of Kin   Confirm Advance Directive None   Patient Would Like to Complete Advance Directive -       Medical Interventions: Full interventions       Other:    As far as possible, the palliative care team has discussed with patient / health care proxy about goals of care / treatment preferences for patient.      HISTORY:     History obtained from:  EMR, daughter, RN    CHIEF COMPLAINT: confusion, somnolence    HPI/SUBJECTIVE:    The patient is:   [] Verbal and participatory  [x] Non-participatory due to:  Delirium; confusion     Clinical Pain Assessment (nonverbal scale for severity on nonverbal patients):   Clinical Pain Assessment  Severity: 4  Location: all over / difficulty with pain reporting  Character: pt unable to report  Duration: pt unable to report  Effect: pt unable to report  Factors: pt unable to report  Frequency: pt unable to report     Activity (Movement): Restless, excessive activity and/or withdrawal reflexes    Duration: for how long has pt been experiencing pain (e.g., 2 days, 1 month, years)  Frequency: how often pain is an issue (e.g., several times per day, once every few days, constant)     FUNCTIONAL ASSESSMENT:     Palliative Performance Scale (PPS):  PPS: 20       PSYCHOSOCIAL/SPIRITUAL SCREENING:     Palliative IDT has assessed this patient for cultural preferences / practices and a referral made as appropriate to needs (Cultural Services, Patient Advocacy, Ethics, etc.)    Any spiritual / Rastafarian concerns:  [] Yes /  [x] No    Caregiver Burnout:  [] Yes /  [x] No /  [] No Caregiver Present      Anticipatory grief assessment:   [x] Normal  / [] Maladaptive       ESAS Anxiety:      ESAS Depression:          REVIEW OF SYSTEMS:     Positive and pertinent negative findings in ROS are noted above in HPI. The following systems were [x] reviewed / [] unable to be reviewed as noted in HPI  Other findings are noted below. Systems: constitutional, ears/nose/mouth/throat, respiratory, gastrointestinal, genitourinary, musculoskeletal, integumentary, neurologic, psychiatric, endocrine. Positive findings noted below. Modified ESAS Completed by: provider           Pain: 4         Anorexia: 10             Stool Occurrence(s): 0        PHYSICAL EXAM:     From RN flowsheet:  Wt Readings from Last 3 Encounters:   11/26/21 179 lb (81.2 kg)   11/24/21 179 lb (81.2 kg)   11/23/21 179 lb (81.2 kg)     Blood pressure (!) 158/94, pulse 85, temperature 97.6 °F (36.4 °C), resp.  rate 18, SpO2 98 %. Pain Scale 1: Numeric (0 - 10)  Pain Intensity 1: 0  Pain Onset 1: acute  Pain Location 1: Head  Pain Orientation 1: Right, Posterior  Pain Description 1: Throbbing  Pain Intervention(s) 1: Medication (see MAR)  Last bowel movement, if known:     Constitutional: age appropriate young woman; lying comfortably today but somnolent  Eyes: pupils equal, anicteric  ENMT: no nasal discharge, moist mucous membranes  Cardiovascular: regular rhythm, distal pulses intact  Respiratory: breathing not labored on RA, symmetric  Gastrointestinal: soft non-tender, +bowel sounds  Musculoskeletal: no deformity, no tenderness to palpation  Skin: warm, dry  Neurologic: opens eyes to stimulation, does not converse or answer questions. Very limited command following.    Psychiatric:  Appropriate affect today       HISTORY:     Principal Problem:    Delirium (2021)    Active Problems:    Recurrent breast cancer ([facility] (10/8/2020)      Dizziness (2021)      Brain metastasis ([facility] (2021)      Pleural effusion, left (2021)      Cancer related pain (2021)      Elevated BP without diagnosis of hypertension (2021)      Hyponatremia (2021)      Past Medical History:   Diagnosis Date    ADHD     Anxiety and depression     Cancer ([address]     LEFT BREAST CA    Ill-defined condition     chemotherapy    Limb alert care status     NO STICKS OR BLOOD PRESSURE IN LEFT ARM    Sleep apnea     MILD -NO CPAP      Past Surgical History:   Procedure Laterality Date    HX BREAST BIOPSY Left 4/15/2020    BREAST BIOPSY performed by Aidan Mae MD at [address] [place] [address] HX BREAST RECONSTRUCTION Bilateral 10/8/2020    BREAST RECONSTRUCTION performed by Radha Rendon MD at [facility] AMBULATORY OR    HX  SECTION  98, 80    HX GI      COLONOSCOPY    HX MASTECTOMY Bilateral 10/8/2020    LEFT BREAST MASTECTOMY WITH AXILLARY NODE DISSECTION AND RIGHT SIMPLE MASTECTOMY / IMMEDIATE BILATERAL BREAST RECONSTRUCTION WITH TISSUE EXPANDERS AND ALLODERM GRAFTS performed by Shayla Daniel MD at [facility] AMBULATORY OR    HX ORTHOPAEDIC      RIGHT ELBOW-FATTY MASS    HX VASCULAR ACCESS Right 04/2020      Family History   Problem Relation Age of Onset    Cancer Mother         Breast    Cancer Maternal Aunt         Breast    Cancer Paternal Uncle     Cancer Maternal Aunt         Breast    Cancer Paternal Uncle       History reviewed, no pertinent family history.   Social History     Tobacco Use    Smoking status: Light Tobacco Smoker    Smokeless tobacco: Never Used    Tobacco comment: Quit April 2020   Substance Use Topics    Alcohol use: Not Currently     Alcohol/week: 6.0 standard drinks     Types: 6 Glasses of wine per week     Comment: PER PT ON 10/1/2020     No Known Allergies   Current Facility-Administered Medications   Medication Dose Route Frequency    dexamethasone (DECADRON) 4 mg/mL injection 4 mg  4 mg IntraVENous Q6H    prochlorperazine (COMPAZINE) tablet 5 mg  5 mg Oral Q6H PRN    memantine (NAMENDA) tablet 5 mg  5 mg Oral DAILY    haloperidoL (HALDOL) tablet 2 mg  2 mg Oral Q4H PRN    HYDROmorphone (DILAUDID) syringe 0.5 mg  0.5 mg IntraVENous Q4H PRN    haloperidol lactate (HALDOL) injection 3 mg  3 mg IntraVENous Q4H PRN    sodium chloride (NS) flush 5-40 mL  5-40 mL IntraVENous Q8H    sodium chloride (NS) flush 5-40 mL  5-40 mL IntraVENous PRN    acetaminophen (TYLENOL) tablet 650 mg  650 mg Oral Q6H PRN    Or    acetaminophen (TYLENOL) suppository 650 mg  650 mg Rectal Q6H PRN    polyethylene glycol (MIRALAX) packet 17 g  17 g Oral DAILY PRN    lactated Ringers infusion  125 mL/hr IntraVENous CONTINUOUS    [Held by provider] LORazepam (ATIVAN) tablet 1 mg  1 mg Oral DAILY PRN    ondansetron (ZOFRAN ODT) tablet 8 mg  8 mg Oral Q8H    Or    ondansetron (ZOFRAN) injection 4 mg  4 mg IntraVENous Q8H    oxyCODONE IR (ROXICODONE) tablet 10 mg  10 mg Oral Q4H PRN          LAB AND IMAGING FINDINGS:     Lab Results   Component Value Date/Time    WBC 14.7 (H) 11/28/2021 03:53 AM    HGB 14.2 11/28/2021 03:53 AM    PLATELET 097 (L) 32/12/6844 03:53 AM     Lab Results   Component Value Date/Time    Sodium 130 (L) 11/30/2021 03:03 AM    Potassium 3.5 11/30/2021 03:03 AM    Chloride 93 (L) 11/30/2021 03:03 AM    CO2 26 11/30/2021 03:03 AM    BUN 26 (H) 11/30/2021 03:03 AM    Creatinine 0.82 11/30/2021 03:03 AM    Calcium 9.2 11/30/2021 03:03 AM    Magnesium 3.1 (H) 11/28/2021 03:53 AM    Phosphorus 1.6 (L) 11/28/2021 03:53 AM      Lab Results   Component Value Date/Time    Alk. phosphatase 726 (H) 11/28/2021 03:53 AM    Protein, total 7.4 11/28/2021 03:53 AM    Albumin 3.0 (L) 11/28/2021 03:53 AM    Globulin 4.4 (H) 11/28/2021 03:53 AM     Lab Results   Component Value Date/Time    INR 1.0 04/23/2020 10:21 AM    Prothrombin time 9.6 04/23/2020 10:21 AM      No results found for: IRON, FE, TIBC, IBCT, PSAT, FERR   No results found for: PH, PCO2, PO2  No components found for: GLPOC   No results found for: CPK, CKMB             Total time:   Counseling / coordination time, spent as noted above:   > 50% counseling / coordination?:     Prolonged service was provided for  []30 min   []75 min in face to face time in the presence of the patient, spent as noted above. Time Start:   Time End:   Note: this can only be billed with 37900 (initial) or 86644 (follow up). If multiple start / stop times, list each separately.

## 2021-11-30 NOTE — PROGRESS NOTES
Music Therapy [id number] [facility] [id number]     1965  55 y.o.  female    Patient Telephone Number: [phone] (home)   Temple Affiliation: No preference   Language: English   Patient Active Problem List    Diagnosis Date Noted    Brain metastasis ([facility] 11/29/2021    Pleural effusion, left 11/29/2021    Cancer related pain 11/29/2021    Elevated BP without diagnosis of hypertension 11/29/2021    Hyponatremia 11/29/2021    Delirium 11/29/2021    Dizziness 11/28/2021    Recurrent breast cancer ([facility] 10/08/2020    Absence of breast 10/08/2020    Severe obesity ([facility] 04/28/2020        Date: 11/30/2021            Total Time (in minutes): 18          SFM 5M1 MED SURG 1    Mental Status:   [x] Alert [  ] Danica Miguel [  ]  Confused  [  ] Minimally responsive  [  ] Sleeping    Communication Status: [  ] Impaired Speech [  ] Nonverbal -SLP chart notes indicate dysphagia    Physical Status:   [  ] Oxygen in use  [  ] Hard of Hearing [  ] Vision Impaired  [  ] Ambulatory  [  ] Ambulatory with assistance [  ] Non-ambulatory -N/A    Music Preferences, Background: Pt's daughter Sandra Rachel shared that the pt listens to nature sounds, such as waterfalls and meditations with nature sounds, including Jewish meditations, R&B including Jocelynn, and Popular singers Mireya Escoto and [place]. Pt's daughter said pt has enjoyed singing in the past.    Clinical Problem to be addressed: Decrease feelings of stress, support relaxation and comfort.     Goal(s) met in session:  Physical/Pain management (Scale of 1-10):    Pre-session rating ___________    Post-session rating __________  [  ] Increased relaxation   [  ] Affected breathing patterns  [  ] Decreased muscle tension   [  ] Decreased agitation  [  ] Affected heart rate    [  ] Increased alertness     Emotional/Psychological:  [  ] Increased self-expression   [  ] Decreased aggressive behavior   [  ] Decreased feelings of stress  [  ] Discussed healthy coping skills     [  ] Improved mood    [  ] Decreased withdrawn behavior     Social:  [  ] Decreased feelings of isolation/loneliness [  ] Positive social interaction   [x] Provided support and/or comfort for family/friends    Spiritual:  [  ] Spiritual support    [  ] Expressed peace  [  ] Expressed griselda    [  ] Discussed beliefs    Techniques Utilized (Check all that apply):   [  ] Procedural support MT [  ] Music for relaxation [x] Patient preferred music  [  ] Rosalva analysis  [  ] Song choice  [  ] Music for validation  [  ] Entrainment  [  ] Movement to music [  ] Guided visualization  [  ] [place] Panning  [  ] Patient instrument playing [  ] Jakub Do writing  [  ] Donnald Genin along   [  ] Ardelle Keas  [  ] Sensory stimulation  [  ] Active Listening  [  ] Music for spiritual support [  ] Making of CDs as gifts    Session Observations:  Referral from Dr. Liliana Singer, Palliative. Patient (pt) was alert but drowsy lying in bed. She had a sitter at bedside. Pt didn't verbally communicate with this music therapist (MT), but answered yes/no questions by nodding her head. MT asked the pt yes/no questions about her music preferences, but didn't observe the pt to respond. MT offered to step out of the pt's room to call her daughter Marilyn Elliott (a contact listed in the pt's chart) to ask if she could share about the pt's favorite types of music. Pt nodded in agreement. MT spoke on the phone with Marilyn Elliott, who shared about the pt's music preferences and music background. Her voice sounded like she might have become emotional at times during the conversation. MT provided active listening and words of validation and support when Marilyn Elliott shared she hoped to visit the pt again later in the day. MT re-entered the pt's room and shared about talking on the phone with Marilyn Elliott and the music preferences Marli shared.  MT offered soft music of the pt's preference to support relaxation and invited the pt to close her eyes if she'd like while listening. Pt nodded her head no. MT asked the pt if now wasn't a good time for music therapy and pt nodded yes. MT expressed understanding and thanked pt for letting the MT know what she needed right now.     Ale Barker MT-BC (Music Therapist-Board Certified)  Spiritual Care Department  Referral-based service

## 2021-11-30 NOTE — PROGRESS NOTES
Pt received 3 of 10 radiation treatments to her whole brain. Returned tomorrow via [facility], pickup at 10 for 11 AM appointment time.  -AM RTT

## 2021-11-30 NOTE — PROGRESS NOTES
Zenon with Mehran from the cancer center. Notified of 1000 pickup for 1100 radiation appointment. 1030  [facility] here to transfer pt to cancer center. 1120  Pt back in bed. No signs of distress. /107, HR 90. Dr. Oksana Shaw notified. Orders received. Hydralazine administered @ 1205. Pt reports restless and agitation and moderate pain - Oxy and Haldol administered at 1229.     1220  Reassessed pt - /93, .     1300  Pt still appears restless and agitated. /85, .    1327  Dr. Oksana Shaw notified of pt's vitals and pain (9/10) by Andreea Kern RN. Dilaudid administered. 1436  Pt resting in bed, calm, in no apparent distress. Pain (6/10). 26  Dr. Oksana Shaw notified of recurrent elevated HR (currently 116). No new orders. Will continue to monitor.

## 2021-11-30 NOTE — PROGRESS NOTES
SPEECH LANGUAGE PATHOLOGY BEDSIDE SWALLOW EVALUATION  Patient: Belen Hardy (73 y.o. female)  Date: 2021  Primary Diagnosis: Dizziness [R42]       Precautions: aspiration       ASSESSMENT :  Based on the objective data described below, the patient presents with intermittently functional swallow. This may fluctuate with LOC,  Confusion,  And progression of cancer affecting the cranial nerves in the brainstem. Currently ok for regular diet, thin liquids as tolerated. Intermittent nausea noted. Admitted with breast CA with metastasis to liver and brain. cUrrently in XRT. Patient will benefit from skilled intervention to address the above impairments. Patients rehabilitation potential is considered to be Guarded     PLAN :  Recommendations and Planned Interventions:  Continue diet as tolerated. Frequency/Duration: Patient will be followed by speech-language pathology 1 time a week to address goals.   Discharge Recommendations: None     SUBJECTIVE:   Patient was not that interested in PO..    OBJECTIVE:     Past Medical History:   Diagnosis Date    ADHD     Anxiety and depression     Cancer ([facility]     LEFT BREAST CA    Ill-defined condition     chemotherapy    Limb alert care status     NO STICKS OR BLOOD PRESSURE IN LEFT ARM    Sleep apnea     MILD -NO CPAP     Past Surgical History:   Procedure Laterality Date    HX BREAST BIOPSY Left 4/15/2020    BREAST BIOPSY performed by Gloria Gomez MD at [address] [place] [address] HX BREAST RECONSTRUCTION Bilateral 10/8/2020    BREAST RECONSTRUCTION performed by Karlene Chan MD at [facility] AM[facility]UL[facility]ORY OR    HX  SECTION  98, 80    HX GI      COLONOSCOPY    HX MASTECTOMY Bilateral 10/8/2020    LEFT BREAST MASTECTOMY WITH AXILLARY NODE DISSECTION AND RIGHT SIMPLE MASTECTOMY / IMMEDIATE BILATERAL BREAST RECONSTRUCTION WITH TISSUE EXPANDERS AND ALLODERM GRAFTS performed by Gloria Gomez MD at [facility] AMBULATORY OR    HX ORTHOPAEDIC      RIGHT ELBOW-FATTY MASS    HX VASCULAR ACCESS Right 04/2020     Prior Level of Function/Home Situation:   Home Situation  Support Systems: Child(hugh)  Diet prior to admission: regular, thins  Current Diet:  Regular, thins   Cognitive and Communication Status:  Neurologic State: Eyes open to voice  Orientation Level: Oriented to person  Cognition: Decreased attention/concentration, Follows commands, Poor safety awareness  Perception: Appears intact  Perseveration: No perseveration noted  Safety/Judgement: Lack of insight into deficits  Oral Assessment:  Oral Assessment  Labial: Left droop (intermittent L facial droop)  Dentition: Natural  Oral Hygiene: WFL  Lingual: No impairment  Mandible: No impairment  P.O. Trials:  Patient Position: upright in bed  Vocal quality prior to P.O.: No impairment  Consistency Presented: Solid; Thin liquid; Puree  How Presented: Self-fed/presented; Spoon; Straw   ORAL PHASE:   Bolus Acceptance: No impairment  Bolus Formation/Control:  (Sitter reports intermittent holding)     Propulsion: No impairment  Oral Residue: None   PHARYNGEAL PHASE:   Initiation of Swallow: No impairment  Laryngeal Elevation: Functional  Aspiration Signs/Symptoms: None                        NOMS:   The NOMS functional outcome measure was used to quantify this patient's level of swallowing impairment. Based on the NOMS, the patient was determined to be at level 5 for swallow function       NOMS Swallowing Levels:  Level 1 (CN): NPO  Level 2 (CM): NPO but takes consistency in therapy  Level 3 (CL): Takes less than 50% of nutrition p.o. and continues with nonoral feedings; and/or safe with mod cues; and/or max diet restriction  Level 4 (CK):  Safe swallow but needs mod cues; and/or mod diet restriction; and/or still requires some nonoral feeding/supplements  Level 5 (CJ): Safe swallow with min diet restriction; and/or needs min cues  Level 6 (CI): Independent with p.o.; rare cues; usually self cues; may need to avoid some foods or needs extra time  Level 7 [facility]): Independent for all p.o.  MONI. (2003). National Outcomes Measurement System (NOMS): Adult Speech-Language Pathology User's Guide. Pain:  Pain Scale 1: Numeric (0 - 10)  Pain Intensity 1: 6  Pain Location 1: Neck    After treatment:   Patient left in no apparent distress in bed and Nursing notified    COMMUNICATION/EDUCATION:   Patient was educated regarding her deficit(s) of DYSPHAGIA  as this relates to her diagnosis of BRAIN CA. She demonstrated Guarded understanding as evidenced by Ams. The patient's plan of care including recommendations, planned interventions, and recommended diet changes were discussed with: Registered nurse. CM    Patient is unable to participate in goal setting and plan of care.     Thank you for this referral.  Donato Geronimo, SUSHIL  Time Calculation: 15 mins

## 2021-11-30 NOTE — PROGRESS NOTES
Bedside shift change report given to KATHARINA Hess (oncoming nurse) by Dinora Keller RN (offgoing nurse). Report included the following information SBAR, Kardex, Intake/Output, MAR and Recent Results.

## 2021-11-30 NOTE — PROGRESS NOTES
Matt Jain ann marie [address] 80  [facility], [address] [place] [address]  [phone]      Medical Progress Note      NAME:         Alex Cespedes   :        1965  MRM:        [id number]    Date of service: 2021      Subjective: Patient has been seen and examined as a follow up for multiple medical issues. Chart, labs, diagnostics reviewed. Has had a poor appetite, not eating or drinking much. Some dysphagia. No other new concerns from her family     Objective:    Vital Signs:    Visit Vitals  BP (!) 158/94 (BP 1 Location: Left upper arm, BP Patient Position: Semi fowlers; At rest)   Pulse 85   Temp 97.6 °F (36.4 °C)   Resp 18   SpO2 98%          Intake/Output Summary (Last 24 hours) at 2021 1107  Last data filed at 2021 0813  Gross per 24 hour   Intake 360 ml   Output 200 ml   Net 160 ml        Physical Examination:    General:   Weak and ill looking, uncomfortable but not in distress   Eyes:   pink conjunctivae, with no discharge. ENT:   no ottorrhea or rhinorrhea with dry mucous membranes  Neck: no masses, thyroid non-tender and trachea central.  Pulm: clear breath sounds without crackles or wheezes  Card: has regular and normal S1, S2 without thrills, bruits or peripheral edema  Abd:  Soft, non-tender, non-distended, normoactive bowel sounds   Musc:  No cyanosis, clubbing, atrophy or deformities. Skin:  No rashes, bruising or ulcers. Neuro: Awake and alert.  Generally a non focal exam.   Psych:  Has little insight to her illness     Current Facility-Administered Medications   Medication Dose Route Frequency    dexamethasone (DECADRON) 4 mg/mL injection 4 mg  4 mg IntraVENous Q6H    0.9% sodium chloride infusion  100 mL/hr IntraVENous CONTINUOUS    prochlorperazine (COMPAZINE) tablet 5 mg  5 mg Oral Q6H PRN    memantine (NAMENDA) tablet 5 mg  5 mg Oral DAILY    haloperidoL (HALDOL) tablet 2 mg  2 mg Oral Q4H PRN    HYDROmorphone (DILAUDID) syringe 0.5 mg  0.5 mg IntraVENous Q4H PRN    haloperidol lactate (HALDOL) injection 3 mg  3 mg IntraVENous Q4H PRN    sodium chloride (NS) flush 5-40 mL  5-40 mL IntraVENous Q8H    sodium chloride (NS) flush 5-40 mL  5-40 mL IntraVENous PRN    acetaminophen (TYLENOL) tablet 650 mg  650 mg Oral Q6H PRN    Or    acetaminophen (TYLENOL) suppository 650 mg  650 mg Rectal Q6H PRN    polyethylene glycol (MIRALAX) packet 17 g  17 g Oral DAILY PRN    lactated Ringers infusion  125 mL/hr IntraVENous CONTINUOUS    [Held by provider] LORazepam (ATIVAN) tablet 1 mg  1 mg Oral DAILY PRN    ondansetron (ZOFRAN ODT) tablet 8 mg  8 mg Oral Q8H    Or    ondansetron (ZOFRAN) injection 4 mg  4 mg IntraVENous Q8H    oxyCODONE IR (ROXICODONE) tablet 10 mg  10 mg Oral Q4H PRN        Laboratory data and review:    Recent Labs     11/28/21  0353 11/27/21  2224   WBC 14.7* 14.9*   HGB 14.2 14.1   HCT 42.5 42.4   * 127*     Recent Labs     11/30/21  0303 11/28/21  0353 11/27/21  2224   * 127* 127*   K 3.5 3.5 3.0*   CL 93* 91* 91*   CO2 26 28 31   * 122* 135*   BUN 26* 19 20   CREA 0.82 0.78 0.85   CA 9.2 9.3 9.2   MG  --  3.1*  --    PHOS  --  1.6*  --    ALB  --  3.0*  --    ALT  --  78  --      No components found for: [place]    Diagnostics: Imaging studies have been reviewed    Assessment and Plan:    Delirium (11/29/2021) / Dizziness (11/28/2021) POA: 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 MRi brain reviewed. Given her progression of disease, seen by palliative care for symptoms management. Continue supportive care. IV haldol PRN. Start IV fluids due to her decreased intake and consult speech therapy given her dysphagia    Recurrent breast cancer ([address] (10/8/2020) POA: has stage 4 disease with mets to the liver and brain. Oncology following. [facility] metastasis ([facility] (11/29/2021) POA: has worsening leptomeningeal disease on MRi.  Continue Dexamethasone, Namenda. Continue WBRT x 10 cycles total. She will need an ommaya placement for further managements. Oncology following     Pleural effusion, left (11/29/2021) POA: likely malignant. CTA neg for PE. She had IR thoracentesis. Cytology pending    Cancer related pain (11/29/2021) POA: continue IV dilaudid PRN, Oxycodone PRN, bowel regimen. Palliative following. Elevated BP without diagnosis of hypertension (11/29/2021) POA: IV Hydralazine PRN    Hyponatremia (11/29/2021) POA: mild. Better. Continue IV fluids.      Total time spent for the patient's care: [address] discussed with: Patient, family (son and daughter) Care Manager, Nursing Staff and Consultant/Specialist    Discussed:  Care Plan and D/C Planning    Prophylaxis:  SCD's    Anticipated Disposition:  Home w/Family           ___________________________________________________    Attending Physician:   Haseeb Cavazos MD

## 2021-11-30 NOTE — PROGRESS NOTES
11/30/2021  Case Management Progress Note    10:33 AM  Patient is 55year old female admitted 11/28 for delirium, metastatic breast cancer  Patient's RUR is 17% yellow/moderate risk for readmission  Covid test: none this admission  Chart reviewed--patient discussed at [facility]. Patient's daughter had stopped by the desk earlier before I came to work, so I stopped in the room to provide opportunity to ask questions and to provide some support. Patient's daughter had questions about what their options were for care at home, how many hours/days etc. Discussed with patient's daughter and son that for specifics like that they could call the phone number on the back of patient's insurance card, but we would help arrange anything they needed. Right now the plan medically is somewhat unclear still, patient is getting radiation therapy while hospitalized and per joselo/onc they want to do this for 72 hours before making a decision on how to proceed. Patient was supposed to have an [address] placed yesterday however this is on hold while patient is here and receiving radiation. Will continue to follow and assist with discharge planning once the plan is more clear and patient's condition stabilizes. Transition of Care Plan   1. Continue medical management/treatment  2. Plan is thus far to be determined  3. Touched base with family today   4. Provided palliative care phone number  5. Joselo onc is having another meeting this evening  6.  CM will continue to follow    TAYLOR Martell

## 2021-11-30 NOTE — PROGRESS NOTES
200  Family is waiting for Dr. Cesia Albright or any MD. They were told they were going to have a family meeting at [facility]. but no one's here yet. Dr. Fer Cruz notified and stated he reached out to team.     Dr. Anabel Aaron on-call and stated Dr. Cesia Albright still rounding and that he reached out to him and will see family in a bit. 1328    Patient c/o 9/10 pain on her legs. Patient just received oxycodone 12mg at 12:29pm and Haldol at the same time. Recent VS: XI=892/85 and VY=537- she is very shaky because of the pain . Also, was just given hydralazine at 1205 for BP of 171/107mmhG    Dr. Fer Cruz notified and stated ok to give PRN dilaudid now.

## 2021-12-01 NOTE — PROGRESS NOTES
Music Therapy [address] [facility] [id number]     1965  55 y.o.  female    Patient Telephone Number: [phone] (home)   Anabaptism Affiliation: No preference   Language: English   Patient Active Problem List    Diagnosis Date Noted    Brain metastasis ([facility] 11/29/2021    Pleural effusion, left 11/29/2021    Cancer related pain 11/29/2021    Elevated BP without diagnosis of hypertension 11/29/2021    Hyponatremia 11/29/2021    Delirium 11/29/2021    Dizziness 11/28/2021    Recurrent breast cancer ([facility] 10/08/2020    Absence of breast 10/08/2020    Severe obesity ([facility] 04/28/2020        Date: 12/1/2021            Total Time (in minutes): 5          SFM 5M1 MED SURG 1    Mental Status:   [  ] Alert [  ] Jeff Moll [  ]  Confused  [  ] Minimally responsive  [x] Sleeping    Communication Status: [  ] Impaired Speech [  ] Nonverbal -N/A    Physical Status:   [  ] Oxygen in use  [  ] Hard of Hearing [  ] Vision Impaired  [  ] Ambulatory  [  ] Ambulatory with assistance [  ] Non-ambulatory -N/A    Music Preferences, Background: Pt's daughter Saman Rush shared that the pt listens to nature sounds, such as waterfalls and meditations with nature sounds, including Yazidism meditations, R&B including Jocelynn, and Popular singers Jimena Meza and Aminta. Pt's daughter said pt has enjoyed singing in the past.     Clinical Problem to be addressed: Decrease feelings of stress, support relaxation and comfort.     Goal(s) met in session:  Physical/Pain management (Scale of 1-10):    Pre-session rating ___________    Post-session rating __________  [  ] Increased relaxation   [  ] Affected breathing patterns  [  ] Decreased muscle tension   [  ] Decreased agitation  [  ] Affected heart rate    [  ] Increased alertness     Emotional/Psychological:  [  ] Increased self-expression   [  ] Decreased aggressive behavior   [  ] Decreased feelings of stress  [  ] Discussed healthy coping skills     [  ] Improved mood    [  ] Decreased withdrawn behavior     Social:  [  ] Decreased feelings of isolation/loneliness [  ] Positive social interaction   [  ] Provided support and/or comfort for family/friends    Spiritual:  [  ] Spiritual support    [  ] Expressed peace  [  ] Expressed griselda    [  ] Discussed beliefs    Techniques Utilized (Check all that apply):   [  ] Procedural support MT [  ] Music for relaxation [  ] Patient preferred music  [  ] Rosalva analysis  [  ] Altagracia Hernandez choice  [  ] Music for validation  [  ] Entrainment  [  ] Movement to music [  ] Guided visualization  [  ] Kendell Susanna  [  ] Patient instrument playing [  ] Altagracia Hernandez writing  [  ] Claudia Jarrett along   [  ] Jo Palomo  [  ] Sensory stimulation  [  ] Active Listening  [  ] Music for spiritual support [  ] Making of CDs as gifts    Session Observations:  F/up visit; Patient (pt) was lying in bed, asleep when this music therapist eligible (MTE) entered the rom. Pt's children were sitting at bedside and shared the pt has been in and out today. MTE assessed the pt needed her rest and exited at this time.      Zoey Cerna, Music Therapist Eligible  Spiritual Care Department

## 2021-12-01 NOTE — PROGRESS NOTES
VAT Note:   Called by RN to place new ultrasound guided IV after patient pulled put existing line. Up to bedside and ultrasound guided endurance placed in  Right basilic with positive blood return.

## 2021-12-01 NOTE — PROGRESS NOTES
Palliative Medicine Consult  Paulo: [phone] ([phone])    Patient Name: Rosana Flynn  YOB: 1965    Date of Initial Consult: 11/29/21  Reason for Consult: overwhelming symptoms  Requesting Provider: Dr. Ana Becerril  Primary Care Physician: Roderick Moreno MD     SUMMARY:   Rosana Flynn is a 55 y.o. with recurrent stage IV breast cancer metastatic to brain, liver, who was admitted on 11/27/2021with a diagnosis of dizziness after experiencing 4 falls within 24 hrs at home. Current medical issues leading to Palliative Medicine involvement include: advanced recurrent metastatic disease. Daughter Natalie Sprague resides with patient. She notes that her mother was increasingly fatigued and with loss of appetite, but was largely herself and talking up until Wed the 24th. She describes her mother as on a rapid decline since Thanksgiving Day with increased confusion, persistent reporting of pain in her head, neck and legs and multiple falls. SH:  . Two children; Natalie Sprague (local- age 21), son ([employer]). Mother and Father living. 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 PALLIATIVE DIAGNOSES:   1. Mixed state delirium  2. Cancer associated pain  3. Leptomeningeal disease  4. Stage IV recurrent breast cancer with mets to liver, brain, possibly bone       PLAN:   5. Delirium- multiple etiologies likely but suspect LM disease as most probable cause as symptoms began prior to hospital admission. 1. More alert and conversant this afternoon, able to answer a few simple questions. I was told she ate a meal earlier today. 2. Continue Haldol 2 mg q4h prn for agitation or restlessness. IV 3 mg as backup in case of return of severe discomfort. 3. Continue to manage underlying pain with oral oxycodone and prn Dilaudid. 4. Will continue to hold Ativan as this may worsen agitation. 2. Pain- pt reporting 8/10 pain in her upper shoulders and neck.   She has not received any doses of opioid yet today. Will schedule oxycodone 10 mg q6h routinely to ensure she has some baseline pain control. No family at bedside to coordinate plan of care. Continue to have Dilaudid 0.5 mg IV q4h prn for severe breakthrough pain. 3. Psychosocial- patient has two young adult children- dtr Sharda (local) and son Andrae SHAHID COMM [facility]"). Lives at home with daughter. Her parents are also alive. Mother visiting often this hospital stay. 4. Recurrent breast cancer- on day #4/10 of brain XRT. Dr. Alvarado Mckeon awaiting response and will reevaluate tomorrow if IT chemo is next step. 5. Will follow with you and support decision making if patient fails to respond adequately to XRT / chemo   6. Thank you for the opportunity to participate in the care of Berta Drew    7. Communicated plan of care with: Palliative ID[facility]arc[facility] Team including Dr. Mirian Salas / TREATMENT PREFERENCES:     GOALS OF CARE:  Patient/Health Care Proxy Stated Goals: Other (comment) (unclear at this time; not discussed with family)    TREATMENT PREFERENCES:   Code Status: Full Code    Advance Care Planning:  [x] The [facility] Interdisciplinary Team has updated the ACP Navigator with [address] and Patient Capacity      Primary Decision Maker: Krista Thorne - Daughter - [phone]    Primary Decision Maker: Hai Comer - Son - [phone]    Secondary Decision Maker: Shay Griffin - Father - [address] 11/19/2021   Patient's Healthcare Decision Maker is: Legal Next of Kin   Confirm Advance Directive None   Patient Would Like to Complete Advance Directive -       Medical Interventions: Full interventions       Other:    As far as possible, the palliative care team has discussed with patient / health care proxy about goals of care / treatment preferences for patient.      HISTORY:     History obtained from:  EMR, daughter, RN    CHIEF COMPLAINT: confusion, somnolence    HPI/SUBJECTIVE:    The patient is:   [] Verbal and participatory  [x] Non-participatory due to:  Delirium; confusion     Clinical Pain Assessment (nonverbal scale for severity on nonverbal patients):   Clinical Pain Assessment  Severity: 4  Location: all over / difficulty with pain reporting  Character: pt unable to report  Duration: pt unable to report  Effect: pt unable to report  Factors: pt unable to report  Frequency: pt unable to report     Activity (Movement): Restless, excessive activity and/or withdrawal reflexes    Duration: for how long has pt been experiencing pain (e.g., 2 days, 1 month, years)  Frequency: how often pain is an issue (e.g., several times per day, once every few days, constant)     FUNCTIONAL ASSESSMENT:     Palliative Performance Scale (PPS):  PPS: 20       PSYCHOSOCIAL/SPIRITUAL SCREENING:     Palliative IDT has assessed this patient for cultural preferences / practices and a referral made as appropriate to needs (Cultural Services, Patient Advocacy, Ethics, etc.)    Any spiritual / Congregational concerns:  [] Yes /  [x] No    Caregiver Burnout:  [] Yes /  [x] No /  [] No Caregiver Present      Anticipatory grief assessment:   [x] Normal  / [] Maladaptive       ESAS Anxiety:      ESAS Depression:          REVIEW OF SYSTEMS:     Positive and pertinent negative findings in ROS are noted above in HPI. The following systems were [x] reviewed / [] unable to be reviewed as noted in HPI  Other findings are noted below. Systems: constitutional, ears/nose/mouth/throat, respiratory, gastrointestinal, genitourinary, musculoskeletal, integumentary, neurologic, psychiatric, endocrine. Positive findings noted below.   Modified ESAS Completed by: provider           Pain: 4         Anorexia: 10             Stool Occurrence(s): 0        PHYSICAL EXAM:     From RN flowsheet:  Wt Readings from Last 3 Encounters:   11/26/21 179 lb (81.2 kg)   11/24/21 179 lb (81.2 kg)   11/23/21 179 lb (81.2 kg) Blood pressure 138/80, pulse 69, temperature 97.6 °F (36.4 °C), resp. rate 20, SpO2 96 %. Pain Scale 1: Numeric (0 - 10)  Pain Intensity 1: 0  Pain Onset 1: acute  Pain Location 1: Generalized  Pain Orientation 1: Posterior  Pain Description 1: Aching  Pain Intervention(s) 1: Medication (see MAR), Repositioned, Rest  Last bowel movement, if known:     Constitutional: age appropriate young woman; lying comfortably today, fatigued but more responsive today compared to prior. Grimacing greatly with turns. Eyes: pupils equal, anicteric  ENMT: no nasal discharge, moist mucous membranes  Cardiovascular: regular rhythm, distal pulses intact  Respiratory: breathing not labored on RA, symmetric  Gastrointestinal: soft non-tender, +bowel sounds  Musculoskeletal: no deformity, no tenderness to palpation  Skin: warm, dry  Neurologic: alert, tracks, answers simple questions today. Still mildly restless.    Psychiatric:  Appropriate affect today       HISTORY:     Principal Problem:    Delirium (2021)    Active Problems:    Recurrent breast cancer ([facility] (10/8/2020)      Dizziness (2021)      Brain metastasis ([address] (2021)      Pleural effusion, left (2021)      Cancer related pain (2021)      Elevated BP without diagnosis of hypertension (2021)      Hyponatremia (2021)      Past Medical History:   Diagnosis Date    ADHD     Anxiety and depression     Cancer ([facility]     LEFT BREAST CA    Ill-defined condition     chemotherapy    Limb alert care status     NO STICKS OR BLOOD PRESSURE IN LEFT ARM    Sleep apnea     MILD -NO CPAP      Past Surgical History:   Procedure Laterality Date    HX BREAST BIOPSY Left 4/15/2020    BREAST BIOPSY performed by Preeti Felix MD at [address] [facility] [address] HX BREAST RECONSTRUCTION Bilateral 10/8/2020    BREAST RECONSTRUCTION performed by Bry Lovell MD at [facility] AMBUL[facility]ORY OR    HX  SECTION  98, 80    HX GI      COLONOSCOPY    HX MASTECTOMY Bilateral 10/8/2020    LEFT BREAST MASTECTOMY WITH AXILLARY NODE DISSECTION AND RIGHT SIMPLE MASTECTOMY / IMMEDIATE BILATERAL BREAST RECONSTRUCTION WITH TISSUE EXPANDERS AND ALLODERM GRAFTS performed by Asiya Swanson MD at [facility] AMBULATORY OR    HX ORTHOPAEDIC      RIGHT ELBOW-FATTY MASS    HX VASCULAR ACCESS Right 04/2020      Family History   Problem Relation Age of Onset    Cancer Mother         Breast    Cancer Maternal Aunt         Breast    Cancer Paternal Uncle     Cancer Maternal Aunt         Breast    Cancer Paternal Uncle       History reviewed, no pertinent family history.   Social History     Tobacco Use    Smoking status: Light Tobacco Smoker    Smokeless tobacco: Never Used    Tobacco comment: Quit April 2020   Substance Use Topics    Alcohol use: Not Currently     Alcohol/week: 6.0 standard drinks     Types: 6 Glasses of wine per week     Comment: PER PT ON 10/1/2020     No Known Allergies   Current Facility-Administered Medications   Medication Dose Route Frequency    labetaloL (NORMODYNE) tablet 200 mg  200 mg Oral BID    dexamethasone (DECADRON) 4 mg/mL injection 4 mg  4 mg IntraVENous Q6H    0.9% sodium chloride infusion  100 mL/hr IntraVENous CONTINUOUS    hydrALAZINE (APRESOLINE) 20 mg/mL injection 20 mg  20 mg IntraVENous Q6H PRN    prochlorperazine (COMPAZINE) tablet 5 mg  5 mg Oral Q6H PRN    memantine (NAMENDA) tablet 5 mg  5 mg Oral DAILY    haloperidoL (HALDOL) tablet 2 mg  2 mg Oral Q4H PRN    HYDROmorphone (DILAUDID) syringe 0.5 mg  0.5 mg IntraVENous Q4H PRN    haloperidol lactate (HALDOL) injection 3 mg  3 mg IntraVENous Q4H PRN    sodium chloride (NS) flush 5-40 mL  5-40 mL IntraVENous Q8H    sodium chloride (NS) flush 5-40 mL  5-40 mL IntraVENous PRN    acetaminophen (TYLENOL) tablet 650 mg  650 mg Oral Q6H PRN    Or    acetaminophen (TYLENOL) suppository 650 mg  650 mg Rectal Q6H PRN    polyethylene glycol (MIRALAX) packet 17 g  17 g Oral DAILY PRN    [Held by provider] LORazepam (ATIVAN) tablet 1 mg  1 mg Oral DAILY PRN    ondansetron (ZOFRAN ODT) tablet 8 mg  8 mg Oral Q8H    Or    ondansetron (ZOFRAN) injection 4 mg  4 mg IntraVENous Q8H    oxyCODONE IR (ROXICODONE) tablet 10 mg  10 mg Oral Q4H PRN          LAB AND IMAGING FINDINGS:     Lab Results   Component Value Date/Time    WBC 14.7 (H) 11/28/2021 03:53 AM    HGB 14.2 11/28/2021 03:53 AM    PLATELET 090 (L) 41/08/1657 03:53 AM     Lab Results   Component Value Date/Time    Sodium 130 (L) 11/30/2021 03:03 AM    Potassium 3.5 11/30/2021 03:03 AM    Chloride 93 (L) 11/30/2021 03:03 AM    CO2 26 11/30/2021 03:03 AM    BUN 26 (H) 11/30/2021 03:03 AM    Creatinine 0.82 11/30/2021 03:03 AM    Calcium 9.2 11/30/2021 03:03 AM    Magnesium 3.1 (H) 11/28/2021 03:53 AM    Phosphorus 1.6 (L) 11/28/2021 03:53 AM      Lab Results   Component Value Date/Time    Alk. phosphatase 726 (H) 11/28/2021 03:53 AM    Protein, total 7.4 11/28/2021 03:53 AM    Albumin 3.0 (L) 11/28/2021 03:53 AM    Globulin 4.4 (H) 11/28/2021 03:53 AM     Lab Results   Component Value Date/Time    INR 1.0 04/23/2020 10:21 AM    Prothrombin time 9.6 04/23/2020 10:21 AM      No results found for: IRON, FE, TIBC, IBCT, PSAT, FERR   No results found for: PH, PCO2, PO2  No components found for: GLPOC   No results found for: CPK, CKMB             Total time:   Counseling / coordination time, spent as noted above:   > 50% counseling / coordination?:     Prolonged service was provided for  []30 min   []75 min in face to face time in the presence of the patient, spent as noted above. Time Start:   Time End:   Note: this can only be billed with 55528 (initial) or 62958 (follow up). If multiple start / stop times, list each separately.

## 2021-12-01 NOTE — PROGRESS NOTES
12/1/2021  Case Management Progress Note    11:55 AM  Patient is 55year old female admitted 11/28 for delirium, metastatic breast cancer  Patient's RUR is 16% yellow/moderate risk for readmission  Covid test: none this admission  Chart reviewed--patient discussed at [facility] rounds. Heme/onc had family meeting last night, and today is the third radiation treatment that they wanted her to have in the hospital--dispo as yet unclear as patient has just returned from that treatment. Per notes patient may need hospice at discharge. Ommaya placement on hold for now, but will likely still need that. Will continue to follow and assist with discharge planning as needs become more clear through clinical course. Transition of Care Plan   1. Continue medical management/treatment  2. Disposition to be determined  3. Family supportive   4. CM will continue to follow and assist with discharge planning.      TAYLOR Carrillo

## 2021-12-01 NOTE — PROGRESS NOTES
Bedside and Verbal shift change report given to Jerrica Maldonado RN (oncoming nurse) by [facility] RN (offgoing nurse). Report included the following information SBAR, Kardex, Intake/Output, MAR and Recent Results.

## 2021-12-01 NOTE — ROUTINE PROCESS
No RN concerns about PO today. Expect swallow skills will fluctuate, based on LOC, cognitive status,  And cancer progression in brainstem. Continue offer diet as tolerated. SLP will sign off at this time.

## 2021-12-01 NOTE — PROGRESS NOTES
Matt Jain ann marie [address]  [address] [facility], [address] [place] [address]  [phone]      Medical Progress Note      NAME:         Juve Stokes   :        1965  MRM:        [id number]    Date of service: 2021      Subjective: Patient has been seen and examined as a follow up for multiple medical issues. Chart, labs, diagnostics reviewed. She looks more alert today and in less pain. No fever or chills. Still with a poor appetite. Objective:    Vital Signs:    Visit Vitals  BP (!) 148/87   Pulse 74   Temp 97.4 °F (36.3 °C)   Resp 18   SpO2 94%          Intake/Output Summary (Last 24 hours) at 2021 1058  Last data filed at 2021 0900  Gross per 24 hour   Intake 890 ml   Output 650 ml   Net 240 ml        Physical Examination:    General:   Weak and ill looking, uncomfortable but not in distress   Eyes:   pink conjunctivae, with no discharge. ENT:   no ottorrhea or rhinorrhea with dry mucous membranes  Neck: no masses, thyroid non-tender and trachea central.  Pulm: clear breath sounds without crackles or wheezes  Card: has regular and normal S1, S2 without thrills, bruits or peripheral edema  Abd:  Soft, non-tender, non-distended, normoactive bowel sounds   Musc:  No cyanosis, clubbing, atrophy or deformities. Neuro: Awake and alert.  Generally a non focal exam.   Psych:  Has little insight to her illness     Current Facility-Administered Medications   Medication Dose Route Frequency    labetaloL (NORMODYNE) tablet 200 mg  200 mg Oral BID    dexamethasone (DECADRON) 4 mg/mL injection 4 mg  4 mg IntraVENous Q6H    0.9% sodium chloride infusion  100 mL/hr IntraVENous CONTINUOUS    hydrALAZINE (APRESOLINE) 20 mg/mL injection 20 mg  20 mg IntraVENous Q6H PRN    prochlorperazine (COMPAZINE) tablet 5 mg  5 mg Oral Q6H PRN    memantine (NAMENDA) tablet 5 mg  5 mg Oral DAILY    haloperidoL (HALDOL) tablet 2 mg  2 mg Oral Q4H PRN    HYDROmorphone (DILAUDID) syringe 0.5 mg  0.5 mg IntraVENous Q4H PRN    haloperidol lactate (HALDOL) injection 3 mg  3 mg IntraVENous Q4H PRN    sodium chloride (NS) flush 5-40 mL  5-40 mL IntraVENous Q8H    sodium chloride (NS) flush 5-40 mL  5-40 mL IntraVENous PRN    acetaminophen (TYLENOL) tablet 650 mg  650 mg Oral Q6H PRN    Or    acetaminophen (TYLENOL) suppository 650 mg  650 mg Rectal Q6H PRN    polyethylene glycol (MIRALAX) packet 17 g  17 g Oral DAILY PRN    [Held by provider] LORazepam (ATIVAN) tablet 1 mg  1 mg Oral DAILY PRN    ondansetron (ZOFRAN ODT) tablet 8 mg  8 mg Oral Q8H    Or    ondansetron (ZOFRAN) injection 4 mg  4 mg IntraVENous Q8H    oxyCODONE IR (ROXICODONE) tablet 10 mg  10 mg Oral Q4H PRN        Laboratory data and review:    No results for input(s): WBC, HGB, HCT, PLT, HGBEXT, HCTEXT, PLTEXT, HGBEXT, HCTEXT, PLTEXT in the last 72 hours. 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 MRi brain reviewed. Given her progression of disease, seen by palliative care for symptoms management. Getting better. Continue supportive care. IV haldol PRN. IV fluids due to her decreased intake and diet as tolerated     Recurrent breast cancer / Brain metastasis ([facility] [address] (11/29/2021) POA: has worsening leptomeningeal disease on MRi. Continue Dexamethasone, Namenda. Continue WBRT x 10 cycles total. She will need an ommaya placement for further managements. Oncology following     Pleural effusion, left (11/29/2021) POA: likely malignant. CTA neg for PE. She had IR thoracentesis.  Cytology still pending    Cancer related pain (11/29/2021) POA: continue IV dilaudid PRN, Oxycodone PRN, bowel regimen. Palliative following. Elevated BP without diagnosis of hypertension (11/29/2021) POA: IV Hydralazine PRN    Hyponatremia (11/29/2021) POA: mild. Better. Continue IV fluids.      Total time spent for the patient's care: [facility] discussed with: Patient, family (son and daughter) Care Manager, Nursing Staff and Consultant/Specialist    Discussed:  Care Plan and D/C Planning    Prophylaxis:  SCD's    Anticipated Disposition:  Home w/Family vs TBD           ___________________________________________________    Attending Physician:   Prerna Garcai MD

## 2021-12-01 NOTE — PROGRESS NOTES
[facility] at [address], [address] [facility] 100[facility]  Mary Lee: [phone]  F: [phone]      Reason for evaluation   Denisa Cai is a 55 y.o. female who is seen in consultation at the request of Dr. Cherise Stewart for evaluation of stage IV breast cancer, uncontrolled pain    History of Present Illness:   Patient is a 55 y.o. 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 MRI brain 11/28/2021 showed Interval worsening of leptomeningeal metastatic disease since 11/12/2021 predominantly involving the brainstem and multiple cranial nerves, with  associated new mild communicating hydrocephalus and periventricular edema with No significant change in size of left parasagittal frontal enhancing lesion  measuring 1.7 x 1.6 cm. She was scheduled to have an Ommaya placed 11/29 and start IT methotrexate. Mother at bedside. Andreea Huerta is somnolent but states she has some pain and nausea, she is week. She has no fevers      FH: Mother with breast cancer at age 79; maternal aunt with breast cancer in her 45s; maternal aunt with breast cancer at age 61; no ovarian, prostate, or pancreas cancer      Treatment History:   · 4/6/20 left breast 3:00, 13cmfn, core bx:  IMC, gr 2-3, 1.3 cm, ER negative, PA negative, HER 2 negative at IHC 0; ki67 68%; LN + by core, 6 mm, gr 2-3  · B-59 Carbo/Taxol +/- atezolizumab 4/28/2020-7/14/2020  · Invitae 4/28/2020: negative  · B-59 DDAC 7/21/2020-9/1/2020  · 10/8/2020 Bilateral mastectomy:  Right breast: benign, left breast: pCR, 0/10 LNs, ypT0 ypN0 cM0  · B59 atezolizumab/placebo 11/11/20- 4/27/21  · XRT 12/9/2020-1/27/21  · 11/18/21 liver core bx: Malignant epitheliolid neoplasm  · 11/18/21 CSF:  Markedly atypical but degenerated cells present; Findings are suspicious for malignancy    Interval History:     Resting comfortably. Opens eyes. Smiles.      Daughter, Liudmila Sarabia and son, Elvira Ulloa and pt's mother at bedside. Daughter reports her mother has been able to eat today; more conversational.   Discussed with nursing; not required pain medication today. More comfortable. Current Facility-Administered Medications   Medication Dose Route Frequency    labetaloL (NORMODYNE) tablet 200 mg  200 mg Oral BID    dexamethasone (DECADRON) 4 mg/mL injection 4 mg  4 mg IntraVENous Q6H    0.9% sodium chloride infusion  100 mL/hr IntraVENous CONTINUOUS    hydrALAZINE (APRESOLINE) 20 mg/mL injection 20 mg  20 mg IntraVENous Q6H PRN    prochlorperazine (COMPAZINE) tablet 5 mg  5 mg Oral Q6H PRN    memantine (NAMENDA) tablet 5 mg  5 mg Oral DAILY    haloperidoL (HALDOL) tablet 2 mg  2 mg Oral Q4H PRN    HYDROmorphone (DILAUDID) syringe 0.5 mg  0.5 mg IntraVENous Q4H PRN    haloperidol lactate (HALDOL) injection 3 mg  3 mg IntraVENous Q4H PRN    sodium chloride (NS) flush 5-40 mL  5-40 mL IntraVENous Q8H    sodium chloride (NS) flush 5-40 mL  5-40 mL IntraVENous PRN    acetaminophen (TYLENOL) tablet 650 mg  650 mg Oral Q6H PRN    Or    acetaminophen (TYLENOL) suppository 650 mg  650 mg Rectal Q6H PRN    polyethylene glycol (MIRALAX) packet 17 g  17 g Oral DAILY PRN    [Held by provider] LORazepam (ATIVAN) tablet 1 mg  1 mg Oral DAILY PRN    ondansetron (ZOFRAN ODT) tablet 8 mg  8 mg Oral Q8H    Or    ondansetron (ZOFRAN) injection 4 mg  4 mg IntraVENous Q8H    oxyCODONE IR (ROXICODONE) tablet 10 mg  10 mg Oral Q4H PRN      No Known Allergies     Review of Systems: A complete review of systems was obtained, negative except as described above.     Physical Exam:     Visit Vitals  /80 (BP 1 Location: Right lower arm, BP Patient Position: At rest)   Pulse 69   Temp 97.6 °F (36.4 °C)   Resp 20   SpO2 96%     ECOG PS: 3  General: no distress; some expressive aphasia, somnolent  Eyes: L eyelid ptosis  HENT: oropharynx clear  Neck: supple  Respiratory: normal respiratory effort  CV: no peripheral edema  GI: nontender  Skin: no rashes; no ecchymoses; no petechiae      Results:     Lab Results   Component Value Date/Time    WBC 14.7 (H) 11/28/2021 03:53 AM    HGB 14.2 11/28/2021 03:53 AM    HCT 42.5 11/28/2021 03:53 AM    PLATELET 036 (L) 82/92/8283 03:53 AM    MCV 86.6 11/28/2021 03:53 AM    ABS. NEUTROPHILS 12.2 (H) 11/28/2021 03:53 AM     Lab Results   Component Value Date/Time    Sodium 130 (L) 11/30/2021 03:03 AM    Potassium 3.5 11/30/2021 03:03 AM    Chloride 93 (L) 11/30/2021 03:03 AM    CO2 26 11/30/2021 03:03 AM    Glucose 125 (H) 11/30/2021 03:03 AM    BUN 26 (H) 11/30/2021 03:03 AM    Creatinine 0.82 11/30/2021 03:03 AM    GFR est AA >60 11/30/2021 03:03 AM    GFR est non-AA >60 11/30/2021 03:03 AM    Calcium 9.2 11/30/2021 03:03 AM     Lab Results   Component Value Date/Time    Bilirubin, total 0.5 11/28/2021 03:53 AM    ALT (SGPT) 78 11/28/2021 03:53 AM    Alk. phosphatase 726 (H) 11/28/2021 03:53 AM    Protein, total 7.4 11/28/2021 03:53 AM    Albumin 3.0 (L) 11/28/2021 03:53 AM    Globulin 4.4 (H) 11/28/2021 03:53 AM       3/31/20  3D mammogram:  1.6 cm mass in L breast  US L breast  3:00 left breast mass 1.6 cm, multiple LN in L ax tail    4/14/2020 CT c/a/p:  IMPRESSION: Left breast mass. No evidence for metastatic disease in the chest  abdomen or pelvis. There are no target lesions for RECIST classification. 4/14/2020 Bone Scan:  IMPRESSION: No evidence of bony metastatic disease. 5/4/2020 MRI breast:  IMPRESSION:     Right Breast:  1. BI-RADS Assessment Category 1: Negative. No evidence of breast carcinoma  within the right breast.     Left Breast:  1. BI-RADS Assessment Category 6: Known biopsy proven malignancy- Appropriate  action should be taken. Large area of malignant enhancement, occupying most of  the middle and posterior third of the left breast upper outer and lower-outer  quadrants, from 2:00 to 4:00.  Malignancy is much larger than it had appeared  mammographically. Greatest measurement is 7 x 4.2 x 3 cm. Contained within this  large area of enhancement is the dominant 2.5 cm mass, which represents the  mammographic and sonographic correlate. 2. BI-RADS Assessment Category 6: Known biopsy proven malignancy- Appropriate  action should be taken. Pathologically proven level 1 left axillary  lymphadenopathy, with numerous enlarged, malignant lymph nodes. There is also  level 2 left axillary lymphadenopathy. 3. Retraction of the skin of the lateral left breast, which is thickened. However, no evidence of malignant skin invasion/involvement. 4. The malignant mass closely approximates the pectoral muscle, but there is no  evidence of direct muscle invasion/involvement.     RECOMMENDATIONS:  Appropriate action is recommended. The patient is undergoing neoadjuvant  chemotherapy. There is no evidence of contralateral disease. 7/30/2020 CTA chest at [facility]: Normal CTA of the chest with no pulmonary embolism or acute aortic abnormalities identified. Consider mild CHF/pulmonary edema    11/12/21 ct c/a/p  FINDINGS:      CHEST WALL: No mass or axillary lymphadenopathy. THYROID: No nodule. MEDIASTINUM: No mass or lymphadenopathy. HENNY: No mass or lymphadenopathy. THORACIC AORTA: No dissection or aneurysm. MAIN PULMONARY ARTERY: Normal in caliber. TRACHEA/BRONCHI: Patent. ESOPHAGUS: No wall thickening or dilatation. HEART: Normal in size. PLEURA: Small left pleural effusion. LUNGS: Left lower lobe atelectasis. Pleural-based opacities are seen in the left  upper lobe, the largest of which measures 3.4 x 1.2 cm.     LIVER: There is a new 15 mm hypodense lesion in the right hepatic lobe. Small  cyst in the right hepatic lobe is again noted. BILIARY TREE: Gallbladder is within normal limits. CBD is not dilated. SPLEEN: within normal limits. PANCREAS: No mass or ductal dilatation. ADRENALS: Unremarkable.   KIDNEYS: No mass, calculus, or hydronephrosis. STOMACH: Unremarkable. SMALL BOWEL: No dilatation or wall thickening. COLON: No dilatation or wall thickening. APPENDIX: Within normal limits. PERITONEUM: No ascites or pneumoperitoneum. RETROPERITONEUM: No lymphadenopathy or aortic aneurysm. REPRODUCTIVE ORGANS: Myomatous uterus is again demonstrated. URINARY BLADDER: Decompressed. BONES: Tiny sclerotic lesion right femoral neck is new. No additional convincing  lesions are identified. ABDOMINAL WALL: No mass or hernia. ADDITIONAL COMMENTS: N/A     IMPRESSION  New small hypodense hepatic lesion is concerning for metastatic disease. Nonspecific tiny sclerotic lesion right femoral neck near compared to the prior  exam, small metastatic focus should be considered. Small left pleural effusion  with underlying atelectasis. Pleural-based opacities in the left upper lobe are  most likely related to prior radiation therapy, however close follow-up is  recommended to evaluate for any interval improvement. 11/12/21 bone scan  FINDINGS: There is slight increase in activity right sixth seventh and eighth  ribs anteriorly and possibly left seventh rib anteriorly. There is vague  increased activity in the hips and there is spotty activity in the femurs. There  is a small focus of activity right tibia. There is increased linear activity in  the fibula. There is question of slight increased activity L1 vertebral body. .     . . Incidental renal imaging shows no abnormality.      IMPRESSION  1. There is suspicion of vague focus of increasing bony activity as described  above which is nonspecific but may represent bony metastatic disease and  correlation with plain films of the pelvis, femurs and right tibia may be  helpful for further assessment    11/12/21 MRI brain  FINDINGS:  There is a left paracentral frontal enhancing mass measuring approximately 1.6 x  1.7 x 1.7 cm located immediately anterior to the genu the corpus callosum.  There  is surrounding edema. Adjacent enhancement in the leptomeninges of the  interhemispheric fissure bilaterally. 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     The ventricular size and configuration are within normal limits. Normal signal demonstrated in the cerebral hemispheres, brain stem and  cerebellum. No abnormal areas of intracranial enhancement. No abnormal diffusion. No evidence of intracranial hemorrhage, acute infarct, mass or abnormal  extra-axial fluid collections. Normal flow-voids are present in the vertebral, basilar and carotid artery  systems. 3-D T1 postcontrast images suggests abnormal enhancement in the medial right  internal auditory canal. Not confirmed on standard axial thicker slice T1. Therefore possibly susceptibility artifact. Correlate clinically and with  imaging follow-up if clinically indicated. Diminished fat signal in the skull base and cervical spine marrow may be  treatment related. Mild diffuse dural enhancement.     IMPRESSION  1. Approximately 1.7 cm left frontal mass extending interhemispheric fissure  with adjacent leptomeningeal involvement. Associated edema. 2. Questionable focus of enhancement in the medial right internal auditory canal  versus susceptibility artifact. 3. Reduced fat signal in the marrow may be treatment related. 11/22/21 cr 1.03  ALT 51  AST 59  t bili 0.4    11/22/21 CTA chest  Moderate L pleural effusion, no PE  Pleural based atelectasis or scarring, producing a mass-like appearance in the ant subpleural TIMOTHY, most likely resulting from previous XRT to the L ant chest wall    11/22/21 MRI t-spine  Diffuse marrow signal abnormalities with low signal T1 and diffuse heterogeneous enhancement      MRI brain 11/28/2021  IMPRESSION     1.  Interval worsening of leptomeningeal metastatic disease since 11/12/2021  predominantly involving the brainstem and multiple cranial nerves as above, with  associated new mild communicating hydrocephalus and periventricular edema. 2. No significant change in size of left frontal parasagittal metastasis, though  now necrotic in appearance. Stable adjacent leptomeningeal metastatic disease in  the anterior interhemispheric fissure. Unchanged edema in the left frontal lobe  without midline shift or herniation. 3. Redemonstrated diffusely abnormal marrow signal, which may represent some  combination of osseous metastatic disease and posttreatment changes  Records reviewed and summarized above. Pathology report(s) reviewed above. Radiology report(s) reviewed above. 11/28/21 CT A/P  IMPRESSION  1. Fluid-filled nondilated colon, consistent with gastroenteritis/diarrhea. 2. Unchanged small liver lesion concerning for metastatic disease. Assessment/Plan:   1. Left IMC, gr 2-3, triple negative    11/12/21 Recurrent breast cancer to brain, LM, liver, possibly bone. PD-L1 testing, Alcon Cottrell, pending still. If +, chemo + pembro would be recommended. If negative, possibly socituzumab govitecan. This decision is pending treatment for #2 below  High concern with rapid progression of disease. Plan if to see if improvement with XRT and if not considering changing to IT chemo  vs hospice. Dr Abiel Sosa has reviewed with daughter. 2. Brain mets/ LM disease:    Resumed  WBRT; initiated on 11/24; discussed with Rad Onc; tentatively scheduled to complete daily WBRT on 12/9; received 4/10 today. --continue Dex 4 mg every 6 hours; changed to IV   --emantine 5 mg daily for 7 days ( started 11/23) for now;, will adjust medication to possibly then 5 mg bid for 7 days then 10 mg in the am and 5 mg in the pm for 7 days, then 10 mg bid for 7 days, for 6 months, rx in  --re-scheduled appt with Dr. Leticia Ji for  [address] placement; was scheduled for 12/2   --waiting to give WBXRT 72 hours ( Thurs) to see if working  Before switching to IT chemo vs hospice.  Considering intraventricular topotecan. 3. L pleural effusion:  IR thoracentesis on 11/24/21, cytology positive for malignancy; poorly differentiated adenocarcinoma. 4. Pain due to cancer:    -palliative team following; appreciate recs    7. Constipation:   miralax, senna    8.  Nausea:  Zofran ODT, phenergan    9) Abnormal LFTs   Due to liver metastasis  CT A/P  as above      Plan reviewed with Dr Erinn Wheat    Signed By: Micky Valladares NP

## 2021-12-01 NOTE — PROGRESS NOTES
Bedside and Verbal shift change report given to Shyann Linares RN (oncoming nurse) by Aaron Howard RN (offgoing nurse). Report included the following information SBAR, Kardex, Intake/Output and Recent Results.

## 2021-12-01 NOTE — PROGRESS NOTES
0900  Pt lying in bed awake and alert. Endurance catheter found on floor - removed by patient. Vickie Douglas, KATHARINA called vascular access team to establish new access. [address] from [facility] called to notify about transport arriving for pt's 1115 appt. Transport here at 1020 for transfer. Pt in no apparent distress at time of transfer. VSS. 1110  Pt returned from cancer center. In bed sleeping, no apparent distress. VSS.       1235  New ultrasound guided IV placed by Fe Montez RN in right basilic with positive blood return.

## 2021-12-01 NOTE — PROGRESS NOTES
Bedside shift change report given to KATHARINA Hess and Skyla Rosado RN (oncoming nurse) by Dinora Keller RN (offgoing nurse). Report included the following information SBAR, Kardex, Intake/Output, MAR and Recent Results.

## 2021-12-02 PROBLEM — G96.198 LEPTOMENINGEAL DISEASE: Status: ACTIVE | Noted: 2021-01-01

## 2021-12-02 NOTE — PROGRESS NOTES
Palliative Medicine      Code Status: Full Code    Advance Care Planning:  Advance Care Planning 12/2/2021   Patient's Healthcare Decision Maker is: Legal Next of Kin:  Adult children (dtr Dellar Dear, son Yakov)   Confirm Advance Directive Not on file   Patient Would Like to Complete Advance Directive Unable       Patient / Family Encounter Documentation    Participants (names): Pt, son Gabby Pires, Palliative Medicine (Dr. Alison Farr, [address])    Narrative:  Spoke with son at bedside; dtr Dellar Dear had stepped out of room for a phone call. Sitter was present, reports pt pulled out line earlier this morning. Pt was drowsy throughout visit, opened eyes but did not otherwise engage. Son was quiet but would respond to direct questions, shared that he is currently a grad student at [employer] in [place], will likely complete the remainder of his semester online in order to be with pt. Pt's parents live in [place], [place] but are currently in town; son shared that pt's boss is paying for a hotel room for parents for the week. Son shared that pt's parents find it too difficult to be in the room with pt, are struggling to see pt in current condition. Son reports pt has a brother in  [place]. Psychosocial Issues Identified/ Resilience Factors:  Anticipatory grief, family is having a difficult time with pt's decline. Son did not elaborate when asked about his support system. No spiritual concerns identified; Chaplains are following. Goals of Care / Plan: Pt is scheduled for 5th of 10 radiation treatments today, transportation had not yet arrived. Oncologist has been discussing goals of care with family; IT chemo vs hospice to be considered if condition does not improve with XRT. SW will continue to follow for support. Discussed with Onc NP. Thank you for including Palliative Medicine in the care of Ms. Tala Franco.      shawna  MARLENE Martniez, [facility]-  [phone])

## 2021-12-02 NOTE — PROGRESS NOTES
Physician Progress Note      PATIENTDorian Schilder  CSN #:                  [id number]  :                       1965  ADMIT DATE:       2021 9:14 PM  [address] [facility] DATE:  RESPONDING  PROVIDER #:        David Ayoub MD          QUERY TEXT:    Pt admitted with brain mets   Noted documentation of brain edema by hematology consult. . If possible, please document in progress notes and discharge summary:    The medical record reflects the following:  Risk Factors: brain mets  Clinical Indicators:  CT  FINDINGS:  Left frontal edema once again seen. Increase in size of the ventricles since the prior examination. No extra-axial fluid collection or shift of the midline. ?  IMPRESSION  1. Increased size of the ventricular system since MRI examination .  2. Stable left frontal lesion.  hem onc  21 MRI brain  FINDINGS:  There is a left paracentral frontal enhancing mass measuring approximately 1.6 x  1.7 x 1.7 cm located im    Treatment: oncology consult, decadron, palliative consult    Thank you,  Jagdish Copeland RN, CCDS  Certified Clinical   [phone]  Options provided:  -- brain edema  confirmed present on admission  -- Defer to hematology consultant documentation regarding brain edema  -- Other - I will add my own diagnosis  -- Disagree - Not applicable / Not valid  -- Disagree - Clinically unable to determine / Unknown  -- Refer to Clinical Documentation Reviewer    PROVIDER RESPONSE TEXT:    The diagnosis of brain edema was confirmed as present on admission.     Query created by: Brent Reynoso on 2021 1:04 PM      Electronically signed by:  David Ayoub MD 2021 3:39 PM

## 2021-12-02 NOTE — PROGRESS NOTES
Spiritual Care Assessment/Progress Note  [address]      NAME: Breana Moore      MRN: [id number]  AGE: 55 y.o. SEX: female  Jehovah's witness Affiliation: No preference   Language: English     12/2/2021     Total Time (in minutes): 18     Spiritual Assessment begun in [facility] 5M1 MED SURG 1 through conversation with:         []Patient        [x] Family    [] Friend(s)        Reason for Consult: Palliative Care, Initial/Spiritual Assessment     Spiritual beliefs: (Please include comment if needed)     [x] Identifies with a griselda tradition:  Spiritual/Scientology per family       [] Supported by a griselda community:            [] Claims no spiritual orientation:           [] Seeking spiritual identity:                [] Adheres to an individual form of spirituality:           [] Not able to assess:                           Identified resources for coping:      [] Prayer                               [] Music                  [] Guided Imagery     [x] Family/friends                 [] Pet visits     [] Devotional reading                         [] Unknown     [] Other:                                             Interventions offered during this visit: (See comments for more details)    Patient Interventions:  Other (comment) (Unable to assess)     Family/Friend(s): Catharsis/review of pertinent events in supportive environment, Affirmation of griselda     Plan of Care:     [] Support spiritual and/or cultural needs    [] Support AMD and/or advance care planning process      [] Support grieving process   [] Coordinate Rites and/or Rituals    [] Coordination with community clergy   [] No spiritual needs identified at this time   [] Detailed Plan of Care below (See Comments)  [] Make referral to Music Therapy  [] Make referral to Pet Therapy     [] Make referral to Addiction services  [] Make referral to [facility]  [] Make referral to Spiritual Care Partner  [] No future visits requested        [x] Contact Spiritual Care for further referrals     Comments:  attempted to visit Mrs. Sidney López for a palliative care initial spiritual assessment on the [facility]. Surgical Unit. Mrs. Sidney López was being taken off the unit when  came to the room. Consulted with her nurse who shared that family was at the bedside.  met with Mrs. Kurtz's son, Una Crook, and introduced herself. He was wide eyed and appeared a bit hesitant when  came into the room.  inquired how he was doing and he shared  alright and expressed no additional needs.  asked if griselda was important to his Mom and he shared that she is [place] and is very spiritual. 's are available for further support upon referral  Tono Brunson.      Paging Service: [phone])

## 2021-12-02 NOTE — PROGRESS NOTES
[facility] [place] [facility] [place] [address] [facility]  [place]: [phone]  F: [phone]      Reason for evaluation   Tomeka Singh is a 55 y.o. female who is seen in consultation at the request of Dr. Perez Stands for evaluation of stage IV breast cancer, uncontrolled pain    History of Present Illness:   Patient is a 55 y.o. 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 MRI brain 11/28/2021 showed Interval worsening of leptomeningeal metastatic disease since 11/12/2021 predominantly involving the brainstem and multiple cranial nerves, with  associated new mild communicating hydrocephalus and periventricular edema with No significant change in size of left parasagittal frontal enhancing lesion  measuring 1.7 x 1.6 cm. She was scheduled to have an Ommaya placed 11/29 and start IT methotrexate. Mother at bedside. Moriah Cottrell is somnolent but states she has some pain and nausea, she is week. She has no fevers      FH: Mother with breast cancer at age 79; maternal aunt with breast cancer in her 45s; maternal aunt with breast cancer at age 61; no ovarian, prostate, or pancreas cancer      Treatment History:   · 4/6/20 left breast 3:00, 13cmfn, core bx:  IMC, gr 2-3, 1.3 cm, ER negative, CO negative, HER 2 negative at IHC 0; ki67 68%; LN + by core, 6 mm, gr 2-3  · B-59 Carbo/Taxol +/- atezolizumab 4/28/2020-7/14/2020  · Invitae 4/28/2020: negative  · B-59 DDAC 7/21/2020-9/1/2020  · 10/8/2020 Bilateral mastectomy:  Right breast: benign, left breast: pCR, 0/10 LNs, ypT0 ypN0 cM0  · B59 atezolizumab/placebo 11/11/20- 4/27/21  · XRT 12/9/2020-1/27/21  · 11/18/21 liver core bx: Malignant epitheliolid neoplasm  · 11/18/21 CSF:  Markedly atypical but degenerated cells present; Findings are suspicious for malignancy    Interval History:     Resting comfortably. More somnolent today.  Per family not as interactive last evening or this morning. Daughter, Alysia Adjutant and son, Shyann Michelle at bedside. Sitter at bedside. Current Facility-Administered Medications   Medication Dose Route Frequency    labetaloL (NORMODYNE) tablet 200 mg  200 mg Oral BID    oxyCODONE IR (ROXICODONE) tablet 10 mg  10 mg Oral Q6H    memantine (NAMENDA) tablet 5 mg  5 mg Oral BID    dexamethasone (DECADRON) 4 mg/mL injection 4 mg  4 mg IntraVENous Q6H    0.9% sodium chloride infusion  100 mL/hr IntraVENous CONTINUOUS    hydrALAZINE (APRESOLINE) 20 mg/mL injection 20 mg  20 mg IntraVENous Q6H PRN    prochlorperazine (COMPAZINE) tablet 5 mg  5 mg Oral Q6H PRN    haloperidoL (HALDOL) tablet 2 mg  2 mg Oral Q4H PRN    HYDROmorphone (DILAUDID) syringe 0.5 mg  0.5 mg IntraVENous Q4H PRN    haloperidol lactate (HALDOL) injection 3 mg  3 mg IntraVENous Q4H PRN    sodium chloride (NS) flush 5-40 mL  5-40 mL IntraVENous Q8H    sodium chloride (NS) flush 5-40 mL  5-40 mL IntraVENous PRN    acetaminophen (TYLENOL) tablet 650 mg  650 mg Oral Q6H PRN    Or    acetaminophen (TYLENOL) suppository 650 mg  650 mg Rectal Q6H PRN    polyethylene glycol (MIRALAX) packet 17 g  17 g Oral DAILY PRN    ondansetron (ZOFRAN ODT) tablet 8 mg  8 mg Oral Q8H    Or    ondansetron (ZOFRAN) injection 4 mg  4 mg IntraVENous Q8H    oxyCODONE IR (ROXICODONE) tablet 10 mg  10 mg Oral Q4H PRN      No Known Allergies     Review of Systems: A complete review of systems was obtained, negative except as described above.     Physical Exam:     Visit Vitals  BP (!) 156/96 (BP 1 Location: Right upper arm, BP Patient Position: At rest)   Pulse 67   Temp 97.8 °F (36.6 °C)   Resp 14   SpO2 97%     ECOG PS: 3-4  General: no distress; some expressive aphasia, somnolent  Eyes: closed  HENT: oropharynx clear  Neck: supple  Respiratory: normal respiratory effort  CV: no peripheral edema  GI: nontender  Skin: no rashes; no ecchymoses; no petechiae      Results:     Lab Results Component Value Date/Time    WBC 5.1 12/02/2021 02:08 AM    HGB 12.0 12/02/2021 02:08 AM    HCT 36.3 12/02/2021 02:08 AM    PLATELET 82 (L) 96/28/7055 02:08 AM    MCV 87.5 12/02/2021 02:08 AM    ABS. NEUTROPHILS 4.4 12/02/2021 02:08 AM     Lab Results   Component Value Date/Time    Sodium 133 (L) 12/02/2021 02:08 AM    Potassium 3.9 12/02/2021 02:08 AM    Chloride 103 12/02/2021 02:08 AM    CO2 23 12/02/2021 02:08 AM    Glucose 128 (H) 12/02/2021 02:08 AM    BUN 37 (H) 12/02/2021 02:08 AM    Creatinine 0.75 12/02/2021 02:08 AM    GFR est AA >60 12/02/2021 02:08 AM    GFR est non-AA >60 12/02/2021 02:08 AM    Calcium 8.4 (L) 12/02/2021 02:08 AM     Lab Results   Component Value Date/Time    Bilirubin, total 0.5 11/28/2021 03:53 AM    ALT (SGPT) 78 11/28/2021 03:53 AM    Alk. phosphatase 726 (H) 11/28/2021 03:53 AM    Protein, total 7.4 11/28/2021 03:53 AM    Albumin 3.0 (L) 11/28/2021 03:53 AM    Globulin 4.4 (H) 11/28/2021 03:53 AM       3/31/20  3D mammogram:  1.6 cm mass in L breast  US L breast  3:00 left breast mass 1.6 cm, multiple LN in L ax tail    4/14/2020 CT c/a/p:  IMPRESSION: Left breast mass. No evidence for metastatic disease in the chest  abdomen or pelvis. There are no target lesions for RECIST classification. 4/14/2020 Bone Scan:  IMPRESSION: No evidence of bony metastatic disease. 5/4/2020 MRI breast:  IMPRESSION:     Right Breast:  1. BI-RADS Assessment Category 1: Negative. No evidence of breast carcinoma  within the right breast.     Left Breast:  1. BI-RADS Assessment Category 6: Known biopsy proven malignancy- Appropriate  action should be taken. Large area of malignant enhancement, occupying most of  the middle and posterior third of the left breast upper outer and lower-outer  quadrants, from 2:00 to 4:00. Malignancy is much larger than it had appeared  mammographically. Greatest measurement is 7 x 4.2 x 3 cm.  Contained within this  large area of enhancement is the dominant 2.5 cm mass, which represents the  mammographic and sonographic correlate. 2. BI-RADS Assessment Category 6: Known biopsy proven malignancy- Appropriate  action should be taken. Pathologically proven level 1 left axillary  lymphadenopathy, with numerous enlarged, malignant lymph nodes. There is also  level 2 left axillary lymphadenopathy. 3. Retraction of the skin of the lateral left breast, which is thickened. However, no evidence of malignant skin invasion/involvement. 4. The malignant mass closely approximates the pectoral muscle, but there is no  evidence of direct muscle invasion/involvement.     RECOMMENDATIONS:  Appropriate action is recommended. The patient is undergoing neoadjuvant  chemotherapy. There is no evidence of contralateral disease. 7/30/2020 [facility] chest at [facility]: Normal CTA of the chest with no pulmonary embolism or acute aortic abnormalities identified. Consider mild CHF/pulmonary edema    11/12/21 ct c/a/p  FINDINGS:      CHEST WALL: No mass or axillary lymphadenopathy. THYROID: No nodule. MEDIASTINUM: No mass or lymphadenopathy. HENNY: No mass or lymphadenopathy. THORACIC AORTA: No dissection or aneurysm. MAIN PULMONARY ARTERY: Normal in caliber. TRACHEA/BRONCHI: Patent. ESOPHAGUS: No wall thickening or dilatation. HEART: Normal in size. PLEURA: Small left pleural effusion. LUNGS: Left lower lobe atelectasis. Pleural-based opacities are seen in the left  upper lobe, the largest of which measures 3.4 x 1.2 cm.     LIVER: There is a new 15 mm hypodense lesion in the right hepatic lobe. Small  cyst in the right hepatic lobe is again noted. BILIARY TREE: Gallbladder is within normal limits. CBD is not dilated. SPLEEN: within normal limits. PANCREAS: No mass or ductal dilatation. ADRENALS: Unremarkable. KIDNEYS: No mass, calculus, or hydronephrosis. STOMACH: Unremarkable. SMALL BOWEL: No dilatation or wall thickening. COLON: No dilatation or wall thickening.   APPENDIX: Within normal limits. PERITONEUM: No ascites or pneumoperitoneum. RETROPERITONEUM: No lymphadenopathy or aortic aneurysm. REPRODUCTIVE ORGANS: Myomatous uterus is again demonstrated. URINARY BLADDER: Decompressed. BONES: Tiny sclerotic lesion right femoral neck is new. No additional convincing  lesions are identified. ABDOMINAL WALL: No mass or hernia. ADDITIONAL COMMENTS: N/A     IMPRESSION  New small hypodense hepatic lesion is concerning for metastatic disease. Nonspecific tiny sclerotic lesion right femoral neck near compared to the prior  exam, small metastatic focus should be considered. Small left pleural effusion  with underlying atelectasis. Pleural-based opacities in the left upper lobe are  most likely related to prior radiation therapy, however close follow-up is  recommended to evaluate for any interval improvement. 11/12/21 bone scan  FINDINGS: There is slight increase in activity right sixth seventh and eighth  ribs anteriorly and possibly left seventh rib anteriorly. There is vague  increased activity in the hips and there is spotty activity in the femurs. There  is a small focus of activity right tibia. There is increased linear activity in  the fibula. There is question of slight increased activity L1 vertebral body. .     . . Incidental renal imaging shows no abnormality.      IMPRESSION  1. There is suspicion of vague focus of increasing bony activity as described  above which is nonspecific but may represent bony metastatic disease and  correlation with plain films of the pelvis, femurs and right tibia may be  helpful for further assessment    11/12/21 MRI brain  FINDINGS:  There is a left paracentral frontal enhancing mass measuring approximately 1.6 x  1.7 x 1.7 cm located immediately anterior to the genu the corpus callosum. There  is surrounding edema. Adjacent enhancement in the leptomeninges of the  interhemispheric fissure bilaterally.  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     The ventricular size and configuration are within normal limits. Normal signal demonstrated in the cerebral hemispheres, brain stem and  cerebellum. No abnormal areas of intracranial enhancement. No abnormal diffusion. No evidence of intracranial hemorrhage, acute infarct, mass or abnormal  extra-axial fluid collections. Normal flow-voids are present in the vertebral, basilar and carotid artery  systems. 3-D T1 postcontrast images suggests abnormal enhancement in the medial right  internal auditory canal. Not confirmed on standard axial thicker slice T1. Therefore possibly susceptibility artifact. Correlate clinically and with  imaging follow-up if clinically indicated. Diminished fat signal in the skull base and cervical spine marrow may be  treatment related. Mild diffuse dural enhancement.     IMPRESSION  1. Approximately 1.7 cm left frontal mass extending interhemispheric fissure  with adjacent leptomeningeal involvement. Associated edema. 2. Questionable focus of enhancement in the medial right internal auditory canal  versus susceptibility artifact. 3. Reduced fat signal in the marrow may be treatment related. 11/22/21 cr 1.03  ALT 51  AST 59  t bili 0.4    11/22/21 CTA chest  Moderate L pleural effusion, no PE  Pleural based atelectasis or scarring, producing a mass-like appearance in the ant subpleural TIMOTHY, most likely resulting from previous XRT to the L ant chest wall    11/22/21 MRI t-spine  Diffuse marrow signal abnormalities with low signal T1 and diffuse heterogeneous enhancement      MRI brain 11/28/2021  IMPRESSION     1. Interval worsening of leptomeningeal metastatic disease since 11/12/2021  predominantly involving the brainstem and multiple cranial nerves as above, with  associated new mild communicating hydrocephalus and periventricular edema.   2. No significant change in size of left frontal parasagittal metastasis, though  now necrotic in appearance. Stable adjacent leptomeningeal metastatic disease in  the anterior interhemispheric fissure. Unchanged edema in the left frontal lobe  without midline shift or herniation. 3. Redemonstrated diffusely abnormal marrow signal, which may represent some  combination of osseous metastatic disease and posttreatment changes  Records reviewed and summarized above. Pathology report(s) reviewed above. Radiology report(s) reviewed above. 11/28/21 CT A/P  IMPRESSION  1. Fluid-filled nondilated colon, consistent with gastroenteritis/diarrhea. 2. Unchanged small liver lesion concerning for metastatic disease. Assessment/Plan:   1. Left IMC, gr 2-3, triple negative    11/12/21 Recurrent breast cancer to brain, LM, liver, possibly bone. PD-L1 testing, Fazal Hyde, pending still. If +, chemo + pembro would be recommended. If negative, possibly socituzumab govitecan. This decision is pending treatment for #2 below  High concern with rapid progression of disease. Plan if to see if improvement with XRT and if not considering changing to IT chemo  vs hospice. Dr Brian Sanchez has reviewed with family. 2. Brain mets/ LM disease:    Resumed  WBRT; initiated on 11/24; discussed with Rad Onc; tentatively scheduled to complete daily WBRT on 12/9; received 4/10 today. --continue Dex 4 mg every 6 hours; changed to IV   --emantine 5 mg daily for 7 days ( started 11/23) for now;, 12/2 adjusted to  5 mg bid for 7 days then 10 mg in the am and 5 mg in the pm for 7 days, then 10 mg bid for 7 days, for 6 months, rx in  --was re-scheduled appt with Dr. Neal Overton for  [facility] 13 placement for today. ; now on hold  --waiting to give WBXRT 72 hours ( Thurs) to see if working  Before switching to IT chemo vs hospice. Considering intrathecal  Topotecan possibly for Monday 12/6;  Have discussed with pharmacy and radiology dept.        3. L pleural effusion:  IR thoracentesis on 11/24/21, cytology positive for malignancy; poorly differentiated adenocarcinoma. 4. Pain due to cancer:    -palliative team following; appreciate recs    7. Constipation:   miralax, senna    8.  Nausea:  Zofran ODT, phenergan    9) Abnormal LFTs   Due to liver metastasis  CT A/P  as above      Plan reviewed with Dr Luca Harman    Signed By: Turner Mora NP

## 2021-12-02 NOTE — PROGRESS NOTES
Palliative Medicine Consult  Paulo: [phone] ([phone])    Patient Name: Briana Winters  YOB: 1965    Date of Initial Consult: 11/29/21  Reason for Consult: overwhelming symptoms  Requesting Provider: Dr. Shelia Moss  Primary Care Physician: Sabrina Santos MD     SUMMARY:   Briana Winters is a 55 y.o. with recurrent stage IV breast cancer metastatic to brain, liver, who was admitted on 11/27/2021with a diagnosis of dizziness after experiencing 4 falls within 24 hrs at home. Current medical issues leading to Palliative Medicine involvement include: advanced recurrent metastatic disease. Daughter Alireza Rodriguez resides with patient. She notes that her mother was increasingly fatigued and with loss of appetite, but was largely herself and talking up until Wed the 24th. She describes her mother as on a rapid decline since Thanksgiving Day with increased confusion, persistent reporting of pain in her head, neck and legs and multiple falls. SH:  . Two children; Marli ([facility]- age 21), son ([facility], 1928–1983- age 24). Her mother and Father are involved in care. 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 PALLIATIVE DIAGNOSES:   1. Mixed state delirium  2. Cancer associated pain  3. Leptomeningeal disease  4. Stage IV recurrent breast cancer with mets to liver, brain, possibly bone       PLAN:   5. Delirium- multiple etiologies likely but suspect LM disease as most probable cause as symptoms began prior to hospital admission. 1. Was more alert yesterday, somnolent again this am after receiving one dose IV Dilaudid 0.5 mg, Haldol 2 po ~ 1 hr ago. Also required Haldol 3 mg IV x 1 yesterday afternoon. 2. Continue Haldol 2 mg q4h prn for agitation or restlessness. IV 3 mg as backup in case of return of severe discomfort. 3. Continue to manage underlying pain with oral oxycodone and prn Dilaudid. 4. Will continue to hold Ativan as this may worsen agitation. 2. Pain- neck/upper shoulders. Pain apparent with movement. Appears comfortable on exam this am while resting. 1. Continue scheduled oxycodone 10 mg q6h  2. Continue to have Dilaudid 0.5 mg IV q4h prn for severe breakthrough pain. 3. Psychosocial- patient has two young adult children- dtr Sharda (local) and son Priya [employer]AH[employer] [facility]). Lives at home with daughter. Her parents are also involved. 4. Recurrent breast cancer- on day #5/10 of brain XRT. Dr. Any Palacios awaiting response and will reevaluate tomorrow if IT chemo is next step. 5. Will follow with you and support decision making if patient fails to respond adequately to XRT / chemo   6. Thank you for the opportunity to participate in the care of Radha Aguilar    7. Please notify me on call at [phone] if any palliative care needs over the weekend  8. Communicated plan of care with: Palliative IDTMarci 192 Team     GOALS OF CARE / TREATMENT PREFERENCES:     GOALS OF CARE:  Patient/Health Care Proxy Stated Goals: Other (comment) (unclear at this time; not discussed with family)    TREATMENT PREFERENCES:   Code Status: Full Code    Advance Care Planning:  [x] The [facility] Interdisciplinary Team has updated the ACP Navigator with Sandipn and Patient Capacity      Primary Decision Maker: Lucia Sweeney - Daughter - [phone]    Primary Decision Maker: Gem Swanson - Son - [phone]    Secondary Decision Maker: Sumi Goldstein - Father - [address] 12/2/2021   Patient's Healthcare Decision Maker is: Legal Next of Kin   Confirm Advance Directive None   Patient Would Like to Complete Advance Directive Unable       Medical Interventions: Full interventions       Other:    As far as possible, the palliative care team has discussed with patient / health care proxy about goals of care / treatment preferences for patient.      HISTORY:     History obtained from:  EMR, daughter, RN    CHIEF COMPLAINT: confusion, somnolence    HPI/SUBJECTIVE:    The patient is:   [] Verbal and participatory  [x] Non-participatory due to:  Delirium; confusion     Clinical Pain Assessment (nonverbal scale for severity on nonverbal patients):   Clinical Pain Assessment  Severity: 4  Location: all over / difficulty with pain reporting  Character: pt unable to report  Duration: pt unable to report  Effect: pt unable to report  Factors: pt unable to report  Frequency: pt unable to report     Activity (Movement): Restless, excessive activity and/or withdrawal reflexes    Duration: for how long has pt been experiencing pain (e.g., 2 days, 1 month, years)  Frequency: how often pain is an issue (e.g., several times per day, once every few days, constant)     FUNCTIONAL ASSESSMENT:     Palliative Performance Scale (PPS):  PPS: 20       PSYCHOSOCIAL/SPIRITUAL SCREENING:     Palliative IDT has assessed this patient for cultural preferences / practices and a referral made as appropriate to needs (Cultural Services, Patient Advocacy, Ethics, etc.)    Any spiritual / Adventist concerns:  [] Yes /  [x] No    Caregiver Burnout:  [] Yes /  [x] No /  [] No Caregiver Present      Anticipatory grief assessment:   [x] Normal  / [] Maladaptive       ESAS Anxiety:      ESAS Depression:          REVIEW OF SYSTEMS:     Positive and pertinent negative findings in ROS are noted above in HPI. The following systems were [x] reviewed / [] unable to be reviewed as noted in HPI  Other findings are noted below. Systems: constitutional, ears/nose/mouth/throat, respiratory, gastrointestinal, genitourinary, musculoskeletal, integumentary, neurologic, psychiatric, endocrine. Positive findings noted below.   Modified ESAS Completed by: provider           Pain: 4         Anorexia: 10             Stool Occurrence(s): 0        PHYSICAL EXAM:     From RN flowsheet:  Wt Readings from Last 3 Encounters:   11/26/21 179 lb (81.2 kg)   11/24/21 179 lb (81.2 kg)   21 179 lb (81.2 kg)     Blood pressure (!) 156/96, pulse 67, temperature 97.8 °F (36.6 °C), resp. rate 14, SpO2 97 %.     Pain Scale 1: Numeric (0 - 10)  Pain Intensity 1: 9  Pain Onset 1: acute  Pain Location 1: Generalized  Pain Orientation 1: Posterior  Pain Description 1: Throbbing  Pain Intervention(s) 1: Medication (see MAR)  Last bowel movement, if known:     Constitutional: age appropriate young woman; comfortable in appearance but somnolent  Eyes: sclerae anicteric  ENMT: no nasal discharge, moist mucous membranes  Cardiovascular: regular rhythm, distal pulses intact  Respiratory: breathing not labored on RA  Neurologic: alert, tracks, answers only one simple question today; minimally interactive  Psychiatric: flat affect     HISTORY:     Principal Problem:    Delirium (2021)    Active Problems:    Recurrent breast cancer ([facility] [address] (10/8/2020)      Dizziness (2021)      Brain metastasis (HCC) (2021)      Pleural effusion, left (2021)      Cancer related pain (2021)      Elevated BP without diagnosis of hypertension (2021)      Hyponatremia (2021)      Past Medical History:   Diagnosis Date    ADHD     Anxiety and depression     Cancer ([facility] [address]     LEFT BREAST CA    Ill-defined condition     chemotherapy    Limb alert care status     NO STICKS OR BLOOD PRESSURE IN LEFT ARM    Sleep apnea     MILD -NO CPAP      Past Surgical History:   Procedure Laterality Date    HX BREAST BIOPSY Left 4/15/2020    BREAST BIOPSY performed by Gilford Pigeon, MD at [address] [place] [address] HX BREAST RECONSTRUCTION Bilateral 10/8/2020    BREAST RECONSTRUCTION performed by Benjamin Valente MD at [facility] AMBULATORY OR    HX  SECTION  98, 99    HX GI      COLONOSCOPY    HX MASTECTOMY Bilateral 10/8/2020    LEFT BREAST MASTECTOMY WITH AXILLARY NODE DISSECTION AND RIGHT SIMPLE MASTECTOMY / IMMEDIATE BILATERAL BREAST RECONSTRUCTION WITH TISSUE EXPANDERS AND ALLODERM GRAFTS performed by Junior Dorene MD at \[facility]\"" AMBULATORY OR    HX ORTHOPAEDIC      RIGHT ELBOW-FATTY MASS    HX VASCULAR ACCESS Right 04/2020      Family History   Problem Relation Age of Onset    Cancer Mother         Breast    Cancer Maternal Aunt         Breast    Cancer Paternal Uncle     Cancer Maternal Aunt         Breast    Cancer Paternal Uncle       History reviewed, no pertinent family history.   Social History     Tobacco Use    Smoking status: Light Tobacco Smoker    Smokeless tobacco: Never Used    Tobacco comment: Quit April 2020   Substance Use Topics    Alcohol use: Not Currently     Alcohol/week: 6.0 standard drinks     Types: 6 Glasses of wine per week     Comment: PER PT ON 10/1/2020     No Known Allergies   Current Facility-Administered Medications   Medication Dose Route Frequency    labetaloL (NORMODYNE) tablet 200 mg  200 mg Oral BID    oxyCODONE IR (ROXICODONE) tablet 10 mg  10 mg Oral Q6H    memantine (NAMENDA) tablet 5 mg  5 mg Oral BID    dexamethasone (DECADRON) 4 mg/mL injection 4 mg  4 mg IntraVENous Q6H    0.9% sodium chloride infusion  100 mL/hr IntraVENous CONTINUOUS    hydrALAZINE (APRESOLINE) 20 mg/mL injection 20 mg  20 mg IntraVENous Q6H PRN    prochlorperazine (COMPAZINE) tablet 5 mg  5 mg Oral Q6H PRN    haloperidoL (HALDOL) tablet 2 mg  2 mg Oral Q4H PRN    HYDROmorphone (DILAUDID) syringe 0.5 mg  0.5 mg IntraVENous Q4H PRN    haloperidol lactate (HALDOL) injection 3 mg  3 mg IntraVENous Q4H PRN    sodium chloride (NS) flush 5-40 mL  5-40 mL IntraVENous Q8H    sodium chloride (NS) flush 5-40 mL  5-40 mL IntraVENous PRN    acetaminophen (TYLENOL) tablet 650 mg  650 mg Oral Q6H PRN    Or    acetaminophen (TYLENOL) suppository 650 mg  650 mg Rectal Q6H PRN    polyethylene glycol (MIRALAX) packet 17 g  17 g Oral DAILY PRN    ondansetron (ZOFRAN ODT) tablet 8 mg  8 mg Oral Q8H    Or    ondansetron (ZOFRAN) injection 4 mg  4 mg IntraVENous Q8H    oxyCODONE IR (ROXICODONE) tablet 10 mg  10 mg Oral Q4H PRN          LAB AND IMAGING FINDINGS:     Lab Results   Component Value Date/Time    WBC 5.1 12/02/2021 02:08 AM    HGB 12.0 12/02/2021 02:08 AM    PLATELET 82 (L) 68/95/7492 02:08 AM     Lab Results   Component Value Date/Time    Sodium 133 (L) 12/02/2021 02:08 AM    Potassium 3.9 12/02/2021 02:08 AM    Chloride 103 12/02/2021 02:08 AM    CO2 23 12/02/2021 02:08 AM    BUN 37 (H) 12/02/2021 02:08 AM    Creatinine 0.75 12/02/2021 02:08 AM    Calcium 8.4 (L) 12/02/2021 02:08 AM    Magnesium 3.1 (H) 11/28/2021 03:53 AM    Phosphorus 1.6 (L) 11/28/2021 03:53 AM      Lab Results   Component Value Date/Time    Alk. phosphatase 726 (H) 11/28/2021 03:53 AM    Protein, total 7.4 11/28/2021 03:53 AM    Albumin 3.0 (L) 11/28/2021 03:53 AM    Globulin 4.4 (H) 11/28/2021 03:53 AM     Lab Results   Component Value Date/Time    INR 1.0 04/23/2020 10:21 AM    Prothrombin time 9.6 04/23/2020 10:21 AM      No results found for: IRON, FE, TIBC, IBCT, PSAT, FERR   No results found for: PH, PCO2, PO2  No components found for: GLPOC   No results found for: CPK, CKMB             Total time: 325m  Counseling / coordination time, spent as noted above: 20m  > 50% counseling / coordination?: y    Prolonged service was provided for  []30 min   []75 min in face to face time in the presence of the patient, spent as noted above. Time Start:   Time End:   Note: this can only be billed with 74360 (initial) or 05660 (follow up). If multiple start / stop times, list each separately.

## 2021-12-02 NOTE — PROGRESS NOTES
Bedside and Verbal shift change report given to John Chinchilla, [facility] (oncoming nurse) by Peggy Austin RN (offgoing nurse). Report included the following information SBAR, Kardex, ED Summary, Intake/Output, MAR, Accordion and Recent Results.

## 2021-12-02 NOTE — ACP (ADVANCE CARE PLANNING)
Advance Care Planning 12/2/2021   Patient's Healthcare Decision Maker is: Legal Next of Kin   Confirm Advance Directive None   Patient Would Like to Complete Advance Directive Unable      Pt does not have AMD on file, is currently unable to complete. In absence of verified Medical POA, pt's two adult children (dtr Margot Bean, son Meghann Fulton) are legal NOK and surrogate decision makers. Pt currently has Full Code order in place.

## 2021-12-02 NOTE — PROGRESS NOTES
[facility]  [facility], [address] [place] [address]  [phone]      Medical Progress Note      NAME:         Tomeka Singh   :        1965  MRM:        [id number]    Date of service: 2021      Subjective: Patient has been seen and examined as a follow up for multiple medical issues. Chart, labs, diagnostics reviewed. Patient found resting post today's session of radiation. She denies any pain. Weak. Objective:    Vital Signs:    Visit Vitals  /85 (BP 1 Location: Right lower arm, BP Patient Position: At rest)   Pulse 64   Temp 96.8 °F (36 °C)   Resp 18   SpO2 96%          Intake/Output Summary (Last 24 hours) at 2021 1334  Last data filed at 2021 2100  Gross per 24 hour   Intake 2070 ml   Output 200 ml   Net 1870 ml        Physical Examination:    General:   Weak and ill looking but not in any distress   ENT:   no ottorrhea or rhinorrhea with dry mucous membranes  Pulm: clear breath sounds without crackles or wheezes  Card: has regular and normal S1, S2, no peripheral edema  Abd:  Soft, non-tender, non-distended, normoactive bowel sounds   Musc:  No cyanosis, clubbing, atrophy or deformities. Neuro: Awake and alert.  Generally a non focal exam.   Psych:  Has little insight to her illness     Current Facility-Administered Medications   Medication Dose Route Frequency    labetaloL (NORMODYNE) tablet 200 mg  200 mg Oral BID    oxyCODONE IR (ROXICODONE) tablet 10 mg  10 mg Oral Q6H    memantine (NAMENDA) tablet 5 mg  5 mg Oral BID    dexamethasone (DECADRON) 4 mg/mL injection 4 mg  4 mg IntraVENous Q6H    0.9% sodium chloride infusion  100 mL/hr IntraVENous CONTINUOUS    hydrALAZINE (APRESOLINE) 20 mg/mL injection 20 mg  20 mg IntraVENous Q6H PRN    prochlorperazine (COMPAZINE) tablet 5 mg  5 mg Oral Q6H PRN    haloperidoL (HALDOL) tablet 2 mg  2 mg Oral Q4H PRN    HYDROmorphone (DILAUDID) syringe 0.5 mg  0.5 mg IntraVENous Q4H PRN    haloperidol lactate (HALDOL) injection 3 mg  3 mg IntraVENous Q4H PRN    sodium chloride (NS) flush 5-40 mL  5-40 mL IntraVENous Q8H    sodium chloride (NS) flush 5-40 mL  5-40 mL IntraVENous PRN    acetaminophen (TYLENOL) tablet 650 mg  650 mg Oral Q6H PRN    Or    acetaminophen (TYLENOL) suppository 650 mg  650 mg Rectal Q6H PRN    polyethylene glycol (MIRALAX) packet 17 g  17 g Oral DAILY PRN    ondansetron (ZOFRAN ODT) tablet 8 mg  8 mg Oral Q8H    Or    ondansetron (ZOFRAN) injection 4 mg  4 mg IntraVENous Q8H    oxyCODONE IR (ROXICODONE) tablet 10 mg  10 mg Oral Q4H PRN        Laboratory data and review:    Recent Labs     12/02/21  0208   WBC 5.1   HGB 12.0   HCT 36.3   PLT 82*     Recent Labs     12/02/21  0208 11/30/21  0303   * 130*   K 3.9 3.5    93*   CO2 23 26   * 125*   BUN 37* 26*   CREA 0.75 0.82   CA 8.4* 9.2     No components found for: [place]    Diagnostics: Imaging studies have been reviewed    Assessment and Plan:    Recurrent breast cancer / Brain metastasis ([facility] [address] (11/29/2021) POA: has worsening leptomeningeal disease on MRi. Seen and followed by Oncology. Continue Dexamethasone, Namenda, WBRT as per oncology. Decision of further treats will be guided by Oncology. Continue supportive care, diet as tolerated, hydrate. Delirium (11/29/2021) / Dizziness (11/28/2021) POA: 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 MRi brain reviewed. Given her progression of disease, seen by palliative care for symptoms management. Improved. Continue supportive care. IV haldol PRN. IV fluids due to her decreased intake and diet as tolerated     Malignant Pleural effusion, left (11/29/2021) POA: CTA neg for PE. She had IR thoracentesis. Cytology showed poorly differentiated adenocarcinoma     Cancer related pain (11/29/2021) POA: continue IV dilaudid PRN, Oxycodone PRN, bowel regimen. Elevated BP without diagnosis of hypertension (11/29/2021) POA: BP stable. Continue IV Hydralazine PRN    Hyponatremia (11/29/2021) POA: mild. Better. Continue IV fluids.      Total time spent for the patient's care: [facility] discussed with: Patient, Care Manager, Nursing Staff and Consultant/Specialist    Discussed:  Care Plan and D/C Planning    Prophylaxis:  SCD's    Anticipated Disposition:  Home w/Family vs TBD           ___________________________________________________    Attending Physician:   Migel Vasquez MD

## 2021-12-02 NOTE — PROGRESS NOTES
Patient received 5/10 radiation treatments to her whole brain. Patient kept grabbing right shoulder with left hand, turning to her right, with eyes rolling back, non verbal when addressed.  -AM RTT

## 2021-12-02 NOTE — PROGRESS NOTES
12/2/2021  Case Management Progress Note    10:12 AM  Patient is 55year old female admitted 11/28 for delirium, metastatic breast cancer  Patient's RUR is 17% yellow/moderate risk for readmission  Covid test: none this admission  Chart reviewed--patient discussed at [facility] rounds. Per rounds we are still awaiting heme/onc input today for medical plan, and disposition is still unclear at this time. Family currently at bedside, per chart patient has been more awake since yesterday. Will continue to follow and assist with discharge planning as needs become more clear. Transition of Care Plan   1. Continue medical management/treatment  2. Await heme/onc recs   3. Dispo tbd  4.  CM will continue to follow and assist     TAYLOR Storey

## 2021-12-03 NOTE — PROGRESS NOTES
12/3/2021  Case Management Progress Note    11:10 AM  Patient is 55year old female admitted 11/28 for delirium, metastatic breast cancer  Patient's RUR is 17% yellow/moderate risk for readmission  Covid test: none this admission  Chart reviewed--patient discussed at IDR rounds.   Per MD at rounds patient will be here through the weekend for observation by heme/onc and then a plan will be developed on Monday. Unsure of disposition at this time. Will continue to follow. Transition of Care Plan   1. Continue medical management/treatment  2. Plan to be developed on Monday   3. Disposition to be determined  4. Palliative involved  5.  CM will continue to follow    TAYLOR Mcgee

## 2021-12-03 NOTE — PROGRESS NOTES
Nutrition Assessment     Type and Reason for Visit: Initial, RD nutrition re-screen/LOS    Nutrition Recommendations/Plan:   1. Continue regular diet - liberalized to increase PO intake  2. Provide Ensure Enlive TID (1050 kcal, 132 g carbs, 60 g P) to aid in meeting kcal/protein needs. 3. No BM x 5 days     Nutrition Assessment:    Pt is a 55year old female admitted with Dizziness [R42]. She  has a past medical history of ADHD, Anxiety and depression, Cancer ([address] Ill-defined condition, Limb alert care status, and Sleep apnea. Daughter and son at bedside, provided all information. Patient sleeping at time of RD visit. Patient with advanced cancer. Per children, eating only a few bites of each meal. No chewing problems. Was seen by SLP for swallowing eval, no issues seen. UBW  200# but has lost 21# 10.5% x 1 year. Not clinically significant for timeframe. No N/V/D at this time. Patient has had Boost in the past, children voiced acceptance of Ensure during this admission. Transport arrived for patient to be taken to treatment, RD unable to complete NFPE at this time. Per palliative care notes, potential to dc to hospice early next week. Patient Vitals for the past 168 hrs:   % Diet Eaten   12/03/21 1004 0%   12/01/21 1400 26 - 50%   12/01/21 0900 0%   11/30/21 1500 0%   11/30/21 1358 0%   11/30/21 0813 0%       Wt Readings from Last 30 Encounters:   11/26/21 81.2 kg (179 lb)   11/17/21 84.4 kg (186 lb 1.1 oz)   11/09/21 86.6 kg (191 lb)   05/04/21 86.2 kg (190 lb)   04/30/21 86.5 kg (190 lb 11.2 oz)   03/16/21 89.8 kg (197 lb 14.4 oz)   03/16/21 89.4 kg (197 lb)   02/23/21 91.1 kg (200 lb 12.8 oz)   01/12/21 90.9 kg (200 lb 4.8 oz)   12/22/20 90.1 kg (198 lb 9.6 oz)   11/11/20 87.8 kg (193 lb 9.6 oz)       Malnutrition Assessment:  Malnutrition Status:   Moderate malnutrition    Context:  Chronic illness     Findings of the 6 clinical characteristics of malnutrition:   Energy Intake:  7 - 75% or less est energy requirements for 1 month or longer  Weight Loss:  Mild weight loss (specify amount and time period) (10.5% x 1 year)     Body Fat Loss:  1 - Mild body fat loss, Buccal region   Muscle Mass Loss:  1 - Mild muscle mass loss, Temples (temporalis)  Fluid Accumulation: None   Strength:  Not performed     Estimated Daily Nutrient Needs:  Energy (kcal):  0247-5152 (30-35, cx)  Protein (g):   (1.2-1.5)       Fluid (ml/day):  6640-7619    Nutrition Related Findings:    ABD: Intact with hypoactive bowel sounds 12/2  Last BM: 11/28  Edema: None noted 12/3    Nutr. Labs:  Lab Results   Component Value Date/Time    GFR est AA >60 12/02/2021 02:08 AM    GFR est non-AA >60 12/02/2021 02:08 AM    Creatinine 0.75 12/02/2021 02:08 AM    BUN 37 (H) 12/02/2021 02:08 AM    Sodium 133 (L) 12/02/2021 02:08 AM    Potassium 3.9 12/02/2021 02:08 AM    Chloride 103 12/02/2021 02:08 AM    CO2 23 12/02/2021 02:08 AM     Lab Results   Component Value Date/Time    Glucose 128 (H) 12/02/2021 02:08 AM     Lab Results   Component Value Date/Time    Hemoglobin A1c 6.1 (H) 11/28/2021 03:53 AM       Nutr.  Meds:  Decadron  Labetalol  Miralax PRN    Current Nutrition Therapies:  ADULT DIET Regular    Anthropometric Measures:  · Height:  5' 2.99\" (160 cm)  · Current Body Wt:  81.2 kg (179 lb)  · BMI: 31.7    Nutrition Diagnosis:   · Moderate malnutrition related to catabolic illness as evidenced by weight loss, intake 0-25%    Nutrition Intervention:  Food and/or Nutrient Delivery: Continue current diet, Start oral nutrition supplement  Nutrition Education and Counseling: No recommendations at this time  Coordination of Nutrition Care: Continue to monitor while inpatient, Interdisciplinary rounds    Goals:  Provide >/= 80% estimated protein needs within 3 - 5 days       Nutrition Monitoring and Evaluation:   Behavioral-Environmental Outcomes: None identified  Food/Nutrient Intake Outcomes: Food and nutrient intake, Supplement intake  Physical Signs/Symptoms Outcomes: Biochemical data, Weight    Discharge Planning:    Continue oral nutrition supplement     Electronically signed by Lorraine Cagle RD on 26/8/9594  Contact Number: [phone] or via Event[employer]

## 2021-12-03 NOTE — PROGRESS NOTES
Patient arrived at 6 to receive treatment. Kept shaking her head no aggressively, asked her if she wanted treatment, and she asked Krystal Corpus is this? \", I told her treatment to her brain, she then stated \"no\". Had  (oncologist on duty) come into the room and he asked the patient if she didn't want treatment today or if she didn't want it at all anymore. Patient shook head that she didn't want to receive further treatment.  will notify Maryuri  of this.  Patient kept shaking her head no with tears, sent back to the hospital. -AM RTT

## 2021-12-03 NOTE — PROGRESS NOTES
Matt Holguinelsen [facility] [address]  [address] [facility], [address] [place] [address]  [phone]      Medical Progress Note      NAME:         Breana Moore   :        1965  MRM:        [id number]    Date of service: 12/3/2021      Subjective: Patient has been seen and examined as a follow up for multiple medical issues. Chart, labs, diagnostics reviewed. Patient remains frail. Not eating as much. Not very conversant. Discussed with her son at bedside earlier. She declined to have radiation therapy today       Objective:    Vital Signs:    Visit Vitals  BP (!) 161/95 (BP 1 Location: Right lower arm, BP Patient Position: At rest) Comment: RN notified. Pulse 98   Temp 97.4 °F (36.3 °C)   Resp 17   Ht 5' 2.99\" (1.6 m)   SpO2 96%   BMI 31.72 kg/m²          Intake/Output Summary (Last 24 hours) at 12/3/2021 1603  Last data filed at 12/3/2021 1004  Gross per 24 hour   Intake 1250 ml   Output    Net 1250 ml        Physical Examination:    General:   Weak and ill looking but not in any distress   Pulm: clear breath sounds without crackles or wheezes  Card: has regular and normal S1, S2, no peripheral edema  Abd:  Soft, non-tender, non-distended, normoactive bowel sounds   Musc:  No cyanosis, clubbing, atrophy or deformities. Neuro: Awake and alert.  Generally a non focal exam.   Psych:  Has little insight to her illness     Current Facility-Administered Medications   Medication Dose Route Frequency    labetaloL (NORMODYNE) tablet 200 mg  200 mg Oral BID    oxyCODONE IR (ROXICODONE) tablet 10 mg  10 mg Oral Q6H    memantine (NAMENDA) tablet 5 mg  5 mg Oral BID    dexamethasone (DECADRON) 4 mg/mL injection 4 mg  4 mg IntraVENous Q6H    0.9% sodium chloride infusion  100 mL/hr IntraVENous CONTINUOUS    hydrALAZINE (APRESOLINE) 20 mg/mL injection 20 mg  20 mg IntraVENous Q6H PRN    prochlorperazine (COMPAZINE) tablet 5 mg  5 mg Oral Q6H PRN  haloperidoL (HALDOL) tablet 2 mg  2 mg Oral Q4H PRN    HYDROmorphone (DILAUDID) syringe 0.5 mg  0.5 mg IntraVENous Q4H PRN    haloperidol lactate (HALDOL) injection 3 mg  3 mg IntraVENous Q4H PRN    sodium chloride (NS) flush 5-40 mL  5-40 mL IntraVENous Q8H    sodium chloride (NS) flush 5-40 mL  5-40 mL IntraVENous PRN    acetaminophen (TYLENOL) tablet 650 mg  650 mg Oral Q6H PRN    Or    acetaminophen (TYLENOL) suppository 650 mg  650 mg Rectal Q6H PRN    polyethylene glycol (MIRALAX) packet 17 g  17 g Oral DAILY PRN    ondansetron (ZOFRAN ODT) tablet 8 mg  8 mg Oral Q8H    Or    ondansetron (ZOFRAN) injection 4 mg  4 mg IntraVENous Q8H    oxyCODONE IR (ROXICODONE) tablet 10 mg  10 mg Oral Q4H PRN        Laboratory data and review:    Recent Labs     12/02/21  0208   WBC 5.1   HGB 12.0   HCT 36.3   PLT 82*     Recent Labs     12/03/21  1248 12/02/21  0208   NA  --  133*   K  --  3.9   CL  --  103   CO2  --  23   GLU  --  128*   BUN  --  37*   CREA  --  0.75   CA  --  8.4*   ALB 2.7*  --    ALT 94*  --      No components found for: [place]    Diagnostics: Imaging studies have been reviewed    Assessment and Plan:    Recurrent breast cancer / Brain metastasis ([facility] [address] (11/29/2021) POA: has worsening leptomeningeal disease on MRi. Seen and followed by Oncology. Continue Dexamethasone, Namenda. Had been receiving XRT but she declined to have more sessions today. Discussions on-going for IT chemo 12/6 vs inpatient hospice. Palliative care following. 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 MRi brain reviewed. Given her progression of disease, seen by palliative care for symptoms management. Mood depressed over time. Continue supportive care. IV haldol PRN. Malignant Pleural effusion, left (11/29/2021) POA: CTA neg for PE. She had IR thoracentesis.  Cytology showed poorly differentiated adenocarcinoma     Cancer related pain (11/29/2021) POA: continue IV dilaudid PRN, Oxycodone PRN, bowel regimen. Palliative care following      Elevated BP without diagnosis of hypertension (11/29/2021) POA: BP stable. Continue Labetalol. Continue IV Hydralazine PRN    Hyponatremia (11/29/2021) POA: mild. Better. Continue IV fluids.      Total time spent for the patient's care: [address] [facility] discussed with: Patient, Care Manager, Nursing Staff and Consultant/Specialist    Discussed:  Care Plan and D/C Planning    Prophylaxis:  SCD's    Anticipated Disposition:  Hospice            ___________________________________________________    Attending Physician:   Dylan Jackman MD

## 2021-12-03 NOTE — PROGRESS NOTES
0900: Pt will not swallow water or open mouth for medications. 1015: Spoke with Juliana Griggs NP and will premedicate with dilaudid prior to radiation today. Pickup time is 1030.     1250: pt continues to refuse PO.    1800: Pt is continues to not wake up enough to take PO meds. 1900: Bedside shift change report given to Ev (oncoming nurse) by Bertha Harding (offgoing nurse). Report included the following information SBAR, Kardex, Intake/Output, MAR and Recent Results.

## 2021-12-03 NOTE — PROGRESS NOTES
[facility] [place] [facility] [place] [address] [facility] [address] Child: [phone]  F: [phone]      Reason for evaluation   Delfino Farley is a 55 y.o. female who is seen in consultation at the request of Dr. Aj Dee for evaluation of stage IV breast cancer, uncontrolled pain    History of Present Illness:   Patient is a 55 y.o. 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 MRI brain 11/28/2021 showed Interval worsening of leptomeningeal metastatic disease since 11/12/2021 predominantly involving the brainstem and multiple cranial nerves, with  associated new mild communicating hydrocephalus and periventricular edema with No significant change in size of left parasagittal frontal enhancing lesion  measuring 1.7 x 1.6 cm. She was scheduled to have an Ommaya placed 11/29 and start IT methotrexate. Mother at bedside. Sofi Corcoran is somnolent but states she has some pain and nausea, she is week. She has no fevers      FH: Mother with breast cancer at age 79; maternal aunt with breast cancer in her 45s; maternal aunt with breast cancer at age 61; no ovarian, prostate, or pancreas cancer      Treatment History:   · 4/6/20 left breast 3:00, 13cmfn, core bx:  IMC, gr 2-3, 1.3 cm, ER negative, HI negative, HER 2 negative at IHC 0; ki67 68%; LN + by core, 6 mm, gr 2-3  · B-59 Carbo/Taxol +/- atezolizumab 4/28/2020-7/14/2020  · Invitae 4/28/2020: negative  · B-59 DDAC 7/21/2020-9/1/2020  · 10/8/2020 Bilateral mastectomy:  Right breast: benign, left breast: pCR, 0/10 LNs, ypT0 ypN0 cM0  · B59 atezolizumab/placebo 11/11/20- 4/27/21  · XRT 12/9/2020-1/27/21  · 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  Nonverbal with me, communicated with Dr. Guillermo Crystal and team that she did not want further XRT, XRT canceled for today. See their note, I reviewed it and spoke with Dr. Camila Stephens with [facility] by phone (daughter) as she was not at bedside when I rounded    Current Facility-Administered Medications   Medication Dose Route Frequency    labetaloL (NORMODYNE) tablet 200 mg  200 mg Oral BID    oxyCODONE IR (ROXICODONE) tablet 10 mg  10 mg Oral Q6H    memantine (NAMENDA) tablet 5 mg  5 mg Oral BID    dexamethasone (DECADRON) 4 mg/mL injection 4 mg  4 mg IntraVENous Q6H    0.9% sodium chloride infusion  100 mL/hr IntraVENous CONTINUOUS    hydrALAZINE (APRESOLINE) 20 mg/mL injection 20 mg  20 mg IntraVENous Q6H PRN    prochlorperazine (COMPAZINE) tablet 5 mg  5 mg Oral Q6H PRN    haloperidoL (HALDOL) tablet 2 mg  2 mg Oral Q4H PRN    HYDROmorphone (DILAUDID) syringe 0.5 mg  0.5 mg IntraVENous Q4H PRN    haloperidol lactate (HALDOL) injection 3 mg  3 mg IntraVENous Q4H PRN    sodium chloride (NS) flush 5-40 mL  5-40 mL IntraVENous Q8H    sodium chloride (NS) flush 5-40 mL  5-40 mL IntraVENous PRN    acetaminophen (TYLENOL) tablet 650 mg  650 mg Oral Q6H PRN    Or    acetaminophen (TYLENOL) suppository 650 mg  650 mg Rectal Q6H PRN    polyethylene glycol (MIRALAX) packet 17 g  17 g Oral DAILY PRN    ondansetron (ZOFRAN ODT) tablet 8 mg  8 mg Oral Q8H    Or    ondansetron (ZOFRAN) injection 4 mg  4 mg IntraVENous Q8H    oxyCODONE IR (ROXICODONE) tablet 10 mg  10 mg Oral Q4H PRN      No Known Allergies     Review of Systems: A complete review of systems was obtained, negative except as described above.     Physical Exam:     Visit Vitals  BP (!) 150/96   Pulse 99   Temp 97.4 °F (36.3 °C)   Resp 16   Ht 5' 2.99\" (1.6 m)   SpO2 96%   BMI 31.72 kg/m²     ECOG PS:  4  General: no distress; nonverbal, somnolent  Eyes: closed  HENT: oropharynx clear  Neck: supple  Respiratory: normal respiratory effort  CV: no peripheral edema  GI: nontender  Skin: no rashes; no ecchymoses; no petechiae      Results:     Lab Results   Component Value Date/Time    WBC 5.1 12/02/2021 02:08 AM    HGB 12.0 12/02/2021 02:08 AM    HCT 36.3 12/02/2021 02:08 AM    PLATELET 82 (L) 24/12/4646 02:08 AM    MCV 87.5 12/02/2021 02:08 AM    ABS. NEUTROPHILS 4.4 12/02/2021 02:08 AM     Lab Results   Component Value Date/Time    Sodium 133 (L) 12/02/2021 02:08 AM    Potassium 3.9 12/02/2021 02:08 AM    Chloride 103 12/02/2021 02:08 AM    CO2 23 12/02/2021 02:08 AM    Glucose 128 (H) 12/02/2021 02:08 AM    BUN 37 (H) 12/02/2021 02:08 AM    Creatinine 0.75 12/02/2021 02:08 AM    GFR est AA >60 12/02/2021 02:08 AM    GFR est non-AA >60 12/02/2021 02:08 AM    Calcium 8.4 (L) 12/02/2021 02:08 AM     Lab Results   Component Value Date/Time    Bilirubin, total 0.4 12/03/2021 12:48 PM    ALT (SGPT) 94 (H) 12/03/2021 12:48 PM    Alk. phosphatase 689 (H) 12/03/2021 12:48 PM    Protein, total 6.7 12/03/2021 12:48 PM    Albumin 2.7 (L) 12/03/2021 12:48 PM    Globulin 4.0 12/03/2021 12:48 PM       3/31/20  3D mammogram:  1.6 cm mass in L breast  US L breast  3:00 left breast mass 1.6 cm, multiple LN in L ax tail    4/14/2020 CT c/a/p:  IMPRESSION: Left breast mass. No evidence for metastatic disease in the chest  abdomen or pelvis. There are no target lesions for RECIST classification. 4/14/2020 Bone Scan:  IMPRESSION: No evidence of bony metastatic disease. 5/4/2020 MRI breast:  IMPRESSION:     Right Breast:  1. BI-RADS Assessment Category 1: Negative. No evidence of breast carcinoma  within the right breast.     Left Breast:  1. BI-RADS Assessment Category 6: Known biopsy proven malignancy- Appropriate  action should be taken. Large area of malignant enhancement, occupying most of  the middle and posterior third of the left breast upper outer and lower-outer  quadrants, from 2:00 to 4:00. Malignancy is much larger than it had appeared  mammographically.  Greatest measurement is 7 x 4.2 x 3 cm. Contained within this  large area of enhancement is the dominant 2.5 cm mass, which represents the  mammographic and sonographic correlate. 2. BI-RADS Assessment Category 6: Known biopsy proven malignancy- Appropriate  action should be taken. Pathologically proven level 1 left axillary  lymphadenopathy, with numerous enlarged, malignant lymph nodes. There is also  level 2 left axillary lymphadenopathy. 3. Retraction of the skin of the lateral left breast, which is thickened. However, no evidence of malignant skin invasion/involvement. 4. The malignant mass closely approximates the pectoral muscle, but there is no  evidence of direct muscle invasion/involvement.     RECOMMENDATIONS:  Appropriate action is recommended. The patient is undergoing neoadjuvant  chemotherapy. There is no evidence of contralateral disease. 7/30/2020 [facility] chest at [facility]: Normal CTA of the chest with no pulmonary embolism or acute aortic abnormalities identified. Consider mild CHF/pulmonary edema    11/12/21 ct c/a/p  FINDINGS:      CHEST WALL: No mass or axillary lymphadenopathy. THYROID: No nodule. MEDIASTINUM: No mass or lymphadenopathy. HENNY: No mass or lymphadenopathy. THORACIC AORTA: No dissection or aneurysm. MAIN PULMONARY ARTERY: Normal in caliber. TRACHEA/BRONCHI: Patent. ESOPHAGUS: No wall thickening or dilatation. HEART: Normal in size. PLEURA: Small left pleural effusion. LUNGS: Left lower lobe atelectasis. Pleural-based opacities are seen in the left  upper lobe, the largest of which measures 3.4 x 1.2 cm.     LIVER: There is a new 15 mm hypodense lesion in the right hepatic lobe. Small  cyst in the right hepatic lobe is again noted. BILIARY TREE: Gallbladder is within normal limits. CBD is not dilated. SPLEEN: within normal limits. PANCREAS: No mass or ductal dilatation. ADRENALS: Unremarkable. KIDNEYS: No mass, calculus, or hydronephrosis. STOMACH: Unremarkable.   SMALL BOWEL: No dilatation or wall thickening. COLON: No dilatation or wall thickening. APPENDIX: Within normal limits. PERITONEUM: No ascites or pneumoperitoneum. RETROPERITONEUM: No lymphadenopathy or aortic aneurysm. REPRODUCTIVE ORGANS: Myomatous uterus is again demonstrated. URINARY BLADDER: Decompressed. BONES: Tiny sclerotic lesion right femoral neck is new. No additional convincing  lesions are identified. ABDOMINAL WALL: No mass or hernia. ADDITIONAL COMMENTS: N/A     IMPRESSION  New small hypodense hepatic lesion is concerning for metastatic disease. Nonspecific tiny sclerotic lesion right femoral neck near compared to the prior  exam, small metastatic focus should be considered. Small left pleural effusion  with underlying atelectasis. Pleural-based opacities in the left upper lobe are  most likely related to prior radiation therapy, however close follow-up is  recommended to evaluate for any interval improvement. 11/12/21 bone scan  FINDINGS: There is slight increase in activity right sixth seventh and eighth  ribs anteriorly and possibly left seventh rib anteriorly. There is vague  increased activity in the hips and there is spotty activity in the femurs. There  is a small focus of activity right tibia. There is increased linear activity in  the fibula. There is question of slight increased activity L1 vertebral body. .     . . Incidental renal imaging shows no abnormality.      IMPRESSION  1. There is suspicion of vague focus of increasing bony activity as described  above which is nonspecific but may represent bony metastatic disease and  correlation with plain films of the pelvis, femurs and right tibia may be  helpful for further assessment    11/12/21 MRI brain  FINDINGS:  There is a left paracentral frontal enhancing mass measuring approximately 1.6 x  1.7 x 1.7 cm located immediately anterior to the genu the corpus callosum. There  is surrounding edema.  Adjacent enhancement in the leptomeninges of the  interhemispheric fissure bilaterally. 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     The ventricular size and configuration are within normal limits. Normal signal demonstrated in the cerebral hemispheres, brain stem and  cerebellum. No abnormal areas of intracranial enhancement. No abnormal diffusion. No evidence of intracranial hemorrhage, acute infarct, mass or abnormal  extra-axial fluid collections. Normal flow-voids are present in the vertebral, basilar and carotid artery  systems. 3-D T1 postcontrast images suggests abnormal enhancement in the medial right  internal auditory canal. Not confirmed on standard axial thicker slice T1. Therefore possibly susceptibility artifact. Correlate clinically and with  imaging follow-up if clinically indicated. Diminished fat signal in the skull base and cervical spine marrow may be  treatment related. Mild diffuse dural enhancement.     IMPRESSION  1. Approximately 1.7 cm left frontal mass extending interhemispheric fissure  with adjacent leptomeningeal involvement. Associated edema. 2. Questionable focus of enhancement in the medial right internal auditory canal  versus susceptibility artifact. 3. Reduced fat signal in the marrow may be treatment related. 11/22/21 cr 1.03  ALT 51  AST 59  t bili 0.4    11/22/21 CTA chest  Moderate L pleural effusion, no PE  Pleural based atelectasis or scarring, producing a mass-like appearance in the ant subpleural TIMOTHY, most likely resulting from previous XRT to the L ant chest wall    11/22/21 MRI t-spine  Diffuse marrow signal abnormalities with low signal T1 and diffuse heterogeneous enhancement      MRI brain 11/28/2021  IMPRESSION     1.  Interval worsening of leptomeningeal metastatic disease since 11/12/2021  predominantly involving the brainstem and multiple cranial nerves as above, with  associated new mild communicating hydrocephalus and periventricular edema. 2. No significant change in size of left frontal parasagittal metastasis, though  now necrotic in appearance. Stable adjacent leptomeningeal metastatic disease in  the anterior interhemispheric fissure. Unchanged edema in the left frontal lobe  without midline shift or herniation. 3. Redemonstrated diffusely abnormal marrow signal, which may represent some  combination of osseous metastatic disease and posttreatment changes  Records reviewed and summarized above. Pathology report(s) reviewed above. Radiology report(s) reviewed above. 11/28/21 CT A/P  IMPRESSION  1. Fluid-filled nondilated colon, consistent with gastroenteritis/diarrhea. 2. Unchanged small liver lesion concerning for metastatic disease. Assessment/Plan:   1. Left IMC, gr 2-3, triple negative    11/12/21 Recurrent breast cancer to brain, LM, liver, lung, possibly bone. High concern with rapid progression of disease. Stopped XRT per pt's wishes today. Offering IT chemo on Monday vs inpatient hospice. Discussed with daughter that patient may only have days to live unless an immediate response is seen with IT chemo    2. Brain mets/ LM disease:    WBXRT initiated on 11/24; discussed with Rad Onc; 5/10 given yesterday, but stopped today, see above  --continue IV Dex 4 mg every 6 hours  --emantine 5 mg 12/2 adjusted to  5 mg bid for 7 days then 10 mg in the am and 5 mg in the pm for 7 days, then 10 mg bid for 6 months  -- Considering intrathecal Topotecan possibly for Monday 12/6;  Have discussed with pharmacy and radiology dept. --discussed with Dr. Preeti Reddy, my recommendation would actually be hospice/comfort care at this time. I am willing to try IT topotecan on Monday, but I did discuss with her daughter that I am concerned that this will not work    3. L pleural effusion:  IR thoracentesis on 11/24/21, cytology positive for malignancy; poorly differentiated adenocarcinoma.      4. Pain due to cancer: -palliative team following; appreciate recs    7. Constipation:   miralax, senna    8. Nausea:  Zofran ODT, phenergan    9.  Abnormal LFTs:  Due to cancer, no further work up needed    > 35 min were spent in coordination of care for this patient today    Signed By: Bailey Mcrae MD

## 2021-12-03 NOTE — PROGRESS NOTES
Bedside and Verbal shift change report given to Jose Angel Amezquita RN (oncoming nurse) by Annie Cooper RN (offgoing nurse). Report included the following information SBAR, Kardex, ED Summary, OR Summary, Intake/Output, MAR, Accordion and Recent Results.

## 2021-12-03 NOTE — PROGRESS NOTES
Bedside and Verbal shift change report given to Lexx (oncoming nurse) by Janette Jane (offgoing nurse). Report included the following information SBAR, Procedure Summary, Intake/Output, MAR and Recent Results.

## 2021-12-04 NOTE — ROUTINE PROCESS
Has not voided bladder scan done reading 999 cc and suprapubic distended. perferct serve on call. [id number] Still no orders another message sent perfect serve. 4686 St.cath done obtained 1650 cc clear yellow urine. Rebladder scan witn 0 reading. Francisco Pineda

## 2021-12-04 NOTE — PROGRESS NOTES
[facility]  [address] [place] [address], [address]: [phone]  F: [phone]      Reason for evaluation   Sue Contreras is a 55 y.o. female who is seen in consultation at the request of Dr. Mary Gaffney for evaluation of stage IV breast cancer, uncontrolled pain    History of Present Illness:   Patient is a 55 y.o. 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 MRI brain 11/28/2021 showed Interval worsening of leptomeningeal metastatic disease since 11/12/2021 predominantly involving the brainstem and multiple cranial nerves, with  associated new mild communicating hydrocephalus and periventricular edema with No significant change in size of left parasagittal frontal enhancing lesion measuring 1.7 x 1.6 cm. She was scheduled to have an Ommaya placed 11/29 and start IT methotrexate. There is a conversation with the family about switching gear to hospice care. Riaz Bunn is somnolent and unable to answer any question. FH:  Mother with breast cancer at age 79; maternal aunt with breast cancer in her 45s; maternal aunt with breast cancer at age 61; no ovarian, prostate, or pancreas cancer      Treatment History:   · 4/6/20 left breast 3:00, 13cmfn, core bx:  IMC, gr 2-3, 1.3 cm, ER negative, IA negative, HER 2 negative at IHC 0; ki67 68%; LN + by core, 6 mm, gr 2-3  · B-59 Carbo/Taxol +/- atezolizumab 4/28/2020-7/14/2020  · Inv[facility]ae 4/28/2020: negative  · B-59 DDAC 7/21/2020-9/1/2020  · 10/8/2020 Bilateral mastectomy:  Right breast: benign, left breast: pCR, 0/10 LNs, ypT0 ypN0 cM0  · B59 atezolizumab/placebo 11/11/20- 4/27/21  · XRT 12/9/2020-1/27/21  · 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 Nonverbal with me, communicated with Dr. Brittany Malik and team that she did not want further XRT, XRT canceled for today. See their note, I reviewed it and spoke with Dr. Earlene Osman with [facility] by phone (daughter) as she was not at bedside when I rounded    Current Facility-Administered Medications   Medication Dose Route Frequency    labetaloL (NORMODYNE) tablet 300 mg  300 mg Oral BID    oxyCODONE IR (ROXICODONE) tablet 10 mg  10 mg Oral Q6H    memantine (NAMENDA) tablet 5 mg  5 mg Oral BID    dexamethasone (DECADRON) 4 mg/mL injection 4 mg  4 mg IntraVENous Q6H    0.9% sodium chloride infusion  100 mL/hr IntraVENous CONTINUOUS    hydrALAZINE (APRESOLINE) 20 mg/mL injection 20 mg  20 mg IntraVENous Q6H PRN    prochlorperazine (COMPAZINE) tablet 5 mg  5 mg Oral Q6H PRN    haloperidoL (HALDOL) tablet 2 mg  2 mg Oral Q4H PRN    HYDROmorphone (DILAUDID) syringe 0.5 mg  0.5 mg IntraVENous Q4H PRN    haloperidol lactate (HALDOL) injection 3 mg  3 mg IntraVENous Q4H PRN    sodium chloride (NS) flush 5-40 mL  5-40 mL IntraVENous Q8H    sodium chloride (NS) flush 5-40 mL  5-40 mL IntraVENous PRN    acetaminophen (TYLENOL) tablet 650 mg  650 mg Oral Q6H PRN    Or    acetaminophen (TYLENOL) suppository 650 mg  650 mg Rectal Q6H PRN    polyethylene glycol (MIRALAX) packet 17 g  17 g Oral DAILY PRN    ondansetron (ZOFRAN ODT) tablet 8 mg  8 mg Oral Q8H    Or    ondansetron (ZOFRAN) injection 4 mg  4 mg IntraVENous Q8H    oxyCODONE IR (ROXICODONE) tablet 10 mg  10 mg Oral Q4H PRN      No Known Allergies     Review of Systems: A complete review of systems was obtained, negative except as described above.     Physical Exam:     Visit Vitals  BP (!) 173/114 (BP 1 Location: Left upper arm, BP Patient Position: At rest)   Pulse 81   Temp 97.3 °F (36.3 °C)   Resp 16   Ht 5' 2.99\" (1.6 m)   SpO2 96%   BMI 31.72 kg/m²     ECOG PS:  4  General: no distress; nonverbal, somnolent  Eyes: closed  HENT: oropharynx clear  Neck: supple  Respiratory: normal respiratory effort  CV: no peripheral edema  GI: nontender  Skin: no rashes; no ecchymoses; no petechiae      Results:     Lab Results   Component Value Date/Time    WBC 5.1 12/02/2021 02:08 AM    HGB 12.0 12/02/2021 02:08 AM    HCT 36.3 12/02/2021 02:08 AM    PLATELET 82 (L) 54/33/5978 02:08 AM    MCV 87.5 12/02/2021 02:08 AM    ABS. NEUTROPHILS 4.4 12/02/2021 02:08 AM     Lab Results   Component Value Date/Time    Sodium 133 (L) 12/02/2021 02:08 AM    Potassium 3.9 12/02/2021 02:08 AM    Chloride 103 12/02/2021 02:08 AM    CO2 23 12/02/2021 02:08 AM    Glucose 128 (H) 12/02/2021 02:08 AM    BUN 37 (H) 12/02/2021 02:08 AM    Creatinine 0.75 12/02/2021 02:08 AM    GFR est AA >60 12/02/2021 02:08 AM    GFR est non-AA >60 12/02/2021 02:08 AM    Calcium 8.4 (L) 12/02/2021 02:08 AM     Lab Results   Component Value Date/Time    Bilirubin, total 0.4 12/03/2021 12:48 PM    ALT (SGPT) 94 (H) 12/03/2021 12:48 PM    Alk. phosphatase 689 (H) 12/03/2021 12:48 PM    Protein, total 6.7 12/03/2021 12:48 PM    Albumin 2.7 (L) 12/03/2021 12:48 PM    Globulin 4.0 12/03/2021 12:48 PM       3/31/20  3D mammogram:  1.6 cm mass in L breast  US L breast  3:00 left breast mass 1.6 cm, multiple LN in L ax tail    4/14/2020 CT c/a/p:  IMPRESSION: Left breast mass. No evidence for metastatic disease in the chest  abdomen or pelvis. There are no target lesions for RECIST classification. 4/14/2020 Bone Scan:  IMPRESSION: No evidence of bony metastatic disease. 5/4/2020 MRI breast:  IMPRESSION:     Right Breast:  1. BI-RADS Assessment Category 1: Negative. No evidence of breast carcinoma  within the right breast.     Left Breast:  1. BI-RADS Assessment Category 6: Known biopsy proven malignancy- Appropriate  action should be taken. Large area of malignant enhancement, occupying most of  the middle and posterior third of the left breast upper outer and lower-outer  quadrants, from 2:00 to 4:00.  Malignancy is much larger than it had appeared  mammographically. Greatest measurement is 7 x 4.2 x 3 cm. Contained within this  large area of enhancement is the dominant 2.5 cm mass, which represents the  mammographic and sonographic correlate. 2. BI-RADS Assessment Category 6: Known biopsy proven malignancy- Appropriate  action should be taken. Pathologically proven level 1 left axillary  lymphadenopathy, with numerous enlarged, malignant lymph nodes. There is also  level 2 left axillary lymphadenopathy. 3. Retraction of the skin of the lateral left breast, which is thickened. However, no evidence of malignant skin invasion/involvement. 4. The malignant mass closely approximates the pectoral muscle, but there is no  evidence of direct muscle invasion/involvement.     RECOMMENDATIONS:  Appropriate action is recommended. The patient is undergoing neoadjuvant  chemotherapy. There is no evidence of contralateral disease. 7/30/2020 CTA chest at : Normal CTA of the chest with no pulmonary embolism or acute aortic abnormalities identified. Consider mild CHF/pulmonary edema    11/12/21 ct c/a/p  FINDINGS:      CHEST WALL: No mass or axillary lymphadenopathy. THYROID: No nodule. MEDIASTINUM: No mass or lymphadenopathy. HENNY: No mass or lymphadenopathy. THORACIC AORTA: No dissection or aneurysm. MAIN PULMONARY ARTERY: Normal in caliber. TRACHEA/BRONCHI: Patent. ESOPHAGUS: No wall thickening or dilatation. HEART: Normal in size. PLEURA: Small left pleural effusion. LUNGS: Left lower lobe atelectasis. Pleural-based opacities are seen in the left  upper lobe, the largest of which measures 3.4 x 1.2 cm.     LIVER: There is a new 15 mm hypodense lesion in the right hepatic lobe. Small  cyst in the right hepatic lobe is again noted. BILIARY TREE: Gallbladder is within normal limits. CBD is not dilated. SPLEEN: within normal limits. PANCREAS: No mass or ductal dilatation. ADRENALS: Unremarkable.   KIDNEYS: No mass, calculus, or hydronephrosis. STOMACH: Unremarkable. SMALL BOWEL: No dilatation or wall thickening. COLON: No dilatation or wall thickening. APPENDIX: Within normal limits. PERITONEUM: No ascites or pneumoperitoneum. RETROPERITONEUM: No lymphadenopathy or aortic aneurysm. REPRODUCTIVE ORGANS: Myomatous uterus is again demonstrated. URINARY BLADDER: Decompressed. BONES: Tiny sclerotic lesion right femoral neck is new. No additional convincing  lesions are identified. ABDOMINAL WALL: No mass or hernia. ADDITIONAL COMMENTS: N/A     IMPRESSION  New small hypodense hepatic lesion is concerning for metastatic disease. Nonspecific tiny sclerotic lesion right femoral neck near compared to the prior  exam, small metastatic focus should be considered. Small left pleural effusion  with underlying atelectasis. Pleural-based opacities in the left upper lobe are  most likely related to prior radiation therapy, however close follow-up is  recommended to evaluate for any interval improvement. 11/12/21 bone scan  FINDINGS: There is slight increase in activity right sixth seventh and eighth  ribs anteriorly and possibly left seventh rib anteriorly. There is vague  increased activity in the hips and there is spotty activity in the femurs. There  is a small focus of activity right tibia. There is increased linear activity in  the fibula. There is question of slight increased activity L1 vertebral body. .     . . Incidental renal imaging shows no abnormality.      IMPRESSION  1. There is suspicion of vague focus of increasing bony activity as described  above which is nonspecific but may represent bony metastatic disease and  correlation with plain films of the pelvis, femurs and right tibia may be  helpful for further assessment    11/12/21 MRI brain  FINDINGS:  There is a left paracentral frontal enhancing mass measuring approximately 1.6 x  1.7 x 1.7 cm located immediately anterior to the genu the corpus callosum. There  is surrounding edema. Adjacent enhancement in the leptomeninges of the  interhemispheric fissure bilaterally. 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     The ventricular size and configuration are within normal limits. Normal signal demonstrated in the cerebral hemispheres, brain stem and  cerebellum. No abnormal areas of intracranial enhancement. No abnormal diffusion. No evidence of intracranial hemorrhage, acute infarct, mass or abnormal  extra-axial fluid collections. Normal flow-voids are present in the vertebral, basilar and carotid artery  systems. 3-D T1 postcontrast images suggests abnormal enhancement in the medial right  internal auditory canal. Not confirmed on standard axial thicker slice T1. Therefore possibly susceptibility artifact. Correlate clinically and with  imaging follow-up if clinically indicated. Diminished fat signal in the skull base and cervical spine marrow may be  treatment related. Mild diffuse dural enhancement.     IMPRESSION  1. Approximately 1.7 cm left frontal mass extending interhemispheric fissure  with adjacent leptomeningeal involvement. Associated edema. 2. Questionable focus of enhancement in the medial right internal auditory canal  versus susceptibility artifact. 3. Reduced fat signal in the marrow may be treatment related. 11/22/21 cr 1.03  ALT 51  AST 59  t bili 0.4    11/22/21 CTA chest  Moderate L pleural effusion, no PE  Pleural based atelectasis or scarring, producing a mass-like appearance in the ant subpleural TIMOTHY, most likely resulting from previous XRT to the L ant chest wall    11/22/21 MRI t-spine  Diffuse marrow signal abnormalities with low signal T1 and diffuse heterogeneous enhancement      MRI brain 11/28/2021  IMPRESSION     1.  Interval worsening of leptomeningeal metastatic disease since 11/12/2021  predominantly involving the brainstem and multiple cranial nerves as above, with  associated new mild communicating hydrocephalus and periventricular edema. 2. No significant change in size of left frontal parasagittal metastasis, though  now necrotic in appearance. Stable adjacent leptomeningeal metastatic disease in  the anterior interhemispheric fissure. Unchanged edema in the left frontal lobe  without midline shift or herniation. 3. Redemonstrated diffusely abnormal marrow signal, which may represent some  combination of osseous metastatic disease and posttreatment changes  Records reviewed and summarized above. Pathology report(s) reviewed above. Radiology report(s) reviewed above. 11/28/21 CT A/P  IMPRESSION  1. Fluid-filled nondilated colon, consistent with gastroenteritis/diarrhea. 2. Unchanged small liver lesion concerning for metastatic disease. Assessment/Plan:   1. Left IMC, gr 2-3, triple negative    11/12/21 Recurrent breast cancer to brain, LM, liver, lung, possibly bone. High concern with rapid progression of disease. Stopped XRT. Offering IT chemo on Monday vs inpatient hospice. 2. Brain mets/ LM disease:    WBXRT initiated on 11/24; discussed with Rad Onc; 5/10 given yesterday, but stopped today, see above  --continue IV Dex 4 mg every 6 hours  --emantine 5 mg 12/2 adjusted to  5 mg bid for 7 days then 10 mg in the am and 5 mg in the pm for 7 days, then 10 mg bid for 6 months  -- Considering intrathecal Topotecan possibly for Monday 12/6  --discussed with Dr. Connie Terry, my recommendation would actually be hospice/comfort care at this time. 3. L pleural effusion:  IR thoracentesis on 11/24/21, cytology positive for malignancy; poorly differentiated adenocarcinoma.      4. Pain due to cancer:    -palliative team following; appreciate recs      Signed by: Leroy Rich MD                     December 4, 2021

## 2021-12-04 NOTE — ROUTINE PROCESS
Bedside and Verbal shift change report given to KATHARINA Carbajal (oncoming nurse) by Camelia Ochoa (offgoing nurse). Report included the following information SBAR and Kardex.

## 2021-12-04 NOTE — PROGRESS NOTES
[facility]  [address] [facility], [address] [place] [address]  [phone]      Medical Progress Note      NAME:         Sue Contreras   :        1965  MRM:        [id number]    Date of service: 2021      Subjective: Patient has been seen and examined as a follow up for multiple medical issues. Chart, labs, diagnostics reviewed. F/u recurrent breast cancer. Patient somnolent, not interactive. Unable to obtain ROS due to patient's mental status. Objective:    Vital Signs:    Visit Vitals  BP (!) 166/76 (BP 1 Location: Right leg, BP Patient Position: At rest)   Pulse (!) 107   Temp 97.9 °F (36.6 °C)   Resp 18   Ht 5' 2.99\" (1.6 m)   SpO2 95%   BMI 31.72 kg/m²          Intake/Output Summary (Last 24 hours) at 2021 1528  Last data filed at 2021 0900  Gross per 24 hour   Intake 1723.33 ml   Output 3150 ml   Net -1426.67 ml        Physical Examination:    General:   Weak and ill looking but not in any distress   Pulm: clear breath sounds without crackles or wheezes  Card: has regular and normal S1, S2, no peripheral edema  Abd:  Soft, non-tender, non-distended, normoactive bowel sounds   Musc:  No cyanosis, clubbing, atrophy or deformities.   Neuro: Awake but not interactive  Psych:  Poor insight     Current Facility-Administered Medications   Medication Dose Route Frequency    labetaloL (NORMODYNE) tablet 300 mg  300 mg Oral BID    oxyCODONE IR (ROXICODONE) tablet 10 mg  10 mg Oral Q6H    memantine (NAMENDA) tablet 5 mg  5 mg Oral BID    dexamethasone (DECADRON) 4 mg/mL injection 4 mg  4 mg IntraVENous Q6H    0.9% sodium chloride infusion  100 mL/hr IntraVENous CONTINUOUS    hydrALAZINE (APRESOLINE) 20 mg/mL injection 20 mg  20 mg IntraVENous Q6H PRN    prochlorperazine (COMPAZINE) tablet 5 mg  5 mg Oral Q6H PRN    haloperidoL (HALDOL) tablet 2 mg  2 mg Oral Q4H PRN    HYDROmorphone (DILAUDID) syringe 0.5 mg  0.5 mg IntraVENous Q4H PRN    haloperidol lactate (HALDOL) injection 3 mg  3 mg IntraVENous Q4H PRN    sodium chloride (NS) flush 5-40 mL  5-40 mL IntraVENous Q8H    sodium chloride (NS) flush 5-40 mL  5-40 mL IntraVENous PRN    acetaminophen (TYLENOL) tablet 650 mg  650 mg Oral Q6H PRN    Or    acetaminophen (TYLENOL) suppository 650 mg  650 mg Rectal Q6H PRN    polyethylene glycol (MIRALAX) packet 17 g  17 g Oral DAILY PRN    ondansetron (ZOFRAN ODT) tablet 8 mg  8 mg Oral Q8H    Or    ondansetron (ZOFRAN) injection 4 mg  4 mg IntraVENous Q8H    oxyCODONE IR (ROXICODONE) tablet 10 mg  10 mg Oral Q4H PRN        Laboratory data and review:    Recent Labs     12/02/21  0208   WBC 5.1   HGB 12.0   HCT 36.3   PLT 82*     Recent Labs     12/03/21  1248 12/02/21  0208   NA  --  133*   K  --  3.9   CL  --  103   CO2  --  23   GLU  --  128*   BUN  --  37*   CREA  --  0.75   CA  --  8.4*   ALB 2.7*  --    ALT 94*  --      No components found for: [place]    Diagnostics: Imaging studies have been reviewed    Assessment and Plan:    Recurrent breast cancer / Brain metastasis ([facility] [address] (11/29/2021) POA: has worsening leptomeningeal disease on MRi. Seen and followed by Oncology. Continue Dexamethasone, Namenda. Had been receiving XRT but she declined further sessions 12/03. Discussions on going for IT chemo 12/6 vs inpatient hospice. Palliative care following. Continue supportive care, diet as tolerated - but not tolerating much PO, hydration with IV fluids      Delirium (11/29/2021) / Dizziness (11/28/2021) POA: 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 MRi brain reviewed. Given her progression of disease, seen by palliative care for symptoms management. Continue supportive care. IV haldol PRN. Malignant Pleural effusion, left (11/29/2021) POA: CTA neg for PE. She had IR thoracentesis. Cytology showed poorly differentiated adenocarcinoma.      Cancer related pain (11/29/2021) POA: continue IV dilaudid PRN, Oxycodone PRN, bowel regimen. Palliative care following      Elevated BP without diagnosis of hypertension (11/29/2021) POA: BP uncontrolled; not tolerating oral Labetalol well. Continue IV Hydralazine PRN    Hyponatremia (11/29/2021) POA: mild. Better. Continue IV fluids.       Total time spent for the patient's care: [facility] discussed with: Patient, Care Manager, Nursing Staff and Consultant/Specialist    Discussed:  Care Plan and D/C Planning    Prophylaxis:  SCD's    Anticipated Disposition:  Hospice            ___________________________________________________    Attending Physician:   Lolita Jackman DO

## 2021-12-04 NOTE — PROGRESS NOTES
Palliative Medicine Consult  Paulo: [phone] ([phone])    Patient Name: Yadira Enrique  YOB: 1965    Date of Initial Consult: 11/29/21  Reason for Consult: overwhelming symptoms  Requesting Provider: Dr. Jose Shah  Primary Care Physician: Mikel Alfonso MD     SUMMARY:   Yadira Enrique is a 55 y.o. with recurrent stage IV breast cancer metastatic to brain, liver, who was admitted on 11/27/2021with a diagnosis of dizziness after experiencing 4 falls within 24 hrs at home. Current medical issues leading to Palliative Medicine involvement include: advanced recurrent metastatic disease. Daughter Alka Lopez resides with patient. She notes that her mother was increasingly fatigued and with loss of appetite, but was largely herself and talking up until Wed the 24th. She describes her mother as on a rapid decline since Thanksgiving Day with increased confusion, persistent reporting of pain in her head, neck and legs and multiple falls. SH:  . Two children; Marli ([facility]- age 21), son ([facility]- age 24). Her mother and Father are involved in care. 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 PALLIATIVE DIAGNOSES:   1. Goals of care counseling/discussion  2. DNR discussion   3. Mixed state delirium  4. Cancer associated pain  5. Leptomeningeal disease  6. Stage IV recurrent breast cancer with mets to liver, brain, possibly bone       PLAN:   1. Family meeting:  Sat 12/4- met with daughter Irvin Mosqueda, son Lisa Goodman, patient's Father Retia Dine and patient's cousin. 1. Reviewed Veronica's current condition. Relayed that she is less tolerant of oral pills today; taking in just a few sips with Marli. Most medicines are not required to be IV. This is concerning for ongoing decline. 2. Reviewed what happened at radiation yesterday, it was described that [facility] elected against further radiation sessions.  I suspect that this was because the sessions are uncomfortable and worsen her pain. 3. Acknowledged that moving forward Tyes ability to make her own decisions is not there. Her ability to process complex information and talk to us is limited due to the cancer itself as well as the effects some of the medicines have on her brain. I recommended family use their knowledge of who she is to make decisions as she would, on her behalf. 4. Reviewed that while intrathecal chemotherapy is being planned for on Monday, it may not be effective against her disease and may even worsen her condition. Father Omar Peoples expressed that he has heard of others who have done well after chemotherapy for brain cancer and are now doing well, some cured. I explained that each cancer is different, but that Tyes is not expected to respond well. I am also worried she may physically not tolerate the procedure given what she went through in radiation. After a careful discussion, they each did express good understanding of her odds and what to expect. Father Omar Peoples expressed very clearly, as did her son Ghazala Plascencia and her cousin that Nohemi Barros would want an attempt at chemotherapy on Monday, even if possibility of a good outcome is low. They all agree and hope it can still be pursued. 5. I talked about option of care in Hospice, either locally (at [facility]) or home with her parents in [facility] if feasible to transition her there. Her Father reiterated the family's dedication to caring for her if she cannot get through the chemotherapy. He is not opposed to utilizing Hospice if there are no treatment options on the table, but he wants to ensure all the options are exhausted before this step is taken. 6. Code status reviewed with medical recommendation for DNR status. All were very clear that Nohemi Barros herself would not accept being on life support and want to change her code status to DNR. Order placed.   I did not complete a DDNR today as paperwork would have been burdensome today. 7. I provided my contact info to Marli to call me tomorrow in case family has any additional questions or concerns. They know that oncology team will be following up on Monday to confirm plan and that the hospitalist is available for acute issues tomorrow. 7. Delirium- multiple etiologies likely but suspect LM disease as most probable cause as symptoms began prior to hospital admission. 1. Stable today; still somnolent, not verbal with me. 2. Had one dose Haldol 3 mg IV / 24 hrs. No PO doses. 3. Will change to liquid Haldol q4h prn so that this can be used before the IV in the future. 4. Continue to manage underlying pain. 2. Pain- neck/upper shoulders. Pain apparent with movement. Appears comfortable on exam this am while resting. 1. Nurses unable to give po oxycodone since noon yesterday, will discontinue. 2. Continue Dilaudid 0.5 mg IV q4h prn which is keeping her comfortable. 3. Psychosocial- patient has two young adult children- dtr Lata Rebollar (local) and son Sheri SHAHID [facility]). Lives at home with daughter. Her parents are also very invested and involved, they live in [facility] [place]. All family are supportive of patient and one another. They appear to be coping as best possible considering the situation. 4. Recurrent breast cancer- completed 5/10 of brain XRT before declining 6th dose. Dr. Khris Del Rosario now offering intrathecal chemo as next step. 5. Thank you for the opportunity to participate in the care of Breana Moore    6. Please notify me on call at [phone] if any palliative care needs over the weekend  7. Communicated plan of care with: Palliative IDTMarci 192 Team including bedside RN. GOALS OF CARE / TREATMENT PREFERENCES:     GOALS OF CARE:  Patient/Health Care Proxy Stated Goals:  Other (comment) (unclear at this time; not discussed with family)    TREATMENT PREFERENCES:   Code Status: DNR    Advance Care Planning:  [x] [facility] Interdisciplinary Team has updated the ACP Navigator with Health Care Decision Maker and Patient Capacity      Primary Decision Maker: Mayelin Pisano - Daughter - [phone]    Primary Decision Maker: Domitila Graham - Son - [phone]    Secondary Decision Maker: Fady Pierson - Father - [phone] 12/2/2021   Patient's Healthcare Decision Maker is: Legal Next of Kin   Confirm Advance Directive None   Patient Would Like to Complete Advance Directive Unable       Medical Interventions: Full interventions       Other:    As far as possible, the palliative care team has discussed with patient / health care proxy about goals of care / treatment preferences for patient.      HISTORY:     History obtained from:  EMR, daughter, RN    CHIEF COMPLAINT: confusion, somnolence    HPI/SUBJECTIVE:    The patient is:   [] Verbal and participatory  [x] Non-participatory due to:  Delirium; confusion     Clinical Pain Assessment (nonverbal scale for severity on nonverbal patients):   Clinical Pain Assessment  Severity: 4  Location: all over / difficulty with pain reporting  Character: pt unable to report  Duration: pt unable to report  Effect: pt unable to report  Factors: pt unable to report  Frequency: pt unable to report     Activity (Movement): Lying quietly, normal position    Duration: for how long has pt been experiencing pain (e.g., 2 days, 1 month, years)  Frequency: how often pain is an issue (e.g., several times per day, once every few days, constant)     FUNCTIONAL ASSESSMENT:     Palliative Performance Scale (PPS):  PPS: 20       PSYCHOSOCIAL/SPIRITUAL SCREENING:     Palliative IDT has assessed this patient for cultural preferences / practices and a referral made as appropriate to needs (Cultural Services, Patient Advocacy, Ethics, etc.)    Any spiritual / Jain concerns:  [] Yes /  [x] No    Caregiver Burnout:  [] Yes /  [x] No / [] No Caregiver Present      Anticipatory grief assessment:   [x] Normal  / [] Maladaptive       ESAS Anxiety:      ESAS Depression:          REVIEW OF SYSTEMS:     Positive and pertinent negative findings in ROS are noted above in HPI. The following systems were [x] reviewed / [] unable to be reviewed as noted in HPI  Other findings are noted below. Systems: constitutional, ears/nose/mouth/throat, respiratory, gastrointestinal, genitourinary, musculoskeletal, integumentary, neurologic, psychiatric, endocrine. Positive findings noted below. Modified ESAS Completed by: provider           Pain: 4         Anorexia: 10             Stool Occurrence(s): 0        PHYSICAL EXAM:     From RN flowsheet:  Wt Readings from Last 3 Encounters:   11/26/21 179 lb (81.2 kg)   11/24/21 179 lb (81.2 kg)   11/23/21 179 lb (81.2 kg)     Blood pressure (!) 166/76, pulse (!) 107, temperature 97.9 °F (36.6 °C), resp. rate 18, height 5' 2.99\" (1.6 m), SpO2 95 %.     Pain Scale 1: Visual  Pain Intensity 1: 0  Pain Onset 1: chronic  Pain Location 1: Generalized  Pain Orientation 1: Posterior  Pain Description 1: Throbbing  Pain Intervention(s) 1: Medication (see MAR)  Last bowel movement, if known:     Constitutional: age appropriate young woman; comfortable in appearance but somnolent  Eyes: sclerae anicteric  ENMT: no nasal discharge, moist mucous membranes  Cardiovascular: regular rhythm, distal pulses intact  Respiratory: breathing not labored on RA  Neurologic: alert, tracks, answers only one simple question today; minimally interactive  Psychiatric: flat affect     HISTORY:     Principal Problem:    Delirium (11/29/2021)    Active Problems:    Recurrent breast cancer ([facility] [address] (10/8/2020)      Dizziness (11/28/2021)      Brain metastasis ([facility] [address] (11/29/2021)      Pleural effusion, left (11/29/2021)      Cancer related pain (11/29/2021)      Elevated BP without diagnosis of hypertension (11/29/2021)      Hyponatremia (11/29/2021)      Past Medical History:   Diagnosis Date    ADHD     Anxiety and depression     Cancer ([facility] [address]     LEFT BREAST CA    Ill-defined condition     chemotherapy    Limb alert care status     NO STICKS OR BLOOD PRESSURE IN LEFT ARM    Sleep apnea     MILD -NO CPAP      Past Surgical History:   Procedure Laterality Date    HX BREAST BIOPSY Left 4/15/2020    BREAST BIOPSY performed by Michael Dugan MD at [address] HX BREAST RECONSTRUCTION Bilateral 10/8/2020    BREAST RECONSTRUCTION performed by Addison Garg MD at [facility]    HX  SECTION  98, 80    HX GI      COLONOSCOPY    HX MASTECTOMY Bilateral 10/8/2020    LEFT BREAST MASTECTOMY WITH AXILLARY NODE DISSECTION AND RIGHT SIMPLE MASTECTOMY / IMMEDIATE BILATERAL BREAST RECONSTRUCTION WITH TISSUE EXPANDERS AND ALLODERM GRAFTS performed by Michael Dugan MD at [facility]    HX ORTHOPAEDIC      RIGHT ELBOW-FATTY MASS    HX VASCULAR ACCESS Right 2020      Family History   Problem Relation Age of Onset    Cancer Mother         Breast    Cancer Maternal Aunt         Breast    Cancer Paternal Uncle     Cancer Maternal Aunt         Breast    Cancer Paternal Uncle       History reviewed, no pertinent family history.   Social History     Tobacco Use    Smoking status: Light Tobacco Smoker    Smokeless tobacco: Never Used    Tobacco comment: Quit 2020   Substance Use Topics    Alcohol use: Not Currently     Alcohol/week: 6.0 standard drinks     Types: 6 Glasses of wine per week     Comment: PER PT ON 10/1/2020     No Known Allergies   Current Facility-Administered Medications   Medication Dose Route Frequency    labetaloL (NORMODYNE) tablet 300 mg  300 mg Oral BID    oxyCODONE IR (ROXICODONE) tablet 10 mg  10 mg Oral Q6H    memantine (NAMENDA) tablet 5 mg  5 mg Oral BID    dexamethasone (DECADRON) 4 mg/mL injection 4 mg  4 mg IntraVENous Q6H    0.9% sodium chloride infusion  100 mL/hr IntraVENous CONTINUOUS    hydrALAZINE (APRESOLINE) 20 mg/mL injection 20 mg  20 mg IntraVENous Q6H PRN    prochlorperazine (COMPAZINE) tablet 5 mg  5 mg Oral Q6H PRN    haloperidoL (HALDOL) tablet 2 mg  2 mg Oral Q4H PRN    HYDROmorphone (DILAUDID) syringe 0.5 mg  0.5 mg IntraVENous Q4H PRN    haloperidol lactate (HALDOL) injection 3 mg  3 mg IntraVENous Q4H PRN    sodium chloride (NS) flush 5-40 mL  5-40 mL IntraVENous Q8H    sodium chloride (NS) flush 5-40 mL  5-40 mL IntraVENous PRN    acetaminophen (TYLENOL) tablet 650 mg  650 mg Oral Q6H PRN    Or    acetaminophen (TYLENOL) suppository 650 mg  650 mg Rectal Q6H PRN    polyethylene glycol (MIRALAX) packet 17 g  17 g Oral DAILY PRN    ondansetron (ZOFRAN ODT) tablet 8 mg  8 mg Oral Q8H    Or    ondansetron (ZOFRAN) injection 4 mg  4 mg IntraVENous Q8H    oxyCODONE IR (ROXICODONE) tablet 10 mg  10 mg Oral Q4H PRN          LAB AND IMAGING FINDINGS:     Lab Results   Component Value Date/Time    WBC 5.1 12/02/2021 02:08 AM    HGB 12.0 12/02/2021 02:08 AM    PLATELET 82 (L) 19/38/5878 02:08 AM     Lab Results   Component Value Date/Time    Sodium 133 (L) 12/02/2021 02:08 AM    Potassium 3.9 12/02/2021 02:08 AM    Chloride 103 12/02/2021 02:08 AM    CO2 23 12/02/2021 02:08 AM    BUN 37 (H) 12/02/2021 02:08 AM    Creatinine 0.75 12/02/2021 02:08 AM    Calcium 8.4 (L) 12/02/2021 02:08 AM    Magnesium 3.1 (H) 11/28/2021 03:53 AM    Phosphorus 1.6 (L) 11/28/2021 03:53 AM      Lab Results   Component Value Date/Time    Alk.  phosphatase 689 (H) 12/03/2021 12:48 PM    Protein, total 6.7 12/03/2021 12:48 PM    Albumin 2.7 (L) 12/03/2021 12:48 PM    Globulin 4.0 12/03/2021 12:48 PM     Lab Results   Component Value Date/Time    INR 1.0 04/23/2020 10:21 AM    Prothrombin time 9.6 04/23/2020 10:21 AM      No results found for: IRON, FE, TIBC, IBCT, PSAT, FERR   No results found for: PH, PCO2, PO2  No components found for: GLPOC   No results found for: CPK, CKMB             Total time: 95m  Counseling / coordination time, spent as noted above: 95m  > 50% counseling / coordination?: y    Prolonged service was provided for  []30 min   []75 min in face to face time in the presence of the patient, spent as noted above. Time Start:  12:10  Time End:  12:45  Time Start: 13:15  Time End: 14:15  Note: this can only be billed with  (initial) or 89249 (follow up). If multiple start / stop times, list each separately.

## 2021-12-05 NOTE — PROGRESS NOTES
Chaplain felipe for patient, Ms. Swathi Kurtz's death on 10M2 Med Surg unit. Arrived and consulted with nurse. No family present at this time.  present at bedside with doctor. Advised nurse to contact [facility] for any further referrals.     Chaplain Hernandez Grey M.Div.  Presley Ivan ([id number])

## 2021-12-05 NOTE — PROGRESS NOTES
Death Pronouncement Note      Events prior to patients death:  Called to bedside at 9:19 PM for unresponsive and pulseless patient. On exam, no heart sounds or breath sounds were noted after 1 minute of auscultation. Pupils were fixed and dilated without pupillary light reflex.      Patient was pronounced dead on 12/4/2021  at 2033    Date/Time of death: 12/4/2021 at 2033      Cause:  Immediate: Metastatic breast cancer    Underlying cause:   Hospital Problems  Date Reviewed: 11/28/2021          Codes Class Noted POA    Brain metastasis ([facility] [address] ICD-10-CM: C79.31  ICD-9-CM: 198.3  11/29/2021 Yes        Pleural effusion, left ICD-10-CM: J90  ICD-9-CM: 511.9  11/29/2021 Yes        Cancer related pain ICD-10-CM: G89.3  ICD-9-CM: 338.3  11/29/2021 Yes        Elevated BP without diagnosis of hypertension ICD-10-CM: R03.0  ICD-9-CM: 796.2  11/29/2021 No        Hyponatremia ICD-10-CM: E87.1  ICD-9-CM: 276.1  11/29/2021 Yes        * (Principal) Delirium ICD-10-CM: R41.0  ICD-9-CM: 780.09  11/29/2021 Yes        Dizziness ICD-10-CM: R42  ICD-9-CM: 780.4  11/28/2021 Yes        Recurrent breast cancer [facility]) ICD-10-CM: C50.919  ICD-9-CM: 174.9  10/8/2020 Yes                Physician Pronouncing Death: Santa Ivy MD      Attending Physician of Record:   Ankush Crowley DO     Events related to death:  Was code called: No   notified: No  Family notified: Yes  Autopsy requested: No  Death certificate completed: No  Code Status Prior to Death: DNR     Discharge summary and death certificate will be completed by: Ankush Crowley DO    Date of Service:  12/4/2021

## 2021-12-05 NOTE — DISCHARGE SUMMARY
Patient admitted on  for dizziness. She had known breast cancer and was found found to have metastatic disease including to her brain. Despite medical treatment she continued to decline. On December 3, she refused radiation therapy. She became less interactive, barely tolerating p.o.'s She was made DNR by her family on  with plans for possible hospice on . However patient  on  at 8.

## 2021-12-05 NOTE — PROGRESS NOTES
This nurse was called into patient room due to the inability to get vital signs. This nurse and the day shift nurse observed the patient unresponsive, with no pulse, heart sounds or breathing. The physician and the  was called to advise.

## 2021-12-05 NOTE — ROUTINE PROCESS
Bedside shift change report given to Carol Mckeon [facility] (oncoming nurse) by Nathen Ladd RN (offgoing nurse). Report included the following information SBAR, Kardex, Intake/Output, MAR, Accordion and Recent Results.
